# Patient Record
Sex: MALE | Race: WHITE | NOT HISPANIC OR LATINO | Employment: OTHER | URBAN - NONMETROPOLITAN AREA
[De-identification: names, ages, dates, MRNs, and addresses within clinical notes are randomized per-mention and may not be internally consistent; named-entity substitution may affect disease eponyms.]

---

## 2017-01-17 ENCOUNTER — OFFICE VISIT (OUTPATIENT)
Dept: OTOLARYNGOLOGY | Facility: CLINIC | Age: 72
End: 2017-01-17

## 2017-01-17 VITALS
SYSTOLIC BLOOD PRESSURE: 110 MMHG | BODY MASS INDEX: 27.25 KG/M2 | WEIGHT: 184 LBS | HEART RATE: 80 BPM | HEIGHT: 69 IN | TEMPERATURE: 97.5 F | RESPIRATION RATE: 19 BRPM | DIASTOLIC BLOOD PRESSURE: 80 MMHG

## 2017-01-17 DIAGNOSIS — C90.20 EXTRAMEDULLARY PLASMACYTOMA (HCC): Primary | ICD-10-CM

## 2017-01-17 DIAGNOSIS — I10 ESSENTIAL HYPERTENSION: ICD-10-CM

## 2017-01-17 DIAGNOSIS — Z92.3 HISTORY OF RADIATION THERAPY: ICD-10-CM

## 2017-01-17 PROCEDURE — 99213 OFFICE O/P EST LOW 20 MIN: CPT | Performed by: OTOLARYNGOLOGY

## 2017-01-17 NOTE — MR AVS SNAPSHOT
Manjit Seth   1/17/2017 9:00 AM   Office Visit    Dept Phone:  [phone]   Encounter #:  [id number]    Provider:  Jose Krueger MD   Department:  [facility]                Your Full Care Plan              Today's Medication Changes          These changes are accurate as of: 1/17/17  9:57 AM.  If you have any questions, ask your nurse or doctor.               Stop taking medication(s)listed here:     glipiZIDE 10 MG tablet   Commonly known as:  GLUCOTROL   Stopped by:  Jose Krueger MD           metFORMIN 500 MG tablet   Commonly known as:  GLUCOPHAGE   Stopped by:  Jose Krueger MD                      Your Updated Medication List          This list is accurate as of: 1/17/17  9:57 AM.  Always use your most recent med list.                aspirin 81 MG EC tablet       atenolol 50 MG tablet   Commonly known as:  TENORMIN       AYR SALINE NASAL DROPS 0.65 % (SOLN) solution   Generic drug:  Saline       Cranberry 400 MG capsule       finasteride 5 MG tablet   Commonly known as:  PROSCAR       fish oil 1000 MG capsule capsule       Garlic 10 MG capsule       lisinopril 5 MG tablet   Commonly known as:  PRINIVIL,ZESTRIL       MULTIVITAMIN ADULT PO       simvastatin 20 MG tablet   Commonly known as:  ZOCOR       triamterene-hydrochlorothiazide 37.5-25 MG per tablet   Commonly known as:  MAXZIDE-25       Vitamin D2 400 UNITS tablet               You Were Diagnosed With        Codes Comments    Extramedullary plasmacytoma    -  Primary ICD-10-CM: C90.20  ICD-9-CM: 238.6     History of radiation therapy     ICD-10-CM: Z92.3  ICD-9-CM: V15.3     Essential hypertension     ICD-10-CM: I10  ICD-9-CM: 401.9       Instructions     None    Patient Instructions History      Upcoming Appointments     Visit Type Date Time Department    FOLLOW UP 1/17/2017  9:00 AM [facility]    FOLLOW UP 7/18/2017  9:15 AM [facility]      MyChart Signup     [facility]  "[employer] allows you to send messages to your doctor, view your test results, renew your prescriptions, schedule appointments, and more. To sign up, go to [employer] and click on the Sign Up Now link in the New User? box. Enter your [employer] Activation Code exactly as it appears below along with the last four digits of your Social Security Number and your Date of Birth () to complete the sign-up process. If you do not sign up before the expiration date, you must request a new code.    [employer] Activation Code: [email or web address]  Expires: 2017  9:57 AM    If you have questions, you can email [email or web address] or call [phone] to talk to our [employer] staff. Remember, [employer] is NOT to be used for urgent needs. For medical emergencies, dial 911.               Other Info from Your Visit           Your Appointments     2017  9:15 AM CDT   Follow Up with Jose Krueger MD   [facility] (--)    [address] [place] [address]   [address]  [facility] [address] [phone]           Arrive 15 minutes prior to appointment.              Allergies     No Known Allergies      Reason for Visit     Follow-up Nasal mass      Vital Signs     Blood Pressure Pulse Temperature Respirations Height Weight    110/80 (Patient Position: Sitting) 80 97.5 °F (36.4 °C) 19 69\" (175.3 cm) 184 lb (83.5 kg)    Body Mass Index Smoking Status                27.17 kg/m2 Former Smoker          Problems and Diagnoses Noted     Extramedullary plasmacytoma    History of therapeutic radiation    High blood pressure        "

## 2017-01-17 NOTE — PATIENT INSTRUCTIONS
No evidence of disease  Call or return for problems, particularly recurrent mass, nasal obstruction, nasal congestion, dryness, bleeding or any other nasal problems  Follow-up with Dr. Stratton has recommended  Follow-up in 6 months

## 2017-01-17 NOTE — PROGRESS NOTES
YOB: 1945  Location: [facility]  Location Address: [address] [place] [address], [facility], [address] [place] [address]  Location Phone: [phone]    Chief Complaint   Patient presents with   • Follow-up     Nasal mass       History of Present Illness  Manjit Seth is a 71 y.o. male.  Manjit Seth is here for follow up of ENT complaints. 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 Patient has completed treatment and Dr. Saleem released him last week. He states he is doing well and has no complaints today. He denies nasal congestion, drainage, oral lesions and sinus pressure.     He has no crusting and denies nasal complaints.               Past Medical History   Diagnosis Date   • Bell's palsy      with facial tremor   • Diabetes mellitus    • Extramedullary plasmacytoma      left nose   • History of radiation therapy 2016     4000 cGy to nose completed on 3/28/16   • History of tobacco abuse    • Hyperlipidemia    • Hypertension        Past Surgical History   Procedure Laterality Date   • Colonoscopy     • Nasal endoscopy       bilateral with excisional biopsy of left intranasal mass 16         Current Outpatient Prescriptions:   •  aspirin 81 MG EC tablet, Take 81 mg by mouth Daily., Disp: , Rfl:   •  atenolol (TENORMIN) 50 MG tablet, Take 50 mg by mouth Daily., Disp: , Rfl:   •  Cranberry 400 MG capsule, Take  by mouth., Disp: , Rfl:   •  Ergocalciferol (VITAMIN D2) 400 UNITS tablet, Take  by mouth., Disp: , Rfl:   •  finasteride (PROSCAR) 5 MG tablet, Take 5 mg by mouth Daily., Disp: , Rfl:   •  Garlic 10 MG capsule, Take  by mouth., Disp: , Rfl:   •  lisinopril (PRINIVIL,ZESTRIL) 5 MG tablet, Take 5 mg by mouth Daily., Disp: , Rfl:   •  Multiple Vitamins-Minerals (MULTIVITAMIN ADULT PO), Take  by mouth., Disp: , Rfl:   •  Omega-3 Fatty Acids (FISH OIL) 1000 MG capsule capsule, Take  by mouth Daily With Breakfast., Disp: , Rfl:   •  Saline (AYR SALINE  NASAL DROPS) 0.65 % (SOLN) solution, into each nostril., Disp: , Rfl:   •  simvastatin (ZOCOR) 20 MG tablet, Take 20 mg by mouth Every Night., Disp: , Rfl:   •  triamterene-hydrochlorothiazide (MAXZIDE-25) 37.5-25 MG per tablet, Take 1 tablet by mouth Daily., Disp: , Rfl:     Review of patient's allergies indicates no known allergies.    Family History   Problem Relation Age of Onset   • Breast cancer Mother    • Heart failure Father    • Cancer Maternal Uncle        Social History     Social History   • Marital status:      Spouse name: N/A   • Number of children: N/A   • Years of education: N/A     Occupational History   • CRNA      Social History Main Topics   • Smoking status: Former Smoker     Types: Cigarettes   • Smokeless tobacco: Current User     Types: Chew   • Alcohol use No   • Drug use: No   • Sexual activity: Defer     Other Topics Concern   • Not on file     Social History Narrative       Review of Systems   Constitutional: Negative.    HENT: Negative.    Eyes: Negative.    Respiratory: Negative.    Cardiovascular: Negative.    Gastrointestinal: Negative.    Endocrine: Negative.    Genitourinary: Negative.    Musculoskeletal: Negative.    Skin: Negative.    Allergic/Immunologic: Negative.    Neurological: Negative.    Hematological: Negative.    Psychiatric/Behavioral: Negative.        Vitals:    01/17/17 0903   BP: 110/80   Pulse: 80   Resp: 19   Temp: 97.5 °F (36.4 °C)       Objective     Physical Exam  CONSTITUTIONAL: well nourished, alert, oriented, in no acute distress     COMMUNICATION AND VOICE: able to communicate normally, normal voice quality    HEAD: normocephalic, no lesions, atraumatic, no tenderness, no masses     FACE: appearance normal, no lesions, no tenderness, no deformities, facial motion symmetric    SALIVARY GLANDS: parotid glands with no tenderness, no swelling, no masses, submandibular glands with normal size, nontender    EYES: ocular motility normal, eyelids normal,  orbits normal, no proptosis, conjunctiva normal , pupils equal, round     EARS:  Hearing: response to conversational voice normal bilaterally   External Ears: auricles without lesions  Otoscopic: tympanic membrane appearance normal, no lesions, no perforation, normal mobility, no fluid    NOSE:  External Nose: structure normal, no tenderness on palpation, no nasal discharge, no lesions, no evidence of trauma, nostrils patent   Intranasal Exam: nasal mucosa with mild hyperemia and no hypervascularity no evidence of recurrent tumor, vestibule within normal limits, inferior turbinate normal, nasal septum with moderate deviation to the left of the midline  Nasopharynx:     ORAL:  Lips: upper and lower lips without lesion   Teeth: dentition within normal limits for age   Gums: gingivae healthy   Oral Mucosa: oral mucosa normal, no mucosal lesions   Floor of Mouth: Warthin’s duct patent, mucosa normal  Tongue: lingual mucosa normal without lesions, normal tongue mobility   Palate: soft and hard palates with normal mucosa and structure  Oropharynx: oropharyngeal mucosa normal    HYPOPHARYNX:   LARYNX:     NECK: neck appearance normal, no mass,  noted without erythema or tenderness    THYROID: no overt thyromegaly, no tenderness, nodules or mass present on palpation, position midline     LYMPH NODES: no lymphadenopathy    CHEST/RESPIRATORY: respiratory effort normal, normal breath sounds     CARDIOVASCULAR: rate and rhythm normal, extremities without cyanosis or edema      NEUROLOGIC/PSYCHIATRIC: oriented to time, place and person, mood normal, affect appropriate, CN II-XII intact grossly    Assessment/Plan   Manjit was seen today for follow-up.    Diagnoses and all orders for this visit:    Extramedullary plasmacytoma- left nose    History of radiation therapy- Left Nose    Essential hypertension      * Surgery not found *  No orders of the defined types were placed in this encounter.    Return in about 6 months (around  7/17/2017) for Recheck.       Patient Instructions   No evidence of disease  Call or return for problems, particularly recurrent mass, nasal obstruction, nasal congestion, dryness, bleeding or any other nasal problems  Follow-up with Dr. Stratton has recommended  Follow-up in 6 months

## 2017-07-18 ENCOUNTER — OFFICE VISIT (OUTPATIENT)
Dept: OTOLARYNGOLOGY | Facility: CLINIC | Age: 72
End: 2017-07-18

## 2017-07-18 VITALS
BODY MASS INDEX: 25.18 KG/M2 | HEART RATE: 55 BPM | DIASTOLIC BLOOD PRESSURE: 69 MMHG | WEIGHT: 170 LBS | SYSTOLIC BLOOD PRESSURE: 142 MMHG | TEMPERATURE: 97.1 F | HEIGHT: 69 IN

## 2017-07-18 DIAGNOSIS — Z92.3 HISTORY OF RADIATION THERAPY: ICD-10-CM

## 2017-07-18 DIAGNOSIS — C90.20 EXTRAMEDULLARY PLASMACYTOMA (HCC): Primary | ICD-10-CM

## 2017-07-18 PROCEDURE — 99214 OFFICE O/P EST MOD 30 MIN: CPT | Performed by: OTOLARYNGOLOGY

## 2017-07-18 PROCEDURE — 31231 NASAL ENDOSCOPY DX: CPT | Performed by: OTOLARYNGOLOGY

## 2017-07-18 RX ORDER — SIMVASTATIN 40 MG
TABLET ORAL
COMMUNITY
Start: 2017-07-08

## 2017-07-18 NOTE — PROGRESS NOTES
PROCEDURE NOTE    Manjit Seth    DATE OF PROCEDURE: 7/18/17    PROCEDURE:   Bilateral Diagnostic Rigid Nasal Endoscopy    PREPROCEDURE DIAGNOSIS:   History of extra medullary plasma cell side, the nasal cavity  Status post radiation therapy  History of epistaxis    POSTPROCEDURE DIAGNOSIS:  SAME    ANESTHESIA:   None    PROCEDURE DESCRIPTION:    With the patient in the chair bilateral diagnostic rigid nasal endoscopy was performed with the Stortz 0° endoscope without difficulty.     An endoscope was passed along the left nasal floor to the nasopharynx.  It was then passed into the region of the middle meatus, middle turbinate, and the sphenoethmoid region. An identical procedure was performed on the right side.  The following findings were noted as stated below:    Findings: Mild right septal deviation associated with inflammation of the left nasal cavity is mild hypervascularity.  No evidence of recent bleeding.  Prior to the last episode of bleeding several months ago the patient had noticed a decrease in his utilization of Bactroban and it was during the closing weeks of winter.  No evidence of recurrence noted.    Condition:  Stable.  Patient tolerated procedure well.    Complications:  None  There was no significant bleeding.

## 2017-07-18 NOTE — PROGRESS NOTES
YOB: 1945  Location: [facility]  Location Address: [address] [place] [address], [facility] [address], [address] [place] [address]  Location Phone: [phone]    Chief Complaint   Patient presents with   • Follow-up     Extramedullary plasmacytoma- left nose       History of Present Illness  Manjit Seth is a 71 y.o. male.  Manjit Seth is here for follow up of ENT complaints. 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 Patient has completed treatment and Dr. Saleem released him last week. He states he is doing well, but has had a couple of mild nosebleeds. He denies nasal congestion, drainage, oral lesions and sinus pressure.     He has no crusting and denies nasal complaints.               Past Medical History:   Diagnosis Date   • Bell's palsy     with facial tremor   • Diabetes mellitus    • Extramedullary plasmacytoma     left nose   • History of radiation therapy 2016    4000 cGy to nose completed on 3/28/16   • History of tobacco abuse    • Hyperlipidemia    • Hypertension        Past Surgical History:   Procedure Laterality Date   • COLONOSCOPY     • NASAL ENDOSCOPY      bilateral with excisional biopsy of left intranasal mass 16         Current Outpatient Prescriptions:   •  aspirin 81 MG EC tablet, Take 81 mg by mouth Daily., Disp: , Rfl:   •  atenolol (TENORMIN) 50 MG tablet, Take 50 mg by mouth Daily., Disp: , Rfl:   •  Cranberry 400 MG capsule, Take  by mouth., Disp: , Rfl:   •  Ergocalciferol (VITAMIN D2) 400 UNITS tablet, Take  by mouth., Disp: , Rfl:   •  finasteride (PROSCAR) 5 MG tablet, Take 5 mg by mouth Daily., Disp: , Rfl:   •  Garlic 10 MG capsule, Take  by mouth., Disp: , Rfl:   •  lisinopril (PRINIVIL,ZESTRIL) 5 MG tablet, Take 5 mg by mouth Daily., Disp: , Rfl:   •  Multiple Vitamins-Minerals (MULTIVITAMIN ADULT PO), Take  by mouth., Disp: , Rfl:   •  mupirocin (BACTROBAN) 2 % ointment, Apply intranasally bid, Disp: 22 g, Rfl: 3  •  Omega-3 Fatty  Acids (FISH OIL) 1000 MG capsule capsule, Take  by mouth Daily With Breakfast., Disp: , Rfl:   •  Saline (AYR SALINE NASAL DROPS) 0.65 % (SOLN) solution, into each nostril., Disp: , Rfl:   •  simvastatin (ZOCOR) 40 MG tablet, , Disp: , Rfl:   •  triamterene-hydrochlorothiazide (MAXZIDE-25) 37.5-25 MG per tablet, Take 1 tablet by mouth Daily., Disp: , Rfl:     Review of patient's allergies indicates no known allergies.    Family History   Problem Relation Age of Onset   • Breast cancer Mother    • Heart failure Father    • Cancer Maternal Uncle        Social History     Social History   • Marital status:      Spouse name: N/A   • Number of children: N/A   • Years of education: N/A     Occupational History   • CRNA      Social History Main Topics   • Smoking status: Former Smoker     Types: Cigarettes   • Smokeless tobacco: Current User     Types: Chew   • Alcohol use No   • Drug use: No   • Sexual activity: Defer     Other Topics Concern   • Not on file     Social History Narrative       Review of Systems   Constitutional: Negative.    HENT: Positive for nosebleeds.    Eyes: Negative.    Respiratory: Negative.    Cardiovascular: Negative.    Gastrointestinal: Negative.    Endocrine: Negative.    Genitourinary: Negative.    Musculoskeletal: Negative.    Skin: Negative.    Allergic/Immunologic: Negative.    Neurological: Negative.    Hematological: Negative.    Psychiatric/Behavioral: Negative.        Vitals:    07/18/17 0911   BP: 142/69   Pulse: 55   Temp: 97.1 °F (36.2 °C)       Objective     Physical Exam  CONSTITUTIONAL: well nourished, alert, oriented, in no acute distress     COMMUNICATION AND VOICE: able to communicate normally, normal voice quality    HEAD: normocephalic, no lesions, atraumatic, no tenderness, no masses     FACE: appearance normal, no lesions, no tenderness, no deformities, facial motion symmetric    SALIVARY GLANDS: parotid glands with no tenderness, no swelling, no masses,  submandibular glands with normal size, nontender    EYES: ocular motility normal, eyelids normal, orbits normal, no proptosis, conjunctiva normal , pupils equal, round     EARS:  Hearing: response to conversational voice normal bilaterally   External Ears: auricles without lesions  Otoscopic: tympanic membrane appearance normal, no lesions, no perforation, normal mobility, no fluid    NOSE:  External Nose: structure normal, no tenderness on palpation, no nasal discharge, no lesions, no evidence of trauma, nostrils patent   Intranasal Exam: nasal mucosa with mild hyperemia with dryness and no hypervascularity no evidence of recurrent tumor, vestibule within normal limits, inferior turbinate normal, nasal septum with moderate deviation to the left of the midline  Nasopharynx:     ORAL:  Lips: upper and lower lips without lesion   Teeth: dentition within normal limits for age   Gums: gingivae healthy   Oral Mucosa: oral mucosa normal, no mucosal lesions   Floor of Mouth: Warthin’s duct patent, mucosa normal  Tongue: lingual mucosa normal without lesions, normal tongue mobility   Palate: soft and hard palates with normal mucosa and structure  Oropharynx: oropharyngeal mucosa normal    HYPOPHARYNX:   LARYNX:     NECK: neck appearance normal, no mass,  noted without erythema or tenderness    THYROID: no overt thyromegaly, no tenderness, nodules or mass present on palpation, position midline     LYMPH NODES: no lymphadenopathy    CHEST/RESPIRATORY: respiratory effort normal, normal breath sounds     CARDIOVASCULAR: rate and rhythm normal, extremities without cyanosis or edema      NEUROLOGIC/PSYCHIATRIC: oriented to time, place and person, mood normal, affect appropriate, CN II-XII intact grossly    Assessment/Plan   Manjit was seen today for follow-up.    Diagnoses and all orders for this visit:    Extramedullary plasmacytoma- left nose    History of radiation therapy- Left Nose      * Surgery not found *  No orders of the  defined types were placed in this encounter.    Return in about 6 months (around 1/18/2018) for Recheck nose Extramedullary plasmacytoma.       Patient Instructions   Be aware of the signs and symptoms of head and neck cancer including neck mass, persistent sore throat, ear pain, hemoptysis, weight loss and hoarseness. If any of these symptoms occur, call for evaluation.

## 2018-03-19 NOTE — PROGRESS NOTES
YOB: 1945  Location: [facility]  Location Address: [address] [place] [address], [facility], [address] [place] [address]  Location Phone: [phone]    Chief Complaint   Patient presents with   • Follow-up     nose       History of Present Illness  Manjit Seth is a 71 y.o. male.  Manjit Seth is here for follow up of ENT complaints. 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 Patient has completed treatment and Dr. Saleem has released him  Patient has no complaints today.  He denies nasal congestion, nasal drainage, epistaxis, oral lesions and sinus pressure.                             Past Medical History:   Diagnosis Date   • Bell's palsy     with facial tremor   • Diabetes mellitus    • Extramedullary plasmacytoma     left nose   • History of radiation therapy 2016    4000 cGy to nose completed on 3/28/16   • History of tobacco abuse    • Hyperlipidemia    • Hypertension        Past Surgical History:   Procedure Laterality Date   • COLONOSCOPY     • NASAL ENDOSCOPY      bilateral with excisional biopsy of left intranasal mass 16       Outpatient Prescriptions Marked as Taking for the 3/20/18 encounter (Office Visit) with Jose Krueger MD   Medication Sig Dispense Refill   • aspirin 81 MG EC tablet Take 81 mg by mouth Daily.     • atenolol (TENORMIN) 50 MG tablet Take 50 mg by mouth Daily.     • Cranberry 400 MG capsule Take  by mouth.     • Ergocalciferol (VITAMIN D2) 400 UNITS tablet Take  by mouth.     • finasteride (PROSCAR) 5 MG tablet Take 5 mg by mouth Daily.     • Garlic 10 MG capsule Take  by mouth.     • lisinopril (PRINIVIL,ZESTRIL) 5 MG tablet Take 5 mg by mouth Daily.     • Multiple Vitamins-Minerals (MULTIVITAMIN ADULT PO) Take  by mouth.     • mupirocin (BACTROBAN) 2 % ointment Apply intranasally bid 22 g 11   • Omega-3 Fatty Acids (FISH OIL) 1000 MG capsule capsule Take  by mouth Daily With Breakfast.     • Saline (AYR SALINE NASAL  DROPS) 0.65 % (SOLN) solution into each nostril.     • simvastatin (ZOCOR) 40 MG tablet      • triamterene-hydrochlorothiazide (MAXZIDE-25) 37.5-25 MG per tablet Take 1 tablet by mouth Daily.         Review of patient's allergies indicates no known allergies.    Family History   Problem Relation Age of Onset   • Breast cancer Mother    • Heart failure Father    • Cancer Maternal Uncle        Social History     Social History   • Marital status:      Spouse name: N/A   • Number of children: N/A   • Years of education: N/A     Occupational History   • CRNA      Social History Main Topics   • Smoking status: Former Smoker     Types: Cigarettes   • Smokeless tobacco: Current User     Types: Chew   • Alcohol use No   • Drug use: No   • Sexual activity: Defer     Other Topics Concern   • Not on file     Social History Narrative   • No narrative on file       Review of Systems   Constitutional: Negative.    HENT: Negative.    Eyes: Negative.    Respiratory: Negative.    Cardiovascular: Negative.    Gastrointestinal: Negative.    Endocrine: Negative.    Genitourinary: Negative.    Musculoskeletal: Negative.    Skin: Negative.    Allergic/Immunologic: Negative.    Neurological: Negative.    Hematological: Negative.    Psychiatric/Behavioral: Negative.        Vitals:    03/20/18 0906   BP: 120/72   Pulse: 68   Temp: 97.3 °F (36.3 °C)       Body mass index is 25.7 kg/m².    Objective     Physical Exam  CONSTITUTIONAL: well nourished, alert, oriented, in no acute distress     COMMUNICATION AND VOICE: able to communicate normally, normal voice quality    HEAD: normocephalic, no lesions, atraumatic, no tenderness, no masses     FACE: appearance normal, no lesions, no tenderness, no deformities, facial motion symmetric    SALIVARY GLANDS: parotid glands with no tenderness, no swelling, no masses, submandibular glands with normal size, nontender    EYES: ocular motility normal, eyelids normal, orbits normal, no proptosis,  conjunctiva normal , pupils equal, round     EARS:  Hearing: response to conversational voice normal bilaterally   External Ears: auricles without lesions  Otoscopic: tympanic membrane appearance normal, no lesions, no perforation, normal mobility, no fluid    NOSE:  External Nose: structure normal, no tenderness on palpation, no nasal discharge, no lesions, no evidence of trauma, nostrils patent   Intranasal Exam: nasal mucosa normal, vestibule within normal limits, inferior turbinate normal, nasal septum midline   Nasopharynx:     ORAL:  Lips: upper and lower lips without lesion   Teeth:  Gums: gingivae healthy   Oral Mucosa: oral mucosa normal, no mucosal lesions   Floor of Mouth: Warthin’s duct patent, mucosa normal  Tongue: lingual mucosa normal without lesions, normal tongue mobility   Palate: soft and hard palates with normal mucosa and structure  Oropharynx: oropharyngeal mucosa normal    HYPOPHARYNX:   LARYNX:     NECK: neck appearance normal, no mass,  noted without erythema or tenderness    THYROID: no overt thyromegaly, no tenderness, nodules or mass present on palpation, position midline     LYMPH NODES: no lymphadenopathy    CHEST/RESPIRATORY: respiratory effort normal, normal breath sounds     CARDIOVASCULAR: rate and rhythm normal, extremities without cyanosis or edema      NEUROLOGIC/PSYCHIATRIC: oriented to time, place and person, mood normal, affect appropriate, CN II-XII intact grossly    Assessment/Plan   Manjit was seen today for follow-up.    Diagnoses and all orders for this visit:    Extramedullary plasmacytoma- left nose    History of radiation therapy- Left Nose      * Surgery not found *  No orders of the defined types were placed in this encounter.    No Follow-up on file.       There are no Patient Instructions on file for this visit.

## 2018-03-20 ENCOUNTER — OFFICE VISIT (OUTPATIENT)
Dept: OTOLARYNGOLOGY | Facility: CLINIC | Age: 73
End: 2018-03-20

## 2018-03-20 VITALS
DIASTOLIC BLOOD PRESSURE: 72 MMHG | TEMPERATURE: 97.3 F | SYSTOLIC BLOOD PRESSURE: 120 MMHG | HEART RATE: 68 BPM | BODY MASS INDEX: 25.77 KG/M2 | HEIGHT: 69 IN | WEIGHT: 174 LBS

## 2018-03-20 DIAGNOSIS — Z92.3 HISTORY OF RADIATION THERAPY: ICD-10-CM

## 2018-03-20 DIAGNOSIS — C90.20 EXTRAMEDULLARY PLASMACYTOMA (HCC): Primary | ICD-10-CM

## 2018-03-20 PROCEDURE — 99213 OFFICE O/P EST LOW 20 MIN: CPT | Performed by: OTOLARYNGOLOGY

## 2018-03-20 PROCEDURE — 31231 NASAL ENDOSCOPY DX: CPT | Performed by: OTOLARYNGOLOGY

## 2018-03-20 NOTE — PROGRESS NOTES
PROCEDURE NOTE    Manjit Seth    DATE OF PROCEDURE: [unfilled]    PROCEDURE:   Bilateral Diagnostic Rigid Nasal Endoscopy    PREPROCEDURE DIAGNOSIS:   History of extra medullary plasma cell cytoma,-nasal    POSTPROCEDURE DIAGNOSIS:  SAME    ANESTHESIA:   None    PROCEDURE DESCRIPTION:    With the patient in the chair bilateral diagnostic rigid nasal endoscopy was performed with the Stortz 0° endoscope without difficulty.     An endoscope was passed along the left nasal floor to the nasopharynx.  It was then passed into the region of the middle meatus, middle turbinate, and the sphenoethmoid region. An identical procedure was performed on the right side.  The following findings were noted as stated below:    Findings: Mild left septal deflection with minimal excoriation the nasal vestibules left greater than right.  No mass, polyp, lesion or ulceration or other abnormality was appreciated.  The choana were patent.    Condition:  Stable.  Patient tolerated procedure well.    Complications:  None  There was no significant bleeding.

## 2018-03-20 NOTE — PATIENT INSTRUCTIONS
Call or return for problems  Keep follow-up with Dr. Medel  Follow-up in 6 months        IF YOU SMOKE OR USE TOBACCO PLEASE READ THE FOLLOWING:    Why is smoking bad for me?  Smoking increases the risk of heart disease, lung disease, vascular disease, stroke, and cancer.     If you smoke, STOP!    If you would like more information on quitting smoking, please visit the [employer] website: [email or web address]   This link will provide additional resources including the QUIT line and the Beat the Pack support groups.     For more information:    Quit Now [facility]  1-800-QUIT-NOW  [email or web address]    MyPlate from [employer]  The general, healthful diet is based on the 2010 Dietary Guidelines for Americans. The amount of food you need to eat from each food group depends on your age, sex, and level of physical activity and can be individualized by a dietitian. Go to [employer].gov for more information.  What do I need to know about the MyPlate plan?  · Enjoy your food, but eat less.  · Avoid oversized portions.  ¨ ½ of your plate should include fruits and vegetables.  ¨ ¼ of your plate should be grains.  ¨ ¼ of your plate should be protein.  Grains   · Make at least half of your grains whole grains.  · For a 2,000 calorie daily food plan, eat 6 oz every day.  · 1 oz is about 1 slice bread, 1 cup cereal, or ½ cup cooked rice, cereal, or pasta.  Vegetables   · Make half your plate fruits and vegetables.  · For a 2,000 calorie daily food plan, eat 2½ cups every day.  · 1 cup is about 1 cup raw or cooked vegetables or vegetable juice or 2 cups raw leafy greens.  Fruits   · Make half your plate fruits and vegetables.  · For a 2,000 calorie daily food plan, eat 2 cups every day.  · 1 cup is about 1 cup fruit or 100% fruit juice or ½ cup dried fruit.  Protein   · For a 2,000 calorie daily food plan, eat 5½ oz every day.  · 1 oz is about 1 oz meat, poultry, or  fish, ¼ cup cooked beans, 1 egg, 1 Tbsp peanut butter, or ½ oz nuts or seeds.  Dairy   · Switch to fat-free or low-fat (1%) milk.  · For a 2,000 calorie daily food plan, eat 3 cups every day.  · 1 cup is about 1 cup milk or yogurt or soy milk (soy beverage), 1½ oz natural cheese, or 2 oz processed cheese.  Fats, Oils, and Empty Calories   · Only small amounts of oils are recommended.  · Empty calories are calories from solid fats or added sugars.  · Compare sodium in foods like soup, bread, and frozen meals. Choose the foods with lower numbers.  · Drink water instead of sugary drinks.  What foods can I eat?  Grains   Whole grains such as whole wheat, quinoa, millet, and bulgur. Bread, rolls, and pasta made from whole grains. Brown or wild rice. Hot or cold cereals made from whole grains and without added sugar.  Vegetables   All fresh vegetables, especially fresh red, dark green, or orange vegetables. Peas and beans. Low-sodium frozen or canned vegetables prepared without added salt. Low-sodium vegetable juices.  Fruits   All fresh, frozen, and dried fruits. Canned fruit packed in water or fruit juice without added sugar. Fruit juices without added sugar.  Meats and Other Protein Sources   Boiled, baked, or grilled lean meat trimmed of fat. Skinless poultry. Fresh seafood and shellfish. Canned seafood packed in water. Unsalted nuts and unsalted nut butters. Tofu. Dried beans and pea. Eggs.  Dairy   Low-fat or fat-free milk, yogurt, and cheeses.  Sweets and Desserts   Frozen desserts made from low-fat milk.  Fats and Oils   Olive, peanut, and canola oils and margarine. Salad dressing and mayonnaise made from these oils.  Other   Soups and casseroles made from allowed ingredients and without added fat or salt.  The items listed above may not be a complete list of recommended foods or beverages. Contact your dietitian for more options.   What foods are not recommended?  Grains   Sweetened, low-fiber cereals. Packaged  baked goods. Snack crackers and chips. Cheese crackers, butter crackers, and biscuits. Frozen waffles, sweet breads, doughnuts, pastries, packaged baking mixes, pancakes, cakes, and cookies.  Vegetables   Regular canned or frozen vegetables or vegetables prepared with salt. Canned tomatoes. Canned tomato sauce. Fried vegetables. Vegetables in cream sauce or cheese sauce.  Fruits   Fruits packed in syrup or made with added sugar.  Meats and Other Protein Sources   Marbled or fatty meats such as ribs. Poultry with skin. Fried meats, poultry, eggs, or fish. Sausages, hot dogs, and deli meats such as pastrami, bologna, or salami.  Dairy   Whole milk, cream, cheeses made from whole milk, sour cream. Ice cream or yogurt made from whole milk or with added sugar.  Beverages   For adults, no more than one alcoholic drink per day. Regular soft drinks or other sugary beverages. Juice drinks.  Sweets and Desserts   Sugary or fatty desserts, candy, and other sweets.  Fats and Oils   Solid shortening or partially hydrogenated oils. Solid margarine. Margarine that contains trans fats. Butter.  The items listed above may not be a complete list of foods and beverages to avoid. Contact your dietitian for more information.   This information is not intended to replace advice given to you by your health care provider. Make sure you discuss any questions you have with your health care provider.  Document Released: 01/06/2009 Document Revised: 05/25/2017 Document Reviewed: 11/26/2014  [employer]vier Interactive Patient Education © 2017 [employer]     Calorie Counting for Weight Loss  Calories are units of energy. Your body needs a certain amount of calories from food to keep you going throughout the day. When you eat more calories than your body needs, your body stores the extra calories as fat. When you eat fewer calories than your body needs, your body burns fat to get the energy it needs.  Calorie counting means keeping track of how many  calories you eat and drink each day. Calorie counting can be helpful if you need to lose weight. If you make sure to eat fewer calories than your body needs, you should lose weight. Ask your health care provider what a healthy weight is for you.  For calorie counting to work, you will need to eat the right number of calories in a day in order to lose a healthy amount of weight per week. A dietitian can help you determine how many calories you need in a day and will give you suggestions on how to reach your calorie goal.  · A healthy amount of weight to lose per week is usually 1-2 lb (0.5-0.9 kg). This usually means that your daily calorie intake should be reduced by 500-750 calories.  · Eating 1,200 - 1,500 calories per day can help most women lose weight.  · Eating 1,500 - 1,800 calories per day can help most men lose weight.  What is my plan?  My goal is to have __________ calories per day.  If I have this many calories per day, I should lose around __________ pounds per week.  What do I need to know about calorie counting?  In order to meet your daily calorie goal, you will need to:  · Find out how many calories are in each food you would like to eat. Try to do this before you eat.  · Decide how much of the food you plan to eat.  · Write down what you ate and how many calories it had. Doing this is called keeping a food log.  To successfully lose weight, it is important to balance calorie counting with a healthy lifestyle that includes regular activity. Aim for 150 minutes of moderate exercise (such as walking) or 75 minutes of vigorous exercise (such as running) each week.  Where do I find calorie information?     The number of calories in a food can be found on a Nutrition Facts label. If a food does not have a Nutrition Facts label, try to look up the calories online or ask your dietitian for help.  Remember that calories are listed per serving. If you choose to have more than one serving of a food, you will  "have to multiply the calories per serving by the amount of servings you plan to eat. For example, the label on a package of bread might say that a serving size is 1 slice and that there are 90 calories in a serving. If you eat 1 slice, you will have eaten 90 calories. If you eat 2 slices, you will have eaten 180 calories.  How do I keep a food log?  Immediately after each meal, record the following information in your food log:  · What you ate. Don't forget to include toppings, sauces, and other extras on the food.  · How much you ate. This can be measured in cups, ounces, or number of items.  · How many calories each food and drink had.  · The total number of calories in the meal.  Keep your food log near you, such as in a small notebook in your pocket, or use a mobile mary kay or website. Some programs will calculate calories for you and show you how many calories you have left for the day to meet your goal.  What are some calorie counting tips?  · Use your calories on foods and drinks that will fill you up and not leave you hungry:  ¨ Some examples of foods that fill you up are nuts and nut butters, vegetables, lean proteins, and high-fiber foods like whole grains. High-fiber foods are foods with more than 5 g fiber per serving.  ¨ Drinks such as sodas, specialty coffee drinks, alcohol, and juices have a lot of calories, yet do not fill you up.  · Eat nutritious foods and avoid empty calories. Empty calories are calories you get from foods or beverages that do not have many vitamins or protein, such as candy, sweets, and soda. It is better to have a nutritious high-calorie food (such as an avocado) than a food with few nutrients (such as a bag of chips).  · Know how many calories are in the foods you eat most often. This will help you calculate calorie counts faster.  · Pay attention to calories in drinks. Low-calorie drinks include water and unsweetened drinks.  · Pay attention to nutrition labels for \"low fat\" or " "\"fat free\" foods. These foods sometimes have the same amount of calories or more calories than the full fat versions. They also often have added sugar, starch, or salt, to make up for flavor that was removed with the fat.  · Find a way of tracking calories that works for you. Get creative. Try different apps or programs if writing down calories does not work for you.  What are some portion control tips?  · Know how many calories are in a serving. This will help you know how many servings of a certain food you can have.  · Use a measuring cup to measure serving sizes. You could also try weighing out portions on a kitchen scale. With time, you will be able to estimate serving sizes for some foods.  · Take some time to put servings of different foods on your favorite plates, bowls, and cups so you know what a serving looks like.  · Try not to eat straight from a bag or box. Doing this can lead to overeating. Put the amount you would like to eat in a cup or on a plate to make sure you are eating the right portion.  · Use smaller plates, glasses, and bowls to prevent overeating.  · Try not to multitask (for example, watch TV or use your computer) while eating. If it is time to eat, sit down at a table and enjoy your food. This will help you to know when you are full. It will also help you to be aware of what you are eating and how much you are eating.  What are tips for following this plan?  Reading food labels   · Check the calorie count compared to the serving size. The serving size may be smaller than what you are used to eating.  · Check the source of the calories. Make sure the food you are eating is high in vitamins and protein and low in saturated and trans fats.  Shopping   · Read nutrition labels while you shop. This will help you make healthy decisions before you decide to purchase your food.  · Make a grocery list and stick to it.  Cooking   · Try to cook your favorite foods in a healthier way. For example, " try baking instead of frying.  · Use low-fat dairy products.  Meal planning   · Use more fruits and vegetables. Half of your plate should be fruits and vegetables.  · Include lean proteins like poultry and fish.  How do I count calories when eating out?  · Ask for smaller portion sizes.  · Consider sharing an entree and sides instead of getting your own entree.  · If you get your own entree, eat only half. Ask for a box at the beginning of your meal and put the rest of your entree in it so you are not tempted to eat it.  · If calories are listed on the menu, choose the lower calorie options.  · Choose dishes that include vegetables, fruits, whole grains, low-fat dairy products, and lean protein.  · Choose items that are boiled, broiled, grilled, or steamed. Stay away from items that are buttered, battered, fried, or served with cream sauce. Items labeled “crispy” are usually fried, unless stated otherwise.  · Choose water, low-fat milk, unsweetened iced tea, or other drinks without added sugar. If you want an alcoholic beverage, choose a lower calorie option such as a glass of wine or light beer.  · Ask for dressings, sauces, and syrups on the side. These are usually high in calories, so you should limit the amount you eat.  · If you want a salad, choose a garden salad and ask for grilled meats. Avoid extra toppings like mitchell, cheese, or fried items. Ask for the dressing on the side, or ask for olive oil and vinegar or lemon to use as dressing.  · Estimate how many servings of a food you are given. For example, a serving of cooked rice is ½ cup or about the size of half a baseball. Knowing serving sizes will help you be aware of how much food you are eating at restaurants. The list below tells you how big or small some common portion sizes are based on everyday objects:  ¨ 1 oz--4 stacked dice.  ¨ 3 oz--1 deck of cards.  ¨ 1 tsp--1 die.  ¨ 1 Tbsp--½ a ping-pong ball.  ¨ 2 Tbsp--1 ping-pong ball.  ¨ ½ cup--½  baseball.  ¨ 1 cup--1 baseball.  Summary  · Calorie counting means keeping track of how many calories you eat and drink each day. If you eat fewer calories than your body needs, you should lose weight.  · A healthy amount of weight to lose per week is usually 1-2 lb (0.5-0.9 kg). This usually means reducing your daily calorie intake by 500-750 calories.  · The number of calories in a food can be found on a Nutrition Facts label. If a food does not have a Nutrition Facts label, try to look up the calories online or ask your dietitian for help.  · Use your calories on foods and drinks that will fill you up, and not on foods and drinks that will leave you hungry.  · Use smaller plates, glasses, and bowls to prevent overeating.  This information is not intended to replace advice given to you by your health care provider. Make sure you discuss any questions you have with your health care provider.  Document Released: 12/18/2006 Document Revised: 11/17/2017 Document Reviewed: 11/17/2017  [employer] Interactive Patient Education © 2017 [employer]     Exercising to Lose Weight  Exercising can help you to lose weight. In order to lose weight through exercise, you need to do vigorous-intensity exercise. You can tell that you are exercising with vigorous intensity if you are breathing very hard and fast and cannot hold a conversation while exercising.  Moderate-intensity exercise helps to maintain your current weight. You can tell that you are exercising at a moderate level if you have a higher heart rate and faster breathing, but you are still able to hold a conversation.  How often should I exercise?  Choose an activity that you enjoy and set realistic goals. Your health care provider can help you to make an activity plan that works for you. Exercise regularly as directed by your health care provider. This may include:  · Doing resistance training twice each week, such as:  ¨ Push-ups.  ¨ Sit-ups.  ¨ Lifting  weights.  ¨ Using resistance bands.  · Doing a given intensity of exercise for a given amount of time. Choose from these options:  ¨ 150 minutes of moderate-intensity exercise every week.  ¨ 75 minutes of vigorous-intensity exercise every week.  ¨ A mix of moderate-intensity and vigorous-intensity exercise every week.  Children, pregnant women, people who are out of shape, people who are overweight, and older adults may need to consult a health care provider for individual recommendations. If you have any sort of medical condition, be sure to consult your health care provider before starting a new exercise program.  What are some activities that can help me to lose weight?  · Walking at a rate of at least 4.5 miles an hour.  · Jogging or running at a rate of 5 miles per hour.  · Biking at a rate of at least 10 miles per hour.  · Lap swimming.  · Roller-skating or in-line skating.  · Cross-country skiing.  · Vigorous competitive sports, such as football, basketball, and soccer.  · Jumping rope.  · Aerobic dancing.  How can I be more active in my day-to-day activities?  · Use the stairs instead of the elevator.  · Take a walk during your lunch break.  · If you drive, park your car farther away from work or school.  · If you take public transportation, get off one stop early and walk the rest of the way.  · Make all of your phone calls while standing up and walking around.  · Get up, stretch, and walk around every 30 minutes throughout the day.  What guidelines should I follow while exercising?  · Do not exercise so much that you hurt yourself, feel dizzy, or get very short of breath.  · Consult your health care provider prior to starting a new exercise program.  · Wear comfortable clothes and shoes with good support.  · Drink plenty of water while you exercise to prevent dehydration or heat stroke. Body water is lost during exercise and must be replaced.  · Work out until you breathe faster and your heart beats  faster.  This information is not intended to replace advice given to you by your health care provider. Make sure you discuss any questions you have with your health care provider.  Document Released: 01/20/2012 Document Revised: 05/25/2017 Document Reviewed: 05/21/2015  [employer] Interactive Patient Education © 2017 [employer]

## 2018-09-24 NOTE — PROGRESS NOTES
YOB: 1945  Location: [facility]  Location Address: [address] [place] [address], [facility], [address] [place] [address]  Location Phone: [phone]    Chief Complaint   Patient presents with   • Follow-up     nose       History of Present Illness  Manjit Seth is a 71 y.o. male.  Manjit Seth is here for follow up of ENT complaints. 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 Patient has completed treatment and Dr. Saleem has released him  Patient has no complaints today and states he is feeling great. He denies nasal congestion, nasal drainage, postnasal drainage, headache, epistaxis, oral lesions, neck mass and sinus pressure.   He is without complaints with the exception of a place in the left level I neck which caused some difficulty with shaving.  It is nontender and enlarging very slowly.                         Past Medical History:   Diagnosis Date   • Bell's palsy     with facial tremor   • Diabetes mellitus (CMS/HCC)    • Extramedullary plasmacytoma (CMS/HCC)     left nose   • History of radiation therapy 2016    4000 cGy to nose completed on 3/28/16   • History of tobacco abuse    • Hyperlipidemia    • Hypertension        Past Surgical History:   Procedure Laterality Date   • COLONOSCOPY     • NASAL ENDOSCOPY      bilateral with excisional biopsy of left intranasal mass 16       Outpatient Prescriptions Marked as Taking for the 18 encounter (Office Visit) with Jose Krueger MD   Medication Sig Dispense Refill   • aspirin 81 MG EC tablet Take 81 mg by mouth Daily.     • atenolol (TENORMIN) 50 MG tablet Take 50 mg by mouth Daily.     • Cranberry 400 MG capsule Take  by mouth.     • Ergocalciferol (VITAMIN D2) 400 UNITS tablet Take  by mouth.     • finasteride (PROSCAR) 5 MG tablet Take 5 mg by mouth Daily.     • Garlic 10 MG capsule Take  by mouth.     • lisinopril (PRINIVIL,ZESTRIL) 5 MG tablet Take 5 mg by mouth Daily.     • Multiple  Vitamins-Minerals (MULTIVITAMIN ADULT PO) Take  by mouth.     • mupirocin (BACTROBAN) 2 % ointment Apply intranasally bid 22 g 11   • Omega-3 Fatty Acids (FISH OIL) 1000 MG capsule capsule Take  by mouth Daily With Breakfast.     • Saline (AYR SALINE NASAL DROPS) 0.65 % (SOLN) solution into each nostril.     • simvastatin (ZOCOR) 40 MG tablet      • triamterene-hydrochlorothiazide (MAXZIDE-25) 37.5-25 MG per tablet Take 1 tablet by mouth Daily.         Patient has no known allergies.    Family History   Problem Relation Age of Onset   • Breast cancer Mother    • Heart failure Father    • Cancer Maternal Uncle        Social History     Social History   • Marital status:      Spouse name: N/A   • Number of children: N/A   • Years of education: N/A     Occupational History   • CRNA      Social History Main Topics   • Smoking status: Former Smoker     Types: Cigarettes   • Smokeless tobacco: Current User     Types: Chew   • Alcohol use No   • Drug use: No   • Sexual activity: Defer     Other Topics Concern   • Not on file     Social History Narrative   • No narrative on file       Review of Systems   Constitutional: Negative.    HENT: Negative.    Eyes: Negative.    Respiratory: Negative.    Cardiovascular: Negative.    Gastrointestinal: Negative.    Endocrine: Negative.    Genitourinary: Negative.    Musculoskeletal: Negative.    Skin: Negative.    Allergic/Immunologic: Negative.    Neurological: Negative.    Hematological: Negative.    Psychiatric/Behavioral: Negative.        Vitals:    09/27/18 0905   BP: 120/70   Temp: 97.8 °F (36.6 °C)       Body mass index is 26.29 kg/m².    Objective     Physical Exam  CONSTITUTIONAL: well nourished, alert, oriented, in no acute distress     COMMUNICATION AND VOICE: able to communicate normally, normal voice quality    HEAD: normocephalic, no lesions, atraumatic, no tenderness, no masses     FACE: appearance normal, no lesions, no tenderness, no deformities, facial motion  symmetric    SALIVARY GLANDS: parotid glands with no tenderness, no swelling, no masses, submandibular glands with normal size, nontender    EYES: ocular motility normal, eyelids normal, orbits normal, no proptosis, conjunctiva normal , pupils equal, round     EARS:  Hearing: response to conversational voice normal bilaterally   External Ears: auricles without lesions  Otoscopic: tympanic membrane appearance normal, no lesions, no perforation, normal mobility, no fluid    NOSE:  External Nose: structure normal, no tenderness on palpation, no nasal discharge, no lesions, no evidence of trauma, nostrils patent   Intranasal Exam: nasal mucosa normal, vestibule within normal limits, inferior turbinate normal, nasal septum slight deviation to the left of midline with mild inflammatory changes left greater than right.  No bleeding mass or lesion is noted.  Nasopharynx:     ORAL:  Lips: upper and lower lips without lesion   Teeth: dentition within normal limits for age   Gums: gingivae healthy   Oral Mucosa: oral mucosa normal, no mucosal lesions   Floor of Mouth: Warthin’s duct patent, mucosa normal  Tongue: lingual mucosa normal without lesions, normal tongue mobility   Palate: soft and hard palates with normal mucosa and structure  Oropharynx: oropharyngeal mucosa normal    NECK: neck appearance normal, no mass, noted without erythema or tenderness  4 mm inflammatory appearing lesion left neck level I.    THYROID: no overt thyromegaly, no tenderness, nodules or mass present on palpation, position midline     LYMPH NODES: no lymphadenopathy    CHEST/RESPIRATORY: respiratory effort normal, normal breath sounds     CARDIOVASCULAR: rate and rhythm normal, extremities without cyanosis or edema      NEUROLOGIC/PSYCHIATRIC: oriented to time, place and person, mood normal, affect appropriate, CN II-XII intact grossly    Assessment/Plan   Manjit was seen today for follow-up.    Diagnoses and all orders for this  visit:    Extramedullary plasmacytoma- left nose    History of radiation therapy- Left Nose    Essential hypertension    Neoplasm of uncertain behavior  Comments:  Left neck level I      * Surgery not found *  No orders of the defined types were placed in this encounter.    Return in about 6 months (around 3/27/2019) for Recheck nose.       Patient Instructions   Continue care as directed, follow-up as directed or sooner if symptoms develop.  Mupirocin when necessary    Risks benefits and options of excision lesion of the left neck were discussed with patient and he agrees to have this removed in the office under local anesthesia.          IF YOU SMOKE OR USE TOBACCO PLEASE READ THE FOLLOWING:    Why is smoking bad for me?  Smoking increases the risk of heart disease, lung disease, vascular disease, stroke, and cancer.     If you smoke, STOP!    If you would like more information on quitting smoking, please visit the [employer] website: [email or web address]   This link will provide additional resources including the QUIT line and the Beat the Pack support groups.     For more information:    Quit Now [place]  1-800-QUIT-NOW  https://[place].[employer].org/en-US/    MyPlate from [employer]  The general, healthful diet is based on the 2010 Dietary Guidelines for Americans. The amount of food you need to eat from each food group depends on your age, sex, and level of physical activity and can be individualized by a dietitian. Go to ChooseMyPlate.gov for more information.  What do I need to know about the MyPlate plan?  · Enjoy your food, but eat less.  · Avoid oversized portions.  ? ½ of your plate should include fruits and vegetables.  ? ¼ of your plate should be grains.  ? ¼ of your plate should be protein.  Grains  · Make at least half of your grains whole grains.  · For a 2,000 calorie daily food plan, eat 6 oz every day.  · 1 oz is about 1 slice bread, 1 cup cereal, or ½  cup cooked rice, cereal, or pasta.  Vegetables  · Make half your plate fruits and vegetables.  · For a 2,000 calorie daily food plan, eat 2½ cups every day.  · 1 cup is about 1 cup raw or cooked vegetables or vegetable juice or 2 cups raw leafy greens.  Fruits  · Make half your plate fruits and vegetables.  · For a 2,000 calorie daily food plan, eat 2 cups every day.  · 1 cup is about 1 cup fruit or 100% fruit juice or ½ cup dried fruit.  Protein  · For a 2,000 calorie daily food plan, eat 5½ oz every day.  · 1 oz is about 1 oz meat, poultry, or fish, ¼ cup cooked beans, 1 egg, 1 Tbsp peanut butter, or ½ oz nuts or seeds.  Dairy  · Switch to fat-free or low-fat (1%) milk.  · For a 2,000 calorie daily food plan, eat 3 cups every day.  · 1 cup is about 1 cup milk or yogurt or soy milk (soy beverage), 1½ oz natural cheese, or 2 oz processed cheese.  Fats, Oils, and Empty Calories  · Only small amounts of oils are recommended.  · Empty calories are calories from solid fats or added sugars.  · Compare sodium in foods like soup, bread, and frozen meals. Choose the foods with lower numbers.  · Drink water instead of sugary drinks.  What foods can I eat?  Grains  Whole grains such as whole wheat, quinoa, millet, and bulgur. Bread, rolls, and pasta made from whole grains. Brown or wild rice. Hot or cold cereals made from whole grains and without added sugar.  Vegetables  All fresh vegetables, especially fresh red, dark green, or orange vegetables. Peas and beans. Low-sodium frozen or canned vegetables prepared without added salt. Low-sodium vegetable juices.  Fruits  All fresh, frozen, and dried fruits. Canned fruit packed in water or fruit juice without added sugar. Fruit juices without added sugar.  Meats and Other Protein Sources  Boiled, baked, or grilled lean meat trimmed of fat. Skinless poultry. Fresh seafood and shellfish. Canned seafood packed in water. Unsalted nuts and unsalted nut butters. Tofu. Dried beans and  pea. Eggs.  Dairy  Low-fat or fat-free milk, yogurt, and cheeses.  Sweets and Desserts  Frozen desserts made from low-fat milk.  Fats and Oils  Olive, peanut, and canola oils and margarine. Salad dressing and mayonnaise made from these oils.  Other  Soups and casseroles made from allowed ingredients and without added fat or salt.  The items listed above may not be a complete list of recommended foods or beverages. Contact your dietitian for more options.  What foods are not recommended?  Grains  Sweetened, low-fiber cereals. Packaged baked goods. Snack crackers and chips. Cheese crackers, butter crackers, and biscuits. Frozen waffles, sweet breads, doughnuts, pastries, packaged baking mixes, pancakes, cakes, and cookies.  Vegetables  Regular canned or frozen vegetables or vegetables prepared with salt. Canned tomatoes. Canned tomato sauce. Fried vegetables. Vegetables in cream sauce or cheese sauce.  Fruits  Fruits packed in syrup or made with added sugar.  Meats and Other Protein Sources  Marbled or fatty meats such as ribs. Poultry with skin. Fried meats, poultry, eggs, or fish. Sausages, hot dogs, and deli meats such as pastrami, bologna, or salami.  Dairy  Whole milk, cream, cheeses made from whole milk, sour cream. Ice cream or yogurt made from whole milk or with added sugar.  Beverages  For adults, no more than one alcoholic drink per day. Regular soft drinks or other sugary beverages. Juice drinks.  Sweets and Desserts  Sugary or fatty desserts, candy, and other sweets.  Fats and Oils  Solid shortening or partially hydrogenated oils. Solid margarine. Margarine that contains trans fats. Butter.  The items listed above may not be a complete list of foods and beverages to avoid. Contact your dietitian for more information.  This information is not intended to replace advice given to you by your health care provider. Make sure you discuss any questions you have with your health care provider.  Document Released:  01/06/2009 Document Revised: 05/25/2017 Document Reviewed: 11/26/2014  [employer] Interactive Patient Education © 2018 [employer]     Calorie Counting for Weight Loss  Calories are units of energy. Your body needs a certain amount of calories from food to keep you going throughout the day. When you eat more calories than your body needs, your body stores the extra calories as fat. When you eat fewer calories than your body needs, your body burns fat to get the energy it needs.  Calorie counting means keeping track of how many calories you eat and drink each day. Calorie counting can be helpful if you need to lose weight. If you make sure to eat fewer calories than your body needs, you should lose weight. Ask your health care provider what a healthy weight is for you.  For calorie counting to work, you will need to eat the right number of calories in a day in order to lose a healthy amount of weight per week. A dietitian can help you determine how many calories you need in a day and will give you suggestions on how to reach your calorie goal.  · A healthy amount of weight to lose per week is usually 1-2 lb (0.5-0.9 kg). This usually means that your daily calorie intake should be reduced by 500-750 calories.  · Eating 1,200 - 1,500 calories per day can help most women lose weight.  · Eating 1,500 - 1,800 calories per day can help most men lose weight.    What do I need to know about calorie counting?  In order to meet your daily calorie goal, you will need to:  · Find out how many calories are in each food you would like to eat. Try to do this before you eat.  · Decide how much of the food you plan to eat.  · Write down what you ate and how many calories it had. Doing this is called keeping a food log.    To successfully lose weight, it is important to balance calorie counting with a healthy lifestyle that includes regular activity. Aim for 150 minutes of moderate exercise (such as walking) or 75 minutes of vigorous  exercise (such as running) each week.  Where do I find calorie information?    The number of calories in a food can be found on a Nutrition Facts label. If a food does not have a Nutrition Facts label, try to look up the calories online or ask your dietitian for help.  Remember that calories are listed per serving. If you choose to have more than one serving of a food, you will have to multiply the calories per serving by the amount of servings you plan to eat. For example, the label on a package of bread might say that a serving size is 1 slice and that there are 90 calories in a serving. If you eat 1 slice, you will have eaten 90 calories. If you eat 2 slices, you will have eaten 180 calories.  How do I keep a food log?  Immediately after each meal, record the following information in your food log:  · What you ate. Don't forget to include toppings, sauces, and other extras on the food.  · How much you ate. This can be measured in cups, ounces, or number of items.  · How many calories each food and drink had.  · The total number of calories in the meal.    Keep your food log near you, such as in a small notebook in your pocket, or use a mobile mary kay or website. Some programs will calculate calories for you and show you how many calories you have left for the day to meet your goal.  What are some calorie counting tips?  · Use your calories on foods and drinks that will fill you up and not leave you hungry:  ? Some examples of foods that fill you up are nuts and nut butters, vegetables, lean proteins, and high-fiber foods like whole grains. High-fiber foods are foods with more than 5 g fiber per serving.  ? Drinks such as sodas, specialty coffee drinks, alcohol, and juices have a lot of calories, yet do not fill you up.  · Eat nutritious foods and avoid empty calories. Empty calories are calories you get from foods or beverages that do not have many vitamins or protein, such as candy, sweets, and soda. It is better to  "have a nutritious high-calorie food (such as an avocado) than a food with few nutrients (such as a bag of chips).  · Know how many calories are in the foods you eat most often. This will help you calculate calorie counts faster.  · Pay attention to calories in drinks. Low-calorie drinks include water and unsweetened drinks.  · Pay attention to nutrition labels for \"low fat\" or \"fat free\" foods. These foods sometimes have the same amount of calories or more calories than the full fat versions. They also often have added sugar, starch, or salt, to make up for flavor that was removed with the fat.  · Find a way of tracking calories that works for you. Get creative. Try different apps or programs if writing down calories does not work for you.  What are some portion control tips?  · Know how many calories are in a serving. This will help you know how many servings of a certain food you can have.  · Use a measuring cup to measure serving sizes. You could also try weighing out portions on a kitchen scale. With time, you will be able to estimate serving sizes for some foods.  · Take some time to put servings of different foods on your favorite plates, bowls, and cups so you know what a serving looks like.  · Try not to eat straight from a bag or box. Doing this can lead to overeating. Put the amount you would like to eat in a cup or on a plate to make sure you are eating the right portion.  · Use smaller plates, glasses, and bowls to prevent overeating.  · Try not to multitask (for example, watch TV or use your computer) while eating. If it is time to eat, sit down at a table and enjoy your food. This will help you to know when you are full. It will also help you to be aware of what you are eating and how much you are eating.  What are tips for following this plan?  Reading food labels  · Check the calorie count compared to the serving size. The serving size may be smaller than what you are used to eating.  · Check the " source of the calories. Make sure the food you are eating is high in vitamins and protein and low in saturated and trans fats.  Shopping  · Read nutrition labels while you shop. This will help you make healthy decisions before you decide to purchase your food.  · Make a grocery list and stick to it.  Cooking  · Try to cook your favorite foods in a healthier way. For example, try baking instead of frying.  · Use low-fat dairy products.  Meal planning  · Use more fruits and vegetables. Half of your plate should be fruits and vegetables.  · Include lean proteins like poultry and fish.  How do I count calories when eating out?  · Ask for smaller portion sizes.  · Consider sharing an entree and sides instead of getting your own entree.  · If you get your own entree, eat only half. Ask for a box at the beginning of your meal and put the rest of your entree in it so you are not tempted to eat it.  · If calories are listed on the menu, choose the lower calorie options.  · Choose dishes that include vegetables, fruits, whole grains, low-fat dairy products, and lean protein.  · Choose items that are boiled, broiled, grilled, or steamed. Stay away from items that are buttered, battered, fried, or served with cream sauce. Items labeled “crispy” are usually fried, unless stated otherwise.  · Choose water, low-fat milk, unsweetened iced tea, or other drinks without added sugar. If you want an alcoholic beverage, choose a lower calorie option such as a glass of wine or light beer.  · Ask for dressings, sauces, and syrups on the side. These are usually high in calories, so you should limit the amount you eat.  · If you want a salad, choose a garden salad and ask for grilled meats. Avoid extra toppings like mitchell, cheese, or fried items. Ask for the dressing on the side, or ask for olive oil and vinegar or lemon to use as dressing.  · Estimate how many servings of a food you are given. For example, a serving of cooked rice is ½ cup  or about the size of half a baseball. Knowing serving sizes will help you be aware of how much food you are eating at restaurants. The list below tells you how big or small some common portion sizes are based on everyday objects:  ? 1 oz--4 stacked dice.  ? 3 oz--1 deck of cards.  ? 1 tsp--1 die.  ? 1 Tbsp--½ a ping-pong ball.  ? 2 Tbsp--1 ping-pong ball.  ? ½ cup--½ baseball.  ? 1 cup--1 baseball.  Summary  · Calorie counting means keeping track of how many calories you eat and drink each day. If you eat fewer calories than your body needs, you should lose weight.  · A healthy amount of weight to lose per week is usually 1-2 lb (0.5-0.9 kg). This usually means reducing your daily calorie intake by 500-750 calories.  · The number of calories in a food can be found on a Nutrition Facts label. If a food does not have a Nutrition Facts label, try to look up the calories online or ask your dietitian for help.  · Use your calories on foods and drinks that will fill you up, and not on foods and drinks that will leave you hungry.  · Use smaller plates, glasses, and bowls to prevent overeating.  This information is not intended to replace advice given to you by your health care provider. Make sure you discuss any questions you have with your health care provider.  Document Released: 12/18/2006 Document Revised: 11/17/2017 Document Reviewed: 11/17/2017  [employer] Interactive Patient Education © 2018 [employer]     Exercising to Lose Weight  Exercising can help you to lose weight. In order to lose weight through exercise, you need to do vigorous-intensity exercise. You can tell that you are exercising with vigorous intensity if you are breathing very hard and fast and cannot hold a conversation while exercising.  Moderate-intensity exercise helps to maintain your current weight. You can tell that you are exercising at a moderate level if you have a higher heart rate and faster breathing, but you are still able to hold a  conversation.  How often should I exercise?  Choose an activity that you enjoy and set realistic goals. Your health care provider can help you to make an activity plan that works for you. Exercise regularly as directed by your health care provider. This may include:  · Doing resistance training twice each week, such as:  ? Push-ups.  ? Sit-ups.  ? Lifting weights.  ? Using resistance bands.  · Doing a given intensity of exercise for a given amount of time. Choose from these options:  ? 150 minutes of moderate-intensity exercise every week.  ? 75 minutes of vigorous-intensity exercise every week.  ? A mix of moderate-intensity and vigorous-intensity exercise every week.    Children, pregnant women, people who are out of shape, people who are overweight, and older adults may need to consult a health care provider for individual recommendations. If you have any sort of medical condition, be sure to consult your health care provider before starting a new exercise program.  What are some activities that can help me to lose weight?  · Walking at a rate of at least 4.5 miles an hour.  · Jogging or running at a rate of 5 miles per hour.  · Biking at a rate of at least 10 miles per hour.  · Lap swimming.  · Roller-skating or in-line skating.  · Cross-country skiing.  · Vigorous competitive sports, such as football, basketball, and soccer.  · Jumping rope.  · Aerobic dancing.  How can I be more active in my day-to-day activities?  · Use the stairs instead of the elevator.  · Take a walk during your lunch break.  · If you drive, park your car farther away from work or school.  · If you take public transportation, get off one stop early and walk the rest of the way.  · Make all of your phone calls while standing up and walking around.  · Get up, stretch, and walk around every 30 minutes throughout the day.  What guidelines should I follow while exercising?  · Do not exercise so much that you hurt yourself, feel dizzy, or get  very short of breath.  · Consult your health care provider prior to starting a new exercise program.  · Wear comfortable clothes and shoes with good support.  · Drink plenty of water while you exercise to prevent dehydration or heat stroke. Body water is lost during exercise and must be replaced.  · Work out until you breathe faster and your heart beats faster.  This information is not intended to replace advice given to you by your health care provider. Make sure you discuss any questions you have with your health care provider.  Document Released: 01/20/2012 Document Revised: 05/25/2017 Document Reviewed: 05/21/2015  [employer] Interactive Patient Education © 2018 [employer]

## 2018-09-27 ENCOUNTER — OFFICE VISIT (OUTPATIENT)
Dept: OTOLARYNGOLOGY | Facility: CLINIC | Age: 73
End: 2018-09-27

## 2018-09-27 VITALS
TEMPERATURE: 97.8 F | SYSTOLIC BLOOD PRESSURE: 120 MMHG | HEIGHT: 69 IN | WEIGHT: 178 LBS | BODY MASS INDEX: 26.36 KG/M2 | DIASTOLIC BLOOD PRESSURE: 70 MMHG

## 2018-09-27 DIAGNOSIS — C90.20 EXTRAMEDULLARY PLASMACYTOMA (HCC): Primary | ICD-10-CM

## 2018-09-27 DIAGNOSIS — I10 ESSENTIAL HYPERTENSION: ICD-10-CM

## 2018-09-27 DIAGNOSIS — Z92.3 HISTORY OF RADIATION THERAPY: ICD-10-CM

## 2018-09-27 DIAGNOSIS — D48.9 NEOPLASM OF UNCERTAIN BEHAVIOR: ICD-10-CM

## 2018-09-27 PROCEDURE — 99214 OFFICE O/P EST MOD 30 MIN: CPT | Performed by: OTOLARYNGOLOGY

## 2018-09-27 NOTE — PATIENT INSTRUCTIONS
Continue care as directed, follow-up as directed or sooner if symptoms develop.  Mupirocin when necessary    Risks benefits and options of excision lesion of the left neck were discussed with patient and he agrees to have this removed in the office under local anesthesia.          IF YOU SMOKE OR USE TOBACCO PLEASE READ THE FOLLOWING:    Why is smoking bad for me?  Smoking increases the risk of heart disease, lung disease, vascular disease, stroke, and cancer.     If you smoke, STOP!    If you would like more information on quitting smoking, please visit the [employer] website: [email or web address]/[place]unn/healthier-together/smoke   This link will provide additional resources including the QUIT line and the Beat the Pack support groups.     For more information:    Quit Now [place]  1-800-QUIT-NOW  https://[facility].clipsynclogix.org/en-[place]/    MyPlate from [employer]  The general, healthful diet is based on the 2010 Dietary Guidelines for Americans. The amount of food you need to eat from each food group depends on your age, sex, and level of physical activity and can be individualized by a dietitian. Go to ChooseKiddyPlate.gov for more information.  What do I need to know about the MyPlate plan?  · Enjoy your food, but eat less.  · Avoid oversized portions.  ? ½ of your plate should include fruits and vegetables.  ? ¼ of your plate should be grains.  ? ¼ of your plate should be protein.  Grains  · Make at least half of your grains whole grains.  · For a 2,000 calorie daily food plan, eat 6 oz every day.  · 1 oz is about 1 slice bread, 1 cup cereal, or ½ cup cooked rice, cereal, or pasta.  Vegetables  · Make half your plate fruits and vegetables.  · For a 2,000 calorie daily food plan, eat 2½ cups every day.  · 1 cup is about 1 cup raw or cooked vegetables or vegetable juice or 2 cups raw leafy greens.  Fruits  · Make half your plate fruits and vegetables.  · For a 2,000 calorie daily food plan, eat 2 cups every  day.  · 1 cup is about 1 cup fruit or 100% fruit juice or ½ cup dried fruit.  Protein  · For a 2,000 calorie daily food plan, eat 5½ oz every day.  · 1 oz is about 1 oz meat, poultry, or fish, ¼ cup cooked beans, 1 egg, 1 Tbsp peanut butter, or ½ oz nuts or seeds.  Dairy  · Switch to fat-free or low-fat (1%) milk.  · For a 2,000 calorie daily food plan, eat 3 cups every day.  · 1 cup is about 1 cup milk or yogurt or soy milk (soy beverage), 1½ oz natural cheese, or 2 oz processed cheese.  Fats, Oils, and Empty Calories  · Only small amounts of oils are recommended.  · Empty calories are calories from solid fats or added sugars.  · Compare sodium in foods like soup, bread, and frozen meals. Choose the foods with lower numbers.  · Drink water instead of sugary drinks.  What foods can I eat?  Grains  Whole grains such as whole wheat, quinoa, millet, and bulgur. Bread, rolls, and pasta made from whole grains. Brown or wild rice. Hot or cold cereals made from whole grains and without added sugar.  Vegetables  All fresh vegetables, especially fresh red, dark green, or orange vegetables. Peas and beans. Low-sodium frozen or canned vegetables prepared without added salt. Low-sodium vegetable juices.  Fruits  All fresh, frozen, and dried fruits. Canned fruit packed in water or fruit juice without added sugar. Fruit juices without added sugar.  Meats and Other Protein Sources  Boiled, baked, or grilled lean meat trimmed of fat. Skinless poultry. Fresh seafood and shellfish. Canned seafood packed in water. Unsalted nuts and unsalted nut butters. Tofu. Dried beans and pea. Eggs.  Dairy  Low-fat or fat-free milk, yogurt, and cheeses.  Sweets and Desserts  Frozen desserts made from low-fat milk.  Fats and Oils  Olive, peanut, and canola oils and margarine. Salad dressing and mayonnaise made from these oils.  Other  Soups and casseroles made from allowed ingredients and without added fat or salt.  The items listed above may not be  a complete list of recommended foods or beverages. Contact your dietitian for more options.  What foods are not recommended?  Grains  Sweetened, low-fiber cereals. Packaged baked goods. Snack crackers and chips. Cheese crackers, butter crackers, and biscuits. Frozen waffles, sweet breads, doughnuts, pastries, packaged baking mixes, pancakes, cakes, and cookies.  Vegetables  Regular canned or frozen vegetables or vegetables prepared with salt. Canned tomatoes. Canned tomato sauce. Fried vegetables. Vegetables in cream sauce or cheese sauce.  Fruits  Fruits packed in syrup or made with added sugar.  Meats and Other Protein Sources  Marbled or fatty meats such as ribs. Poultry with skin. Fried meats, poultry, eggs, or fish. Sausages, hot dogs, and deli meats such as pastrami, bologna, or salami.  Dairy  Whole milk, cream, cheeses made from whole milk, sour cream. Ice cream or yogurt made from whole milk or with added sugar.  Beverages  For adults, no more than one alcoholic drink per day. Regular soft drinks or other sugary beverages. Juice drinks.  Sweets and Desserts  Sugary or fatty desserts, candy, and other sweets.  Fats and Oils  Solid shortening or partially hydrogenated oils. Solid margarine. Margarine that contains trans fats. Butter.  The items listed above may not be a complete list of foods and beverages to avoid. Contact your dietitian for more information.  This information is not intended to replace advice given to you by your health care provider. Make sure you discuss any questions you have with your health care provider.  Document Released: 01/06/2009 Document Revised: 05/25/2017 Document Reviewed: 11/26/2014  [employer] Interactive Patient Education © 2018 Elsevier Inc.     Calorie Counting for Weight Loss  Calories are units of energy. Your body needs a certain amount of calories from food to keep you going throughout the day. When you eat more calories than your body needs, your body stores the extra  calories as fat. When you eat fewer calories than your body needs, your body burns fat to get the energy it needs.  Calorie counting means keeping track of how many calories you eat and drink each day. Calorie counting can be helpful if you need to lose weight. If you make sure to eat fewer calories than your body needs, you should lose weight. Ask your health care provider what a healthy weight is for you.  For calorie counting to work, you will need to eat the right number of calories in a day in order to lose a healthy amount of weight per week. A dietitian can help you determine how many calories you need in a day and will give you suggestions on how to reach your calorie goal.  · A healthy amount of weight to lose per week is usually 1-2 lb (0.5-0.9 kg). This usually means that your daily calorie intake should be reduced by 500-750 calories.  · Eating 1,200 - 1,500 calories per day can help most women lose weight.  · Eating 1,500 - 1,800 calories per day can help most men lose weight.    What do I need to know about calorie counting?  In order to meet your daily calorie goal, you will need to:  · Find out how many calories are in each food you would like to eat. Try to do this before you eat.  · Decide how much of the food you plan to eat.  · Write down what you ate and how many calories it had. Doing this is called keeping a food log.    To successfully lose weight, it is important to balance calorie counting with a healthy lifestyle that includes regular activity. Aim for 150 minutes of moderate exercise (such as walking) or 75 minutes of vigorous exercise (such as running) each week.  Where do I find calorie information?    The number of calories in a food can be found on a Nutrition Facts label. If a food does not have a Nutrition Facts label, try to look up the calories online or ask your dietitian for help.  Remember that calories are listed per serving. If you choose to have more than one serving of a  food, you will have to multiply the calories per serving by the amount of servings you plan to eat. For example, the label on a package of bread might say that a serving size is 1 slice and that there are 90 calories in a serving. If you eat 1 slice, you will have eaten 90 calories. If you eat 2 slices, you will have eaten 180 calories.  How do I keep a food log?  Immediately after each meal, record the following information in your food log:  · What you ate. Don't forget to include toppings, sauces, and other extras on the food.  · How much you ate. This can be measured in cups, ounces, or number of items.  · How many calories each food and drink had.  · The total number of calories in the meal.    Keep your food log near you, such as in a small notebook in your pocket, or use a mobile mary kay or website. Some programs will calculate calories for you and show you how many calories you have left for the day to meet your goal.  What are some calorie counting tips?  · Use your calories on foods and drinks that will fill you up and not leave you hungry:  ? Some examples of foods that fill you up are nuts and nut butters, vegetables, lean proteins, and high-fiber foods like whole grains. High-fiber foods are foods with more than 5 g fiber per serving.  ? Drinks such as sodas, specialty coffee drinks, alcohol, and juices have a lot of calories, yet do not fill you up.  · Eat nutritious foods and avoid empty calories. Empty calories are calories you get from foods or beverages that do not have many vitamins or protein, such as candy, sweets, and soda. It is better to have a nutritious high-calorie food (such as an avocado) than a food with few nutrients (such as a bag of chips).  · Know how many calories are in the foods you eat most often. This will help you calculate calorie counts faster.  · Pay attention to calories in drinks. Low-calorie drinks include water and unsweetened drinks.  · Pay attention to nutrition labels  "for \"low fat\" or \"fat free\" foods. These foods sometimes have the same amount of calories or more calories than the full fat versions. They also often have added sugar, starch, or salt, to make up for flavor that was removed with the fat.  · Find a way of tracking calories that works for you. Get creative. Try different apps or programs if writing down calories does not work for you.  What are some portion control tips?  · Know how many calories are in a serving. This will help you know how many servings of a certain food you can have.  · Use a measuring cup to measure serving sizes. You could also try weighing out portions on a kitchen scale. With time, you will be able to estimate serving sizes for some foods.  · Take some time to put servings of different foods on your favorite plates, bowls, and cups so you know what a serving looks like.  · Try not to eat straight from a bag or box. Doing this can lead to overeating. Put the amount you would like to eat in a cup or on a plate to make sure you are eating the right portion.  · Use smaller plates, glasses, and bowls to prevent overeating.  · Try not to multitask (for example, watch TV or use your computer) while eating. If it is time to eat, sit down at a table and enjoy your food. This will help you to know when you are full. It will also help you to be aware of what you are eating and how much you are eating.  What are tips for following this plan?  Reading food labels  · Check the calorie count compared to the serving size. The serving size may be smaller than what you are used to eating.  · Check the source of the calories. Make sure the food you are eating is high in vitamins and protein and low in saturated and trans fats.  Shopping  · Read nutrition labels while you shop. This will help you make healthy decisions before you decide to purchase your food.  · Make a grocery list and stick to it.  Cooking  · Try to cook your favorite foods in a healthier way. " For example, try baking instead of frying.  · Use low-fat dairy products.  Meal planning  · Use more fruits and vegetables. Half of your plate should be fruits and vegetables.  · Include lean proteins like poultry and fish.  How do I count calories when eating out?  · Ask for smaller portion sizes.  · Consider sharing an entree and sides instead of getting your own entree.  · If you get your own entree, eat only half. Ask for a box at the beginning of your meal and put the rest of your entree in it so you are not tempted to eat it.  · If calories are listed on the menu, choose the lower calorie options.  · Choose dishes that include vegetables, fruits, whole grains, low-fat dairy products, and lean protein.  · Choose items that are boiled, broiled, grilled, or steamed. Stay away from items that are buttered, battered, fried, or served with cream sauce. Items labeled “crispy” are usually fried, unless stated otherwise.  · Choose water, low-fat milk, unsweetened iced tea, or other drinks without added sugar. If you want an alcoholic beverage, choose a lower calorie option such as a glass of wine or light beer.  · Ask for dressings, sauces, and syrups on the side. These are usually high in calories, so you should limit the amount you eat.  · If you want a salad, choose a garden salad and ask for grilled meats. Avoid extra toppings like mitchell, cheese, or fried items. Ask for the dressing on the side, or ask for olive oil and vinegar or lemon to use as dressing.  · Estimate how many servings of a food you are given. For example, a serving of cooked rice is ½ cup or about the size of half a baseball. Knowing serving sizes will help you be aware of how much food you are eating at restaurants. The list below tells you how big or small some common portion sizes are based on everyday objects:  ? 1 oz--4 stacked dice.  ? 3 oz--1 deck of cards.  ? 1 tsp--1 die.  ? 1 Tbsp--½ a ping-pong ball.  ? 2 Tbsp--1 ping-pong ball.  ? ½  cup--½ baseball.  ? 1 cup--1 baseball.  Summary  · Calorie counting means keeping track of how many calories you eat and drink each day. If you eat fewer calories than your body needs, you should lose weight.  · A healthy amount of weight to lose per week is usually 1-2 lb (0.5-0.9 kg). This usually means reducing your daily calorie intake by 500-750 calories.  · The number of calories in a food can be found on a Nutrition Facts label. If a food does not have a Nutrition Facts label, try to look up the calories online or ask your dietitian for help.  · Use your calories on foods and drinks that will fill you up, and not on foods and drinks that will leave you hungry.  · Use smaller plates, glasses, and bowls to prevent overeating.  This information is not intended to replace advice given to you by your health care provider. Make sure you discuss any questions you have with your health care provider.  Document Released: 12/18/2006 Document Revised: 11/17/2017 Document Reviewed: 11/17/2017  [employer] Interactive Patient Education © 2018 [employer]     Exercising to Lose Weight  Exercising can help you to lose weight. In order to lose weight through exercise, you need to do vigorous-intensity exercise. You can tell that you are exercising with vigorous intensity if you are breathing very hard and fast and cannot hold a conversation while exercising.  Moderate-intensity exercise helps to maintain your current weight. You can tell that you are exercising at a moderate level if you have a higher heart rate and faster breathing, but you are still able to hold a conversation.  How often should I exercise?  Choose an activity that you enjoy and set realistic goals. Your health care provider can help you to make an activity plan that works for you. Exercise regularly as directed by your health care provider. This may include:  · Doing resistance training twice each week, such as:  ? Push-ups.  ? Sit-ups.  ? Lifting  weights.  ? Using resistance bands.  · Doing a given intensity of exercise for a given amount of time. Choose from these options:  ? 150 minutes of moderate-intensity exercise every week.  ? 75 minutes of vigorous-intensity exercise every week.  ? A mix of moderate-intensity and vigorous-intensity exercise every week.    Children, pregnant women, people who are out of shape, people who are overweight, and older adults may need to consult a health care provider for individual recommendations. If you have any sort of medical condition, be sure to consult your health care provider before starting a new exercise program.  What are some activities that can help me to lose weight?  · Walking at a rate of at least 4.5 miles an hour.  · Jogging or running at a rate of 5 miles per hour.  · Biking at a rate of at least 10 miles per hour.  · Lap swimming.  · Roller-skating or in-line skating.  · Cross-country skiing.  · Vigorous competitive sports, such as football, basketball, and soccer.  · Jumping rope.  · Aerobic dancing.  How can I be more active in my day-to-day activities?  · Use the stairs instead of the elevator.  · Take a walk during your lunch break.  · If you drive, park your car farther away from work or school.  · If you take public transportation, get off one stop early and walk the rest of the way.  · Make all of your phone calls while standing up and walking around.  · Get up, stretch, and walk around every 30 minutes throughout the day.  What guidelines should I follow while exercising?  · Do not exercise so much that you hurt yourself, feel dizzy, or get very short of breath.  · Consult your health care provider prior to starting a new exercise program.  · Wear comfortable clothes and shoes with good support.  · Drink plenty of water while you exercise to prevent dehydration or heat stroke. Body water is lost during exercise and must be replaced.  · Work out until you breathe faster and your heart beats  faster.  This information is not intended to replace advice given to you by your health care provider. Make sure you discuss any questions you have with your health care provider.  Document Released: 01/20/2012 Document Revised: 05/25/2017 Document Reviewed: 05/21/2015  [employer] Interactive Patient Education © 2018 [employer].

## 2018-11-08 ENCOUNTER — TELEPHONE (OUTPATIENT)
Dept: OTOLARYNGOLOGY | Facility: CLINIC | Age: 73
End: 2018-11-08

## 2018-11-08 ENCOUNTER — PROCEDURE VISIT (OUTPATIENT)
Dept: OTOLARYNGOLOGY | Facility: CLINIC | Age: 73
End: 2018-11-08

## 2018-11-08 DIAGNOSIS — D48.9 NEOPLASM OF UNCERTAIN BEHAVIOR: Primary | ICD-10-CM

## 2018-11-08 DIAGNOSIS — M54.12 CERVICAL RADICULOPATHY: Primary | ICD-10-CM

## 2018-11-08 PROCEDURE — 11420 EXC H-F-NK-SP B9+MARG 0.5/<: CPT | Performed by: OTOLARYNGOLOGY

## 2018-11-08 NOTE — PROGRESS NOTES
Patient presented for procedure with Dr. Krueger today. He is complaining of neck pain with extension into his right arm. He has occasional numbness and tingling. He states he has had previous MRI scans but these are over 10 years old. He has seen Dr. Hernandez many years ago but surgery was not performed. He states Dr. Hernandez states surgery would be in the future. Dr. Krueger has recommended a Cervical MRI and referral to Dr. Islas.

## 2018-11-08 NOTE — PROGRESS NOTES
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  Following the injection of local anesthesia circumferential elliptical excision was accomplished of the lesion left level I neck without difficulty utilizing a #15 blade.  Wide undermining was performed with curved iris scissors.  There was minimal to no bleeding.  The specimen was submitted for permanent pathologic examination.  The excision was approximately 2.2 x 0.8 centimeters and  the lesion was approximately 4 x 6 mm.    Reapproximation was accomplished with interrupted 5-0 nylon.    Bacitracin ointment was applied and the procedure terminated.  The patient tolerated the procedure well. There were no complications.

## 2018-11-13 ENCOUNTER — TELEPHONE (OUTPATIENT)
Dept: OTOLARYNGOLOGY | Facility: CLINIC | Age: 73
End: 2018-11-13

## 2018-11-14 ENCOUNTER — HOSPITAL ENCOUNTER (OUTPATIENT)
Dept: MRI IMAGING | Facility: HOSPITAL | Age: 73
Discharge: HOME | End: 2018-11-14
Attending: OTOLARYNGOLOGY | Admitting: OTOLARYNGOLOGY

## 2018-11-14 ENCOUNTER — TELEPHONE (OUTPATIENT)
Dept: OTOLARYNGOLOGY | Facility: CLINIC | Age: 73
End: 2018-11-14

## 2018-11-14 DIAGNOSIS — M54.12 CERVICAL RADICULOPATHY: ICD-10-CM

## 2018-11-14 PROCEDURE — 72141 MRI NECK SPINE W/O DYE: CPT

## 2018-11-14 NOTE — TELEPHONE ENCOUNTER
----- Message from Jose Krueger MD sent at 11/14/2018  3:38 PM CST -----  Please call patient with result: Please make sure he has a neurosurgical evaluation and appointment scheduled relatively quickly and send a copy of these results to Dr. Islas.  Please asked Mr. Seth to call if he has any questions

## 2018-11-19 ENCOUNTER — OFFICE VISIT (OUTPATIENT)
Dept: OTOLARYNGOLOGY | Facility: CLINIC | Age: 73
End: 2018-11-19

## 2018-11-19 DIAGNOSIS — B07.9 VIRAL WARTS, UNSPECIFIED TYPE: Primary | ICD-10-CM

## 2018-11-19 PROCEDURE — 99024 POSTOP FOLLOW-UP VISIT: CPT | Performed by: OTOLARYNGOLOGY

## 2018-11-19 NOTE — PROGRESS NOTES
Procedure   Suture Removal  Date/Time: 11/19/2018 9:58 AM  Performed by: Taay Caba RN  Authorized by: Jose Krueger MD   Consent: Verbal consent obtained.  Consent given by: patient  Patient identity confirmed: verbally with patient  Body area: head/neck  Location details: neck  Comments: Patient presents for suture removal. The incision is well-approximated with no redness or edema noted. Path results given

## 2018-12-06 ENCOUNTER — PREP FOR SURGERY (OUTPATIENT)
Dept: OTHER | Facility: HOSPITAL | Age: 73
End: 2018-12-06

## 2018-12-06 ENCOUNTER — OFFICE VISIT (OUTPATIENT)
Dept: NEUROSURGERY | Facility: CLINIC | Age: 73
End: 2018-12-06

## 2018-12-06 VITALS
SYSTOLIC BLOOD PRESSURE: 138 MMHG | WEIGHT: 179.2 LBS | BODY MASS INDEX: 26.54 KG/M2 | DIASTOLIC BLOOD PRESSURE: 70 MMHG | HEIGHT: 69 IN

## 2018-12-06 DIAGNOSIS — M50.30 DEGENERATION OF CERVICAL INTERVERTEBRAL DISC: ICD-10-CM

## 2018-12-06 DIAGNOSIS — M48.02 SPINAL STENOSIS IN CERVICAL REGION: Primary | ICD-10-CM

## 2018-12-06 DIAGNOSIS — M48.02 SPINAL STENOSIS IN CERVICAL REGION: ICD-10-CM

## 2018-12-06 DIAGNOSIS — M54.12 CERVICAL RADICULOPATHY: ICD-10-CM

## 2018-12-06 DIAGNOSIS — Z72.0 SNUFF USER: ICD-10-CM

## 2018-12-06 DIAGNOSIS — M47.12 CERVICAL SPONDYLOSIS WITH MYELOPATHY: Primary | ICD-10-CM

## 2018-12-06 DIAGNOSIS — M50.00 INTERVERTEBRAL CERVICAL DISC DISORDER WITH MYELOPATHY, CERVICAL REGION: ICD-10-CM

## 2018-12-06 PROCEDURE — 99204 OFFICE O/P NEW MOD 45 MIN: CPT | Performed by: NURSE PRACTITIONER

## 2018-12-06 RX ORDER — BUPIVACAINE HCL/0.9 % NACL/PF 0.1 %
2 PLASTIC BAG, INJECTION (ML) EPIDURAL ONCE
Status: CANCELLED | OUTPATIENT
Start: 2018-12-06 | End: 2018-12-06

## 2018-12-06 NOTE — PATIENT INSTRUCTIONS

## 2018-12-06 NOTE — PROGRESS NOTES
Chief complaint:   Chief Complaint   Patient presents with   • Neck Pain     Manjit has been referred here today by Dr. Jose Krueger for follow up for neck and right shoulder and arm pain.  He has had an MRI of his cervical here at [facility], but has had no physical therapy or pain management.        Subjective     HPI: This is a 73-year-old male gentleman who is referred to us by EDWIN Perez for neck and right arm pain.  He is here to be evaluated today.  He states that his had neck pain for several years but over the last couple years it has been progressively getting worse.  The pain in his neck is constant.  Its worse whenever he sitting without support to his neck and when he is driving.  Its better whenever he is looking down.  He does have pain that radiates over into his right upper extremity in a radicular fashion.  This pain is constant.  It has the same modifying factors as the neck.  Denies any bowel or bladder incontinence.  He has not done any recent physical therapy, cannot her to care, or pain management injections.  Rates the pain on scale 0-10 at a 1 on a constant basis however the pain can wax and wane.  He states that he is having some difficulty with his ambulation and has noticed that he does have to lean up against the wall from time to time.  He is right-hand dominant.  He is retired.  Denies any issues with fine motor problems.  He is .  Denies any tobacco, alcohol, or illicit drug use.  Past medical history significant for diabetes and hypertension.  Does have a past history of cancer as well.    Review of Systems   Constitutional: Positive for activity change. Negative for diaphoresis, fatigue and fever.   Respiratory: Negative for shortness of breath.    Cardiovascular: Negative for chest pain and palpitations.   Gastrointestinal: Negative for abdominal pain, nausea and vomiting.   Musculoskeletal: Positive for myalgias, neck pain and neck stiffness. Negative for back  "pain.   Neurological: Negative for dizziness, syncope, weakness, light-headedness and headaches.   Hematological: Positive for adenopathy.   Psychiatric/Behavioral: Negative for confusion.   All other systems reviewed and are negative.       Past Medical History:   Diagnosis Date   • Bell's palsy     with facial tremor   • Diabetes mellitus (CMS/HCC)    • Extramedullary plasmacytoma (CMS/HCC)     left nose   • History of radiation therapy 03/28/2016    4000 cGy to nose completed on 3/28/16   • History of tobacco abuse    • Hyperlipidemia    • Hypertension      Past Surgical History:   Procedure Laterality Date   • COLONOSCOPY     • NASAL ENDOSCOPY      bilateral with excisional biopsy of left intranasal mass 1/11/16     Family History   Problem Relation Age of Onset   • Breast cancer Mother    • Heart failure Father    • Cancer Maternal Uncle      Social History     Tobacco Use   • Smoking status: Former Smoker     Types: Cigarettes   • Smokeless tobacco: Current User     Types: Chew   Substance Use Topics   • Alcohol use: No   • Drug use: No       (Not in a hospital admission)  Allergies:  Patient has no known allergies.    Objective      Vital Signs  /70 (BP Location: Right arm, Patient Position: Sitting)   Ht 175.3 cm (69\")   Wt 81.3 kg (179 lb 3.2 oz)   BMI 26.46 kg/m²     Physical Exam   Constitutional: He is oriented to person, place, and time. He appears well-developed and well-nourished.   HENT:   Head: Normocephalic.   Eyes: Conjunctivae, EOM and lids are normal. Pupils are equal, round, and reactive to light.   Neck: Normal range of motion.   Cardiovascular: Normal rate, regular rhythm and normal heart sounds.   Pulmonary/Chest: Effort normal and breath sounds normal.   Abdominal: Normal appearance.   Musculoskeletal: Normal range of motion.        Cervical back: He exhibits pain.   Neurological: He is alert and oriented to person, place, and time. He has normal strength and normal reflexes. He " displays normal reflexes. No cranial nerve deficit or sensory deficit. Gait abnormal. GCS eye subscore is 4. GCS verbal subscore is 5. GCS motor subscore is 6.   unable to tandem walk.  No clonus or Mullen's.   Skin: Skin is warm.   Psychiatric: He has a normal mood and affect. His speech is normal and behavior is normal. Thought content normal. Cognition and memory are normal.       Results Review: MRI of the cervical spine shows the patient does have cervical spondylosis at C3 through 7.  C5-6 and 6-7 appear to be the worst levels with C6-7 having evidence of cord signal change.  No malalignment present.  Disc degeneration is noted with spurring.          Assessment/Plan: Dr. Islas did review the imaging and did come in and discuss this with the patient.  It is felt that the patient would benefit from a see 3 through 6 ACDF.  Dr. Islas did go over the risks and benefits of the procedure with the patient.  Please see his addendum to this patient.  BMI shows that he is overweight.  BMI chart was given the patient.  He is a nonsmoker.      Manjit was seen today for neck pain.    Diagnoses and all orders for this visit:    Spinal stenosis in cervical region  -     Ambulatory Referral to Family Practice    Intervertebral cervical disc disorder with myelopathy, cervical region  -     Ambulatory Referral to Family Practice    Cervical radiculopathy  -     Ambulatory Referral to Family Practice    BMI 26.0-26.9,adult    Snuff user          I discussed the patients findings and my recommendations with patient    Bc JOHAN Alcantar, EDWIN  12/06/18  10:26 AM    Attending addendum.  This is a 73-year-old gentleman has had some neck and right upper extremity radicular pain however he is noticed that he is having increasingly difficulty with walking and balance over the last several weeks.  He bumps into walls at home he cannot walk on his boat at all and has had several falls.  He denies any bowel or bladder problems.  His  MRI demonstrates severe cervical stenosis at 45, 56, 67 with significant cord compression at C5-6 with cord signal change.  He has clearly myelopathic gait and is unable to tandem walk.  At this point I would not recommend any further conservative care at the knee he is at significant risk for neurologic injury should he fall and hit his head and it is unlikely that his myelopathy will improve with any sort of physical therapy.  I would recommend a 3 level anterior cervical fusion in order to decompress his cervical spine and give his spinal cord a chance to recover.  I was very clear with the patient that the purpose of this operation primarily is to prevent him from any further neurologic deterioration.  If his spinal cord has been permanently damaged I was very clear with him that I cannot guarantee how much neurologic recovery he will experience.  The risks and benefits of the procedure were discussed at length which included but were not limited to infection, bleeding, paralysis, spinal fluid leak, bowel bladder incontinence, speech and swallow difficulty, stroke, coma, and death.  He acknowledged understanding of this.  His questions concerns were addressed.  We will get him prepped and scheduled as soon as possible.      Answers for HPI/ROS submitted by the patient on 11/29/2018   Neurologic complaint  altered mental status: No  clumsiness: Yes  focal sensory loss: No  focal weakness: No  loss of balance: Yes  memory loss: No  near-syncope: No  slurred speech: No  visual change: No  Chronicity: chronic  Onset: more than 1 year ago  Onset quality: gradually  Progression since onset: waxing and waning  Focality: upper extremity  auditory change: No  aura: No  bladder incontinence: No  bowel incontinence: No  vertigo: No  Treatments tried: acetaminophen  Improvement on treatment: mild

## 2018-12-10 PROBLEM — M47.12 CERVICAL SPONDYLOSIS WITH MYELOPATHY: Status: ACTIVE | Noted: 2018-12-10

## 2018-12-10 PROBLEM — M50.30 DEGENERATION OF CERVICAL INTERVERTEBRAL DISC: Status: ACTIVE | Noted: 2018-12-10

## 2019-01-09 ENCOUNTER — APPOINTMENT (OUTPATIENT)
Dept: PREADMISSION TESTING | Facility: HOSPITAL | Age: 74
End: 2019-01-09

## 2019-01-09 VITALS
WEIGHT: 180.34 LBS | RESPIRATION RATE: 16 BRPM | HEIGHT: 67 IN | DIASTOLIC BLOOD PRESSURE: 62 MMHG | HEART RATE: 54 BPM | BODY MASS INDEX: 28.3 KG/M2 | OXYGEN SATURATION: 99 % | SYSTOLIC BLOOD PRESSURE: 135 MMHG

## 2019-01-09 DIAGNOSIS — M50.30 DEGENERATION OF CERVICAL INTERVERTEBRAL DISC: ICD-10-CM

## 2019-01-09 DIAGNOSIS — I48.91 ATRIAL FIBRILLATION, UNSPECIFIED TYPE (HCC): Primary | ICD-10-CM

## 2019-01-09 DIAGNOSIS — M48.02 SPINAL STENOSIS IN CERVICAL REGION: ICD-10-CM

## 2019-01-09 DIAGNOSIS — M47.12 CERVICAL SPONDYLOSIS WITH MYELOPATHY: ICD-10-CM

## 2019-01-09 DIAGNOSIS — M54.12 CERVICAL RADICULOPATHY: ICD-10-CM

## 2019-01-09 LAB
ALBUMIN SERPL-MCNC: 4.7 G/DL (ref 3.5–5)
ALBUMIN/GLOB SERPL: 1.3 G/DL (ref 1.1–2.5)
ALP SERPL-CCNC: 90 U/L (ref 24–120)
ALT SERPL W P-5'-P-CCNC: 34 U/L (ref 0–54)
ANION GAP SERPL CALCULATED.3IONS-SCNC: 11 MMOL/L (ref 4–13)
APTT PPP: 30.1 SECONDS (ref 24.1–34.8)
AST SERPL-CCNC: 34 U/L (ref 7–45)
BASOPHILS # BLD AUTO: 0.07 10*3/MM3 (ref 0–0.2)
BASOPHILS NFR BLD AUTO: 0.8 % (ref 0–2)
BILIRUB SERPL-MCNC: 0.6 MG/DL (ref 0.1–1)
BILIRUB UR QL STRIP: NEGATIVE
BUN BLD-MCNC: 17 MG/DL (ref 5–21)
BUN/CREAT SERPL: 21.3 (ref 7–25)
CALCIUM SPEC-SCNC: 9.6 MG/DL (ref 8.4–10.4)
CHLORIDE SERPL-SCNC: 97 MMOL/L (ref 98–110)
CLARITY UR: CLEAR
CO2 SERPL-SCNC: 32 MMOL/L (ref 24–31)
COLOR UR: YELLOW
CREAT BLD-MCNC: 0.8 MG/DL (ref 0.5–1.4)
DEPRECATED RDW RBC AUTO: 46 FL (ref 40–54)
EOSINOPHIL # BLD AUTO: 0.21 10*3/MM3 (ref 0–0.7)
EOSINOPHIL NFR BLD AUTO: 2.3 % (ref 0–4)
ERYTHROCYTE [DISTWIDTH] IN BLOOD BY AUTOMATED COUNT: 13.1 % (ref 12–15)
GFR SERPL CREATININE-BSD FRML MDRD: 94 ML/MIN/1.73
GLOBULIN UR ELPH-MCNC: 3.5 GM/DL
GLUCOSE BLD-MCNC: 104 MG/DL (ref 70–100)
GLUCOSE UR STRIP-MCNC: NEGATIVE MG/DL
HCT VFR BLD AUTO: 42.2 % (ref 40–52)
HGB BLD-MCNC: 13.9 G/DL (ref 14–18)
HGB UR QL STRIP.AUTO: NEGATIVE
IMM GRANULOCYTES # BLD AUTO: 0.01 10*3/MM3 (ref 0–0.03)
IMM GRANULOCYTES NFR BLD AUTO: 0.1 % (ref 0–5)
INR PPP: 0.96 (ref 0.91–1.09)
KETONES UR QL STRIP: NEGATIVE
LEUKOCYTE ESTERASE UR QL STRIP.AUTO: NEGATIVE
LYMPHOCYTES # BLD AUTO: 2.81 10*3/MM3 (ref 0.72–4.86)
LYMPHOCYTES NFR BLD AUTO: 31.4 % (ref 15–45)
MCH RBC QN AUTO: 31.4 PG (ref 28–32)
MCHC RBC AUTO-ENTMCNC: 32.9 G/DL (ref 33–36)
MCV RBC AUTO: 95.5 FL (ref 82–95)
MONOCYTES # BLD AUTO: 0.62 10*3/MM3 (ref 0.19–1.3)
MONOCYTES NFR BLD AUTO: 6.9 % (ref 4–12)
NEUTROPHILS # BLD AUTO: 5.22 10*3/MM3 (ref 1.87–8.4)
NEUTROPHILS NFR BLD AUTO: 58.5 % (ref 39–78)
NITRITE UR QL STRIP: NEGATIVE
NRBC BLD AUTO-RTO: 0 /100 WBC (ref 0–0)
PH UR STRIP.AUTO: 6 [PH] (ref 5–8)
PLATELET # BLD AUTO: 170 10*3/MM3 (ref 130–400)
PMV BLD AUTO: 12.1 FL (ref 6–12)
POTASSIUM BLD-SCNC: 4.1 MMOL/L (ref 3.5–5.3)
PROT SERPL-MCNC: 8.2 G/DL (ref 6.3–8.7)
PROT UR QL STRIP: NEGATIVE
PROTHROMBIN TIME: 13.1 SECONDS (ref 11.9–14.6)
RBC # BLD AUTO: 4.42 10*6/MM3 (ref 4.8–5.9)
SODIUM BLD-SCNC: 140 MMOL/L (ref 135–145)
SP GR UR STRIP: 1.01 (ref 1–1.03)
UROBILINOGEN UR QL STRIP: NORMAL
WBC NRBC COR # BLD: 8.94 10*3/MM3 (ref 4.8–10.8)

## 2019-01-09 PROCEDURE — 93010 ELECTROCARDIOGRAM REPORT: CPT | Performed by: INTERNAL MEDICINE

## 2019-01-09 PROCEDURE — 36415 COLL VENOUS BLD VENIPUNCTURE: CPT

## 2019-01-09 PROCEDURE — 85610 PROTHROMBIN TIME: CPT | Performed by: NURSE PRACTITIONER

## 2019-01-09 PROCEDURE — 81003 URINALYSIS AUTO W/O SCOPE: CPT | Performed by: NURSE PRACTITIONER

## 2019-01-09 PROCEDURE — 85025 COMPLETE CBC W/AUTO DIFF WBC: CPT | Performed by: NURSE PRACTITIONER

## 2019-01-09 PROCEDURE — 93005 ELECTROCARDIOGRAM TRACING: CPT

## 2019-01-09 PROCEDURE — 85730 THROMBOPLASTIN TIME PARTIAL: CPT | Performed by: NURSE PRACTITIONER

## 2019-01-09 PROCEDURE — 80053 COMPREHEN METABOLIC PANEL: CPT | Performed by: NURSE PRACTITIONER

## 2019-01-09 NOTE — DISCHARGE INSTRUCTIONS
Anterior Cervical Diskectomy and Fusion  Anterior cervical diskectomy and fusion is a surgery to remove and replace an intervertebral disk. Intervertebral disks are plates of cartilage located between the bones of the spine. This surgery is done when an intervertebral disk in the neck puts pressure on the spine or on a nerve.  The surgery is done through the front (anterior) part of the neck. During the surgery, the damaged disk is removed and replaced with a plastic implant, a bone from another part of the body (bone graft), or both. Sometimes metal plates and screws (hardware) are also put in the neck to help keep the implant or bone graft in place and to allow the bones to grow together (fuse).  Tell a health care provider about:  · Any allergies you have.  · All medicines you are taking, including vitamins, herbs, eye drops, creams, and over-the-counter medicines.  · Any problems you or family members have had with anesthetic medicines.  · Any blood disorders you have.  · Any surgeries you have had.  · Any medical conditions you have.  · Whether you are pregnant or may be pregnant.  What are the risks?  Generally, this is a safe procedure. However, problems may occur, including:  · Infection.  · Bleeding with the possible need for blood transfusion.  · Injury to surrounding structures, including nerves.  · Leakage of fluid from the brain or spinal cord (cerebrospinal fluid).  · Blood clots.  · Temporary breathing difficulties.    What happens before the procedure?  · Follow instructions from your health care provider about eating or drinking restrictions.  · Ask your health care provider about:  ? Changing or stopping your regular medicines. This is especially important if you are taking diabetes medicines or blood thinners.  ? Taking medicines such as aspirin and ibuprofen. These medicines can thin your blood. Do not take these medicines before your procedure if your health care provider instructs you not  to.  · You may be given antibiotic medicines to help prevent infection.  What happens during the procedure?  · An IV tube will be inserted into one of your veins.  · You will be given one or more of the following:  ? A medicine that helps you relax (sedative).  ? A medicine that makes you fall asleep (general anesthetic).  · A breathing tube will be placed.  · Your neck will be cleaned with a germ-killing solution (antiseptic solution).  · Your surgeon will make a cut (incision) in the front of your neck.  · Your neck muscles will be spread apart.  · The damaged disk and any damaged bone will be removed.  · The area where the disk was removed will be filled with a small plastic implant, a bone graft, or both.  · Hardware may be put in your neck.  · The incision will be closed with stitches (sutures).  · Adhesive strips or skin glue may be placed across the incision.  · A bandage (dressing) will be applied over the incision.  The procedure may vary among health care providers and hospitals.  What happens after the procedure?  · Your blood pressure, heart rate, breathing rate, and blood oxygen level will be monitored often until the medicines you were given have worn off.  · You will be monitored for any signs of complications from the procedure, such as:  ? Too much bleeding from the incision site.  ? A buildup of blood under your skin at the surgical site.  ? Difficulty breathing.  · You may continue to receive antibiotics.  · You can start to eat as soon as you feel comfortable.  · You may be given a neck brace to wear. This brace limits your neck movement while your bones are fusing.  This information is not intended to replace advice given to you by your health care provider. Make sure you discuss any questions you have with your health care provider.  Document Released: 12/06/2010 Document Revised: 05/25/2017 Document Reviewed: 12/01/2016  [employer] Interactive Patient Education © 2018 [employer]      DAY OF  SURGERY INSTRUCTIONS        YOUR SURGEON: ***PAUL JEREZ     PROCEDURE: ***ANTERIOR CERVICAL DISCECTOMY    DATE OF SURGERY: ***January 16,2019    ARRIVAL TIME: AS DIRECTED BY OFFICE    YOU MAY TAKE THE FOLLOWING MEDICATION(S) THE MORNING OF SURGERY WITH A SIP OF WATER: ***ATENOLOL                MANAGING PAIN AFTER SURGERY    We know you are probably wondering what your pain will be like after surgery.  Following surgery it is unrealistic to expect you will not have pain.   Pain is how our bodies let us know that something is wrong or cautions us to be careful.  That said, our goal is to make your pain tolerable.    Methods we may use to treat your pain include (oral or IV medications, PCAs, epidurals, nerve blocks, etc.)   While some procedures require IV pain medications for a short time after surgery, transitioning to pain medications by mouth allows for better management of pain.   Your nurse will encourage you to take oral pain medications whenever possible.  IV medications work almost immediately, but only last a short while.  Taking medications by mouth allows for a more constant level of medication in your blood stream for a longer period of time.      Once your pain is out of control it is harder to get back under control.  It is important you are aware when your next dose of pain medication is due.  If you are admitted, your nurse may write the time of your next dose on the white board in your room to help you remember.      We are interested in your pain and encourage you to inform us about aggravating factors during your visit.   Many times a simple repositioning every few hours can make a big difference.    If your physician says it is okay, do not let your pain prevent you from getting out of bed. Be sure to call your nurse for assistance prior to getting up so you do not fall.      Before surgery, please decide your tolerable pain goal.  These faces help describe the pain ratings we use on a 0-10  scale.   Be prepared to tell us your goal and whether or not you take pain or anxiety medications at home.          BEFORE YOU COME TO THE HOSPITAL  (Pre-op instructions)  • Do not eat, drink, smoke or chew gum after midnight the night before surgery.  This also includes no mints.  • Morning of surgery take only the medicines you have been instructed with a sip of water unless otherwise instructed  by your physician.  • Do not shave, wear makeup or dark nail polish.  • Remove all jewelry including rings.  • Leave anything you consider valuable at home.  • Leave your suitcase in the car until after your surgery.  • Bring the following with you if applicable:  o Picture ID and insurance, Medicare or Medicaid cards  o Co-pay/deductible required by insurance (cash, check, credit card)  o Copy of advance directive, living will or power-of- documents if not brought to PAT  o CPAP or BIPAP mask and tubing  o Relaxation aids (MP3 player, book, magazine)  • On the day of surgery check in at registration located at the main entrance of the hospital.       Outpatient Surgery Guidelines, Adult  Outpatient procedures are those for which the person having the procedure is allowed to go home the same day as the procedure. Various procedures are done on an outpatient basis. You should follow some general guidelines if you will be having an outpatient procedure.  LET YOUR HEALTH CARE PROVIDER KNOW ABOUT:  · Any allergies you have.  · All medicines you are taking, including vitamins, herbs, eye drops, creams, and over-the-counter medicines.  · Previous problems you or members of your family have had with the use of anesthetics.  · Any blood disorders you have.  · Previous surgeries you have had.  · Medical conditions you have.  RISKS AND COMPLICATIONS  Your health care provider will discuss possible risks and complications with you before surgery. Common risks and complications include:    · Problems due to the use of  anesthetics.  · Blood loss and replacement (does not apply to minor surgical procedures).  · Temporary increase in pain due to surgery.  · Uncorrected pain or problems that the surgery was meant to correct.  · Infection.  · New damage.  BEFORE THE PROCEDURE  · Ask your health care provider about changing or stopping your regular medicines. You may need to stop taking certain medicines in the days or weeks before the procedure.  · Stop smoking at least 2 weeks before surgery. This lowers your risk for complications during and after surgery. Ask your health care provider for help with this if needed.  · Eat your usual meals and a light supper the day before surgery. Continue fluid intake. Do not drink alcohol.  · Do not eat or drink after midnight the night before your surgery.   · Arrange for someone to take you home and to stay with you for 24 hours after the procedure. Medicine given for your procedure may affect your ability to drive or to care for yourself.  · Call your health care provider's office if you develop an illness or problem that may prevent you from safely having your procedure.  AFTER THE PROCEDURE  After surgery, you will be taken to a recovery area, where your progress will be monitored. If there are no complications, you will be allowed to go home when you are awake, stable, and taking fluids well. You may have numbness around the surgical site. Healing will take some time. You will have tenderness at the surgical site and may have some swelling and bruising. You may also have some nausea.  HOME CARE INSTRUCTIONS  · Do not drive for 24 hours, or as directed by your health care provider. Do not drive while taking prescription pain medicines.  · Do not drink alcohol for 24 hours.  · Do not make important decisions or sign legal documents for 24 hours.  · You may resume a normal diet and activities as directed.  · Do not lift anything heavier than 10 pounds (4.5 kg) or play contact sports until your  health care provider says it is okay.  · Change your bandages (dressings) as directed.  · Only take over-the-counter or prescription medicines as directed by your health care provider.  · Follow up with your health care provider as directed.  SEEK MEDICAL CARE IF:  · You have increased bleeding (more than a small spot) from the surgical site.  · You have redness, swelling, or increasing pain in the wound.  · You see pus coming from the wound.  · You have a fever.  · You notice a bad smell coming from the wound or dressing.  · You feel lightheaded or faint.  · You develop a rash.  · You have trouble breathing.  · You develop allergies.  MAKE SURE YOU:  · Understand these instructions.  · Will watch your condition.  · Will get help right away if you are not doing well or get worse.     This information is not intended to replace advice given to you by your health care provider. Make sure you discuss any questions you have with your health care provider.     Document Released: 09/12/2002 Document Revised: 05/03/2016 Document Reviewed: 05/22/2014  [employer] Interactive Patient Education ©2016 [employer]       Fall Prevention in Hospitals, Adult  As a hospital patient, your condition and the treatments you receive can increase your risk for falls. Some additional risk factors for falls in a hospital include:  · Being in an unfamiliar environment.  · Being on bed rest.  · Your surgery.  · Taking certain medicines.  · Your tubing requirements, such as intravenous (IV) therapy or catheters.  It is important that you learn how to decrease fall risks while at the hospital. Below are important tips that can help prevent falls.  SAFETY TIPS FOR PREVENTING FALLS  Talk about your risk of falling.  · Ask your health care provider why you are at risk for falling. Is it your medicine, illness, tubing placement, or something else?  · Make a plan with your health care provider to keep you safe from falls.  · Ask your health care  provider or pharmacist about side effects of your medicines. Some medicines can make you dizzy or affect your coordination.  Ask for help.  · Ask for help before getting out of bed. You may need to press your call button.  · Ask for assistance in getting safely to the toilet.  · Ask for a walker or cane to be put at your bedside. Ask that most of the side rails on your bed be placed up before your health care provider leaves the room.  · Ask family or friends to sit with you.  · Ask for things that are out of your reach, such as your glasses, hearing aids, telephone, bedside table, or call button.  Follow these tips to avoid falling:  · Stay lying or seated, rather than standing, while waiting for help.  · Wear rubber-soled slippers or shoes whenever you walk in the hospital.  · Avoid quick, sudden movements.  ¨ Change positions slowly.  ¨ Sit on the side of your bed before standing.  ¨ Stand up slowly and wait before you start to walk.  · Let your health care provider know if there is a spill on the floor.  · Pay careful attention to the medical equipment, electrical cords, and tubes around you.  · When you need help, use your call button by your bed or in the bathroom. Wait for one of your health care providers to help you.  · If you feel dizzy or unsure of your footing, return to bed and wait for assistance.  · Avoid being distracted by the TV, telephone, or another person in your room.  · Do not lean or support yourself on rolling objects, such as IV poles or bedside tables.     This information is not intended to replace advice given to you by your health care provider. Make sure you discuss any questions you have with your health care provider.     Document Released: 12/15/2001 Document Revised: 01/08/2016 Document Reviewed: 08/25/2013  [employer] Interactive Patient Education ©2016 [employer]       Surgical Site Infections FAQs  What is a Surgical Site Infection (SSI)?  A surgical site infection is an  infection that occurs after surgery in the part of the body where the surgery took place. Most patients who have surgery do not develop an infection. However, infections develop in about 1 to 3 out of every 100 patients who have surgery.  Some of the common symptoms of a surgical site infection are:  · Redness and pain around the area where you had surgery  · Drainage of cloudy fluid from your surgical wound  · Fever  Can SSIs be treated?  Yes. Most surgical site infections can be treated with antibiotics. The antibiotic given to you depends on the bacteria (germs) causing the infection. Sometimes patients with SSIs also need another surgery to treat the infection.  What are some of the things that hospitals are doing to prevent SSIs?  To prevent SSIs, doctors, nurses, and other healthcare providers:  · Clean their hands and arms up to their elbows with an antiseptic agent just before the surgery.  · Clean their hands with soap and water or an alcohol-based hand rub before and after caring for each patient.  · May remove some of your hair immediately before your surgery using electric clippers if the hair is in the same area where the procedure will occur. They should not shave you with a razor.  · Wear special hair covers, masks, gowns, and gloves during surgery to keep the surgery area clean.  · Give you antibiotics before your surgery starts. In most cases, you should get antibiotics within 60 minutes before the surgery starts and the antibiotics should be stopped within 24 hours after surgery.  · Clean the skin at the site of your surgery with a special soap that kills germs.  What can I do to help prevent SSIs?  Before your surgery:  · Tell your doctor about other medical problems you may have. Health problems such as allergies, diabetes, and obesity could affect your surgery and your treatment.  · Quit smoking. Patients who smoke get more infections. Talk to your doctor about how you can quit before your  surgery.  · Do not shave near where you will have surgery. Shaving with a razor can irritate your skin and make it easier to develop an infection.  At the time of your surgery:  · Speak up if someone tries to shave you with a razor before surgery. Ask why you need to be shaved and talk with your surgeon if you have any concerns.  · Ask if you will get antibiotics before surgery.  After your surgery:  · Make sure that your healthcare providers clean their hands before examining you, either with soap and water or an alcohol-based hand rub.  · If you do not see your providers clean their hands, please ask them to do so.  · Family and friends who visit you should not touch the surgical wound or dressings.  · Family and friends should clean their hands with soap and water or an alcohol-based hand rub before and after visiting you. If you do not see them clean their hands, ask them to clean their hands.  What do I need to do when I go home from the hospital?  · Before you go home, your doctor or nurse should explain everything you need to know about taking care of your wound. Make sure you understand how to care for your wound before you leave the hospital.  · Always clean your hands before and after caring for your wound.  · Before you go home, make sure you know who to contact if you have questions or problems after you get home.  · If you have any symptoms of an infection, such as redness and pain at the surgery site, drainage, or fever, call your doctor immediately.  If you have additional questions, please ask your doctor or nurse.  Developed and co-sponsored by The Society for Healthcare Epidemiology of [place] (SHEA); Infectious Diseases Society of [place] (IDSA); American Hospital Association; Association for Professionals in Infection Control and Epidemiology (APIC); Centers for Disease Control and Prevention (CDC); and The Joint Commission.     This information is not intended to replace advice given to you by  your health care provider. Make sure you discuss any questions you have with your health care provider.     Document Released: 12/23/2014 Document Revised: 01/08/2016 Document Reviewed: 03/02/2016  [employer] Interactive Patient Education ©2016 [employer]       [facility]  CHG 4% Patient Instruction Sheet    Preparing the Skin Before Surgery  Preparing or “prepping” skin before surgery can reduce the risk of infection at the surgical site. To make the process easier,[facility] has chosen 4% Chlorhexidine Gluconate (CHG) antiseptic solution.   The steps below outline the prepping process and should be carefully followed.                                                                                                                                                      Prep the skin at the following time(s):                                                      We recommend you shower the night before surgery, and again the morning of surgery with the 4% CHG antiseptic solution using half of the bottle and a cloth each time.  Dress in clean clothes/sleepwear after showering.  See instructions below for application.          Do not apply any lotions or moisturizers.       Do not shave the area to be prepped for at least 2 days prior to surgery.    Clipping the hair may be done immediately prior to your surgery at the hospital    if needed.    Directions:  Thoroughly rinse your body with water.  Apply 4% CHG to a cloth and wash skin gently, paying special attention to the operative site.  Rinse again thoroughly.  Once you have begun using this product do not apply anything else to your skin. If itching or redness persists, rinse affected areas and discontinue use.    When using this product:  • Keep out of eyes, ears, and mouth.  • If solution should contact these areas, rinse out promptly and thoroughly with water.  • For external use only.  • Do not use in genital area, on your face or  head.      PATIENT/FAMILY/RESPONSIBLE PARTY VERBALIZES UNDERSTANDING OF ABOVE EDUCATION.  COPY OF PAIN SCALE GIVEN AND REVIEWED WITH VERBALIZED UNDERSTANDING.

## 2019-01-10 ENCOUNTER — OFFICE VISIT (OUTPATIENT)
Dept: CARDIOLOGY | Facility: CLINIC | Age: 74
End: 2019-01-10

## 2019-01-10 VITALS
HEIGHT: 69 IN | SYSTOLIC BLOOD PRESSURE: 100 MMHG | HEART RATE: 52 BPM | OXYGEN SATURATION: 99 % | WEIGHT: 178 LBS | BODY MASS INDEX: 26.36 KG/M2 | DIASTOLIC BLOOD PRESSURE: 74 MMHG

## 2019-01-10 DIAGNOSIS — I49.1 PAC (PREMATURE ATRIAL CONTRACTION): ICD-10-CM

## 2019-01-10 DIAGNOSIS — R94.31 ABNORMAL ECG: Primary | ICD-10-CM

## 2019-01-10 DIAGNOSIS — I10 ESSENTIAL HYPERTENSION: ICD-10-CM

## 2019-01-10 PROBLEM — I48.91 ATRIAL FIBRILLATION (HCC): Status: RESOLVED | Noted: 2019-01-09 | Resolved: 2019-01-10

## 2019-01-10 PROCEDURE — 99204 OFFICE O/P NEW MOD 45 MIN: CPT | Performed by: INTERNAL MEDICINE

## 2019-01-10 PROCEDURE — 93000 ELECTROCARDIOGRAM COMPLETE: CPT | Performed by: INTERNAL MEDICINE

## 2019-01-10 RX ORDER — GLIPIZIDE 10 MG/1
10 TABLET ORAL DAILY
Status: ON HOLD | COMMUNITY
End: 2019-01-16

## 2019-01-10 NOTE — PROGRESS NOTES
Subjective:     Encounter Date:01/10/2019      Patient ID: Manjit Seth is a 73 y.o. male with no prior cardiac history who is referred for preoperative evaluation after he was found have an abnormal EKG.    Referring provider: Pablito Islas M.D.  Reason for referral: Abnormal EKG    Chief Complaint: Abnormal EKG    Hypertension   This is a chronic problem. The problem is unchanged. The problem is controlled. Associated symptoms include neck pain. Pertinent negatives include no blurred vision, chest pain, headaches, orthopnea, peripheral edema, PND or shortness of breath. There are no associated agents to hypertension. Risk factors for coronary artery disease include male gender, diabetes mellitus and dyslipidemia. Current antihypertension treatment includes diuretics, beta blockers and ACE inhibitors. The current treatment provides significant improvement. There are no compliance problems.  There is no history of angina, CAD/MI, CVA, heart failure or PVD.      This a 73-year-old male with a history of hypertension, hyperlipidemia who presents after he was found have an abnormal preoperative EKG.  The patient says that for a number of years he has had some irregularity to his pulse.  He has previous had thought this was either PACs or PVCs.  He denies having any palpitations but says that on a few times he has checked his pulse and found it to be somewhat irregular.  He says that a number of years ago after taking a multivitamin with ephedra in it, he had some brief cardiac rhythm abnormality that spontaneously resolved.  Since that time, he has never been diagnosed or had any significant cardiac rhythm abnormalities.  He says that he has never been told that he has atrial fibrillation.  He does not have any history of cardiac disease otherwise.  As a history of type II diabetes mellitus which is treated with oral medications.  He does not require insulin.  He is to undergo neck surgery in the near future  due to degenerative disc disease.    The following portions of the patient's history were reviewed and updated as appropriate: allergies, current medications, past family history, past medical history, past social history, past surgical history and problem list.     Past Medical History:   Diagnosis Date   • Bell's palsy     with facial tremor   • Diabetes mellitus (CMS/HCC)    • Extramedullary plasmacytoma (CMS/HCC)     left nose   • History of radiation therapy 2016    4000 cGy to nose completed on 3/28/16   • History of tobacco abuse    • Hyperlipidemia    • Hypertension      Past Surgical History:   Procedure Laterality Date   • COLONOSCOPY     • NASAL ENDOSCOPY      bilateral with excisional biopsy of left intranasal mass 16       Current Outpatient Medications:   •  aspirin 81 MG EC tablet, Take 81 mg by mouth Daily., Disp: , Rfl:   •  atenolol (TENORMIN) 50 MG tablet, Take 50 mg by mouth Daily., Disp: , Rfl:   •  Cranberry 400 MG capsule, Take 1 capsule by mouth Daily., Disp: , Rfl:   •  Ergocalciferol (VITAMIN D2) 400 UNITS tablet, Take  by mouth., Disp: , Rfl:   •  finasteride (PROSCAR) 5 MG tablet, Take 5 mg by mouth Daily., Disp: , Rfl:   •  Garlic 10 MG capsule, Take  by mouth., Disp: , Rfl:   •  glipiZIDE (GLUCOTROL) 10 MG tablet, Take 10 mg by mouth Daily., Disp: , Rfl:   •  Multiple Vitamins-Minerals (MULTIVITAMIN ADULT PO), Take  by mouth., Disp: , Rfl:   •  Omega-3 Fatty Acids (FISH OIL) 1000 MG capsule capsule, Take  by mouth Daily With Breakfast., Disp: , Rfl:   •  simvastatin (ZOCOR) 40 MG tablet, , Disp: , Rfl:   •  triamterene-hydrochlorothiazide (MAXZIDE-25) 37.5-25 MG per tablet, Take 1 tablet by mouth Daily., Disp: , Rfl:   •  lisinopril (PRINIVIL,ZESTRIL) 5 MG tablet, Take 5 mg by mouth Daily., Disp: , Rfl:     No Known Allergies    Social History     Tobacco Use   • Smoking status: Former Smoker     Types: Cigarettes     Last attempt to quit: 1980     Years since quittin.0    • Smokeless tobacco: Current User     Types: Chew   Substance Use Topics   • Alcohol use: No     Family History   Problem Relation Age of Onset   • Breast cancer Mother    • Heart failure Father    • Cancer Maternal Uncle      Review of Systems   Constitution: Negative for chills, fever, night sweats and weight loss.   HENT: Negative for congestion and hearing loss.    Eyes: Negative for blurred vision and pain.   Cardiovascular: Positive for irregular heartbeat (PAC's). Negative for chest pain, claudication, dyspnea on exertion, leg swelling, orthopnea, paroxysmal nocturnal dyspnea and syncope.   Respiratory: Negative for cough, hemoptysis, shortness of breath and wheezing.    Endocrine: Negative for cold intolerance, heat intolerance, polydipsia and polyuria.   Hematologic/Lymphatic: Negative for adenopathy and bleeding problem. Does not bruise/bleed easily.   Skin: Negative for color change, poor wound healing and rash.   Musculoskeletal: Positive for neck pain. Negative for arthritis, back pain, joint pain, joint swelling and myalgias.   Gastrointestinal: Negative for abdominal pain, change in bowel habit, constipation, diarrhea, heartburn, hematochezia, melena, nausea and vomiting.   Genitourinary: Negative for bladder incontinence, dysuria, frequency, hematuria and nocturia.   Neurological: Negative for dizziness, focal weakness, headaches, light-headedness, loss of balance, numbness and seizures.   Psychiatric/Behavioral: Negative for altered mental status, memory loss and substance abuse.   Allergic/Immunologic: Negative for hives and persistent infections.       ECG 12 Lead  Date/Time: 1/10/2019 1:46 PM  Performed by: Turner Morley MD  Authorized by: Turner Morley MD   Rhythm: sinus bradycardia  Ectopy: atrial premature contractions  Rate: bradycardic  BPM: 59  Conduction: conduction normal  QRS axis: normal  Clinical impression: abnormal ECG  Comments: NSTT             Objective:      Physical Exam   Constitutional: He is oriented to person, place, and time. Vital signs are normal. He appears well-developed and well-nourished. He is cooperative.  Non-toxic appearance. No distress.   HENT:   Head: Normocephalic and atraumatic.   Right Ear: External ear normal.   Left Ear: External ear normal.   Nose: Nose normal.   Mouth/Throat: Uvula is midline, oropharynx is clear and moist and mucous membranes are normal. Mucous membranes are not pale, not dry and not cyanotic. No oropharyngeal exudate.   Eyes: EOM and lids are normal. Pupils are equal, round, and reactive to light.   Neck: Normal range of motion. Neck supple. No hepatojugular reflux and no JVD present. Carotid bruit is not present. No tracheal deviation and no edema present. No thyroid mass and no thyromegaly present.   Cardiovascular: Normal rate, S1 normal, S2 normal, normal heart sounds, intact distal pulses and normal pulses. An irregular rhythm present.  No extrasystoles are present. PMI is not displaced. Exam reveals no gallop and no friction rub.   No murmur heard.  Pulses:       Radial pulses are 2+ on the right side, and 2+ on the left side.        Femoral pulses are 2+ on the right side, and 2+ on the left side.       Dorsalis pedis pulses are 2+ on the right side, and 2+ on the left side.        Posterior tibial pulses are 2+ on the right side, and 2+ on the left side.   Irregular due to presence of PACs   Pulmonary/Chest: Effort normal and breath sounds normal. No accessory muscle usage. No respiratory distress. He has no wheezes. He has no rales. He exhibits no tenderness.   Abdominal: Soft. Normal appearance and bowel sounds are normal. He exhibits no distension, no abdominal bruit and no pulsatile midline mass. There is no hepatosplenomegaly. There is no tenderness.   Musculoskeletal: Normal range of motion. He exhibits no edema, tenderness or deformity.   Lymphadenopathy:     He has no cervical adenopathy.   Neurological: He is  "oriented to person, place, and time. He has normal strength. No cranial nerve deficit.   Skin: Skin is warm, dry and intact. No rash noted. He is not diaphoretic. No cyanosis or erythema. Nails show no clubbing.   Psychiatric: He has a normal mood and affect. His speech is normal and behavior is normal. Thought content normal.   Vitals reviewed.    /74 (BP Location: Left arm, Patient Position: Sitting)   Pulse 52   Ht 175.3 cm (69\")   Wt 80.7 kg (178 lb)   SpO2 99%   BMI 26.29 kg/m²     Data/Lab Review:     Lipid panel from 12/28/18: LDL cholesterol 62, HDL 41    Lab Results   Component Value Date    WBC 8.94 01/09/2019    HGB 13.9 (L) 01/09/2019    HCT 42.2 01/09/2019    MCV 95.5 (H) 01/09/2019     01/09/2019     Lab Results   Component Value Date    GLUCOSE 104 (H) 01/09/2019    CALCIUM 9.6 01/09/2019     01/09/2019    K 4.1 01/09/2019    CO2 32.0 (H) 01/09/2019    CL 97 (L) 01/09/2019    BUN 17 01/09/2019    CREATININE 0.80 01/09/2019    EGFRIFNONA 94 01/09/2019    BCR 21.3 01/09/2019    ANIONGAP 11.0 01/09/2019           Assessment:          Diagnosis Plan   1. Abnormal ECG  ECG 12 Lead   2. PAC (premature atrial contraction)     3. Essential hypertension            Plan:       1.  Abnormal EKG: The patient was referred here after his preoperative EKG showed possible atrial fibrillation.  Review of this EKG does show that this is not truly atrial fibrillation.  There are P waves in front of every QRS complex.  The rhythm in question actually appears to be an ectopic atrial rhythm, which would not increase this patient's risk for surgery and any fashion.  He has PACs which create the irregularity to the rhythm.  Based on this EKG, no further cardiac workup would be indicated prior to surgery.  Today's EKG shows sinus rhythm/sinus bradycardia with PACs as well.  There is no evidence of atrial fibrillation.  I do not feel that this patient has atrial fibrillation.    2.  Premature atrial " contractions: The patient does have PACs present on both EKGs.  The patient is relatively asymptomatic from these.  He has been on a beta blocker for a number of years.  No changes in therapy recommended at this time, as PACs would be considered benign.    3.  Essential hypertension: The patient's blood pressures currently well controlled on current medications.  No changes at this time.    Patient's Body mass index is 26.29 kg/m². BMI is above normal parameters. Recommendations include: exercise counseling and nutrition counseling.    Follow-up: as needed

## 2019-01-15 ENCOUNTER — ANESTHESIA EVENT (OUTPATIENT)
Dept: PERIOP | Facility: HOSPITAL | Age: 74
End: 2019-01-15

## 2019-01-16 ENCOUNTER — HOSPITAL ENCOUNTER (INPATIENT)
Facility: HOSPITAL | Age: 74
LOS: 1 days | Discharge: HOME | End: 2019-01-17
Attending: NEUROLOGICAL SURGERY | Admitting: NEUROLOGICAL SURGERY

## 2019-01-16 ENCOUNTER — ANESTHESIA (OUTPATIENT)
Dept: PERIOP | Facility: HOSPITAL | Age: 74
End: 2019-01-16

## 2019-01-16 ENCOUNTER — APPOINTMENT (OUTPATIENT)
Dept: GENERAL RADIOLOGY | Facility: HOSPITAL | Age: 74
End: 2019-01-16

## 2019-01-16 DIAGNOSIS — M54.12 CERVICAL RADICULOPATHY: ICD-10-CM

## 2019-01-16 DIAGNOSIS — M50.30 DEGENERATION OF CERVICAL INTERVERTEBRAL DISC: ICD-10-CM

## 2019-01-16 DIAGNOSIS — M48.02 SPINAL STENOSIS IN CERVICAL REGION: ICD-10-CM

## 2019-01-16 DIAGNOSIS — M47.12 CERVICAL SPONDYLOSIS WITH MYELOPATHY: ICD-10-CM

## 2019-01-16 LAB
ABO GROUP BLD: NORMAL
BLD GP AB SCN SERPL QL: NEGATIVE
GLUCOSE BLDC GLUCOMTR-MCNC: 106 MG/DL (ref 70–130)
GLUCOSE BLDC GLUCOMTR-MCNC: 111 MG/DL (ref 70–130)
RH BLD: NEGATIVE
T&S EXPIRATION DATE: NORMAL

## 2019-01-16 PROCEDURE — 86901 BLOOD TYPING SEROLOGIC RH(D): CPT | Performed by: NURSE PRACTITIONER

## 2019-01-16 PROCEDURE — 0RG20A0 FUSION OF 2 OR MORE CERVICAL VERTEBRAL JOINTS WITH INTERBODY FUSION DEVICE, ANTERIOR APPROACH, ANTERIOR COLUMN, OPEN APPROACH: ICD-10-PCS | Performed by: NEUROLOGICAL SURGERY

## 2019-01-16 PROCEDURE — 25010000002 CEFAZOLIN PER 500 MG: Performed by: NURSE PRACTITIONER

## 2019-01-16 PROCEDURE — 22845 INSERT SPINE FIXATION DEVICE: CPT | Performed by: NEUROLOGICAL SURGERY

## 2019-01-16 PROCEDURE — 25010000002 PROPOFOL 1000 MG/ML EMULSION: Performed by: NURSE ANESTHETIST, CERTIFIED REGISTERED

## 2019-01-16 PROCEDURE — C1713 ANCHOR/SCREW BN/BN,TIS/BN: HCPCS | Performed by: NEUROLOGICAL SURGERY

## 2019-01-16 PROCEDURE — 88304 TISSUE EXAM BY PATHOLOGIST: CPT | Performed by: NEUROLOGICAL SURGERY

## 2019-01-16 PROCEDURE — 94760 N-INVAS EAR/PLS OXIMETRY 1: CPT

## 2019-01-16 PROCEDURE — 0RT30ZZ RESECTION OF CERVICAL VERTEBRAL DISC, OPEN APPROACH: ICD-10-PCS | Performed by: NEUROLOGICAL SURGERY

## 2019-01-16 PROCEDURE — 22585 ARTHRD ANT NTRBD MIN DSC EA: CPT | Performed by: NEUROLOGICAL SURGERY

## 2019-01-16 PROCEDURE — 72040 X-RAY EXAM NECK SPINE 2-3 VW: CPT

## 2019-01-16 PROCEDURE — 25010000002 MIDAZOLAM PER 1 MG: Performed by: ANESTHESIOLOGY

## 2019-01-16 PROCEDURE — 25010000002 PROPOFOL 10 MG/ML EMULSION: Performed by: NURSE ANESTHETIST, CERTIFIED REGISTERED

## 2019-01-16 PROCEDURE — 22554 ARTHRD ANT NTRBD MIN DSC CRV: CPT | Performed by: NEUROLOGICAL SURGERY

## 2019-01-16 PROCEDURE — 22854 INSJ BIOMECHANICAL DEVICE: CPT | Performed by: NEUROLOGICAL SURGERY

## 2019-01-16 PROCEDURE — 88311 DECALCIFY TISSUE: CPT | Performed by: NEUROLOGICAL SURGERY

## 2019-01-16 PROCEDURE — 82962 GLUCOSE BLOOD TEST: CPT

## 2019-01-16 PROCEDURE — 25010000002 SUCCINYLCHOLINE PER 20 MG: Performed by: NURSE ANESTHETIST, CERTIFIED REGISTERED

## 2019-01-16 PROCEDURE — 25010000002 MIDAZOLAM PER 1 MG: Performed by: NURSE ANESTHETIST, CERTIFIED REGISTERED

## 2019-01-16 PROCEDURE — 94799 UNLISTED PULMONARY SVC/PX: CPT

## 2019-01-16 PROCEDURE — 86900 BLOOD TYPING SEROLOGIC ABO: CPT | Performed by: NURSE PRACTITIONER

## 2019-01-16 PROCEDURE — 76000 FLUOROSCOPY <1 HR PHYS/QHP: CPT

## 2019-01-16 PROCEDURE — 99024 POSTOP FOLLOW-UP VISIT: CPT | Performed by: NEUROLOGICAL SURGERY

## 2019-01-16 PROCEDURE — 25010000002 ONDANSETRON PER 1 MG: Performed by: NURSE ANESTHETIST, CERTIFIED REGISTERED

## 2019-01-16 PROCEDURE — 63082 REMOVE VERTEBRAL BODY ADD-ON: CPT | Performed by: NEUROLOGICAL SURGERY

## 2019-01-16 PROCEDURE — 63081 REMOVE VERT BODY DCMPRN CRVL: CPT | Performed by: NEUROLOGICAL SURGERY

## 2019-01-16 PROCEDURE — 86850 RBC ANTIBODY SCREEN: CPT | Performed by: NURSE PRACTITIONER

## 2019-01-16 DEVICE — CAGE 5030741 ANATOMIC PTC 14X11X7MM
Type: IMPLANTABLE DEVICE | Site: SPINE CERVICAL | Status: FUNCTIONAL
Brand: ANATOMIC PEEK PTC CERVICAL FUSION SYSTEM

## 2019-01-16 DEVICE — INSTRUMENT 8796003 PREFIXATION PIN: Type: IMPLANTABLE DEVICE | Site: SPINE CERVICAL | Status: FUNCTIONAL

## 2019-01-16 DEVICE — HEMO ABS GELFOAM PWDR PORCN 1GM: Type: IMPLANTABLE DEVICE | Status: FUNCTIONAL

## 2019-01-16 DEVICE — HEMO ABS GELFOAM SPNG PORCN SZ100: Type: IMPLANTABLE DEVICE | Status: FUNCTIONAL

## 2019-01-16 DEVICE — SCREW 7713515 ZEVO VAR SD 3.5MM X 15MM
Type: IMPLANTABLE DEVICE | Site: SPINE CERVICAL | Status: FUNCTIONAL
Brand: ZEVO™ ANTERIOR CERVICAL PLATE SYSTEM

## 2019-01-16 RX ORDER — SODIUM CHLORIDE 0.9 % (FLUSH) 0.9 %
3 SYRINGE (ML) INJECTION EVERY 12 HOURS SCHEDULED
Status: DISCONTINUED | OUTPATIENT
Start: 2019-01-16 | End: 2019-01-17 | Stop reason: HOSPADM

## 2019-01-16 RX ORDER — SODIUM CHLORIDE 9 MG/ML
75 INJECTION, SOLUTION INTRAVENOUS CONTINUOUS
Status: DISCONTINUED | OUTPATIENT
Start: 2019-01-16 | End: 2019-01-17 | Stop reason: HOSPADM

## 2019-01-16 RX ORDER — KETAMINE HYDROCHLORIDE 50 MG/ML
INJECTION, SOLUTION, CONCENTRATE INTRAMUSCULAR; INTRAVENOUS AS NEEDED
Status: DISCONTINUED | OUTPATIENT
Start: 2019-01-16 | End: 2019-01-16 | Stop reason: SURG

## 2019-01-16 RX ORDER — SODIUM CHLORIDE 0.9 % (FLUSH) 0.9 %
3-10 SYRINGE (ML) INJECTION AS NEEDED
Status: DISCONTINUED | OUTPATIENT
Start: 2019-01-16 | End: 2019-01-16 | Stop reason: HOSPADM

## 2019-01-16 RX ORDER — SUFENTANIL CITRATE 50 UG/ML
INJECTION EPIDURAL; INTRAVENOUS AS NEEDED
Status: DISCONTINUED | OUTPATIENT
Start: 2019-01-16 | End: 2019-01-16 | Stop reason: SURG

## 2019-01-16 RX ORDER — MEPERIDINE HYDROCHLORIDE 25 MG/ML
12.5 INJECTION INTRAMUSCULAR; INTRAVENOUS; SUBCUTANEOUS
Status: DISCONTINUED | OUTPATIENT
Start: 2019-01-16 | End: 2019-01-16 | Stop reason: HOSPADM

## 2019-01-16 RX ORDER — SUCCINYLCHOLINE CHLORIDE 20 MG/ML
INJECTION INTRAMUSCULAR; INTRAVENOUS AS NEEDED
Status: DISCONTINUED | OUTPATIENT
Start: 2019-01-16 | End: 2019-01-16 | Stop reason: SURG

## 2019-01-16 RX ORDER — HYDRALAZINE HYDROCHLORIDE 20 MG/ML
5 INJECTION INTRAMUSCULAR; INTRAVENOUS
Status: DISCONTINUED | OUTPATIENT
Start: 2019-01-16 | End: 2019-01-16 | Stop reason: HOSPADM

## 2019-01-16 RX ORDER — ACETAMINOPHEN 325 MG/1
650 TABLET ORAL EVERY 4 HOURS PRN
Status: DISCONTINUED | OUTPATIENT
Start: 2019-01-16 | End: 2019-01-17 | Stop reason: HOSPADM

## 2019-01-16 RX ORDER — NALOXONE HCL 0.4 MG/ML
0.4 VIAL (ML) INJECTION
Status: DISCONTINUED | OUTPATIENT
Start: 2019-01-16 | End: 2019-01-17 | Stop reason: HOSPADM

## 2019-01-16 RX ORDER — SODIUM CHLORIDE, SODIUM LACTATE, POTASSIUM CHLORIDE, CALCIUM CHLORIDE 600; 310; 30; 20 MG/100ML; MG/100ML; MG/100ML; MG/100ML
1000 INJECTION, SOLUTION INTRAVENOUS CONTINUOUS
Status: DISCONTINUED | OUTPATIENT
Start: 2019-01-16 | End: 2019-01-16 | Stop reason: HOSPADM

## 2019-01-16 RX ORDER — BISACODYL 10 MG
10 SUPPOSITORY, RECTAL RECTAL DAILY PRN
Status: DISCONTINUED | OUTPATIENT
Start: 2019-01-16 | End: 2019-01-17 | Stop reason: HOSPADM

## 2019-01-16 RX ORDER — ONDANSETRON 2 MG/ML
INJECTION INTRAMUSCULAR; INTRAVENOUS AS NEEDED
Status: DISCONTINUED | OUTPATIENT
Start: 2019-01-16 | End: 2019-01-16 | Stop reason: SURG

## 2019-01-16 RX ORDER — ACETAMINOPHEN 500 MG
1000 TABLET ORAL ONCE
Status: COMPLETED | OUTPATIENT
Start: 2019-01-16 | End: 2019-01-16

## 2019-01-16 RX ORDER — FAMOTIDINE 10 MG/ML
20 INJECTION, SOLUTION INTRAVENOUS
Status: DISCONTINUED | OUTPATIENT
Start: 2019-01-16 | End: 2019-01-16 | Stop reason: HOSPADM

## 2019-01-16 RX ORDER — ATENOLOL 50 MG/1
50 TABLET ORAL DAILY
Status: DISCONTINUED | OUTPATIENT
Start: 2019-01-17 | End: 2019-01-17 | Stop reason: HOSPADM

## 2019-01-16 RX ORDER — HYDROCODONE BITARTRATE AND ACETAMINOPHEN 7.5; 325 MG/1; MG/1
1 TABLET ORAL EVERY 4 HOURS PRN
Status: DISCONTINUED | OUTPATIENT
Start: 2019-01-16 | End: 2019-01-17 | Stop reason: HOSPADM

## 2019-01-16 RX ORDER — NALOXONE HCL 0.4 MG/ML
0.04 VIAL (ML) INJECTION AS NEEDED
Status: DISCONTINUED | OUTPATIENT
Start: 2019-01-16 | End: 2019-01-16 | Stop reason: HOSPADM

## 2019-01-16 RX ORDER — SODIUM CHLORIDE 0.9 % (FLUSH) 0.9 %
1-10 SYRINGE (ML) INJECTION AS NEEDED
Status: DISCONTINUED | OUTPATIENT
Start: 2019-01-16 | End: 2019-01-17 | Stop reason: HOSPADM

## 2019-01-16 RX ORDER — MIDAZOLAM HYDROCHLORIDE 1 MG/ML
1 INJECTION INTRAMUSCULAR; INTRAVENOUS
Status: DISCONTINUED | OUTPATIENT
Start: 2019-01-16 | End: 2019-01-16 | Stop reason: HOSPADM

## 2019-01-16 RX ORDER — ROCURONIUM BROMIDE 10 MG/ML
INJECTION, SOLUTION INTRAVENOUS AS NEEDED
Status: DISCONTINUED | OUTPATIENT
Start: 2019-01-16 | End: 2019-01-16 | Stop reason: SURG

## 2019-01-16 RX ORDER — PROPOFOL 10 MG/ML
VIAL (ML) INTRAVENOUS AS NEEDED
Status: DISCONTINUED | OUTPATIENT
Start: 2019-01-16 | End: 2019-01-16 | Stop reason: SURG

## 2019-01-16 RX ORDER — SODIUM CHLORIDE 0.9 % (FLUSH) 0.9 %
1-10 SYRINGE (ML) INJECTION AS NEEDED
Status: DISCONTINUED | OUTPATIENT
Start: 2019-01-16 | End: 2019-01-16 | Stop reason: HOSPADM

## 2019-01-16 RX ORDER — MORPHINE SULFATE 2 MG/ML
2 INJECTION, SOLUTION INTRAMUSCULAR; INTRAVENOUS
Status: DISCONTINUED | OUTPATIENT
Start: 2019-01-16 | End: 2019-01-16 | Stop reason: HOSPADM

## 2019-01-16 RX ORDER — FLUMAZENIL 0.1 MG/ML
0.2 INJECTION INTRAVENOUS AS NEEDED
Status: DISCONTINUED | OUTPATIENT
Start: 2019-01-16 | End: 2019-01-16 | Stop reason: HOSPADM

## 2019-01-16 RX ORDER — SENNA AND DOCUSATE SODIUM 50; 8.6 MG/1; MG/1
1 TABLET, FILM COATED ORAL NIGHTLY PRN
Status: DISCONTINUED | OUTPATIENT
Start: 2019-01-16 | End: 2019-01-17 | Stop reason: HOSPADM

## 2019-01-16 RX ORDER — DOCUSATE SODIUM 100 MG/1
100 CAPSULE, LIQUID FILLED ORAL 2 TIMES DAILY PRN
Status: DISCONTINUED | OUTPATIENT
Start: 2019-01-16 | End: 2019-01-17 | Stop reason: HOSPADM

## 2019-01-16 RX ORDER — FINASTERIDE 5 MG/1
5 TABLET, FILM COATED ORAL DAILY
Status: DISCONTINUED | OUTPATIENT
Start: 2019-01-16 | End: 2019-01-17 | Stop reason: HOSPADM

## 2019-01-16 RX ORDER — LABETALOL HYDROCHLORIDE 5 MG/ML
5 INJECTION, SOLUTION INTRAVENOUS
Status: DISCONTINUED | OUTPATIENT
Start: 2019-01-16 | End: 2019-01-16 | Stop reason: HOSPADM

## 2019-01-16 RX ORDER — BUPIVACAINE HCL/0.9 % NACL/PF 0.1 %
2 PLASTIC BAG, INJECTION (ML) EPIDURAL ONCE
Status: COMPLETED | OUTPATIENT
Start: 2019-01-16 | End: 2019-01-16

## 2019-01-16 RX ORDER — FENTANYL CITRATE 50 UG/ML
25 INJECTION, SOLUTION INTRAMUSCULAR; INTRAVENOUS AS NEEDED
Status: DISCONTINUED | OUTPATIENT
Start: 2019-01-16 | End: 2019-01-16 | Stop reason: HOSPADM

## 2019-01-16 RX ORDER — BUPIVACAINE HCL/0.9 % NACL/PF 0.1 %
2 PLASTIC BAG, INJECTION (ML) EPIDURAL EVERY 8 HOURS
Status: COMPLETED | OUTPATIENT
Start: 2019-01-16 | End: 2019-01-17

## 2019-01-16 RX ORDER — METOCLOPRAMIDE HYDROCHLORIDE 5 MG/ML
5 INJECTION INTRAMUSCULAR; INTRAVENOUS
Status: DISCONTINUED | OUTPATIENT
Start: 2019-01-16 | End: 2019-01-16 | Stop reason: HOSPADM

## 2019-01-16 RX ORDER — MIDAZOLAM HYDROCHLORIDE 1 MG/ML
2 INJECTION INTRAMUSCULAR; INTRAVENOUS
Status: DISCONTINUED | OUTPATIENT
Start: 2019-01-16 | End: 2019-01-16 | Stop reason: HOSPADM

## 2019-01-16 RX ORDER — LISINOPRIL 5 MG/1
5 TABLET ORAL DAILY
Status: DISCONTINUED | OUTPATIENT
Start: 2019-01-16 | End: 2019-01-17 | Stop reason: HOSPADM

## 2019-01-16 RX ORDER — SODIUM CHLORIDE, SODIUM LACTATE, POTASSIUM CHLORIDE, CALCIUM CHLORIDE 600; 310; 30; 20 MG/100ML; MG/100ML; MG/100ML; MG/100ML
100 INJECTION, SOLUTION INTRAVENOUS CONTINUOUS
Status: DISCONTINUED | OUTPATIENT
Start: 2019-01-16 | End: 2019-01-16 | Stop reason: HOSPADM

## 2019-01-16 RX ORDER — MAGNESIUM HYDROXIDE 1200 MG/15ML
LIQUID ORAL AS NEEDED
Status: DISCONTINUED | OUTPATIENT
Start: 2019-01-16 | End: 2019-01-16 | Stop reason: HOSPADM

## 2019-01-16 RX ORDER — IPRATROPIUM BROMIDE AND ALBUTEROL SULFATE 2.5; .5 MG/3ML; MG/3ML
3 SOLUTION RESPIRATORY (INHALATION) ONCE AS NEEDED
Status: DISCONTINUED | OUTPATIENT
Start: 2019-01-16 | End: 2019-01-16 | Stop reason: HOSPADM

## 2019-01-16 RX ORDER — SODIUM CHLORIDE 9 MG/ML
INJECTION, SOLUTION INTRAVENOUS AS NEEDED
Status: DISCONTINUED | OUTPATIENT
Start: 2019-01-16 | End: 2019-01-16 | Stop reason: HOSPADM

## 2019-01-16 RX ORDER — MIDAZOLAM HYDROCHLORIDE 1 MG/ML
INJECTION INTRAMUSCULAR; INTRAVENOUS AS NEEDED
Status: DISCONTINUED | OUTPATIENT
Start: 2019-01-16 | End: 2019-01-16 | Stop reason: SURG

## 2019-01-16 RX ORDER — ONDANSETRON 2 MG/ML
4 INJECTION INTRAMUSCULAR; INTRAVENOUS AS NEEDED
Status: DISCONTINUED | OUTPATIENT
Start: 2019-01-16 | End: 2019-01-16 | Stop reason: HOSPADM

## 2019-01-16 RX ORDER — OXYCODONE AND ACETAMINOPHEN 10; 325 MG/1; MG/1
1 TABLET ORAL ONCE AS NEEDED
Status: COMPLETED | OUTPATIENT
Start: 2019-01-16 | End: 2019-01-16

## 2019-01-16 RX ORDER — MORPHINE SULFATE 2 MG/ML
1 INJECTION, SOLUTION INTRAMUSCULAR; INTRAVENOUS EVERY 4 HOURS PRN
Status: DISCONTINUED | OUTPATIENT
Start: 2019-01-16 | End: 2019-01-17 | Stop reason: RX

## 2019-01-16 RX ORDER — TRIAMTERENE AND HYDROCHLOROTHIAZIDE 37.5; 25 MG/1; MG/1
1 TABLET ORAL DAILY
Status: DISCONTINUED | OUTPATIENT
Start: 2019-01-16 | End: 2019-01-17 | Stop reason: HOSPADM

## 2019-01-16 RX ORDER — SODIUM CHLORIDE 0.9 % (FLUSH) 0.9 %
3 SYRINGE (ML) INJECTION EVERY 12 HOURS SCHEDULED
Status: DISCONTINUED | OUTPATIENT
Start: 2019-01-16 | End: 2019-01-16 | Stop reason: HOSPADM

## 2019-01-16 RX ORDER — SODIUM CHLORIDE 0.9 % (FLUSH) 0.9 %
3 SYRINGE (ML) INJECTION AS NEEDED
Status: DISCONTINUED | OUTPATIENT
Start: 2019-01-16 | End: 2019-01-16 | Stop reason: HOSPADM

## 2019-01-16 RX ORDER — SODIUM CHLORIDE, SODIUM LACTATE, POTASSIUM CHLORIDE, CALCIUM CHLORIDE 600; 310; 30; 20 MG/100ML; MG/100ML; MG/100ML; MG/100ML
30 INJECTION, SOLUTION INTRAVENOUS CONTINUOUS
Status: DISCONTINUED | OUTPATIENT
Start: 2019-01-16 | End: 2019-01-16 | Stop reason: HOSPADM

## 2019-01-16 RX ORDER — ONDANSETRON 2 MG/ML
4 INJECTION INTRAMUSCULAR; INTRAVENOUS EVERY 6 HOURS PRN
Status: DISCONTINUED | OUTPATIENT
Start: 2019-01-16 | End: 2019-01-17 | Stop reason: HOSPADM

## 2019-01-16 RX ADMIN — ONDANSETRON HYDROCHLORIDE 4 MG: 2 SOLUTION INTRAMUSCULAR; INTRAVENOUS at 13:41

## 2019-01-16 RX ADMIN — ROCURONIUM BROMIDE 5 MG: 10 INJECTION INTRAVENOUS at 10:36

## 2019-01-16 RX ADMIN — MIDAZOLAM HYDROCHLORIDE 2 MG: 1 INJECTION, SOLUTION INTRAMUSCULAR; INTRAVENOUS at 10:29

## 2019-01-16 RX ADMIN — SUFENTANIL CITRATE 10 MCG: 50 INJECTION, SOLUTION EPIDURAL; INTRAVENOUS at 12:00

## 2019-01-16 RX ADMIN — FAMOTIDINE 20 MG: 10 INJECTION, SOLUTION INTRAVENOUS at 09:44

## 2019-01-16 RX ADMIN — Medication 2 G: at 10:43

## 2019-01-16 RX ADMIN — MIDAZOLAM HYDROCHLORIDE 3 MG: 1 INJECTION, SOLUTION INTRAMUSCULAR; INTRAVENOUS at 10:35

## 2019-01-16 RX ADMIN — SUFENTANIL CITRATE 10 MCG: 50 INJECTION, SOLUTION EPIDURAL; INTRAVENOUS at 10:45

## 2019-01-16 RX ADMIN — KETAMINE HYDROCHLORIDE 20 MG: 50 INJECTION, SOLUTION INTRAMUSCULAR; INTRAVENOUS at 10:36

## 2019-01-16 RX ADMIN — SODIUM CHLORIDE 75 ML/HR: 9 INJECTION, SOLUTION INTRAVENOUS at 17:22

## 2019-01-16 RX ADMIN — FINASTERIDE 5 MG: 5 TABLET, FILM COATED ORAL at 17:21

## 2019-01-16 RX ADMIN — LIDOCAINE HYDROCHLORIDE 0.5 ML: 10 INJECTION, SOLUTION EPIDURAL; INFILTRATION; INTRACAUDAL; PERINEURAL at 09:03

## 2019-01-16 RX ADMIN — MIDAZOLAM HYDROCHLORIDE 2 MG: 1 INJECTION, SOLUTION INTRAMUSCULAR; INTRAVENOUS at 13:16

## 2019-01-16 RX ADMIN — PROPOFOL 150 MCG/KG/MIN: 10 INJECTION, EMULSION INTRAVENOUS at 10:38

## 2019-01-16 RX ADMIN — SUFENTANIL CITRATE 20 MCG: 50 INJECTION, SOLUTION EPIDURAL; INTRAVENOUS at 10:36

## 2019-01-16 RX ADMIN — OXYCODONE HYDROCHLORIDE AND ACETAMINOPHEN 1 TABLET: 10; 325 TABLET ORAL at 14:49

## 2019-01-16 RX ADMIN — PROPOFOL 120 MG: 10 INJECTION, EMULSION INTRAVENOUS at 10:36

## 2019-01-16 RX ADMIN — SODIUM CHLORIDE, POTASSIUM CHLORIDE, SODIUM LACTATE AND CALCIUM CHLORIDE 1000 ML: 600; 310; 30; 20 INJECTION, SOLUTION INTRAVENOUS at 09:05

## 2019-01-16 RX ADMIN — ACETAMINOPHEN 650 MG: 325 TABLET, FILM COATED ORAL at 22:35

## 2019-01-16 RX ADMIN — TRIAMTERENE AND HYDROCHLOROTHIAZIDE 1 TABLET: 37.5; 25 TABLET ORAL at 17:21

## 2019-01-16 RX ADMIN — KETAMINE HYDROCHLORIDE 20 MG: 50 INJECTION, SOLUTION INTRAMUSCULAR; INTRAVENOUS at 12:15

## 2019-01-16 RX ADMIN — KETAMINE HYDROCHLORIDE 20 MG: 50 INJECTION, SOLUTION INTRAMUSCULAR; INTRAVENOUS at 11:35

## 2019-01-16 RX ADMIN — ACETAMINOPHEN 1000 MG: 500 TABLET, FILM COATED ORAL at 09:43

## 2019-01-16 RX ADMIN — KETAMINE HYDROCHLORIDE 20 MG: 50 INJECTION, SOLUTION INTRAMUSCULAR; INTRAVENOUS at 12:30

## 2019-01-16 RX ADMIN — SUFENTANIL CITRATE 10 MCG: 50 INJECTION, SOLUTION EPIDURAL; INTRAVENOUS at 11:16

## 2019-01-16 RX ADMIN — SUCCINYLCHOLINE CHLORIDE 100 MG: 20 INJECTION, SOLUTION INTRAMUSCULAR; INTRAVENOUS at 10:36

## 2019-01-16 RX ADMIN — SODIUM CHLORIDE, POTASSIUM CHLORIDE, SODIUM LACTATE AND CALCIUM CHLORIDE: 600; 310; 30; 20 INJECTION, SOLUTION INTRAVENOUS at 13:39

## 2019-01-16 RX ADMIN — KETAMINE HYDROCHLORIDE 20 MG: 50 INJECTION, SOLUTION INTRAMUSCULAR; INTRAVENOUS at 11:00

## 2019-01-16 RX ADMIN — SODIUM CHLORIDE, POTASSIUM CHLORIDE, SODIUM LACTATE AND CALCIUM CHLORIDE 30 ML/HR: 600; 310; 30; 20 INJECTION, SOLUTION INTRAVENOUS at 09:04

## 2019-01-16 RX ADMIN — LISINOPRIL 5 MG: 5 TABLET ORAL at 17:21

## 2019-01-16 RX ADMIN — SODIUM CHLORIDE 2 G: 9 INJECTION, SOLUTION INTRAVENOUS at 17:22

## 2019-01-16 NOTE — H&P
Chief Complaint   Patient presents with   • Neck Pain       Manjit has been referred here today by Dr. Jose Krueger for follow up for neck and right shoulder and arm pain.  He has had an MRI of his cervical here at [facility], but has had no physical therapy or pain management.             Subjective         HPI: This is a 73-year-old male gentleman who is referred to us by EDWIN Perez for neck and right arm pain.  He is here to be evaluated today.  He states that his had neck pain for several years but over the last couple years it has been progressively getting worse.  The pain in his neck is constant.  Its worse whenever he sitting without support to his neck and when he is driving.  Its better whenever he is looking down.  He does have pain that radiates over into his right upper extremity in a radicular fashion.  This pain is constant.  It has the same modifying factors as the neck.  Denies any bowel or bladder incontinence.  He has not done any recent physical therapy, cannot her to care, or pain management injections.  Rates the pain on scale 0-10 at a 1 on a constant basis however the pain can wax and wane.  He states that he is having some difficulty with his ambulation and has noticed that he does have to lean up against the wall from time to time.  He is right-hand dominant.  He is retired.  Denies any issues with fine motor problems.  He is .  Denies any tobacco, alcohol, or illicit drug use.  Past medical history significant for diabetes and hypertension.  Does have a past history of cancer as well.     Review of Systems   Constitutional: Positive for activity change. Negative for diaphoresis, fatigue and fever.   Respiratory: Negative for shortness of breath.    Cardiovascular: Negative for chest pain and palpitations.   Gastrointestinal: Negative for abdominal pain, nausea and vomiting.   Musculoskeletal: Positive for myalgias, neck pain and neck stiffness. Negative for back pain.  "  Neurological: Negative for dizziness, syncope, weakness, light-headedness and headaches.   Hematological: Positive for adenopathy.   Psychiatric/Behavioral: Negative for confusion.   All other systems reviewed and are negative.        Medical History        Past Medical History:   Diagnosis Date   • Bell's palsy       with facial tremor   • Diabetes mellitus (CMS/HCC)     • Extramedullary plasmacytoma (CMS/HCC)       left nose   • History of radiation therapy 03/28/2016     4000 cGy to nose completed on 3/28/16   • History of tobacco abuse     • Hyperlipidemia     • Hypertension           Surgical History         Past Surgical History:   Procedure Laterality Date   • COLONOSCOPY       • NASAL ENDOSCOPY         bilateral with excisional biopsy of left intranasal mass 1/11/16               Family History   Problem Relation Age of Onset   • Breast cancer Mother     • Heart failure Father     • Cancer Maternal Uncle        Social History            Tobacco Use   • Smoking status: Former Smoker       Types: Cigarettes   • Smokeless tobacco: Current User       Types: Chew   Substance Use Topics   • Alcohol use: No   • Drug use: No        Prescriptions Prior to Admission      (Not in a hospital admission)     Allergies:  Patient has no known allergies.          Objective          Vital Signs  /70 (BP Location: Right arm, Patient Position: Sitting)   Ht 175.3 cm (69\")   Wt 81.3 kg (179 lb 3.2 oz)   BMI 26.46 kg/m²      Physical Exam   Constitutional: He is oriented to person, place, and time. He appears well-developed and well-nourished.   HENT:   Head: Normocephalic.   Eyes: Conjunctivae, EOM and lids are normal. Pupils are equal, round, and reactive to light.   Neck: Normal range of motion.   Cardiovascular: Normal rate, regular rhythm and normal heart sounds.   Pulmonary/Chest: Effort normal and breath sounds normal.   Abdominal: Normal appearance.   Musculoskeletal: Normal range of motion.        Cervical " back: He exhibits pain.   Neurological: He is alert and oriented to person, place, and time. He has normal strength and normal reflexes. He displays normal reflexes. No cranial nerve deficit or sensory deficit. Gait abnormal. GCS eye subscore is 4. GCS verbal subscore is 5. GCS motor subscore is 6.   unable to tandem walk.  No clonus or Mullen's.   Skin: Skin is warm.   Psychiatric: He has a normal mood and affect. His speech is normal and behavior is normal. Thought content normal. Cognition and memory are normal.         Results Review: MRI of the cervical spine shows the patient does have cervical spondylosis at C3 through 7.  C5-6 and 6-7 appear to be the worst levels with C6-7 having evidence of cord signal change.  No malalignment present.  Disc degeneration is noted with spurring.              Assessment/Plan: Dr. Islas did review the imaging and did come in and discuss this with the patient.  It is felt that the patient would benefit from a see 3 through 6 ACDF.  Dr. Islas did go over the risks and benefits of the procedure with the patient.  Please see his addendum to this patient.  BMI shows that he is overweight.  BMI chart was given the patient.  He is a nonsmoker.        Manjit was seen today for neck pain.     Diagnoses and all orders for this visit:     Spinal stenosis in cervical region  -     Ambulatory Referral to Family Practice     Intervertebral cervical disc disorder with myelopathy, cervical region  -     Ambulatory Referral to Family Practice     Cervical radiculopathy  -     Ambulatory Referral to Family Practice     BMI 26.0-26.9,adult     Snuff user            I discussed the patients findings and my recommendations with patient     EDWIN Lema  12/06/18  10:26 AM     Attending addendum.  This is a 73-year-old gentleman has had some neck and right upper extremity radicular pain however he is noticed that he is having increasingly difficulty with walking and balance over the  last several weeks.  He bumps into walls at home he cannot walk on his boat at all and has had several falls.  He denies any bowel or bladder problems.  His MRI demonstrates severe cervical stenosis at 45, 56, 67 with significant cord compression at C5-6 with cord signal change.  He has clearly myelopathic gait and is unable to tandem walk.  At this point I would not recommend any further conservative care at the knee he is at significant risk for neurologic injury should he fall and hit his head and it is unlikely that his myelopathy will improve with any sort of physical therapy.  I would recommend a 3 level anterior cervical fusion in order to decompress his cervical spine and give his spinal cord a chance to recover.  I was very clear with the patient that the purpose of this operation primarily is to prevent him from any further neurologic deterioration.  If his spinal cord has been permanently damaged I was very clear with him that I cannot guarantee how much neurologic recovery he will experience.  The risks and benefits of the procedure were discussed at length which included but were not limited to infection, bleeding, paralysis, spinal fluid leak, bowel bladder incontinence, speech and swallow difficulty, stroke, coma, and death.  He acknowledged understanding of this.  His questions concerns were addressed.  We will get him prepped and scheduled as soon as possible.

## 2019-01-16 NOTE — OP NOTE
Procedure Note  Preop Diagnosis: Cervical spondylosis with myelopathy [M47.12]  Spinal stenosis in cervical region [M48.02]  Cervical radiculopathy [M54.12]  Degeneration of cervical intervertebral disc [M50.30]    Post-Op Diagnosis Codes:     * Cervical spondylosis with myelopathy [M47.12]     * Spinal stenosis in cervical region [M48.02]     * Cervical radiculopathy [M54.12]     * Degeneration of cervical intervertebral disc [M50.30]     Procedure Name: C4-5, 5-6, 6-7 anterior cervical discectomy  C4-5, 5-6, 6-7 anterior interbody fusion with peek and allograft  C4-5, 5-6, 6-7 anterior cervical plating and instrumentation  C 4-5, 5-6, 6-7 partial corpectomies less than 50%  Use of the operative microscope    Indications:  A MRI the cervical spine revealed findings of severe cervical spondylosis and stenosis. The patient now presents for decompression and fusion after discussing therapeutic alternatives.          Surgeon: Pablito Islas MD     Assistants: None    Anesthesia: General endotracheal anesthesia    ASA Class: 3    Procedure Details   After obtaining informed consent, having the risks and benefits of the procedure explained including but not limited to infection, bleeding, paralysis, spinal fluid leak, bowel bladder incontinence, speech and swallow difficulty, stroke, coma, and death.  The patient was brought to the operating.  He was given general anesthesia via an endotracheal tube.  He was laid supine on operating table.  His head was bolstered with towels and he was placed in 5 pounds of axial traction using occipital mandibular head harness.  A preplanned incision the right anterior portion of his neck was marked with indelible marker.  The patient was then prepped and draped in standard sterile fashion.  Fluoroscopy confirm the correct levels.  The preplanned incision was then infiltrated Marcaine and epinephrine.  10 blade scalpel edges make incisions the dermis and epidermis.  Bovie cautery  used to extend the incision to the subcutaneous and soft tissues to level the platysma.  The platysma was then split longitudinally using Metzenbaum scissors.  The connective tissue plane lateral to the esophagus and trachea medial to the carotid artery was then bluntly and sharply dissected using Metzenbaum scissors.  Once the anterior portions of the vertebral body were identified a bent spinal needle was placed into the disc space.  Fluoroscopy confirm the level of C4-5.  This levels and marked with Bovie cautery and a purple marker.  The longus coli were then dissected off the lateral anterior portions of the vertebral body of C4 through 7 using Bovie cautery.  At this point the shadow line retractor system was put into the case.  At this point the operative microscope was brought in.  The discectomies at each level was then conducted using a 15 blade scalpel to incise the annulus then a pituitary rongeur and up-biting curettes and a 5 mm barrel bit were used to complete the discectomies.  The posterior longitudinal ligament was identified at each level was bluntly dissected using a small nerve hook.  The posterior longitudinal ligament was then incised using 1 mm Kerrison.  Bilateral foraminotomies were then conducted using 1.5 mm Kerrisons at each level.  Posterior partial corpectomies were done at C4, 5, 6, 7 using a 1 and 2 mm Kerrisons.  Once are satisfied that we adequately decompressed all the neural structures a series of interbody spacers were sized and then tapped into place.  A 6 mm spacer was tapped into place at C4-5, a 8 mm spacer was tapped into place at C5-6, and a 7 mm spacers tapped into place at C6-7.  A 53 mm anterior cervical plate was then selected.  It is tapped her to the anterior portions of the vertebral body at C4, 5, 6, 7 using a series of 15 mm screws.  Final AP and lateral fluoroscopy showed good positioning of the interbody devices and hardware.  The wound was then copiously  irrigated with an antibiotic solution.  It was inspected for hemostasis.  The subcutaneous tissues and platysma were reapproximated using a series of inverted interrupted 2-0 Vicryl sutures.  The skin was closed using a running 4-0 Monocryl subcuticular.  All sponge needle and instrument counts were correct at the end of the procedure.  The patient was extubated in stable condition returned recovery with about 100 mL of blood loss.    Findings:  Cervical stenosis]    Estimated Blood Loss:  100ml           Drains: None           Total IV Fluids: ml           Specimens:   ID Type Source Tests Collected by Time   A : C3-7 Disc Spine, Cervical TISSUE PATHOLOGY EXAM Pablito Islas MD 1/16/2019 1128              Implants:   Implant Name Type Inv. Item Serial No.  Lot No. LRB No. Used   GELFOAM PWDR 1GM - [id number] Implant GELFOAM PWDR 1GM  [employer]  N/A 1   SPNG HEMO GELFOAM COLLGN  - [id number] Implant SPNG HEMO GELFOAM COLLGN   [employer]  N/A 1   CAGE ANATOMIC PTC [id number] - [id number] Implant CAGE ANATOMIC PTC [id number]  [employer] 43FY N/A 1   CAGE ANATOMIC PTC [id number] - [id number] Implant CAGE ANATOMIC PTC [id number]  [employer] 46EP N/A 1   CAGE ANATOMIC PTC [id number] - [id number] Implant CAGE ANATOMIC PTC [id number]  [employer] 01EY N/A 1   PLT CERV ANT ZEVO 3LVL 53MM - [id number] Implant PLT CERV ANT ZEVO 3LVL 53MM  [employer]  N/A 1   SCRW ZEVO 2THRD SD VA 3.5X15MM - [id number] Implant SCRW ZEVO 2THRD SD VA 3.5X15MM  [employer]  N/A 8   PIN PREFIXATION [id number] - [id number] Implant PIN PREFIXATION [id number]  [employer]  N/A 1              Complications:  None           Disposition: PACU - hemodynamically stable.           Condition: stable        Pablito Islas MD

## 2019-01-16 NOTE — ANESTHESIA PREPROCEDURE EVALUATION
Anesthesia Evaluation     Patient summary reviewed and Nursing notes reviewed   no history of anesthetic complications:  NPO Solid Status: > 8 hours  NPO Liquid Status: > 8 hours           Airway   Mallampati: I  TM distance: >3 FB  Neck ROM: full  No difficulty expected  Dental          Pulmonary    (+) a smoker (quit 1980) Former,   Cardiovascular   Exercise tolerance: good (4-7 METS)    ECG reviewed  Patient on routine beta blocker and Beta blocker given within 24 hours of surgery    (+) hypertension, dysrhythmias PAC, hyperlipidemia,       Neuro/Psych  (+) numbness,     GI/Hepatic/Renal/Endo    (+)   diabetes mellitus,     Musculoskeletal     Abdominal    Substance History - negative use     OB/GYN negative ob/gyn ROS         Other   (+) arthritis   history of cancer (extramedullary plasmacytoma left nose, s/p radiation) remission                    Anesthesia Plan    ASA 2     general     intravenous induction   Anesthetic plan, all risks, benefits, and alternatives have been provided, discussed and informed consent has been obtained with: patient.

## 2019-01-16 NOTE — PLAN OF CARE
Problem: Patient Care Overview  Goal: Plan of Care Review  Outcome: Ongoing (interventions implemented as appropriate)   01/16/19 5038   Coping/Psychosocial   Plan of Care Reviewed With patient;spouse;family   Plan of Care Review   Progress no change   OTHER   Outcome Summary New ACDF per Dr. Islas today. Hard collar in place, dressing clean, dry and intact. C/O some pain in shoulders and sore throat, no swelling noted. Safety maintained. will continue to monitor.        Problem: Fall Risk (Adult)  Goal: Absence of Fall  Outcome: Ongoing (interventions implemented as appropriate)      Problem: Laminectomy/Foraminotomy/Discectomy (Adult)  Goal: Signs and Symptoms of Listed Potential Problems Will be Absent, Minimized or Managed (Laminectomy/Foraminotomy/Discectomy)  Outcome: Ongoing (interventions implemented as appropriate)

## 2019-01-16 NOTE — ANESTHESIA PROCEDURE NOTES
Airway  Urgency: elective    Airway not difficult    General Information and Staff    Patient location during procedure: OR  CRNA: Clement Giordano CRNA    Indications and Patient Condition  Indications for airway management: airway protection    Preoxygenated: yes  MILS maintained throughout  Mask difficulty assessment: 1 - vent by mask    Final Airway Details  Final airway type: endotracheal airway      Successful airway: ETT and NIM tube  Cuffed: yes   Successful intubation technique: video laryngoscopy  Endotracheal tube insertion site: oral  Blade: [place]  Blade size: 3  ETT size (mm): 8.0  Cormack-Lehane Classification: grade I - full view of glottis  Placement verified by: chest auscultation and capnometry   Cuff volume (mL): 5  Measured from: lips  ETT to lips (cm): 22  Number of attempts at approach: 1

## 2019-01-16 NOTE — ANESTHESIA POSTPROCEDURE EVALUATION
Patient: Manjit Seth    Procedure Summary     Date:  01/16/19 Room / Location:   [facility] OR  /  [facility]    Anesthesia Start:  1035 Anesthesia Stop:  1358    Procedure:  CERVICAL DISCECTOMY ANTERIOR WITH FUSION C3-4,4-5, 5-6 ACDF with neuromonitoring and 1 C-arm (N/A Spine Cervical) Diagnosis:       Cervical spondylosis with myelopathy      Spinal stenosis in cervical region      Cervical radiculopathy      Degeneration of cervical intervertebral disc      (Cervical spondylosis with myelopathy [M47.12])      (Spinal stenosis in cervical region [M48.02])      (Cervical radiculopathy [M54.12])      (Degeneration of cervical intervertebral disc [M50.30])    Surgeon:  Pablito Islas MD Provider:  Clement Giordano CRNA    Anesthesia Type:  general ASA Status:  2          Anesthesia Type: general  Last vitals  BP   118/55 (01/16/19 1440)   Temp   98.4 °F (36.9 °C) (01/16/19 1440)   Pulse   62 (01/16/19 1502)   Resp   16 (01/16/19 1502)     SpO2   100 % (01/16/19 1502)     Post Anesthesia Care and Evaluation    Patient location during evaluation: PACU  Patient participation: complete - patient participated  Level of consciousness: awake and alert  Pain management: adequate  Airway patency: patent  Anesthetic complications: No anesthetic complications    Cardiovascular status: acceptable  Respiratory status: acceptable  Hydration status: acceptable    Comments: Blood pressure 118/55, pulse 62, temperature 98.4 °F (36.9 °C), temperature source Temporal, resp. rate 16, SpO2 100 %.    Pt discharged from PACU based on lonnie score >8

## 2019-01-17 VITALS
DIASTOLIC BLOOD PRESSURE: 64 MMHG | WEIGHT: 180.34 LBS | HEART RATE: 74 BPM | TEMPERATURE: 98.3 F | HEIGHT: 67 IN | OXYGEN SATURATION: 94 % | BODY MASS INDEX: 28.3 KG/M2 | SYSTOLIC BLOOD PRESSURE: 107 MMHG | RESPIRATION RATE: 18 BRPM

## 2019-01-17 LAB
ANION GAP SERPL CALCULATED.3IONS-SCNC: 11 MMOL/L (ref 4–13)
BASOPHILS # BLD AUTO: 0.05 10*3/MM3 (ref 0–0.2)
BASOPHILS NFR BLD AUTO: 0.4 % (ref 0–2)
BUN BLD-MCNC: 10 MG/DL (ref 5–21)
BUN/CREAT SERPL: 17.2 (ref 7–25)
CALCIUM SPEC-SCNC: 8.8 MG/DL (ref 8.4–10.4)
CHLORIDE SERPL-SCNC: 98 MMOL/L (ref 98–110)
CO2 SERPL-SCNC: 28 MMOL/L (ref 24–31)
CREAT BLD-MCNC: 0.58 MG/DL (ref 0.5–1.4)
DEPRECATED RDW RBC AUTO: 43.8 FL (ref 40–54)
EOSINOPHIL # BLD AUTO: 0.12 10*3/MM3 (ref 0–0.7)
EOSINOPHIL NFR BLD AUTO: 1.1 % (ref 0–4)
ERYTHROCYTE [DISTWIDTH] IN BLOOD BY AUTOMATED COUNT: 13.2 % (ref 12–15)
GFR SERPL CREATININE-BSD FRML MDRD: 137 ML/MIN/1.73
GLUCOSE BLD-MCNC: 112 MG/DL (ref 70–100)
HCT VFR BLD AUTO: 35.6 % (ref 40–52)
HGB BLD-MCNC: 11.9 G/DL (ref 14–18)
IMM GRANULOCYTES # BLD AUTO: 0.03 10*3/MM3 (ref 0–0.03)
IMM GRANULOCYTES NFR BLD AUTO: 0.3 % (ref 0–5)
LYMPHOCYTES # BLD AUTO: 1.76 10*3/MM3 (ref 0.72–4.86)
LYMPHOCYTES NFR BLD AUTO: 15.7 % (ref 15–45)
MCH RBC QN AUTO: 30.7 PG (ref 28–32)
MCHC RBC AUTO-ENTMCNC: 33.4 G/DL (ref 33–36)
MCV RBC AUTO: 91.8 FL (ref 82–95)
MONOCYTES # BLD AUTO: 0.75 10*3/MM3 (ref 0.19–1.3)
MONOCYTES NFR BLD AUTO: 6.7 % (ref 4–12)
NEUTROPHILS # BLD AUTO: 8.47 10*3/MM3 (ref 1.87–8.4)
NEUTROPHILS NFR BLD AUTO: 75.8 % (ref 39–78)
NRBC BLD AUTO-RTO: 0 /100 WBC (ref 0–0)
PLATELET # BLD AUTO: 160 10*3/MM3 (ref 130–400)
PMV BLD AUTO: 11.7 FL (ref 6–12)
POTASSIUM BLD-SCNC: 3.9 MMOL/L (ref 3.5–5.3)
RBC # BLD AUTO: 3.88 10*6/MM3 (ref 4.8–5.9)
SODIUM BLD-SCNC: 137 MMOL/L (ref 135–145)
WBC NRBC COR # BLD: 11.18 10*3/MM3 (ref 4.8–10.8)

## 2019-01-17 PROCEDURE — 97161 PT EVAL LOW COMPLEX 20 MIN: CPT

## 2019-01-17 PROCEDURE — 25010000002 CEFAZOLIN PER 500 MG: Performed by: NURSE PRACTITIONER

## 2019-01-17 PROCEDURE — 97165 OT EVAL LOW COMPLEX 30 MIN: CPT | Performed by: OCCUPATIONAL THERAPIST

## 2019-01-17 PROCEDURE — 85025 COMPLETE CBC W/AUTO DIFF WBC: CPT | Performed by: NURSE PRACTITIONER

## 2019-01-17 PROCEDURE — 80048 BASIC METABOLIC PNL TOTAL CA: CPT | Performed by: NURSE PRACTITIONER

## 2019-01-17 PROCEDURE — 99024 POSTOP FOLLOW-UP VISIT: CPT | Performed by: NURSE PRACTITIONER

## 2019-01-17 RX ORDER — HYDROCODONE BITARTRATE AND ACETAMINOPHEN 7.5; 325 MG/1; MG/1
1 TABLET ORAL EVERY 6 HOURS PRN
Qty: 120 TABLET | Refills: 0 | Status: SHIPPED | OUTPATIENT
Start: 2019-01-17 | End: 2019-02-16

## 2019-01-17 RX ADMIN — FINASTERIDE 5 MG: 5 TABLET, FILM COATED ORAL at 09:37

## 2019-01-17 RX ADMIN — ACETAMINOPHEN 650 MG: 325 TABLET, FILM COATED ORAL at 03:45

## 2019-01-17 RX ADMIN — TRIAMTERENE AND HYDROCHLOROTHIAZIDE 1 TABLET: 37.5; 25 TABLET ORAL at 09:37

## 2019-01-17 RX ADMIN — SODIUM CHLORIDE 2 G: 9 INJECTION, SOLUTION INTRAVENOUS at 02:51

## 2019-01-17 RX ADMIN — SODIUM CHLORIDE 75 ML/HR: 9 INJECTION, SOLUTION INTRAVENOUS at 06:35

## 2019-01-17 RX ADMIN — LISINOPRIL 5 MG: 5 TABLET ORAL at 09:37

## 2019-01-17 RX ADMIN — ATENOLOL 50 MG: 50 TABLET ORAL at 09:37

## 2019-01-17 NOTE — THERAPY EVALUATION
Acute Care - Physical Therapy Initial Evaluation  [facility]     Patient Name: Manjit Seth  : 1945  MRN: [id number]  Today's Date: 2019   Onset of Illness/Injury or Date of Surgery: 19  Date of Referral to PT: 19  Referring Physician: Dr. Islas      Admit Date: 2019    Visit Dx:     ICD-10-CM ICD-9-CM   1. Cervical spondylosis with myelopathy M47.12 721.1   2. Spinal stenosis in cervical region M48.02 723.0   3. Cervical radiculopathy M54.12 723.4   4. Degeneration of cervical intervertebral disc M50.30 722.4     Patient Active Problem List   Diagnosis   • Extramedullary plasmacytoma- left nose   • History of radiation therapy- Left Nose   • Encounter for aftercare   • Hypertension   • Neoplasm of uncertain behavior   • Spinal stenosis in cervical region   • Intervertebral cervical disc disorder with myelopathy, cervical region   • Cervical radiculopathy   • BMI 26.0-26.9,adult   • Snuff user   • Cervical spondylosis with myelopathy   • Degeneration of cervical intervertebral disc   • Abnormal ECG   • PAC (premature atrial contraction)     Past Medical History:   Diagnosis Date   • Bell's palsy     with facial tremor   • Diabetes mellitus (CMS/HCC)    • Extramedullary plasmacytoma (CMS/HCC)     left nose   • History of radiation therapy 2016    4000 cGy to nose completed on 3/28/16   • History of tobacco abuse    • Hyperlipidemia    • Hypertension      Past Surgical History:   Procedure Laterality Date   • ANTERIOR CERVICAL DISCECTOMY W/ FUSION N/A 2019    Procedure: CERVICAL DISCECTOMY ANTERIOR WITH FUSION C3-4,4-5, 5-6 ACDF with neuromonitoring and 1 C-arm;  Surgeon: Pablito Islas MD;  Location: [facility];  Service: Neurosurgery   • COLONOSCOPY     • NASAL ENDOSCOPY      bilateral with excisional biopsy of left intranasal mass 16        PT ASSESSMENT (last 12 hours)      Physical Therapy Evaluation     Row Name 19 0809          PT Evaluation  Time/Intention    Subjective Information  complains of;pain  -MS (r) EH (t) MS (c)     Document Type  evaluation  -MS (r) EH (t) MS (c)     Mode of Treatment  physical therapy;concurrent therapy  -MS (r) EH (t) MS (c)     Patient Effort  good  -MS (r) EH (t) MS (c)     Symptoms Noted During/After Treatment  none  -MS (r) EH (t) MS (c)     Row Name 01/17/19 0809          General Information    Patient Profile Reviewed?  yes  -MS (r) EH (t) MS (c)     Onset of Illness/Injury or Date of Surgery  01/16/19  -MS (r) EH (t) MS (c)     Referring Physician  Dr. Islas  -MS (r) EH (t) MS (c)     Patient Observations  alert;cooperative;agree to therapy  -MS (r) EH (t) MS (c)     General Observations of Patient  Pt seated upright in bed, SCDs on, IV in place, cervical collar in place and in no apparent distress.  -MS (r) EH (t) MS (c)     Prior Level of Function  independent:;all household mobility;community mobility;gait;shopping;home management;bathing;dressing;grooming;transfer;bed mobility  -MS (r) EH (t) MS (c)     Equipment Currently Used at Home  none  -MS (r) EH (t) MS (c)     Pertinent History of Current Functional Problem  Pt was experiencing neck and R shoulder and arm pain. He waws also feeling clumsy when walking around at night. Due to this the pt was scheduled to have surgery on 1/16/19. Surgery performed was an anterior cervical discectomy with fusion of C3-4, 4-5, 5-6.   -MS (r) EH (t) MS (c)     Existing Precautions/Restrictions  brace worn when out of bed  -MS (r) EH (t) MS (c)     Risks Reviewed  patient:;increased discomfort;dizziness;nausea/vomiting  -MS (r) EH (t) MS (c)     Benefits Reviewed  patient:;improve function;increase independence;increase strength;increase balance;decrease pain  -MS (r) EH (t) MS (c)     Barriers to Rehab  none identified  -MS (r) EH (t) MS (c)     Row Name 01/17/19 0858          Relationship/Environment    Primary Source of Support/Comfort  spouse  -MS (r) EH (t) MS (c)      Lives With  spouse  -MS (r) EH (t) MS (c)     Row Name 01/17/19 0809          Resource/Environmental Concerns    Current Living Arrangements  home/apartment/condo  -MS (r) EH (t) MS (c)     Resource/Environmental Concerns  none  -MS (r) EH (t) MS (c)     Row Name 01/17/19 0809          Home Main Entrance    Number of Stairs, Main Entrance  two  -MS (r) EH (t) MS (c)     Stair Railings, Main Entrance  railing on right side (ascending)  -MS (r) EH (t) MS (c)     Row Name 01/17/19 0809          Cognitive Assessment/Intervention- PT/OT    Orientation Status (Cognition)  oriented x 4  -MS (r) EH (t) MS (c)     Follows Commands (Cognition)  WNL  -MS (r) EH (t) MS (c)     Row Name 01/17/19 0809          Safety Issues, Functional Mobility    Impairments Affecting Function (Mobility)  balance;pain  -MS (r) EH (t) MS (c)     Row Name 01/17/19 0809          Bed Mobility Assessment/Treatment    Bed Mobility Assessment/Treatment  bed mobility (all) activities  -MS (r) EH (t) MS (c)     [place] Level (Bed Mobility)  independent  -MS (r) EH (t) MS (c)     Assistive Device (Bed Mobility)  head of bed elevated  -MS (r) EH (t) MS (c)     Row Name 01/17/19 0809          Transfer Assessment/Treatment    Transfer Assessment/Treatment  bed-chair transfer  -MS (r) EH (t) MS (c)     Bed-Chair [place] (Transfers)  stand by assist  -MS (r) EH (t) MS (c)     Row Name 01/17/19 0809          Gait/Stairs Assessment/Training    Gait/Stairs Assessment/Training  gait/ambulation independence  -MS (r) EH (t) MS (c)     [place] Level (Gait)  contact guard  -MS (r) EH (t) MS (c)     Distance in Feet (Gait)  200  -MS (r) EH (t) MS (c)     Pattern (Gait)  step-through  -MS (r) EH (t) MS (c)     Deviations/Abnormal Patterns (Gait)  base of support, narrow;stride length decreased  -MS (r) EH (t) MS (c)     Comment (Gait/Stairs)  Pt drifts R/L when ambulating as well as poor posture and he frequently looks at the ground. Both feet are toed  out.   -MS (r) EH (t) MS (c)     Row Name 01/17/19 0809          General ROM    GENERAL ROM COMMENTS  All UE and LE ROM are WFL.   -MS (r) EH (t) MS (c)     Row Name 01/17/19 0809          MMT (Manual Muscle Testing)    General MMT Comments  All UE and LE muscle strength is WFL.   -MS (r) EH (t) MS (c)     Row Name 01/17/19 0809          Motor Assessment/Intervention    Additional Documentation  Balance (Group)  -MS (r) EH (t) MS (c)     Row Name 01/17/19 0809          Balance    Balance  static standing balance  -MS (r) EH (t) MS (c)     Row Name 01/17/19 0809          Static Standing Balance    Level of [place] (Supported Standing, Static Balance)  contact guard assist  -MS (r) EH (t) MS (c)     Time Able to Maintain Position (Supported Standing, Static Balance)  15 to 30 seconds  -MS (r) EH (t) MS (c)     Comment (Supported Standing, Static Balance)  Pt stood with feet apart EO/EC with no LOB, feet together EO/EC with no LOB but increased sway, and tandem (<15 s) with extreme difficulty and LOB.   -MS (r) EH (t) MS (c)     [facility] Name 01/17/19 0809          Sensory Assessment/Intervention    Sensory General Assessment  no sensation deficits identified  -MS (r) EH (t) MS (c)     Row Name 01/17/19 0809          Pain Assessment    Additional Documentation  Pain Scale: Numbers Pre/Post-Treatment (Group)  -MS (r) EH (t) MS (c)     Row Name 01/17/19 0809          Pain Scale: Numbers Pre/Post-Treatment    Pain Scale: Numbers, Pretreatment  2/10  -MS (r) EH (t) MS (c)     Pain Scale: Numbers, Post-Treatment  2/10  -MS (r) EH (t) MS (c)     Pain Location - Side  Bilateral  -MS (r) EH (t) MS (c)     Pain Location  neck  -MS (r) EH (t) MS (c)     Pre/Post Treatment Pain Comment  Pt described the pain as discomfort vs pain  -MS (r) EH (t) MS (c)     Pain Intervention(s)  Repositioned;Ambulation/increased activity  -MS (r) EH (t) MS (c)     Row Name 01/17/19 0809          Orthotics & Prosthetics Management    Orthosis  Location  spinal orthosis  -MS (r) EH (t) MS (c)     Additional Documentation  Orthosis Location (Row)  -MS (r) EH (t) MS (c)     Row Name 01/17/19 0809          Spinal Orthosis Management    Type (Spinal Orthosis)  cervical collar, hard  -MS (r) EH (t) MS (c)     Wearing Schedule (Spinal Orthosis)  wear full time  -MS (r) EH (t) MS (c)     Orthosis Training (Spinal Orthosis)  patient;cleaning/care of orthosis;donning/doffing orthosis;orthosis adjustment;purpose/goals of orthosis;wearing schedule;able to verbalize training;partially meets, needs review/practice  -MS (r) EH (t) MS (c)     Compliance/Wearing Issues (Spinal Orthosis)  patient/caregiver comprehend strategies;patient/caregiver comprehend rationale for orthosis  -MS (r) EH (t) MS (c)     Row Name             Wound 01/16/19 1353 Other (See comments) neck incision    Wound - Properties Group Date first assessed: 01/16/19  -GENE Time first assessed: 1353  -GENE Side: Other (See comments)  -GENE Location: neck  -GENE Type: incision  -GENE    Row Name 01/17/19 0809          Plan of Care Review    Plan of Care Reviewed With  patient  -MS (r) EH (t) MS (c)     Row Name 01/17/19 0809          Physical Therapy Clinical Impression    Date of Referral to PT  01/16/19  -MS (r) EH (t) MS (c)     PT Diagnosis (PT Clinical Impression)  Impaired balance  -MS (r) EH (t) MS (c)     Patient/Family Goals Statement (PT Clinical Impression)  Pt wants to go home.   -MS (r) EH (t) MS (c)     Pathology/Pathophysiology Noted (Describe Specifically for Each System)  neuromuscular  -MS (r) EH (t) MS (c)     Impairments Found (describe specific impairments)  gait, locomotion, and balance  -MS (r) EH (t) MS (c)     Functional Limitations in Following Categories (Describe Specific Limitations)  self-care  -MS (r) EH (t) MS (c)     Rehab Potential (PT Clinical Summary)  good, to achieve stated therapy goals  -MS (r) EH (t) MS (c)     Predicted Duration of Therapy (PT)  Pt is being discharged  today 1/17/19  -MS (r) EH (t) MS (c)     Care Plan Review (PT)  evaluation/treatment results reviewed;risks/benefits reviewed  -MS (r) EH (t) MS (c)     Patient/Family Concerns, Anticipated Discharge Disposition (PT)  OP if increase in pain or balance problems persist.   -MS (r) EH (t) MS (c)     Row Name 01/17/19 0809          Positioning and Restraints    Pre-Treatment Position  in bed  -MS (r) EH (t) MS (c)     Post Treatment Position  chair  -MS (r) EH (t) MS (c)     In Chair  sitting;call light within reach;encouraged to call for assist  -MS (r) EH (t) MS (c)     Row Name 01/17/19 0809          Physical Therapy Discharge Summary    Additional Documentation  Discharge Summary, PT Eval (Group)  -MS (r) EH (t) MS (c)     Row Name 01/17/19 0809          Discharge Summary, PT Eval    Reason for Discharge (PT Discharge Summary)  patient discharged from this facility  -MS (r) EH (t) MS (c)     Outcomes Achieved Upon Discharge (PT Discharge Summary)  discharge from facility occurred on same date as evaluation  -MS (r) EH (t) MS (c)     Row Name 01/17/19 0809          Living Environment    Home Accessibility  stairs to enter home  -MS (r) EH (t) MS (c)       User Key  (r) = Recorded By, (t) = Taken By, (c) = Cosigned By    Initials Name Provider Type    Adelaida Heath R, PT, DPT, NCS Physical Therapist    Delvis Adler, RN Registered Nurse    Magdalena Will, PT Student PT Student        Physical Therapy Education     Title: PT OT SLP Therapies (In Progress)     Topic: Physical Therapy (In Progress)     Point: Mobility training (Done)     Learning Progress Summary           Patient Acceptance, E,TB, VU by  at 1/17/2019 11:06 AM    Comment:  Pt was edu on brace wear schedule and don/doffing instructions. Pt was edu on maintaining head in place when brace is off for dressing. Pt was educated on looking up when ambulating and safety during ambulation.                   Point: Body mechanics (Done)     Learning  Progress Summary           Patient Acceptance, E,TB, VU by  at 1/17/2019 11:06 AM    Comment:  Pt was edu on brace wear schedule and don/doffing instructions. Pt was edu on maintaining head in place when brace is off for dressing. Pt was educated on looking up when ambulating and safety during ambulation.                   Point: Precautions (Done)     Learning Progress Summary           Patient Acceptance, E,TB, VU by  at 1/17/2019 11:06 AM    Comment:  Pt was edu on brace wear schedule and don/doffing instructions. Pt was edu on maintaining head in place when brace is off for dressing. Pt was educated on looking up when ambulating and safety during ambulation.                               User Key     Initials Effective Dates Name Provider Type Discipline     12/28/18 -  Magdalena Banerjee, PT Student PT Student PT              PT Recommendation and Plan  Anticipated Discharge Disposition (PT): home with OP services  Therapy Frequency (PT Clinical Impression): evaluation only  Outcome Summary/Treatment Plan (PT)  Anticipated Discharge Disposition (PT): home with OP services  Patient/Family Concerns, Anticipated Discharge Disposition (PT): OP if increase in pain or balance problems persist.   Reason for Discharge (PT Discharge Summary): patient discharged from this facility  Plan of Care Reviewed With: patient  Progress: no change  Outcome Summary: PT evaluation completed. Pt reports a 2/10 pain today as more of discomfort than pain. He was able to perform bed mobility, transfers, and gait with supervision/contact guard. Balance was only impaired in tandem stance and pt reports he has never been able to do that. He was able to walk up and down hallway with no increase in pain or fatigue. His face was flushed but he reports he has rosacia. Pt is scheduled for discharge today. I recommend outpatient therapy if pain increases or balance problems persist.   Outcome Measures     Row Name 01/17/19 1100 01/17/19 0838           How much help from another person do you currently need...    Turning from your back to your side while in flat bed without using bedrails?  --  4  -MS (r) EH (t) MS (c)     Moving from lying on back to sitting on the side of a flat bed without bedrails?  --  4  -MS (r) EH (t) MS (c)     Moving to and from a bed to a chair (including a wheelchair)?  --  4  -MS (r) EH (t) MS (c)     Standing up from a chair using your arms (e.g., wheelchair, bedside chair)?  --  4  -MS (r) EH (t) MS (c)     Climbing 3-5 steps with a railing?  --  4  -MS (r) EH (t) MS (c)     To walk in hospital room?  --  4  -MS (r) EH (t) MS (c)     AM-[facility] 6 Clicks Score  --  24  -MS (r) EH (t)        How much help from another is currently needed...    Putting on and taking off regular lower body clothing?  2  -JJ  --     Bathing (including washing, rinsing, and drying)  4  -JJ  --     Toileting (which includes using toilet bed pan or urinal)  4  -JJ  --     Putting on and taking off regular upper body clothing  3  -JJ  --     Taking care of personal grooming (such as brushing teeth)  4  -JJ  --     Eating meals  4  -JJ  --     Score  21  -JJ  --        Functional Assessment    Outcome Measure Options  AM-PAC 6 Clicks Daily Activity (OT)  -JJ  -[facility] 6 Clicks Basic Mobility (PT)  -MS (r) EH (t) MS (c)       User Key  (r) = Recorded By, (t) = Taken By, (c) = Cosigned By    Initials Name Provider Type    Adelaida Heath, PT, DPT, NCS Physical Therapist    Mago Moralez, OTR/L Occupational Therapist    Magdalena Will, PT Student PT Student         Time Calculation:   PT Charges     Row Name 01/17/19 2096             Time Calculation    Start Time  0809 Chart Review: 9681-0757  -MS (r) EH (t) MS (c)      Stop Time  0838  -MS (r) EH (t) MS (c)      Time Calculation (min)  29 min  -MS (r) EH (t)      PT Received On  01/17/19  -MS (r) EH (t) MS (c)      PT Goal Re-Cert Due Date  --  -MS (r) EH (t) MS (c)        User Key  (r) = Recorded  By, (t) = Taken By, (c) = Cosigned By    Initials Name Provider Type    MS Adelaida Ruvalcaba R, PT, DPT, NCS Physical Therapist    EH Magdalena Banerjee, PT Student PT Student        Therapy Suggested Charges     Code   Minutes Charges    None           Therapy Charges for Today     Code Description Service Date Service Provider Modifiers Qty    52522167804 HC PT EVAL LOW COMPLEXITY 2 1/17/2019 Magdalena Banerjee, PT Student GP 1          PT G-Codes  Outcome Measure Options: AM-PAC 6 Clicks Daily Activity (OT)  AM-PAC 6 Clicks Score: 24  Score: 21      Magdalena Banerjee PT Student  1/17/2019

## 2019-01-17 NOTE — PROGRESS NOTES
Discharge Planning Assessment  [facility]     Patient Name: Manjit Seth  MRN: [id number]  Today's Date: 1/17/2019    Admit Date: 1/16/2019    Discharge Needs Assessment     Row Name 01/17/19 0906       Living Environment    Lives With  spouse    Current Living Arrangements  home/apartment/condo    Primary Care Provided by  self;spouse/significant other    Provides Primary Care For  no one    Family Caregiver if Needed  spouse    Quality of Family Relationships  helpful;involved    Able to Return to Prior Arrangements  yes       Resource/Environmental Concerns    Resource/Environmental Concerns  none    Transportation Concerns  car, none       Transition Planning    Patient/Family Anticipates Transition to  home;home with family    Transportation Anticipated  family or friend will provide       Discharge Needs Assessment    Readmission Within the Last 30 Days  no previous admission in last 30 days    Concerns to be Addressed  no discharge needs identified;denies needs/concerns at this time    Equipment Currently Used at Home  none    Discharge Coordination/Progress  Patient lives with his wife and plans to return home today as he is discharged. Patient has Rx coverage and uses [facility] in [place], [place]. Patient denies discharge planning needs.         Discharge Plan     Row Name 01/17/19 0805       Plan    Final Discharge Disposition Code  01 - home or self-care             Expected Discharge Date and Time     Expected Discharge Date Expected Discharge Time    Jan 17, 2019         JONAH Rod

## 2019-01-17 NOTE — PLAN OF CARE
Problem: Patient Care Overview  Goal: Plan of Care Review  Outcome: Ongoing (interventions implemented as appropriate)   01/17/19 1117   Coping/Psychosocial   Plan of Care Reviewed With patient   Plan of Care Review   Progress improving   OTHER   Outcome Summary OT eval completed. Pt able to complete all bed mobility with Mod I. Pt able to complete sit <> stand t/f from EOB with SBA. Pt amb from bed to nurses station and back to chair with CGA. Pt required Max A for donning socks, but reports that wife will be at home to assist with this task. Pt educated on cervical collar wear schedule/fitting/mgt and spinal precuations. Pt is schedule for d/c from facility today, OT to sign off. Anticipate d/c home with assist.

## 2019-01-17 NOTE — DISCHARGE SUMMARY
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 male presented with neck and arm pain. The patient went through all manner of conservative care without any relief. He went to the OR on 1/16/2019 for a C 3-6 ACDF. The patient tolerated procedure well. He has had improvement in his neck and arm pain. He is ambulating, swallowing and voiding. It is felt at this time the patient is stable and suitable to be DC home.  Pain is controlled    Procedures Performed  Procedure(s):  CERVICAL DISCECTOMY ANTERIOR WITH FUSION C3-4,4-5, 5-6 ACDF with neuromonitoring and 1 C-arm       Consults:   Consults     No orders found for last 30 day(s).              Condition on Discharge:  stable    Vital Signs  Temp:  [97.6 °F (36.4 °C)-99.1 °F (37.3 °C)] 98.3 °F (36.8 °C)  Heart Rate:  [51-76] 74  Resp:  [14-18] 18  BP: (103-132)/(45-76) 107/64    Physical Exam:   Physical Exam   Constitutional: He is oriented to person, place, and time. He appears well-developed and well-nourished.   HENT:   Head: Normocephalic.   Eyes: EOM are normal. Pupils are equal, round, and reactive to light.   Neck: Normal range of motion.   Pulmonary/Chest: Effort normal.   Musculoskeletal: Normal range of motion.        Cervical back: He exhibits pain.   Neurological: He is alert and oriented to person, place, and time. He has normal strength and normal reflexes. No cranial nerve deficit or sensory deficit. Gait normal. GCS eye subscore is 4. GCS verbal subscore is 5. GCS motor subscore is 6.   Skin: Skin is warm.   Incision clean dry and intact   Psychiatric: He has a normal mood and affect. His  speech is normal and behavior is normal. Thought content normal.        Neurologic Exam     Mental Status   Oriented to person, place, and time.   Speech: speech is normal     Cranial Nerves     CN III, IV, VI   Pupils are equal, round, and reactive to light.  Extraocular motions are normal.     Motor Exam     Strength   Strength 5/5 throughout.          Discharge Disposition  Home or Self Care    Discharge Medications     Discharge Medications      New Medications      Instructions Start Date   HYDROcodone-acetaminophen 7.5-325 MG per tablet  Commonly known as:  NORCO   1 tablet, Oral, Every 6 Hours PRN         Continue These Medications      Instructions Start Date   atenolol 50 MG tablet  Commonly known as:  TENORMIN   50 mg, Oral, Daily      Cranberry 400 MG capsule   1 capsule, Oral, Daily      finasteride 5 MG tablet  Commonly known as:  PROSCAR   5 mg, Oral, Daily      fish oil 1000 MG capsule capsule   Oral, Daily With Breakfast      Garlic 10 MG capsule   Oral      lisinopril 5 MG tablet  Commonly known as:  PRINIVIL,ZESTRIL   5 mg, Oral, Daily      MULTIVITAMIN ADULT PO   Oral      simvastatin 40 MG tablet  Commonly known as:  ZOCOR   No dose, route, or frequency recorded.      triamterene-hydrochlorothiazide 37.5-25 MG per tablet  Commonly known as:  MAXZIDE-25   1 tablet, Oral, Daily      Vitamin D2 400 units tablet   Oral         Stop These Medications    aspirin 81 MG EC tablet            Discharge Diet:   Diet Instructions     Diet: Regular; Thin      Discharge Diet:  Regular    Fluid Consistency:  Thin          Activity at Discharge:   Activity Instructions     Discharge Activity Restrictions      1) No driving for till seen in office and no longer taking narcotics.   2) May shower / sponge bathe in 72 hours.  3) Do not lift / push / pull more then 5 lbs.  4) Wear collar at all times          Follow-up Appointments  Future Appointments   Date Time Provider Department Center   3/28/2019  9:00 AM  Jose Krueger MD [facility] None     Additional Instructions for the Follow-ups that You Need to Schedule     Call MD With Problems / Concerns   As directed      Instructions: Worsening neck or arm pain, drainage from wound, fever, difficulty breathing or swallowing.    Order Comments:  Instructions: Worsening neck or arm pain, drainage from wound, fever, difficulty breathing or swallowing.          Discharge Follow-up with Specialty: sally alcantar np; 3 Weeks   As directed      Specialty:  sally alcantar np    Follow Up:  3 Weeks               Test Results Pending at Discharge   Order Current Status    Tissue Pathology Exam Collected (01/16/19 1128)           Sally Alcantar, EDWIN  01/17/19  7:49 AM    Time: Discharge 30 min

## 2019-01-17 NOTE — PLAN OF CARE
Problem: Patient Care Overview  Goal: Plan of Care Review  Outcome: Ongoing (interventions implemented as appropriate)   01/17/19 0520   Coping/Psychosocial   Plan of Care Reviewed With patient;spouse   Plan of Care Review   Progress improving   OTHER   Outcome Summary VSS, safety maintained, dressing to neck CDI, cervical collar in place, no swelling noted, no c/o numbness and tingling, minimal c/o pain, tylenol provided with relief noted, voiding, A/Ox4, AMBROSIO, will continue to monitor     Goal: Individualization and Mutuality  Outcome: Ongoing (interventions implemented as appropriate)    Goal: Discharge Needs Assessment  Outcome: Ongoing (interventions implemented as appropriate)    Goal: Interprofessional Rounds/Family Conf  Outcome: Ongoing (interventions implemented as appropriate)      Problem: Fall Risk (Adult)  Goal: Identify Related Risk Factors and Signs and Symptoms  Outcome: Ongoing (interventions implemented as appropriate)    Goal: Absence of Fall  Outcome: Ongoing (interventions implemented as appropriate)      Problem: Laminectomy/Foraminotomy/Discectomy (Adult)  Goal: Signs and Symptoms of Listed Potential Problems Will be Absent, Minimized or Managed (Laminectomy/Foraminotomy/Discectomy)  Outcome: Ongoing (interventions implemented as appropriate)

## 2019-01-17 NOTE — THERAPY DISCHARGE NOTE
Acute Care - Occupational Therapy Initial Eval/Discharge  [facility]     Patient Name: Manjit Seth  : 1945  MRN: [id number]  Today's Date: 2019  Onset of Illness/Injury or Date of Surgery: 19  Date of Referral to OT: 19  Referring Physician: Dr. Islas      Admit Date: 2019       ICD-10-CM ICD-9-CM   1. Cervical spondylosis with myelopathy M47.12 721.1   2. Spinal stenosis in cervical region M48.02 723.0   3. Cervical radiculopathy M54.12 723.4   4. Degeneration of cervical intervertebral disc M50.30 722.4     Patient Active Problem List   Diagnosis   • Extramedullary plasmacytoma- left nose   • History of radiation therapy- Left Nose   • Encounter for aftercare   • Hypertension   • Neoplasm of uncertain behavior   • Spinal stenosis in cervical region   • Intervertebral cervical disc disorder with myelopathy, cervical region   • Cervical radiculopathy   • BMI 26.0-26.9,adult   • Snuff user   • Cervical spondylosis with myelopathy   • Degeneration of cervical intervertebral disc   • Abnormal ECG   • PAC (premature atrial contraction)     Past Medical History:   Diagnosis Date   • Bell's palsy     with facial tremor   • Diabetes mellitus (CMS/HCC)    • Extramedullary plasmacytoma (CMS/HCC)     left nose   • History of radiation therapy 2016    4000 cGy to nose completed on 3/28/16   • History of tobacco abuse    • Hyperlipidemia    • Hypertension      Past Surgical History:   Procedure Laterality Date   • ANTERIOR CERVICAL DISCECTOMY W/ FUSION N/A 2019    Procedure: CERVICAL DISCECTOMY ANTERIOR WITH FUSION C3-4,4-5, 5-6 ACDF with neuromonitoring and 1 C-arm;  Surgeon: Pablito Islas MD;  Location: [facility];  Service: Neurosurgery   • COLONOSCOPY     • NASAL ENDOSCOPY      bilateral with excisional biopsy of left intranasal mass 16          OT ASSESSMENT FLOWSHEET (last 72 hours)      Occupational Therapy Evaluation     Row Name 19 0810                   OT  Evaluation Time/Intention    Subjective Information  complains of;pain  -JJ        Document Type  evaluation see MAR  -JJ        Mode of Treatment  occupational therapy;concurrent therapy  -JJ        Patient Effort  good  -JJ           General Information    Patient Profile Reviewed?  yes  -JJ        Onset of Illness/Injury or Date of Surgery  01/16/19  -JJ        Referring Physician  Dr. Islas  -JJ        Patient Observations  alert;cooperative;agree to therapy  -JJ        Patient/Family Observations  no family present in room   -JJ        General Observations of Patient  Pt seated in bed, IV infusing, pulse ox, Apsen collar in place.   -JJ        Prior Level of Function  independent:;all household mobility;community mobility;transfer;bed mobility;ADL's  -JJ        Equipment Currently Used at Home  none  -JJ        Pertinent History of Current Functional Problem  Pt was experiencing neck and R shoulder and arm pain. He waws also feeling clumsy when walking around at night. Due to this the pt was scheduled to have surgery on 1/16/19. Surgery performed was an anterior cervical discectomy with fusion of C3-4, 4-5, 5-6. (P)   -JJ        Existing Precautions/Restrictions  brace worn when out of bed  -JJ        Equipment Issued to Patient  gait belt  -JJ        Risks Reviewed  patient:;LOB;nausea/vomiting;dizziness;increased discomfort;change in vital signs;increased drainage;lines disloged  -JJ        Benefits Reviewed  patient:;improve function;increase independence;increase strength;increase balance;decrease pain;decrease risk of DVT;improve skin integrity;increase knowledge  -JJ           Relationship/Environment    Primary Source of Support/Comfort  spouse  -JJ           Resource/Environmental Concerns    Current Living Arrangements  home/apartment/condo  -J        Resource/Environmental Concerns  none  -JJ           Home Main Entrance    Number of Stairs, Main Entrance  two  -JJ        Stair Railings, Main  Entrance  railing on right side (ascending)  -           Cognitive Assessment/Interventions    Additional Documentation  Cognitive Assessment/Intervention (Group)  -           Cognitive Assessment/Intervention- PT/OT    Affect/Mental Status (Cognitive)  WNL  -        Orientation Status (Cognition)  oriented x 4  -        Follows Commands (Cognition)  WNL  -        Personal Safety Interventions  fall prevention program maintained;gait belt;muscle strengthening facilitated;nonskid shoes/slippers when out of bed;supervised activity  -           Safety Issues, Functional Mobility    Impairments Affecting Function (Mobility)  pain;balance  -J           Bed Mobility Assessment/Treatment    Bed Mobility Assessment/Treatment  bed mobility (all) activities  -        [place] Level (Bed Mobility)  conditional independence  -        Assistive Device (Bed Mobility)  head of bed elevated  -           Functional Mobility    Functional Mobility- Ind. Level  contact guard assist  -        Functional Mobility- Device  -- HHA  -        Functional Mobility- Comment  Pt amb from bed to nurses station and back to bed with CGA and HHA. Demo's decreased step length with amb.,   -J           Transfer Assessment/Treatment    Transfer Assessment/Treatment  sit-stand transfer;stand-sit transfer  -           Sit-Stand Transfer    Sit-Stand [place] (Transfers)  stand by assist  -           Stand-Sit Transfer    Stand-Sit [place] (Transfers)  stand by assist  -           ADL Assessment/Intervention    BADL Assessment/Intervention  lower body dressing  -           Lower Body Dressing Assessment/Training    Lower Body Dressing [place] Level  don;socks;maximum assist (25% patient effort)  -        Lower Body Dressing Position  edge of bed sitting  -           BADL Safety/Performance    Impairments, BADL Safety/Performance  balance;pain  -        Skilled BADL Treatment/Intervention  BADL  process/adaptation training  -JJ           General ROM    GENERAL ROM COMMENTS  BUE AROM WFL   -JJ           MMT (Manual Muscle Testing)    General MMT Comments  BUE strength functionally 4+/5  -JJ           Motor Assessment/Interventions    Additional Documentation  Balance (Group)  -JJ           Balance    Balance  static sitting balance;static standing balance  -J           Static Sitting Balance    Level of [place] (Unsupported Sitting, Static Balance)  independent  -JJ        Sitting Position (Unsupported Sitting, Static Balance)  sitting on edge of bed  -JJ           Static Standing Balance    Level of [place] (Supported Standing, Static Balance)  contact guard assist  -JJ           Sensory Assessment/Intervention    Sensory General Assessment  no sensation deficits identified per pt report   -JJ           Positioning and Restraints    Pre-Treatment Position  in bed  -JJ        Post Treatment Position  chair  -JJ        In Chair  notified nsg;sitting;call light within reach;encouraged to call for assist;legs elevated  -JJ           Pain Assessment    Additional Documentation  Pain Scale: Numbers Pre/Post-Treatment (Group)  -J           Pain Scale: Numbers Pre/Post-Treatment    Pain Scale: Numbers, Pretreatment  2/10  -JJ        Pain Scale: Numbers, Post-Treatment  2/10  -JJ        Pain Location  neck  -JJ        Pain Intervention(s)  Repositioned;Ambulation/increased activity  -JJ           Orthotics & Prosthetics Management    Orthosis Location  spinal orthosis  -JJ           Spinal Orthosis Management    Type (Spinal Orthosis)  cervical collar, hard  -JJ        Fabrication Comment (Spinal Orthosis)  Cervical collar readjusted for proper fitting.   -JJ        Functional Design (Spinal Orthosis)  static orthosis  -JJ        Wearing Schedule (Spinal Orthosis)  wear full time  -JJ        Orthosis Training (Spinal Orthosis)  patient;all orthosis skills  -J        Compliance/Wearing Issues (Spinal  Orthosis)  patient/caregiver comprehend strategies  -JJ           Wound 01/16/19 1353 Other (See comments) neck incision    Wound - Properties Group Date first assessed: 01/16/19  -GENE Time first assessed: 1353  -GENE Side: Other (See comments)  -GENE Location: neck  -GENE Type: incision  -GENE       Plan of Care Review    Plan of Care Reviewed With  patient  -JJ           Clinical Impression (OT)    Date of Referral to OT  01/16/19  -JJ        OT Diagnosis  decreased adls   -JJ        Patient/Family Goals Statement (OT Eval)  return home   -J        Criteria for Skilled Therapeutic Interventions Met (OT Eval)  other (see comments) eval occured on same day as d/c   -JJ        Therapy Frequency (OT Eval)  evaluation only  -J        Care Plan Review (OT)  evaluation/treatment results reviewed;care plan/treatment goals reviewed;risks/benefits reviewed;current/potential barriers reviewed;patient/other agree to care plan  -JJ        Anticipated Discharge Disposition (OT)  home with assist  -J           Living Environment    Home Accessibility  stairs to enter home  -J          User Key  (r) = Recorded By, (t) = Taken By, (c) = Cosigned By    Initials Name Effective Dates    Delvis Adler RN 08/02/16 -     JMago Guan OTR/L 10/12/18 -           Occupational Therapy Education     Title: PT OT SLP Therapies (In Progress)     Topic: Occupational Therapy (In Progress)     Point: ADL training (Done)     Description: Instruct learner(s) on proper safety adaptation and remediation techniques during self care or transfers.   Instruct in proper use of assistive devices.    Learning Progress Summary           Patient Acceptance, E, VU by ASHA at 1/17/2019 11:17 AM    Comment:  Pt educated on the benefits and role of OT, POC, adl modifications, spinal precuations, cervical collar wear schedule/fitting/mgt.                   Point: Precautions (Done)     Description: Instruct learner(s) on prescribed precautions during  self-care and functional transfers.    Learning Progress Summary           Patient Acceptance, E, VU by ASHA at 1/17/2019 11:17 AM    Comment:  Pt educated on the benefits and role of OT, POC, adl modifications, spinal precuations, cervical collar wear schedule/fitting/mgt.                   Point: Body mechanics (Done)     Description: Instruct learner(s) on proper positioning and spine alignment during self-care, functional mobility activities and/or exercises.    Learning Progress Summary           Patient Acceptance, E, VU by ASHA at 1/17/2019 11:17 AM    Comment:  Pt educated on the benefits and role of OT, POC, adl modifications, spinal precuations, cervical collar wear schedule/fitting/mgt.                               User Key     Initials Effective Dates Name Provider Type Discipline    ASHA 10/12/18 -  Mago Chase OTR/L Occupational Therapist OT                OT Recommendation and Plan  Outcome Summary/Treatment Plan (OT)  Anticipated Discharge Disposition (OT): home with assist  Therapy Frequency (OT Eval): evaluation only  Plan of Care Review  Plan of Care Reviewed With: patient  Plan of Care Reviewed With: patient  Outcome Summary: OT eval completed. Pt able to complete all bed mobility with Mod I. Pt able to complete sit <> stand t/f from EOB with SBA. Pt amb from bed to nurses station and back to chair with CGA. Pt required Max A for donning socks, but reports that wife will be at home to assist with this task. Pt educated on cervical collar wear schedule/fitting/mgt and spinal precuations. Pt is schedule for d/c from facility today, OT to sign off. Anticipate d/c home with assist.          Outcome Measures     Row Name 01/17/19 1100 01/17/19 0838          How much help from another person do you currently need...    Turning from your back to your side while in flat bed without using bedrails?  --  4  (Pended)   -EH     Moving from lying on back to sitting on the side of a flat bed without  bedrails?  --  4  (Pended)   -EH     Moving to and from a bed to a chair (including a wheelchair)?  --  4  (Pended)   -EH     Standing up from a chair using your arms (e.g., wheelchair, bedside chair)?  --  4  (Pended)   -EH     Climbing 3-5 steps with a railing?  --  4  (Pended)   -EH     To walk in hospital room?  --  4  (Pended)   -     AM-PAC 6 Clicks Score  --  24  (Pended)   - (r)  (t)        How much help from another is currently needed...    Putting on and taking off regular lower body clothing?  2  -JJ  --     Bathing (including washing, rinsing, and drying)  4  -JJ  --     Toileting (which includes using toilet bed pan or urinal)  4  -JJ  --     Putting on and taking off regular upper body clothing  3  -JJ  --     Taking care of personal grooming (such as brushing teeth)  4  -JJ  --     Eating meals  4  -J  --     Score  21  -JJ  --        Functional Assessment    Outcome Measure Options  AM-[facility] 6 Clicks Daily Activity (OT)  -[facility] 6 Clicks Basic Mobility (PT)  (Pended)   -       User Key  (r) = Recorded By, (t) = Taken By, (c) = Cosigned By    Initials Name Provider Type    Mago Moralez OTR/L Occupational Therapist     Magdalena Banerjee, PT Student PT Student          Time Calculation:   Time Calculation- OT     Row Name 01/17/19 1115             Time Calculation- OT    OT Start Time  0810 add 10 minutes for chart review   -      OT Stop Time  0840  -      OT Time Calculation (min)  30 min  -      OT Received On  01/17/19  -        User Key  (r) = Recorded By, (t) = Taken By, (c) = Cosigned By    Initials Name Provider Type    Mago Moralez OTR/L Occupational Therapist        Therapy Suggested Charges     Code   Minutes Charges    None           Therapy Charges for Today     Code Description Service Date Service Provider Modifiers Qty    [id number]  OT EVAL LOW COMPLEXITY 3 1/17/2019 Mago Chase OTR/L GO 1               OT Discharge Summary  Anticipated  Discharge Disposition (OT): home with assist  Reason for Discharge: Discharge from facility  Outcomes Achieved: (eval x1)  Discharge Destination: Home with assist    Mago Chase OTR/L  1/17/2019

## 2019-01-17 NOTE — PLAN OF CARE
Problem: Patient Care Overview  Goal: Plan of Care Review  Outcome: Ongoing (interventions implemented as appropriate)   01/17/19 1508 01/17/19 1515   Coping/Psychosocial   Plan of Care Reviewed With --  patient   Plan of Care Review   Progress --  no change   OTHER   Outcome Summary PT evaluation completed. Pt reports a 2/10 pain today as more of discomfort than pain. He was able to perform bed mobility, transfers, and gait with supervision/contact guard. Balance was only impaired in tandem stance and pt reports he has never been able to do that. He was able to walk up and down hallway with no increase in pain or fatigue. His face was flushed but he reports he has rosacia. Pt is scheduled for discharge today. I recommend outpatient therapy if pain increases or balance problems persist.  --

## 2019-01-18 LAB
CYTO UR: NORMAL
LAB AP CASE REPORT: NORMAL
PATH REPORT.FINAL DX SPEC: NORMAL
PATH REPORT.GROSS SPEC: NORMAL

## 2019-02-13 ENCOUNTER — OFFICE VISIT (OUTPATIENT)
Dept: NEUROSURGERY | Facility: CLINIC | Age: 74
End: 2019-02-13

## 2019-02-13 ENCOUNTER — HOSPITAL ENCOUNTER (OUTPATIENT)
Dept: GENERAL RADIOLOGY | Facility: HOSPITAL | Age: 74
Discharge: HOME | End: 2019-02-13
Admitting: NURSE PRACTITIONER

## 2019-02-13 DIAGNOSIS — M50.00 INTERVERTEBRAL CERVICAL DISC DISORDER WITH MYELOPATHY, CERVICAL REGION: Primary | ICD-10-CM

## 2019-02-13 DIAGNOSIS — M47.12 CERVICAL SPONDYLOSIS WITH MYELOPATHY: ICD-10-CM

## 2019-02-13 DIAGNOSIS — Z72.0 SNUFF USER: ICD-10-CM

## 2019-02-13 DIAGNOSIS — M54.12 CERVICAL RADICULOPATHY: ICD-10-CM

## 2019-02-13 DIAGNOSIS — M50.00 INTERVERTEBRAL CERVICAL DISC DISORDER WITH MYELOPATHY, CERVICAL REGION: ICD-10-CM

## 2019-02-13 PROCEDURE — 72050 X-RAY EXAM NECK SPINE 4/5VWS: CPT

## 2019-02-13 PROCEDURE — 99024 POSTOP FOLLOW-UP VISIT: CPT | Performed by: NURSE PRACTITIONER

## 2019-02-13 NOTE — PATIENT INSTRUCTIONS
BMI for Adults  Body mass index (BMI) is a number that is calculated from a person's weight and height. In most adults, the number is used to find how much of an adult's weight is made up of fat. BMI is not as accurate as a direct measure of body fat.  How is BMI calculated?  BMI is calculated by dividing weight in kilograms by height in meters squared. It can also be calculated by dividing weight in pounds by height in inches squared, then multiplying the resulting number by 703. Charts are available to help you find your BMI quickly and easily without doing this calculation.  How is BMI interpreted?  Health care professionals use BMI charts to identify whether an adult is underweight, at a normal weight, or overweight based on the following guidelines:  · Underweight: BMI less than 18.5.  · Normal weight: BMI between 18.5 and 24.9.  · Overweight: BMI between 25 and 29.9.  · Obese: BMI of 30 and above.    BMI is usually interpreted the same for males and females.  Weight includes both fat and muscle, so someone with a muscular build, such as an athlete, may have a BMI that is higher than 24.9. In cases like these, BMI may not accurately depict body fat. To determine if excess body fat is the cause of a BMI of 25 or higher, further assessments may need to be done by a health care provider.  Why is BMI a useful tool?  BMI is used to identify a possible weight problem that may be related to a medical problem or may increase the risk for medical problems. BMI can also be used to promote changes to reach a healthy weight.  This information is not intended to replace advice given to you by your health care provider. Make sure you discuss any questions you have with your health care provider.  Document Released: 08/29/2005 Document Revised: 04/27/2017 Document Reviewed: 05/15/2015  [employer] Interactive Patient Education © 2018 [employer]  Steps to Quit Smoking  Smoking tobacco can be bad for your health. It can also  affect almost every organ in your body. Smoking puts you and people around you at risk for many serious long-lasting (chronic) diseases. Quitting smoking is hard, but it is one of the best things that you can do for your health. It is never too late to quit.  What are the benefits of quitting smoking?  When you quit smoking, you lower your risk for getting serious diseases and conditions. They can include:  · Lung cancer or lung disease.  · Heart disease.  · Stroke.  · Heart attack.  · Not being able to have children (infertility).  · Weak bones (osteoporosis) and broken bones (fractures).    If you have coughing, wheezing, and shortness of breath, those symptoms may get better when you quit. You may also get sick less often. If you are pregnant, quitting smoking can help to lower your chances of having a baby of low birth weight.  What can I do to help me quit smoking?  Talk with your doctor about what can help you quit smoking. Some things you can do (strategies) include:  · Quitting smoking totally, instead of slowly cutting back how much you smoke over a period of time.  · Going to in-person counseling. You are more likely to quit if you go to many counseling sessions.  · Using resources and support systems, such as:  ? Online chats with a counselor.  ? Phone quitlines.  ? Printed self-help materials.  ? Support groups or group counseling.  ? Text messaging programs.  ? Mobile phone apps or applications.  · Taking medicines. Some of these medicines may have nicotine in them. If you are pregnant or breastfeeding, do not take any medicines to quit smoking unless your doctor says it is okay. Talk with your doctor about counseling or other things that can help you.    Talk with your doctor about using more than one strategy at the same time, such as taking medicines while you are also going to in-person counseling. This can help make quitting easier.  What things can I do to make it easier to quit?  Quitting smoking  might feel very hard at first, but there is a lot that you can do to make it easier. Take these steps:  · Talk to your family and friends. Ask them to support and encourage you.  · Call phone quitlines, reach out to support groups, or work with a counselor.  · Ask people who smoke to not smoke around you.  · Avoid places that make you want (trigger) to smoke, such as:  ? Bars.  ? Parties.  ? Smoke-break areas at work.  · Spend time with people who do not smoke.  · Lower the stress in your life. Stress can make you want to smoke. Try these things to help your stress:  ? Getting regular exercise.  ? Deep-breathing exercises.  ? Yoga.  ? Meditating.  ? Doing a body scan. To do this, close your eyes, focus on one area of your body at a time from head to toe, and notice which parts of your body are tense. Try to relax the muscles in those areas.  · Download or buy apps on your mobile phone or tablet that can help you stick to your quit plan. There are many free apps, such as QuitGuide from the CDC (Centers for Disease Control and Prevention). You can find more support from smokefree.gov and other websites.    This information is not intended to replace advice given to you by your health care provider. Make sure you discuss any questions you have with your health care provider.  Document Released: 10/14/2010 Document Revised: 08/15/2017 Document Reviewed: 05/03/2016  [employer] Interactive Patient Education © 2018 [employer]

## 2019-02-13 NOTE — PROGRESS NOTES
Chief complaint:   Chief Complaint   Patient presents with   • Post-op     Manjit returns today following a ACF on 01/16/19, he is very pleased and states he is doing great.         Subjective     HPI: This is a 73-year-old male gentleman who went to the operating room on January 16, 2019 for C3-4, 4-5, 5-6 ACDF for cervical stenosis with cervical cord signal change and early signs of myelopathy.  He is here in follow-up today.  He states overall he feels like he is doing very well.  He is not complaining of any meaningful pain.  He does have some stiffness in his arm but is not complaining of any radicular arm pain like what he is having prior to the surgery.  Overall though is very happy and satisfied with the results of the surgery.  He remains in the colon.    Review of Systems   Musculoskeletal: Positive for arthralgias.   Neurological: Negative.          Objective      Vital Signs  There were no vitals taken for this visit.    Physical Exam   Constitutional: He is oriented to person, place, and time. He appears well-developed and well-nourished.   HENT:   Head: Normocephalic.   Eyes: EOM are normal. Pupils are equal, round, and reactive to light.   Neck: Normal range of motion.   Pulmonary/Chest: Effort normal.   Musculoskeletal: Normal range of motion.   Neurological: He is alert and oriented to person, place, and time. He has normal strength and normal reflexes. No cranial nerve deficit or sensory deficit. Gait normal. GCS eye subscore is 4. GCS verbal subscore is 5. GCS motor subscore is 6.   Skin: Skin is warm.   Incision clean dry and intact   Psychiatric: He has a normal mood and affect. His speech is normal and behavior is normal. Thought content normal.       Results Review: No new imaging          Assessment/Plan: At this point the patient is doing very well.  We are going to follow-up again with him in 6-8 weeks to see how he is doing.  We will send him for a set of x-rays of his neck today and if  everything looks good and come out of the brace in a week and a half.  BMI shows that he is overweight.  Mitral to the patient.  He is a non-smoker        Manjit was seen today for post-op.    Diagnoses and all orders for this visit:    Intervertebral cervical disc disorder with myelopathy, cervical region  -     XR Spine Cervical Complete 4 or 5 View; Future    Cervical radiculopathy  -     XR Spine Cervical Complete 4 or 5 View; Future    Cervical spondylosis with myelopathy  -     XR Spine Cervical Complete 4 or 5 View; Future    Snuff user    BMI 26.0-26.9,adult        I discussed the patients findings and my recommendations with patient  Bc Alcantar, APRN  02/13/19  1:52 PM

## 2019-02-19 ENCOUNTER — TELEPHONE (OUTPATIENT)
Dept: NEUROSURGERY | Facility: CLINIC | Age: 74
End: 2019-02-19

## 2019-02-19 NOTE — TELEPHONE ENCOUNTER
Manjit was called with his x-ray results and it was explained he may come out of his brace on 02/23/19, he is now calling with some questions about his restrictions, he is asking since he won't get to see Dr. Islas until April 11 would it be okay to ride in a fishing boat or may he ride on his riding .    Please call patient back and advise. .    His # [phone]

## 2019-02-21 ENCOUNTER — TELEPHONE (OUTPATIENT)
Dept: NEUROSURGERY | Facility: CLINIC | Age: 74
End: 2019-02-21

## 2019-04-03 NOTE — PROGRESS NOTES
YOB: 1945  Location: [facility]  Location Address: [address] [place] [address], [facility], [address] [place] [address]  Location Phone: [phone]    Chief Complaint   Patient presents with   • Follow-up     nose       History of Present Illness  Manjit Seth is a 73 y.o. male.  Manjit Seth is status post Excision of neoplasm of uncertain origin of the left neck with simple closure on 2018      Manjit Seth is a 71 y.o. male.  Manjit Seth is here for follow up of ENT complaints. 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 Patient has completed treatment and Dr. Saleem has released him  Patient has no complaints today and states he is feeling great. He denies nasal congestion, nasal drainage, postnasal drainage, headache, epistaxis, oral lesions, neck mass and sinus pressure    He is doing well.          Tissue Pathology Exam: [id number]   Order: [id number]   Collected:  2019 11:28   Status:  Final result   Visible to patient:  Yes (MyChart)   Dx:  Cervical spondylosis with myelopathy;...   Component    Final Diagnosis   Intravertebral disc, C3-67, discectomy:  Benign fibrous disc material  Benign, viable bone  Benign bone marrow elements   Electronically signed by Yanet Barron MD on 2019 at 0855                  Past Medical History:   Diagnosis Date   • Bell's palsy     with facial tremor   • Diabetes mellitus (CMS/HCC)    • Extramedullary plasmacytoma (CMS/HCC)     left nose   • History of radiation therapy 2016    4000 cGy to nose completed on 3/28/16   • History of tobacco abuse    • Hyperlipidemia    • Hypertension        Past Surgical History:   Procedure Laterality Date   • ANTERIOR CERVICAL DISCECTOMY W/ FUSION N/A 2019    Procedure: CERVICAL DISCECTOMY ANTERIOR WITH FUSION C3-4,4-5, 5-6 ACDF with neuromonitoring and 1 C-arm;  Surgeon: Pablito Islas MD;  Location: [facility];  Service: Neurosurgery   • COLONOSCOPY      • NASAL ENDOSCOPY      bilateral with excisional biopsy of left intranasal mass 16       Outpatient Medications Marked as Taking for the 19 encounter (Office Visit) with Jose Krueger MD   Medication Sig Dispense Refill   • atenolol (TENORMIN) 50 MG tablet Take 50 mg by mouth Daily.     • Cranberry 400 MG capsule Take 1 capsule by mouth Daily.     • Ergocalciferol (VITAMIN D2) 400 UNITS tablet Take  by mouth.     • finasteride (PROSCAR) 5 MG tablet Take 5 mg by mouth Daily.     • Garlic 10 MG capsule Take  by mouth.     • lisinopril (PRINIVIL,ZESTRIL) 5 MG tablet Take 5 mg by mouth Daily.     • Multiple Vitamins-Minerals (MULTIVITAMIN ADULT PO) Take  by mouth.     • Omega-3 Fatty Acids (FISH OIL) 1000 MG capsule capsule Take  by mouth Daily With Breakfast.     • simvastatin (ZOCOR) 40 MG tablet      • triamterene-hydrochlorothiazide (MAXZIDE-25) 37.5-25 MG per tablet Take 1 tablet by mouth Daily.         Patient has no known allergies.    Family History   Problem Relation Age of Onset   • Breast cancer Mother    • Heart failure Father    • Cancer Maternal Uncle        Social History     Socioeconomic History   • Marital status:      Spouse name: Not on file   • Number of children: Not on file   • Years of education: Not on file   • Highest education level: Not on file   Occupational History   • Occupation: CRNA   Tobacco Use   • Smoking status: Former Smoker     Types: Cigarettes     Last attempt to quit: 1980     Years since quittin.2   • Smokeless tobacco: Current User     Types: Chew   Substance and Sexual Activity   • Alcohol use: No   • Drug use: No   • Sexual activity: Defer       Review of Systems   Constitutional: Negative for activity change, appetite change, chills, diaphoresis, fatigue, fever and unexpected weight change.   HENT: Negative for congestion, ear discharge, ear pain, facial swelling, hearing loss, mouth sores, nosebleeds, postnasal drip, rhinorrhea, sinus  pressure, sneezing, sore throat, tinnitus, trouble swallowing and voice change.         History of left intranasal carcinoma   Eyes: Negative for pain, discharge, redness, itching and visual disturbance.   Respiratory: Negative for apnea, cough, choking, chest tightness, shortness of breath, wheezing and stridor.    Gastrointestinal: Negative for nausea and vomiting.   Endocrine: Negative for cold intolerance and heat intolerance.   Musculoskeletal: Negative for arthralgias, back pain, gait problem, neck pain and neck stiffness.   Skin: Negative for rash.   Allergic/Immunologic: Negative for environmental allergies and food allergies.   Neurological: Negative for dizziness, tremors, seizures, syncope, facial asymmetry, speech difficulty, weakness, light-headedness, numbness and headaches.   Hematological: Negative for adenopathy. Does not bruise/bleed easily.   Psychiatric/Behavioral: Negative for behavioral problems, sleep disturbance and suicidal ideas. The patient is not nervous/anxious and is not hyperactive.        Vitals:    04/04/19 0932   BP: 118/80   Temp: 97.8 °F (36.6 °C)       Body mass index is 29.01 kg/m².    Objective     Physical Exam  CONSTITUTIONAL: well nourished, alert, oriented, in no acute distress     COMMUNICATION AND VOICE: able to communicate normally, normal voice quality    HEAD: normocephalic, no lesions, atraumatic, no tenderness, no masses     FACE: appearance normal, no lesions, no tenderness, no deformities, facial motion symmetric    SALIVARY GLANDS: parotid glands with no tenderness, no swelling, no masses, submandibular glands with normal size, nontender    EYES: ocular motility normal, eyelids normal, orbits normal, no proptosis, conjunctiva normal , pupils equal, round     EARS:  Hearing: response to conversational voice normal bilaterally   External Ears: auricles without lesions  Otoscopic: tympanic membrane appearance normal, no lesions, no perforation, normal mobility, no  fluid    NOSE:  External Nose: structure normal, no tenderness on palpation, no nasal discharge, no lesions, no evidence of trauma, nostrils patent   Intranasal Exam: nasal mucosa normal, vestibule within normal limits, inferior turbinate normal, mild deviation of the nasal septum to the right of midline-no evidence of recurrent mass or lesion  Nasopharynx:     ORAL:  Lips: upper and lower lips without lesion   Teeth:   Gums: gingivae healthy   Oral Mucosa: oral mucosa normal, no mucosal lesions   Floor of Mouth: Warthin’s duct patent, mucosa normal  Tongue: lingual mucosa normal without lesions, normal tongue mobility   Palate: soft and hard palates with normal mucosa and structure  Oropharynx: oropharyngeal mucosa normal    HYPOPHARYNX:   LARYNX:     NECK: neck appearance normal, no mass,  noted without erythema or tenderness-well-healed incision    THYROID: no overt thyromegaly, no tenderness, nodules or mass present on palpation, position midline     LYMPH NODES: no lymphadenopathy    CHEST/RESPIRATORY: respiratory effort normal,   CARDIOVASCULAR: rate and rhythm normal, extremities without cyanosis or edema      NEUROLOGIC/PSYCHIATRIC: oriented to time, place and person, mood normal, affect appropriate, CN II-XII intact grossly    Assessment/Plan   Manjit was seen today for follow-up.    Diagnoses and all orders for this visit:    Extramedullary plasmacytoma- left nose    History of radiation therapy- Left Nose      * Surgery not found *  No orders of the defined types were placed in this encounter.    Return in about 6 months (around 10/4/2019) for Recheck nose.       Patient Instructions   Will continue to monitor, follow-up in six months. If symptoms develop call for sooner appointment.    Call or return for problems      IF YOU SMOKE OR USE TOBACCO PLEASE READ THE FOLLOWING:    Why is smoking bad for me?  Smoking increases the risk of heart disease, lung disease, vascular disease, stroke, and cancer.     If  you smoke, STOP!    If you would like more information on quitting smoking, please visit the [employer] website: www.[employer]/corporate/healthier-together/smoke   This link will provide additional resources including the QUIT line and the Beat the Pack support groups.     For more information:    Quit Now [place]  1-800-QUIT-NOW  https://[place].quitlogix.org/en-[place]/    MyPlate from USDA  MyPlate is an outline of a general healthy diet based on the 2010 Dietary Guidelines for Americans, from the U.S. Department of Agriculture (USDA). It sets guidelines for how much food you should eat from each food group based on your age, sex, and level of physical activity.  What are tips for following MyPlate?  To follow MyPlate recommendations:  · Eat a wide variety of fruits and vegetables, grains, and protein foods.  · Serve smaller portions and eat less food throughout the day.  · Limit portion sizes to avoid overeating.  · Enjoy your food.  · Get at least 150 minutes of exercise every week. This is about 30 minutes each day, 5 or more days per week.    It can be difficult to have every meal look like MyPlate. Think about MyPlate as eating guidelines for an entire day, rather than each individual meal.  Fruits and Vegetables  · Make half of your plate fruits and vegetables.  · Eat many different colors of fruits and vegetables each day.  · For a 2,000 calorie daily food plan, eat:  ? 2½ cups of vegetables every day.  ? 2 cups of fruit every day.  · 1 cup is equal to:  ? 1 cup raw or cooked vegetables.  ? 1 cup raw fruit.  ? 1 medium-sized orange, apple, or banana.  ? 1 cup 100% fruit or vegetable juice.  ? 2 cups raw leafy greens, such as lettuce, spinach, or kale.  ? ½ cup dried fruit.  Grains  · One fourth of your plate should be grains.  · Make at least half of the grains you eat each day whole grains.  · For a 2,000 calorie daily food plan, eat 6 oz of grains every day.  · 1 oz is equal to:  ? 1  slice bread.  ? 1 cup cereal.  ? ½ cup cooked rice, cereal, or pasta.  Protein  · One fourth of your plate should be protein.  · Eat a wide variety of protein foods, including meat, poultry, fish, eggs, beans, nuts, and tofu.  · For a 2,000 calorie daily food plan, eat 5½ oz of protein every day.  · 1 oz is equal to:  ? 1 oz meat, poultry, or fish.  ? ¼ cup cooked beans.  ? 1 egg.  ? ½ oz nuts or seeds.  ? 1 Tbsp peanut butter.  Dairy  · Drink fat-free or low-fat (1%) milk.  · Eat or drink dairy as a side to meals.  · For a 2,000 calorie daily food plan, eat or drink 3 cups of dairy every day.  · 1 cup is equal to:  ? 1 cup milk, yogurt, cottage cheese, or soy milk (soy beverage).  ? 2 oz processed cheese.  ? 1½ oz natural cheese.  Fats, oils, salt, and sugars  · Only small amounts of oils are recommended.  · Avoid foods that are high in calories and low in nutritional value (empty calories), like foods high in fat or added sugars.  · Choose foods that are low in salt (sodium). Choose foods that have less than 140 milligrams (mg) of sodium per serving.  · Drink water instead of sugary drinks. Drink enough water each day to keep your urine pale yellow.  Where to find support  · Work with your health care provider or a nutrition specialist (dietitian) to develop a customized eating plan that is right for you.  · Download an mary kay (mobile application) to help you track your daily food intake.  Where to find more information  · Go to ChooseMyPlate.gov for more information.  · Learn more and log your daily food intake according to MyPlate using USDA's SuperTracker: www.supertracker.usda.gov  Summary  · MyPlate is a general guideline for healthy eating from the USDA. It is based on the 2010 Dietary Guidelines for Americans.  · In general, fruits and vegetables should take up ½ of your plate, grains should take up ¼ of your plate, and protein should take up ¼ of your plate.  This information is not intended to replace advice  given to you by your health care provider. Make sure you discuss any questions you have with your health care provider.  Document Released: 01/06/2009 Document Revised: 03/19/2018 Document Reviewed: 03/19/2018  [employer] Interactive Patient Education © 2019 [employer]     Calorie Counting for Weight Loss  Calories are units of energy. Your body needs a certain amount of calories from food to keep you going throughout the day. When you eat more calories than your body needs, your body stores the extra calories as fat. When you eat fewer calories than your body needs, your body burns fat to get the energy it needs.  Calorie counting means keeping track of how many calories you eat and drink each day. Calorie counting can be helpful if you need to lose weight. If you make sure to eat fewer calories than your body needs, you should lose weight. Ask your health care provider what a healthy weight is for you.  For calorie counting to work, you will need to eat the right number of calories in a day in order to lose a healthy amount of weight per week. A dietitian can help you determine how many calories you need in a day and will give you suggestions on how to reach your calorie goal.  · A healthy amount of weight to lose per week is usually 1-2 lb (0.5-0.9 kg). This usually means that your daily calorie intake should be reduced by 500-750 calories.  · Eating 1,200 - 1,500 calories per day can help most women lose weight.  · Eating 1,500 - 1,800 calories per day can help most men lose weight.    What is my plan?  My goal is to have __________ calories per day.  If I have this many calories per day, I should lose around __________ pounds per week.  What do I need to know about calorie counting?  In order to meet your daily calorie goal, you will need to:  · Find out how many calories are in each food you would like to eat. Try to do this before you eat.  · Decide how much of the food you plan to eat.  · Write down what  you ate and how many calories it had. Doing this is called keeping a food log.    To successfully lose weight, it is important to balance calorie counting with a healthy lifestyle that includes regular activity. Aim for 150 minutes of moderate exercise (such as walking) or 75 minutes of vigorous exercise (such as running) each week.  Where do I find calorie information?    The number of calories in a food can be found on a Nutrition Facts label. If a food does not have a Nutrition Facts label, try to look up the calories online or ask your dietitian for help.  Remember that calories are listed per serving. If you choose to have more than one serving of a food, you will have to multiply the calories per serving by the amount of servings you plan to eat. For example, the label on a package of bread might say that a serving size is 1 slice and that there are 90 calories in a serving. If you eat 1 slice, you will have eaten 90 calories. If you eat 2 slices, you will have eaten 180 calories.  How do I keep a food log?  Immediately after each meal, record the following information in your food log:  · What you ate. Don't forget to include toppings, sauces, and other extras on the food.  · How much you ate. This can be measured in cups, ounces, or number of items.  · How many calories each food and drink had.  · The total number of calories in the meal.    Keep your food log near you, such as in a small notebook in your pocket, or use a mobile mary kay or website. Some programs will calculate calories for you and show you how many calories you have left for the day to meet your goal.  What are some calorie counting tips?  · Use your calories on foods and drinks that will fill you up and not leave you hungry:  ? Some examples of foods that fill you up are nuts and nut butters, vegetables, lean proteins, and high-fiber foods like whole grains. High-fiber foods are foods with more than 5 g fiber per serving.  ? Drinks such as  "sodas, specialty coffee drinks, alcohol, and juices have a lot of calories, yet do not fill you up.  · Eat nutritious foods and avoid empty calories. Empty calories are calories you get from foods or beverages that do not have many vitamins or protein, such as candy, sweets, and soda. It is better to have a nutritious high-calorie food (such as an avocado) than a food with few nutrients (such as a bag of chips).  · Know how many calories are in the foods you eat most often. This will help you calculate calorie counts faster.  · Pay attention to calories in drinks. Low-calorie drinks include water and unsweetened drinks.  · Pay attention to nutrition labels for \"low fat\" or \"fat free\" foods. These foods sometimes have the same amount of calories or more calories than the full fat versions. They also often have added sugar, starch, or salt, to make up for flavor that was removed with the fat.  · Find a way of tracking calories that works for you. Get creative. Try different apps or programs if writing down calories does not work for you.  What are some portion control tips?  · Know how many calories are in a serving. This will help you know how many servings of a certain food you can have.  · Use a measuring cup to measure serving sizes. You could also try weighing out portions on a kitchen scale. With time, you will be able to estimate serving sizes for some foods.  · Take some time to put servings of different foods on your favorite plates, bowls, and cups so you know what a serving looks like.  · Try not to eat straight from a bag or box. Doing this can lead to overeating. Put the amount you would like to eat in a cup or on a plate to make sure you are eating the right portion.  · Use smaller plates, glasses, and bowls to prevent overeating.  · Try not to multitask (for example, watch TV or use your computer) while eating. If it is time to eat, sit down at a table and enjoy your food. This will help you to know " when you are full. It will also help you to be aware of what you are eating and how much you are eating.  What are tips for following this plan?  Reading food labels  · Check the calorie count compared to the serving size. The serving size may be smaller than what you are used to eating.  · Check the source of the calories. Make sure the food you are eating is high in vitamins and protein and low in saturated and trans fats.  Shopping  · Read nutrition labels while you shop. This will help you make healthy decisions before you decide to purchase your food.  · Make a grocery list and stick to it.  Cooking  · Try to cook your favorite foods in a healthier way. For example, try baking instead of frying.  · Use low-fat dairy products.  Meal planning  · Use more fruits and vegetables. Half of your plate should be fruits and vegetables.  · Include lean proteins like poultry and fish.  How do I count calories when eating out?  · Ask for smaller portion sizes.  · Consider sharing an entree and sides instead of getting your own entree.  · If you get your own entree, eat only half. Ask for a box at the beginning of your meal and put the rest of your entree in it so you are not tempted to eat it.  · If calories are listed on the menu, choose the lower calorie options.  · Choose dishes that include vegetables, fruits, whole grains, low-fat dairy products, and lean protein.  · Choose items that are boiled, broiled, grilled, or steamed. Stay away from items that are buttered, battered, fried, or served with cream sauce. Items labeled “crispy” are usually fried, unless stated otherwise.  · Choose water, low-fat milk, unsweetened iced tea, or other drinks without added sugar. If you want an alcoholic beverage, choose a lower calorie option such as a glass of wine or light beer.  · Ask for dressings, sauces, and syrups on the side. These are usually high in calories, so you should limit the amount you eat.  · If you want a salad,  choose a garden salad and ask for grilled meats. Avoid extra toppings like mitchell, cheese, or fried items. Ask for the dressing on the side, or ask for olive oil and vinegar or lemon to use as dressing.  · Estimate how many servings of a food you are given. For example, a serving of cooked rice is ½ cup or about the size of half a baseball. Knowing serving sizes will help you be aware of how much food you are eating at restaurants. The list below tells you how big or small some common portion sizes are based on everyday objects:  ? 1 oz--4 stacked dice.  ? 3 oz--1 deck of cards.  ? 1 tsp--1 die.  ? 1 Tbsp--½ a ping-pong ball.  ? 2 Tbsp--1 ping-pong ball.  ? ½ cup--½ baseball.  ? 1 cup--1 baseball.  Summary  · Calorie counting means keeping track of how many calories you eat and drink each day. If you eat fewer calories than your body needs, you should lose weight.  · A healthy amount of weight to lose per week is usually 1-2 lb (0.5-0.9 kg). This usually means reducing your daily calorie intake by 500-750 calories.  · The number of calories in a food can be found on a Nutrition Facts label. If a food does not have a Nutrition Facts label, try to look up the calories online or ask your dietitian for help.  · Use your calories on foods and drinks that will fill you up, and not on foods and drinks that will leave you hungry.  · Use smaller plates, glasses, and bowls to prevent overeating.  This information is not intended to replace advice given to you by your health care provider. Make sure you discuss any questions you have with your health care provider.  Document Released: 12/18/2006 Document Revised: 11/17/2017 Document Reviewed: 11/17/2017  [employer] Interactive Patient Education © 2019 [employer]     Exercising to Lose Weight  Exercising can help you to lose weight. In order to lose weight through exercise, you need to do vigorous-intensity exercise. You can tell that you are exercising with vigorous intensity  if you are breathing very hard and fast and cannot hold a conversation while exercising.  Moderate-intensity exercise helps to maintain your current weight. You can tell that you are exercising at a moderate level if you have a higher heart rate and faster breathing, but you are still able to hold a conversation.  How often should I exercise?  Choose an activity that you enjoy and set realistic goals. Your health care provider can help you to make an activity plan that works for you. Exercise regularly as directed by your health care provider. This may include:  · Doing resistance training twice each week, such as:  ? Push-ups.  ? Sit-ups.  ? Lifting weights.  ? Using resistance bands.  · Doing a given intensity of exercise for a given amount of time. Choose from these options:  ? 150 minutes of moderate-intensity exercise every week.  ? 75 minutes of vigorous-intensity exercise every week.  ? A mix of moderate-intensity and vigorous-intensity exercise every week.    Children, pregnant women, people who are out of shape, people who are overweight, and older adults may need to consult a health care provider for individual recommendations. If you have any sort of medical condition, be sure to consult your health care provider before starting a new exercise program.  What are some activities that can help me to lose weight?  · Walking at a rate of at least 4.5 miles an hour.  · Jogging or running at a rate of 5 miles per hour.  · Biking at a rate of at least 10 miles per hour.  · Lap swimming.  · Roller-skating or in-line skating.  · Cross-country skiing.  · Vigorous competitive sports, such as football, basketball, and soccer.  · Jumping rope.  · Aerobic dancing.  How can I be more active in my day-to-day activities?  · Use the stairs instead of the elevator.  · Take a walk during your lunch break.  · If you drive, park your car farther away from work or school.  · If you take public transportation, get off one stop  early and walk the rest of the way.  · Make all of your phone calls while standing up and walking around.  · Get up, stretch, and walk around every 30 minutes throughout the day.  What guidelines should I follow while exercising?  · Do not exercise so much that you hurt yourself, feel dizzy, or get very short of breath.  · Consult your health care provider prior to starting a new exercise program.  · Wear comfortable clothes and shoes with good support.  · Drink plenty of water while you exercise to prevent dehydration or heat stroke. Body water is lost during exercise and must be replaced.  · Work out until you breathe faster and your heart beats faster.  This information is not intended to replace advice given to you by your health care provider. Make sure you discuss any questions you have with your health care provider.  Document Released: 01/20/2012 Document Revised: 05/25/2017 Document Reviewed: 05/21/2015  [employer] Interactive Patient Education © 2019 [employer]

## 2019-04-04 ENCOUNTER — OFFICE VISIT (OUTPATIENT)
Dept: OTOLARYNGOLOGY | Facility: CLINIC | Age: 74
End: 2019-04-04

## 2019-04-04 VITALS
DIASTOLIC BLOOD PRESSURE: 80 MMHG | SYSTOLIC BLOOD PRESSURE: 118 MMHG | TEMPERATURE: 97.8 F | BODY MASS INDEX: 29.35 KG/M2 | HEIGHT: 67 IN | WEIGHT: 187 LBS

## 2019-04-04 DIAGNOSIS — C90.20 EXTRAMEDULLARY PLASMACYTOMA (HCC): Primary | ICD-10-CM

## 2019-04-04 DIAGNOSIS — Z92.3 HISTORY OF RADIATION THERAPY: ICD-10-CM

## 2019-04-04 PROCEDURE — 99213 OFFICE O/P EST LOW 20 MIN: CPT | Performed by: OTOLARYNGOLOGY

## 2019-04-04 NOTE — PATIENT INSTRUCTIONS
Will continue to monitor, follow-up in six months. If symptoms develop call for sooner appointment.    Call or return for problems      IF YOU SMOKE OR USE TOBACCO PLEASE READ THE FOLLOWING:    Why is smoking bad for me?  Smoking increases the risk of heart disease, lung disease, vascular disease, stroke, and cancer.     If you smoke, STOP!    If you would like more information on quitting smoking, please visit the [employer] website: [email or web address]/Luminateate/healthier-[email or web address]   This link will provide additional resources including the QUIT line and the Beat the Pack support groups.     For more information:    Quit Now [place]  1-800-QUIT-NOW  https://[place].GamgeelogOgone.org/en-[place]/    MyPlate from USDA  MyPlate is an outline of a general healthy diet based on the 2010 Dietary Guidelines for Americans, from the U.S. Department of Agriculture (USDA). It sets guidelines for how much food you should eat from each food group based on your age, sex, and level of physical activity.  What are tips for following MyPlate?  To follow MyPlate recommendations:  · Eat a wide variety of fruits and vegetables, grains, and protein foods.  · Serve smaller portions and eat less food throughout the day.  · Limit portion sizes to avoid overeating.  · Enjoy your food.  · Get at least 150 minutes of exercise every week. This is about 30 minutes each day, 5 or more days per week.    It can be difficult to have every meal look like MyPlate. Think about MyPlate as eating guidelines for an entire day, rather than each individual meal.  Fruits and Vegetables  · Make half of your plate fruits and vegetables.  · Eat many different colors of fruits and vegetables each day.  · For a 2,000 calorie daily food plan, eat:  ? 2½ cups of vegetables every day.  ? 2 cups of fruit every day.  · 1 cup is equal to:  ? 1 cup raw or cooked vegetables.  ? 1 cup raw fruit.  ? 1 medium-sized orange, apple, or banana.  ? 1 cup 100% fruit  or vegetable juice.  ? 2 cups raw leafy greens, such as lettuce, spinach, or kale.  ? ½ cup dried fruit.  Grains  · One fourth of your plate should be grains.  · Make at least half of the grains you eat each day whole grains.  · For a 2,000 calorie daily food plan, eat 6 oz of grains every day.  · 1 oz is equal to:  ? 1 slice bread.  ? 1 cup cereal.  ? ½ cup cooked rice, cereal, or pasta.  Protein  · One fourth of your plate should be protein.  · Eat a wide variety of protein foods, including meat, poultry, fish, eggs, beans, nuts, and tofu.  · For a 2,000 calorie daily food plan, eat 5½ oz of protein every day.  · 1 oz is equal to:  ? 1 oz meat, poultry, or fish.  ? ¼ cup cooked beans.  ? 1 egg.  ? ½ oz nuts or seeds.  ? 1 Tbsp peanut butter.  Dairy  · Drink fat-free or low-fat (1%) milk.  · Eat or drink dairy as a side to meals.  · For a 2,000 calorie daily food plan, eat or drink 3 cups of dairy every day.  · 1 cup is equal to:  ? 1 cup milk, yogurt, cottage cheese, or soy milk (soy beverage).  ? 2 oz processed cheese.  ? 1½ oz natural cheese.  Fats, oils, salt, and sugars  · Only small amounts of oils are recommended.  · Avoid foods that are high in calories and low in nutritional value (empty calories), like foods high in fat or added sugars.  · Choose foods that are low in salt (sodium). Choose foods that have less than 140 milligrams (mg) of sodium per serving.  · Drink water instead of sugary drinks. Drink enough water each day to keep your urine pale yellow.  Where to find support  · Work with your health care provider or a nutrition specialist (dietitian) to develop a customized eating plan that is right for you.  · Download an mary kay (mobile application) to help you track your daily food intake.  Where to find more information  · Go to ChooseMyPlate.gov for more information.  · Learn more and log your daily food intake according to MyPlate using USDA's SuperTracker:  www.supertracker.usda.gov  Summary  · MyPlate is a general guideline for healthy eating from the USDA. It is based on the 2010 Dietary Guidelines for Americans.  · In general, fruits and vegetables should take up ½ of your plate, grains should take up ¼ of your plate, and protein should take up ¼ of your plate.  This information is not intended to replace advice given to you by your health care provider. Make sure you discuss any questions you have with your health care provider.  Document Released: 01/06/2009 Document Revised: 03/19/2018 Document Reviewed: 03/19/2018  [employer] Interactive Patient Education © 2019 [employer]     Calorie Counting for Weight Loss  Calories are units of energy. Your body needs a certain amount of calories from food to keep you going throughout the day. When you eat more calories than your body needs, your body stores the extra calories as fat. When you eat fewer calories than your body needs, your body burns fat to get the energy it needs.  Calorie counting means keeping track of how many calories you eat and drink each day. Calorie counting can be helpful if you need to lose weight. If you make sure to eat fewer calories than your body needs, you should lose weight. Ask your health care provider what a healthy weight is for you.  For calorie counting to work, you will need to eat the right number of calories in a day in order to lose a healthy amount of weight per week. A dietitian can help you determine how many calories you need in a day and will give you suggestions on how to reach your calorie goal.  · A healthy amount of weight to lose per week is usually 1-2 lb (0.5-0.9 kg). This usually means that your daily calorie intake should be reduced by 500-750 calories.  · Eating 1,200 - 1,500 calories per day can help most women lose weight.  · Eating 1,500 - 1,800 calories per day can help most men lose weight.    What is my plan?  My goal is to have __________ calories per  day.  If I have this many calories per day, I should lose around __________ pounds per week.  What do I need to know about calorie counting?  In order to meet your daily calorie goal, you will need to:  · Find out how many calories are in each food you would like to eat. Try to do this before you eat.  · Decide how much of the food you plan to eat.  · Write down what you ate and how many calories it had. Doing this is called keeping a food log.    To successfully lose weight, it is important to balance calorie counting with a healthy lifestyle that includes regular activity. Aim for 150 minutes of moderate exercise (such as walking) or 75 minutes of vigorous exercise (such as running) each week.  Where do I find calorie information?    The number of calories in a food can be found on a Nutrition Facts label. If a food does not have a Nutrition Facts label, try to look up the calories online or ask your dietitian for help.  Remember that calories are listed per serving. If you choose to have more than one serving of a food, you will have to multiply the calories per serving by the amount of servings you plan to eat. For example, the label on a package of bread might say that a serving size is 1 slice and that there are 90 calories in a serving. If you eat 1 slice, you will have eaten 90 calories. If you eat 2 slices, you will have eaten 180 calories.  How do I keep a food log?  Immediately after each meal, record the following information in your food log:  · What you ate. Don't forget to include toppings, sauces, and other extras on the food.  · How much you ate. This can be measured in cups, ounces, or number of items.  · How many calories each food and drink had.  · The total number of calories in the meal.    Keep your food log near you, such as in a small notebook in your pocket, or use a mobile mary kay or website. Some programs will calculate calories for you and show you how many calories you have left for the day  "to meet your goal.  What are some calorie counting tips?  · Use your calories on foods and drinks that will fill you up and not leave you hungry:  ? Some examples of foods that fill you up are nuts and nut butters, vegetables, lean proteins, and high-fiber foods like whole grains. High-fiber foods are foods with more than 5 g fiber per serving.  ? Drinks such as sodas, specialty coffee drinks, alcohol, and juices have a lot of calories, yet do not fill you up.  · Eat nutritious foods and avoid empty calories. Empty calories are calories you get from foods or beverages that do not have many vitamins or protein, such as candy, sweets, and soda. It is better to have a nutritious high-calorie food (such as an avocado) than a food with few nutrients (such as a bag of chips).  · Know how many calories are in the foods you eat most often. This will help you calculate calorie counts faster.  · Pay attention to calories in drinks. Low-calorie drinks include water and unsweetened drinks.  · Pay attention to nutrition labels for \"low fat\" or \"fat free\" foods. These foods sometimes have the same amount of calories or more calories than the full fat versions. They also often have added sugar, starch, or salt, to make up for flavor that was removed with the fat.  · Find a way of tracking calories that works for you. Get creative. Try different apps or programs if writing down calories does not work for you.  What are some portion control tips?  · Know how many calories are in a serving. This will help you know how many servings of a certain food you can have.  · Use a measuring cup to measure serving sizes. You could also try weighing out portions on a kitchen scale. With time, you will be able to estimate serving sizes for some foods.  · Take some time to put servings of different foods on your favorite plates, bowls, and cups so you know what a serving looks like.  · Try not to eat straight from a bag or box. Doing this can " lead to overeating. Put the amount you would like to eat in a cup or on a plate to make sure you are eating the right portion.  · Use smaller plates, glasses, and bowls to prevent overeating.  · Try not to multitask (for example, watch TV or use your computer) while eating. If it is time to eat, sit down at a table and enjoy your food. This will help you to know when you are full. It will also help you to be aware of what you are eating and how much you are eating.  What are tips for following this plan?  Reading food labels  · Check the calorie count compared to the serving size. The serving size may be smaller than what you are used to eating.  · Check the source of the calories. Make sure the food you are eating is high in vitamins and protein and low in saturated and trans fats.  Shopping  · Read nutrition labels while you shop. This will help you make healthy decisions before you decide to purchase your food.  · Make a grocery list and stick to it.  Cooking  · Try to cook your favorite foods in a healthier way. For example, try baking instead of frying.  · Use low-fat dairy products.  Meal planning  · Use more fruits and vegetables. Half of your plate should be fruits and vegetables.  · Include lean proteins like poultry and fish.  How do I count calories when eating out?  · Ask for smaller portion sizes.  · Consider sharing an entree and sides instead of getting your own entree.  · If you get your own entree, eat only half. Ask for a box at the beginning of your meal and put the rest of your entree in it so you are not tempted to eat it.  · If calories are listed on the menu, choose the lower calorie options.  · Choose dishes that include vegetables, fruits, whole grains, low-fat dairy products, and lean protein.  · Choose items that are boiled, broiled, grilled, or steamed. Stay away from items that are buttered, battered, fried, or served with cream sauce. Items labeled “crispy” are usually fried, unless  stated otherwise.  · Choose water, low-fat milk, unsweetened iced tea, or other drinks without added sugar. If you want an alcoholic beverage, choose a lower calorie option such as a glass of wine or light beer.  · Ask for dressings, sauces, and syrups on the side. These are usually high in calories, so you should limit the amount you eat.  · If you want a salad, choose a garden salad and ask for grilled meats. Avoid extra toppings like mitchell, cheese, or fried items. Ask for the dressing on the side, or ask for olive oil and vinegar or lemon to use as dressing.  · Estimate how many servings of a food you are given. For example, a serving of cooked rice is ½ cup or about the size of half a baseball. Knowing serving sizes will help you be aware of how much food you are eating at restaurants. The list below tells you how big or small some common portion sizes are based on everyday objects:  ? 1 oz--4 stacked dice.  ? 3 oz--1 deck of cards.  ? 1 tsp--1 die.  ? 1 Tbsp--½ a ping-pong ball.  ? 2 Tbsp--1 ping-pong ball.  ? ½ cup--½ baseball.  ? 1 cup--1 baseball.  Summary  · Calorie counting means keeping track of how many calories you eat and drink each day. If you eat fewer calories than your body needs, you should lose weight.  · A healthy amount of weight to lose per week is usually 1-2 lb (0.5-0.9 kg). This usually means reducing your daily calorie intake by 500-750 calories.  · The number of calories in a food can be found on a Nutrition Facts label. If a food does not have a Nutrition Facts label, try to look up the calories online or ask your dietitian for help.  · Use your calories on foods and drinks that will fill you up, and not on foods and drinks that will leave you hungry.  · Use smaller plates, glasses, and bowls to prevent overeating.  This information is not intended to replace advice given to you by your health care provider. Make sure you discuss any questions you have with your health care  provider.  Document Released: 12/18/2006 Document Revised: 11/17/2017 Document Reviewed: 11/17/2017  [employer] Interactive Patient Education © 2019 [employer]     Exercising to Lose Weight  Exercising can help you to lose weight. In order to lose weight through exercise, you need to do vigorous-intensity exercise. You can tell that you are exercising with vigorous intensity if you are breathing very hard and fast and cannot hold a conversation while exercising.  Moderate-intensity exercise helps to maintain your current weight. You can tell that you are exercising at a moderate level if you have a higher heart rate and faster breathing, but you are still able to hold a conversation.  How often should I exercise?  Choose an activity that you enjoy and set realistic goals. Your health care provider can help you to make an activity plan that works for you. Exercise regularly as directed by your health care provider. This may include:  · Doing resistance training twice each week, such as:  ? Push-ups.  ? Sit-ups.  ? Lifting weights.  ? Using resistance bands.  · Doing a given intensity of exercise for a given amount of time. Choose from these options:  ? 150 minutes of moderate-intensity exercise every week.  ? 75 minutes of vigorous-intensity exercise every week.  ? A mix of moderate-intensity and vigorous-intensity exercise every week.    Children, pregnant women, people who are out of shape, people who are overweight, and older adults may need to consult a health care provider for individual recommendations. If you have any sort of medical condition, be sure to consult your health care provider before starting a new exercise program.  What are some activities that can help me to lose weight?  · Walking at a rate of at least 4.5 miles an hour.  · Jogging or running at a rate of 5 miles per hour.  · Biking at a rate of at least 10 miles per hour.  · Lap swimming.  · Roller-skating or in-line skating.  · Cross-country  skiing.  · Vigorous competitive sports, such as football, basketball, and soccer.  · Jumping rope.  · Aerobic dancing.  How can I be more active in my day-to-day activities?  · Use the stairs instead of the elevator.  · Take a walk during your lunch break.  · If you drive, park your car farther away from work or school.  · If you take public transportation, get off one stop early and walk the rest of the way.  · Make all of your phone calls while standing up and walking around.  · Get up, stretch, and walk around every 30 minutes throughout the day.  What guidelines should I follow while exercising?  · Do not exercise so much that you hurt yourself, feel dizzy, or get very short of breath.  · Consult your health care provider prior to starting a new exercise program.  · Wear comfortable clothes and shoes with good support.  · Drink plenty of water while you exercise to prevent dehydration or heat stroke. Body water is lost during exercise and must be replaced.  · Work out until you breathe faster and your heart beats faster.  This information is not intended to replace advice given to you by your health care provider. Make sure you discuss any questions you have with your health care provider.  Document Released: 01/20/2012 Document Revised: 05/25/2017 Document Reviewed: 05/21/2015  [employer] Interactive Patient Education © 2019 [employer].

## 2019-04-11 ENCOUNTER — OFFICE VISIT (OUTPATIENT)
Dept: NEUROSURGERY | Facility: CLINIC | Age: 74
End: 2019-04-11

## 2019-04-11 VITALS
DIASTOLIC BLOOD PRESSURE: 74 MMHG | BODY MASS INDEX: 28.63 KG/M2 | WEIGHT: 182.4 LBS | SYSTOLIC BLOOD PRESSURE: 124 MMHG | HEIGHT: 67 IN

## 2019-04-11 DIAGNOSIS — Z72.0 SNUFF USER: ICD-10-CM

## 2019-04-11 DIAGNOSIS — M50.00 INTERVERTEBRAL CERVICAL DISC DISORDER WITH MYELOPATHY, CERVICAL REGION: ICD-10-CM

## 2019-04-11 DIAGNOSIS — M47.12 CERVICAL SPONDYLOSIS WITH MYELOPATHY: ICD-10-CM

## 2019-04-11 DIAGNOSIS — M48.02 SPINAL STENOSIS IN CERVICAL REGION: Primary | ICD-10-CM

## 2019-04-11 PROCEDURE — 99024 POSTOP FOLLOW-UP VISIT: CPT | Performed by: NEUROLOGICAL SURGERY

## 2019-04-11 NOTE — PROGRESS NOTES
SUBJECTIVE:  Patient ID: Manjit Seth is a 73 y.o. male is here today for follow-up.    Chief Complaint: Neck pain  Chief Complaint   Patient presents with   • Cervical Myelopathy     patient went to the operating room on 1-16-19 for C3-4, 4-5, 5-6 ACDF for cervical stenosis with cervical cord signal change and early signs of myelopathy; patient states he continues to do well from the surgery and has no new complaints today.       HPI  73-year-old gentleman went to the operating for 3 level anterior cervical fusion for neck pain arm pain and cervical myelopathy.  Is doing very well.  He has no neck pain or arm pain.  Right now is very satisfied with the results of the surgery.    The following portions of the patient's history were reviewed and updated as appropriate: allergies, current medications, past family history, past medical history, past social history, past surgical history and problem list.    OBJECTIVE:    Review of Systems   All other systems reviewed and are negative.         Physical Exam   Constitutional: He is oriented to person, place, and time. He appears well-developed and well-nourished.   HENT:   Head: Normocephalic and atraumatic.   Right Ear: Hearing normal.   Left Ear: Hearing normal.   Eyes: EOM are normal. Pupils are equal, round, and reactive to light.   Neck: Normal range of motion.   Neurological: He is alert and oriented to person, place, and time. He has normal strength and normal reflexes. No cranial nerve deficit or sensory deficit. He displays a negative Romberg sign. GCS eye subscore is 4. GCS verbal subscore is 5. GCS motor subscore is 6. He displays no Babinski's sign on the right side. He displays no Babinski's sign on the left side.   Psychiatric: His speech is normal. Judgment normal. Cognition and memory are normal.       Neurologic Exam     Mental Status   Oriented to person, place, and time.   Speech: speech is normal     Cranial Nerves     CN III, IV, VI   Pupils  are equal, round, and reactive to light.  Extraocular motions are normal.     Motor Exam     Strength   Strength 5/5 throughout.   Thoracolumbar scoliotic deformity with some kyphosis on inspection.    Independent Review of Radiographic Studies:   Plain x-rays show good positioning of all interbody devices and hardware.    ASSESSMENT/PLAN:  Manjit is done very well after surgery his pain is essentially disappeared.  He does still have some difficulty with balance but that could be a multifactorial issue with his age.  We will see him in follow-up in 3 months.      1. Spinal stenosis in cervical region    2. Intervertebral cervical disc disorder with myelopathy, cervical region    3. Cervical spondylosis with myelopathy    4. Snuff user    5. BMI 28.0-28.9,adult            Return in about 3 months (around 7/11/2019) for follow up w/[facility].      Pablito Islas MD

## 2019-04-11 NOTE — PATIENT INSTRUCTIONS
PATIENT TO CONTINUE TO FOLLOW UP WITH HIS PRIMARY CARE PROVIDER FOR YEARLY PHYSICAL EXAMS TO ENSURE COMPLETE HEALTH MAINTENANCE      BMI for Adults  Body mass index (BMI) is a number that is calculated from a person's weight and height. In most adults, the number is used to find how much of an adult's weight is made up of fat. BMI is not as accurate as a direct measure of body fat.  How is BMI calculated?  BMI is calculated by dividing weight in kilograms by height in meters squared. It can also be calculated by dividing weight in pounds by height in inches squared, then multiplying the resulting number by 703. Charts are available to help you find your BMI quickly and easily without doing this calculation.  How is BMI interpreted?  Health care professionals use BMI charts to identify whether an adult is underweight, at a normal weight, or overweight based on the following guidelines:  · Underweight: BMI less than 18.5.  · Normal weight: BMI between 18.5 and 24.9.  · Overweight: BMI between 25 and 29.9.  · Obese: BMI of 30 and above.    BMI is usually interpreted the same for males and females.  Weight includes both fat and muscle, so someone with a muscular build, such as an athlete, may have a BMI that is higher than 24.9. In cases like these, BMI may not accurately depict body fat. To determine if excess body fat is the cause of a BMI of 25 or higher, further assessments may need to be done by a health care provider.  Why is BMI a useful tool?  BMI is used to identify a possible weight problem that may be related to a medical problem or may increase the risk for medical problems. BMI can also be used to promote changes to reach a healthy weight.  This information is not intended to replace advice given to you by your health care provider. Make sure you discuss any questions you have with your health care provider.  Document Released: 08/29/2005 Document Revised: 04/27/2017 Document Reviewed: 05/15/2015  [employer]  Interactive Patient Education © 2018 [employer]        Smokeless Tobacco Information, Adult  Tobacco use is one of the leading causes of cancer and other chronic health problems. Smokeless tobacco is tobacco that is put directly into the mouth instead of being smoked. It may also be called chewing tobacco or snuff. Smokeless tobacco is made from the leaves of tobacco plants and it comes in several forms:  · Loose, dry leaves, plugs, or twists.  · Moist pouches.  · Dissolving lozenges or strips.    Chewing, sucking, or holding the tobacco in your mouth causes your mouth to make more saliva. The saliva mixes with the tobacco to make “tobacco juice” that is swallowed or spit out.  How can smokeless tobacco affect me?  Using smokeless tobacco:  · Increases your risk of developing cancer. Smokeless tobacco contains at least 28 different types of cancer-causing chemicals (carcinogens).  · Increases your chances of developing other long-term health problems, including high blood pressure, heart disease, stroke, and dental problems.  · Can make you become addicted. Nicotine is one of the chemicals in tobacco. When you chew tobacco, you absorb nicotine from the tobacco juice. This can make you feel more alert than usual.  · Can cause problems with pregnancy. Pregnant women who use smokeless tobacco are more likely to miscarry or deliver a baby too early (premature delivery).  · Can affect the appearance and health of your mouth. Using smokeless tobacco may cause bad breath, yellow-brown teeth, mouth sores, cracking and bleeding lips, gum recession, and lesions on the soft tissues of your mouth (leukoplakia).    What are the benefits of not using smokeless tobacco?  The benefits of not using smokeless tobacco include:  · A healthy mind because:  ? You avoid addiction.  · A healthy body because:  ? You avoid dental problems.  ? You promote healthy pregnancy.  ? You avoid long-term health problems.  · A healthy wallet  because:  ? You avoid costs of buying tobacco.  ? You avoid health care costs in the future.  · A healthy family because:  ? You avoid accidental poisoning of children in your household.    What can happen if I continue to use smokeless tobacco?  If you continue to use smokeless tobacco, you will increase your risk for developing certain cancers. These include:  · Tongue.  · Lips, mouth, and gums.  · Throat (esophagus) and voice box (larynx).  · Stomach.  · Pancreas.  · Bladder.  · Colon.    Long-term use of smokeless tobacco can also lead to:  · High blood pressure, heart disease, and stroke.  · Gum disease, gum recession, and bone loss around the teeth.  · Tooth decay.    How do I quit using smokeless tobacco?  Quitting the use of smokeless tobacco can be hard, but it can be done. Follow these steps:  · Pick a date to quit. Set a date within the next two weeks. This gives you time to prepare.  · Write down the reasons why you are quitting. Keep this list in places where you will see it often, such as on your bathroom mirror or in your car or wallet.  · Identify the people, places, things, and activities that make you want to use tobacco (triggers) and avoid them.  · Get rid of any tobacco you have and remove any tobacco smells. To do this:  ? Throw away all containers of tobacco at home, at work, and in your car.  ? Throw away any other items that you use regularly when you chew tobacco.  ? Clean your car and make sure to remove all tobacco-related items.  ? Clean your home, including curtains and carpets.  · Tell your family, friends, and coworkers that you are quitting. This can make quitting easier.  · Ask your health care provider for help quitting smokeless tobacco. This may involve treatment. Find out what treatment options are covered by your health insurance.  · Keep track of how many days have passed since you quit. Remembering how long and hard you have worked to quit can help you avoid using tobacco  again.    Where can I get support?  Ask your health care provider if there is a local support group for quitting smokeless tobacco.  Where can I get more information?  You can learn more about the risks of using smokeless tobacco and the benefits of quitting from these sources:  · National Cancer [place]: www.cancer.gov  · American Cancer Society: www.cancer.org    When should I seek medical care?  Seek medical care if you have:  · White or other discolored patches in your mouth.  · Difficulty swallowing.  · A change in your voice.  · Unexplained weight loss.  · Stomach pain, nausea, or vomiting.    Summary  · Smokeless tobacco contains at least 28 different chemicals that are known to cause cancer (carcinogen).  · Nicotine is an addictive chemical in smokeless tobacco.  · When you quit using smokeless tobacco, you lower your risk of developing cancer.  This information is not intended to replace advice given to you by your health care provider. Make sure you discuss any questions you have with your health care provider.  Document Released: 05/21/2012 Document Revised: 08/03/2018 Document Reviewed: 07/29/2016  [employer] Interactive Patient Education © 2019 [employer].

## 2019-07-03 ENCOUNTER — HOSPITAL ENCOUNTER (OUTPATIENT)
Dept: INFUSION THERAPY | Age: 74
Discharge: HOME | End: 2019-07-03
Payer: MEDICARE

## 2019-07-03 PROCEDURE — 84153 ASSAY OF PSA TOTAL: CPT

## 2019-07-03 PROCEDURE — 80053 COMPREHEN METABOLIC PANEL: CPT

## 2019-07-03 PROCEDURE — 84443 ASSAY THYROID STIM HORMONE: CPT

## 2019-07-03 PROCEDURE — 36415 COLL VENOUS BLD VENIPUNCTURE: CPT

## 2019-07-11 ENCOUNTER — OFFICE VISIT (OUTPATIENT)
Dept: NEUROSURGERY | Facility: CLINIC | Age: 74
End: 2019-07-11

## 2019-07-11 VITALS
DIASTOLIC BLOOD PRESSURE: 78 MMHG | SYSTOLIC BLOOD PRESSURE: 128 MMHG | HEIGHT: 67 IN | WEIGHT: 191.2 LBS | BODY MASS INDEX: 30.01 KG/M2

## 2019-07-11 DIAGNOSIS — M54.12 CERVICAL RADICULOPATHY: Primary | ICD-10-CM

## 2019-07-11 DIAGNOSIS — Z72.0 SNUFF USER: ICD-10-CM

## 2019-07-11 DIAGNOSIS — M50.30 DEGENERATION OF CERVICAL INTERVERTEBRAL DISC: ICD-10-CM

## 2019-07-11 PROBLEM — R97.20 RAISED PROSTATE SPECIFIC ANTIGEN: Status: ACTIVE | Noted: 2017-08-07

## 2019-07-11 PROBLEM — N40.1 BENIGN PROSTATIC HYPERPLASIA WITH URINARY OBSTRUCTION: Status: ACTIVE | Noted: 2017-08-07

## 2019-07-11 PROBLEM — N13.8 BENIGN PROSTATIC HYPERPLASIA WITH URINARY OBSTRUCTION: Status: ACTIVE | Noted: 2017-08-07

## 2019-07-11 PROCEDURE — 99213 OFFICE O/P EST LOW 20 MIN: CPT | Performed by: NURSE PRACTITIONER

## 2019-07-11 NOTE — PROGRESS NOTES
"    Chief complaint:   Chief Complaint   Patient presents with   • Neck Pain     Manjit is here today for his routine 3 month follow up, he states he is doing great and has no complaints this moring.         Subjective     HPI: This is a 73-year-old male gentleman who went to the operating room on January 16, 2019 for C3-4, 4-5, 5-6 ACDF for cervical stenosis with cervical cord signal change and early signs of myelopathy.  He states overall feels like he is doing very well.  Not complaining of any meaningful pain.  Denies any numbness or tingling.  He says that his arms are doing well at this point.  He feels that his walking is not quite as good as he would like for to be but he says it is a lot better than what it was but he says that this is not bothersome to him at this time.  Overall he is very happy and satisfied with the results of the surgery and feels like he is had a great improvement.    Review of Systems   Musculoskeletal: Positive for arthralgias.   Neurological: Negative.          Objective      Vital Signs  /78 (BP Location: Right arm, Patient Position: Sitting)   Ht 171 cm (67.32\")   Wt 86.7 kg (191 lb 3.2 oz)   BMI 29.66 kg/m²     Physical Exam   Constitutional: He is oriented to person, place, and time. He appears well-developed and well-nourished.   HENT:   Head: Normocephalic.   Eyes: EOM are normal. Pupils are equal, round, and reactive to light.   Neck: Normal range of motion.   Pulmonary/Chest: Effort normal.   Musculoskeletal: Normal range of motion.   Neurological: He is alert and oriented to person, place, and time. He has normal strength and normal reflexes. No cranial nerve deficit or sensory deficit. Gait normal. GCS eye subscore is 4. GCS verbal subscore is 5. GCS motor subscore is 6.   Skin: Skin is warm.   Incision clean dry and intact   Psychiatric: He has a normal mood and affect. His speech is normal and behavior is normal. Thought content normal.       Results Review: No " new imaging          Assessment/Plan: The patient is doing very well after his 3 level ACDF.  At this point we will need to see him on an as-needed basis.  He was told to call us if he had any further problems.    BMI shows the patient is Overweight. BMI chart was given to the patient. Patient is a non-smoker    Manjit was seen today for neck pain.    Diagnoses and all orders for this visit:    Cervical radiculopathy    Degeneration of cervical intervertebral disc    Snuff user    BMI 29.0-29.9,adult        I discussed the patients findings and my recommendations with patient  Bc Alcantar, APRN  07/11/19  8:42 AM

## 2019-07-11 NOTE — PATIENT INSTRUCTIONS
Smokeless Tobacco Information, Adult  Tobacco use is one of the leading causes of cancer and other chronic health problems. Smokeless tobacco is tobacco that is put directly into the mouth instead of being smoked. It may also be called chewing tobacco or snuff. Smokeless tobacco is made from the leaves of tobacco plants and it comes in several forms:  · Loose, dry leaves, plugs, or twists.  · Moist pouches.  · Dissolving lozenges or strips.    Chewing, sucking, or holding the tobacco in your mouth causes your mouth to make more saliva. The saliva mixes with the tobacco to make “tobacco juice” that is swallowed or spit out.  How can smokeless tobacco affect me?  Using smokeless tobacco:  · Increases your risk of developing cancer. Smokeless tobacco contains at least 28 different types of cancer-causing chemicals (carcinogens).  · Increases your chances of developing other long-term health problems, including high blood pressure, heart disease, stroke, and dental problems.  · Can make you become addicted. Nicotine is one of the chemicals in tobacco. When you chew tobacco, you absorb nicotine from the tobacco juice. This can make you feel more alert than usual.  · Can cause problems with pregnancy. Pregnant women who use smokeless tobacco are more likely to miscarry or deliver a baby too early (premature delivery).  · Can affect the appearance and health of your mouth. Using smokeless tobacco may cause bad breath, yellow-brown teeth, mouth sores, cracking and bleeding lips, gum recession, and lesions on the soft tissues of your mouth (leukoplakia).    What are the benefits of not using smokeless tobacco?  The benefits of not using smokeless tobacco include:  · A healthy mind because:  ? You avoid addiction.  · A healthy body because:  ? You avoid dental problems.  ? You promote healthy pregnancy.  ? You avoid long-term health problems.  · A healthy wallet because:  ? You avoid costs of buying tobacco.  ? You avoid  health care costs in the future.  · A healthy family because:  ? You avoid accidental poisoning of children in your household.    What can happen if I continue to use smokeless tobacco?  If you continue to use smokeless tobacco, you will increase your risk for developing certain cancers. These include:  · Tongue.  · Lips, mouth, and gums.  · Throat (esophagus) and voice box (larynx).  · Stomach.  · Pancreas.  · Bladder.  · Colon.    Long-term use of smokeless tobacco can also lead to:  · High blood pressure, heart disease, and stroke.  · Gum disease, gum recession, and bone loss around the teeth.  · Tooth decay.    How do I quit using smokeless tobacco?  Quitting the use of smokeless tobacco can be hard, but it can be done. Follow these steps:  · Pick a date to quit. Set a date within the next two weeks. This gives you time to prepare.  · Write down the reasons why you are quitting. Keep this list in places where you will see it often, such as on your bathroom mirror or in your car or wallet.  · Identify the people, places, things, and activities that make you want to use tobacco (triggers) and avoid them.  · Get rid of any tobacco you have and remove any tobacco smells. To do this:  ? Throw away all containers of tobacco at home, at work, and in your car.  ? Throw away any other items that you use regularly when you chew tobacco.  ? Clean your car and make sure to remove all tobacco-related items.  ? Clean your home, including curtains and carpets.  · Tell your family, friends, and coworkers that you are quitting. This can make quitting easier.  · Ask your health care provider for help quitting smokeless tobacco. This may involve treatment. Find out what treatment options are covered by your health insurance.  · Keep track of how many days have passed since you quit. Remembering how long and hard you have worked to quit can help you avoid using tobacco again.    Where can I get support?  Ask your health care  provider if there is a local support group for quitting smokeless tobacco.  Where can I get more information?  You can learn more about the risks of using smokeless tobacco and the benefits of quitting from these sources:  · National Cancer [place]: www.cancer.gov  · American Cancer Society: www.cancer.org    When should I seek medical care?  Seek medical care if you have:  · White or other discolored patches in your mouth.  · Difficulty swallowing.  · A change in your voice.  · Unexplained weight loss.  · Stomach pain, nausea, or vomiting.    Summary  · Smokeless tobacco contains at least 28 different chemicals that are known to cause cancer (carcinogen).  · Nicotine is an addictive chemical in smokeless tobacco.  · When you quit using smokeless tobacco, you lower your risk of developing cancer.  This information is not intended to replace advice given to you by your health care provider. Make sure you discuss any questions you have with your health care provider.  Document Released: 05/21/2012 Document Revised: 08/03/2018 Document Reviewed: 07/29/2016  [employer] Interactive Patient Education © 2019 [employer].    Document Released: 01/16/2018 Document Revised: 10/18/2018  [employer] Interactive Patient Education © 2019 [employer].  BMI for Adults  Body mass index (BMI) is a number that is calculated from a person's weight and height. In most adults, the number is used to find how much of an adult's weight is made up of fat. BMI is not as accurate as a direct measure of body fat.  How is BMI calculated?  BMI is calculated by dividing weight in kilograms by height in meters squared. It can also be calculated by dividing weight in pounds by height in inches squared, then multiplying the resulting number by 703. Charts are available to help you find your BMI quickly and easily without doing this calculation.  How is BMI interpreted?  Health care professionals use BMI charts to identify whether an adult is  underweight, at a normal weight, or overweight based on the following guidelines:  · Underweight: BMI less than 18.5.  · Normal weight: BMI between 18.5 and 24.9.  · Overweight: BMI between 25 and 29.9.  · Obese: BMI of 30 and above.    BMI is usually interpreted the same for males and females.  Weight includes both fat and muscle, so someone with a muscular build, such as an athlete, may have a BMI that is higher than 24.9. In cases like these, BMI may not accurately depict body fat. To determine if excess body fat is the cause of a BMI of 25 or higher, further assessments may need to be done by a health care provider.  Why is BMI a useful tool?  BMI is used to identify a possible weight problem that may be related to a medical problem or may increase the risk for medical problems. BMI can also be used to promote changes to reach a healthy weight.  This information is not intended to replace advice given to you by your health care provider. Make sure you discuss any questions you have with your health care provider.  Document Released: 08/29/2005 Document Revised: 04/27/2017 Document Reviewed: 05/15/2015  [employer] Interactive Patient Education © 2018 [employer]

## 2019-07-20 VITALS — WEIGHT: 180 LBS | DIASTOLIC BLOOD PRESSURE: 60 MMHG | SYSTOLIC BLOOD PRESSURE: 120 MMHG | HEART RATE: 57 BPM

## 2019-08-15 ENCOUNTER — OFFICE VISIT (OUTPATIENT)
Dept: HEMATOLOGY | Age: 74
End: 2019-08-15
Payer: MEDICARE

## 2019-08-15 ENCOUNTER — HOSPITAL ENCOUNTER (OUTPATIENT)
Dept: INFUSION THERAPY | Age: 74
Discharge: HOME | End: 2019-08-15
Payer: MEDICARE

## 2019-08-15 VITALS
HEIGHT: 69 IN | DIASTOLIC BLOOD PRESSURE: 76 MMHG | HEART RATE: 60 BPM | WEIGHT: 189.3 LBS | SYSTOLIC BLOOD PRESSURE: 118 MMHG | BODY MASS INDEX: 28.04 KG/M2 | OXYGEN SATURATION: 98 %

## 2019-08-15 DIAGNOSIS — C90.20 EXTRAMEDULLARY PLASMACYTOMA (HCC): ICD-10-CM

## 2019-08-15 DIAGNOSIS — C90.20 EXTRAMEDULLARY PLASMACYTOMA (HCC): Primary | ICD-10-CM

## 2019-08-15 PROCEDURE — 99211 OFF/OP EST MAY X REQ PHY/QHP: CPT

## 2019-08-15 PROCEDURE — 85025 COMPLETE CBC W/AUTO DIFF WBC: CPT

## 2019-08-15 PROCEDURE — 99213 OFFICE O/P EST LOW 20 MIN: CPT | Performed by: INTERNAL MEDICINE

## 2019-08-15 RX ORDER — SIMVASTATIN 40 MG
TABLET ORAL
COMMUNITY
Start: 2019-07-31

## 2019-08-15 RX ORDER — LISINOPRIL 5 MG/1
TABLET ORAL
COMMUNITY
End: 2019-08-15

## 2019-08-15 RX ORDER — ERGOCALCIFEROL (VITAMIN D2) 10 MCG
TABLET ORAL
COMMUNITY

## 2019-08-15 RX ORDER — FINASTERIDE 5 MG/1
TABLET, FILM COATED ORAL
COMMUNITY
Start: 2019-07-31

## 2019-08-15 RX ORDER — TRIAMTERENE AND HYDROCHLOROTHIAZIDE 37.5; 25 MG/1; MG/1
CAPSULE ORAL
COMMUNITY
Start: 2019-06-28

## 2019-08-15 RX ORDER — ATENOLOL 50 MG/1
TABLET ORAL
COMMUNITY

## 2019-08-15 RX ORDER — CHLORAL HYDRATE 500 MG
CAPSULE ORAL
COMMUNITY

## 2019-08-15 NOTE — PROGRESS NOTES
bilateral rigid nasal endoscopy with excisional but biopsy of the left intranasal mass. Findings on the left revealed an excoriated polypoid mass noted in the vault in the posterior aspect of the nasal vestibule and to the nasal cavity proper. No other abnormalities were noted. The mass was excised down to the perichondrium. Pathology from the 1/11/2016 specimen described a 2.3 x 0.6 x 0.1 cm in depth specimen. At the in the of the strip was a fungating pink polyp measuring 1.4 x 1.3 x 0.6 cm. Notation is made that \"the polyp appears to extend to the peripheral margin. ....... 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 FISH for myeloma panel was also submitted, and pending. Pathology was discussed with Dr. Serena Whittington on 1/19/2016. Dr Annamaria Musa again reviewed the pathology and stated that the deep surgical margins were involved with the plasmacytoma. Serology on 1/19/2016 revealed a normal creatinine, calcium and protein level on the CMP. SPEP did reveal a positive M--spike of 0.4g/dL  Quantitative immunoglobulins revealed a mild elevation in the IgA at 425 () with a normal IgG and IgM. Kappa and lambda light chains and ratios were all normal.   Beta-2 microglobulin was normal at 1.6  LDH was normal at 189    A bone survey on 1/20/2016 did not reveal any sclerotic or lytic bone lesions. A 24-hour urine for immunoelectrophoresis on 2/5/2016 was positive for Bence Mcdonough protein, kappa type. The 24-hour urine protein was 118 mg/24 hr but due to the small quantity of monoclonal proteins, the M-spike was unable to be quantitated.      A bone marrow aspiration and biopsy on 2/8/2016 was normal cellular (25-30%) with trilineage encounter. No follow-ups on file. I, Dr. Doretta Boeck, personally performed the services described in this documentation as scribed by [facility] LPN in my presence, and it's both accurate and complete.

## 2019-10-02 NOTE — PROGRESS NOTES
YOB: 1945  Location: [facility]  Location Address: [address] [place] [address], [facility], [address] [place] [address]  Location Phone: [phone]    Chief Complaint   Patient presents with   • Follow-up     F/U nose Ca, doing well       History of Present Illness  Manjit Seth is a 73 y.o. male.  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 Patient has had 2-3 nosebleeds since last office visit. He denies nasal congestion, nasal drainage, headaches and allergies. Patient is continuing to use bactroban.      I have personally reviewed the review of systems.    I have personally reviewed the information imported into the chart during this visit.  Prior documentation that information has been reviewed in the imported note or documented material should not be taken to imply that review during this visit has not occurred.      Final Diagnosis                          Amended Report                                                       Excision, left nasal mass:                                 A.     Extramedullary plasmacytoma.                                 B.     The plasmacytic infiltrate can be seen extending to the inked deep                           margin of excision.                                                       Comment:  Flow cytometric analysis performed by [facility] reveals a                           monoclonal IgG kappa plasma cell population consistent with plasma cell neoplasm                           accounting for 5% of total cells.  Please refer to the complete flow cytometry                           report from [facility] which is appended.  Morphologically, the plasma                           cell population is greater with the plasmacytoma packed with atypical plasma                           cells.  FISH myeloma panel studies are pending at this time and will be                           forwarded upon receipt from  the reference laboratory.                                                                       1                                                                                 Electronically Signed Out By                           Laxmi Mo MD                                                      Specimen(s) Received:                           Left nasal mass    Past Medical History:   Diagnosis Date   • Bell's palsy     with facial tremor   • Diabetes mellitus (CMS/HCC)    • Extramedullary plasmacytoma (CMS/HCC)     left nose   • History of radiation therapy 03/28/2016    4000 cGy to nose completed on 3/28/16   • History of tobacco abuse    • Hyperlipidemia    • Hypertension        Past Surgical History:   Procedure Laterality Date   • ANTERIOR CERVICAL DISCECTOMY W/ FUSION N/A 1/16/2019    Procedure: CERVICAL DISCECTOMY ANTERIOR WITH FUSION C3-4,4-5, 5-6 ACDF with neuromonitoring and 1 C-arm;  Surgeon: Pablito Islas MD;  Location: [facility];  Service: Neurosurgery   • COLONOSCOPY     • NASAL ENDOSCOPY      bilateral with excisional biopsy of left intranasal mass 1/11/16       Outpatient Medications Marked as Taking for the 10/3/19 encounter (Office Visit) with Jose Krueger MD   Medication Sig Dispense Refill   • atenolol (TENORMIN) 50 MG tablet Take 50 mg by mouth Daily.     • Cranberry 400 MG capsule Take 1 capsule by mouth Daily.     • Ergocalciferol (VITAMIN D2) 400 UNITS tablet Take  by mouth.     • finasteride (PROSCAR) 5 MG tablet Take 5 mg by mouth Daily.     • Garlic 10 MG capsule Take  by mouth.     • lisinopril (PRINIVIL,ZESTRIL) 5 MG tablet Take 5 mg by mouth Daily.     • Multiple Vitamins-Minerals (MULTIVITAMIN ADULT PO) Take  by mouth.     • Omega-3 Fatty Acids (FISH OIL) 1000 MG capsule capsule Take  by mouth Daily With Breakfast.     • simvastatin (ZOCOR) 40 MG tablet      • triamterene-hydrochlorothiazide (MAXZIDE-25) 37.5-25 MG per tablet Take 1 tablet by mouth Daily.          Patient has no known allergies.    Family History   Problem Relation Age of Onset   • Breast cancer Mother    • Heart failure Father    • Cancer Maternal Uncle        Social History     Socioeconomic History   • Marital status:      Spouse name: Not on file   • Number of children: Not on file   • Years of education: Not on file   • Highest education level: Not on file   Occupational History   • Occupation: CRNA   Tobacco Use   • Smoking status: Former Smoker     Types: Cigarettes     Last attempt to quit: 1980     Years since quittin.7   • Smokeless tobacco: Current User     Types: Chew   Substance and Sexual Activity   • Alcohol use: No   • Drug use: No   • Sexual activity: Defer       Review of Systems   Constitutional: Negative.    HENT: Positive for nosebleeds.    Eyes: Negative.    Respiratory: Negative.    Cardiovascular: Negative.    Gastrointestinal: Negative.    Endocrine: Negative.    Genitourinary: Negative.    Musculoskeletal: Negative.    Skin: Negative.    Allergic/Immunologic: Negative.    Neurological: Negative.    Hematological: Negative.    Psychiatric/Behavioral: Negative.        Vitals:    10/03/19 0911   BP: 128/64   Pulse: 61   Resp: 17   Temp: 98.5 °F (36.9 °C)   SpO2: 98%       Body mass index is 30.25 kg/m².    Objective     Physical Exam  CONSTITUTIONAL: well nourished, alert, oriented, in no acute distress     COMMUNICATION AND VOICE: able to communicate normally, normal voice quality    HEAD: normocephalic, no lesions, atraumatic, no tenderness, no masses     FACE: appearance normal, no lesions, no tenderness, no deformities, facial motion symmetric    SALIVARY GLANDS: parotid glands with no tenderness, no swelling, no masses, submandibular glands with normal size, nontender    EYES: ocular motility normal, eyelids normal, orbits normal, no proptosis, conjunctiva normal , pupils equal, round     EARS:  Hearing: response to conversational voice normal bilaterally    External Ears: auricles without lesions  Otoscopic: tympanic membrane appearance normal, no lesions, no perforation, normal mobility, no fluid    NOSE:  External Nose: structure normal, no tenderness on palpation, no nasal discharge, no lesions, no evidence of trauma, nostrils patent   Intranasal Exam:   Bilateral diagnostic rigid nasal endoscopy was performed the Stortz 0 degree endoscope.  Mild caudal septal deviation is noted on the right with no evidence of lesion or mass.  No recidivistic polyposis is noted.  On the left no significant excoriation is appreciated but there is slight hypervascularity of Julia area in Kiesselbach's plexus.  Nasopharynx:     ORAL:  Lips: upper and lower lips without lesion   Teeth:   Gums: gingivae healthy   Oral Mucosa: oral mucosa normal, no mucosal lesions   Floor of Mouth: Warthin’s duct patent, mucosa normal  Tongue: lingual mucosa normal without lesions, normal tongue mobility   Palate: soft and hard palates with normal mucosa and structure  Oropharynx: oropharyngeal mucosa normal    HYPOPHARYNX:   LARYNX:   NECK:   THYROID: no overt thyromegaly, no tenderness, nodules or mass present on palpation, position midline     LYMPH NODES: no lymphadenopathy    CHEST/RESPIRATORY: respiratory effort normal, normal breath sounds     CARDIOVASCULAR: rate and rhythm normal, extremities without cyanosis or edema      NEUROLOGIC/PSYCHIATRIC: oriented to time, place and person, mood normal, affect appropriate, CN II-XII intact grossly    Assessment/Plan   Manjit was seen today for follow-up.    Diagnoses and all orders for this visit:    Extramedullary plasmacytoma in remission (CMS/HCC)    Anterior epistaxis      * Surgery not found *  No orders of the defined types were placed in this encounter.    Return in about 6 months (around 4/3/2020).       Patient Instructions   Continue mupirocin  Recommendations for how to control anterior epistaxis if it occurs were covered    Continue Ocean  Spray  Call or return for problems  Follow-up in 6 months

## 2019-10-03 ENCOUNTER — OFFICE VISIT (OUTPATIENT)
Dept: OTOLARYNGOLOGY | Facility: CLINIC | Age: 74
End: 2019-10-03

## 2019-10-03 VITALS
SYSTOLIC BLOOD PRESSURE: 128 MMHG | TEMPERATURE: 98.5 F | DIASTOLIC BLOOD PRESSURE: 64 MMHG | RESPIRATION RATE: 17 BRPM | HEART RATE: 61 BPM | HEIGHT: 67 IN | WEIGHT: 195 LBS | OXYGEN SATURATION: 98 % | BODY MASS INDEX: 30.61 KG/M2

## 2019-10-03 DIAGNOSIS — R04.0 ANTERIOR EPISTAXIS: ICD-10-CM

## 2019-10-03 DIAGNOSIS — C90.21 EXTRAMEDULLARY PLASMACYTOMA IN REMISSION (HCC): Primary | ICD-10-CM

## 2019-10-03 PROBLEM — Z87.891 HISTORY OF TOBACCO ABUSE: Status: ACTIVE | Noted: 2019-10-03

## 2019-10-03 PROCEDURE — 99214 OFFICE O/P EST MOD 30 MIN: CPT | Performed by: OTOLARYNGOLOGY

## 2019-10-03 RX ORDER — INFLUENZA VACCINE, ADJUVANTED 15; 15; 15 UG/.5ML; UG/.5ML; UG/.5ML
INJECTION, SUSPENSION INTRAMUSCULAR
Refills: 0 | COMMUNITY
Start: 2019-09-16

## 2019-10-03 NOTE — PATIENT INSTRUCTIONS
Continue mupirocin  Recommendations for how to control anterior epistaxis if it occurs were covered    Continue [facility]  Call or return for problems  Follow-up in 6 months

## 2020-01-16 ENCOUNTER — HOSPITAL ENCOUNTER (OUTPATIENT)
Dept: INFUSION THERAPY | Age: 75
Discharge: HOME | End: 2020-01-16
Payer: MEDICARE

## 2020-01-16 DIAGNOSIS — C90.20 EXTRAMEDULLARY PLASMACYTOMA, UNSPECIFIED WHETHER REMISSION ACHIEVED (HCC): ICD-10-CM

## 2020-01-16 DIAGNOSIS — C90.20 EXTRAMEDULLARY PLASMACYTOMA (HCC): ICD-10-CM

## 2020-01-22 NOTE — PROGRESS NOTES
Patient:  Alexis Cabezas  YOB: 1945  Date of Service: 1/23/2020  MRN: [id number]    Primary Care Physician: Caio Mendoza    Chief Complaint   Patient presents with    Other     Extramedullary plasmacytoma    Other     IgA MGUS       Patient Seen, Chart, Consults notes, Labs, Radiology studies reviewed. 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 Re-resection by Dr. Tucker Pereyra on 1/11/2016. Consolidation XRT to the left nares area at [facility] completed on 3/28/2016. Robert Sanderson Is monitored every 6 months, in January and July of each year at this office and  he is seen by Dr. Tucker Pereyra every 6 months in April and October of every year for fiberoptic evaluation. 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 Tucker Pereyra with a diagnosis of a resected left nasal extra medullary plasmacytoma. Mr. Dung Gupta presented to the emergency room at [facility] on 12/23/2015 for evaluation of tissue that he blew out of his left nose that same morning. The tissue was submitted to pathology. Pathology on 12/23/2015 documented a piece of tan fibrosis tissue measuring 0.9 x 0.9 x 0.5 cm. Microscopic exam demonstrated histopathologic features supporting a diagnosis of a plasmacytoma. Mr. Dung Gupta was relatively asymptomatic prior to this presentation.  He did state that occasionally over the previous couple of plasmacytoma from left nares 1/11/2016   2. Excisional but biopsy of the left intranasal mass by Dr. Reinier Stephen 1/11/2016   3. Consolidation XRT was initiated on 3/1/2016. Cholo Robles received 20 fractions of treatment for a total of 4000 cGy that was completed on 3/28/2016      Allergies:  No known allergies    Medicines:  Current Outpatient Medications   Medication Sig Dispense Refill    magnesium (MAGNESIUM-OXIDE) 250 MG TABS tablet Take 250 mg by mouth daily      diphenhydrAMINE (BENADRYL ALLERGY) 25 MG capsule Take 25 mg by mouth every 6 hours as needed for Itching      ASPIRIN 81 PO Aspir-81   daily      Cranberry 1000 MG CAPS cranberry   daily      Multiple Vitamin (MULTI-VITAMINS PO) Take by mouth      atenolol (TENORMIN) 50 MG tablet atenolol 50 mg tablet      Ergocalciferol (VITAMIN D2) 400 units TABS Take by mouth      finasteride (PROSCAR) 5 MG tablet       Omega-3 Fatty Acids (FISH OIL) 1000 MG CAPS Take by mouth      simvastatin (ZOCOR) 40 MG tablet       triamterene-hydrochlorothiazide (DYAZIDE) 37.5-25 MG per capsule       Acetaminophen (TYLENOL) 325 MG CAPS Tylenol   prn       No current facility-administered medications for this visit. Past Medical History:      Diagnosis Date    Epistaxis 1/23/2020    Extramedullary plasmacytoma ([facility] [address] 1/23/2020    Head and neck cancer ([facility] [address]     Hypertension     Plasmacytoma, extramedullary ([facility] [address]     Type 2 diabetes mellitus without complication ([facility] [address]         Past Surgical History:      Procedure Laterality Date    NOSE SURGERY Left         Family History:  No family history on file. Social History  Social History     Tobacco Use    Smoking status: Former Smoker    Smokeless tobacco: Current User   Substance Use Topics    Alcohol use: Not on file    Drug use: Not on file          Review of Systems:  Constitutional: Negative for chills, fatigue, fever or significant weight loss.    HENT: Negative for congestion, hearing loss, nosebleeds pallor. Psychiatric: Judgment normal.     Labs:  BMP: No results for input(s): NA, K, CL, CO2, PHOS, BUN, CREATININE, CALCIUM in the last 72 hours. CBC: No results for input(s): WBC, HGB, HCT, MCV, PLT in the last 72 hours. LIVER PROFILE: No results for input(s): AST, ALT, LIPASE, BILIDIR, BILITOT, ALKPHOS in the last 72 hours. Invalid input(s): AMYLASE,  ALB  PT/INR: No results for input(s): PROTIME, INR in the last 72 hours. APTT: No results for input(s): APTT in the last 72 hours. BNP:  No results for input(s): BNP in the last 72 hours. Ionized Calcium:No results for input(s): IONCA in the last 72 hours. Magnesium:No results for input(s): MG in the last 72 hours. Phosphorus:No results for input(s): PHOS in the last 72 hours. HgbA1C: No results for input(s): LABA1C in the last 72 hours. Hepatic: No results for input(s): ALKPHOS, ALT, AST, PROT, BILITOT, BILIDIR, LABALBU in the last 72 hours. Lactic Acid: No results for input(s): LACTA in the last 72 hours. Troponin: No results for input(s): CKTOTAL, CKMB, TROPONINT in the last 72 hours. ABGs: No results for input(s): PH, PCO2, PO2, HCO3, O2SAT in the last 72 hours. CRP:  No results for input(s): CRP in the last 72 hours. Sed Rate:  No results for input(s): SEDRATE in the last 72 hours. Cultures:   No results for input(s): CULTURE in the last 72 hours. Radiology reports as per the Radiologist  Radiology: No results found. 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   Re-resection by Dr. Julio Fung Placed This Encounter   Procedures    Comprehensive Metabolic Panel     Standing Status:   Future     Standing Expiration Date:   1/23/2021    Beta 2 Microglobulin, Serum     Standing Status:   Future     Standing Expiration Date:   1/23/2021    Miscellaneous Sendout 1     Standing Status:   Future     Standing Expiration Date:   1/23/2021     Order Specific Question:   Specify Req. Test (1 Test/Order)     Answer:   Immunofixation electrophoresis, serum    Immunoglobulins, Quantitative     Standing Status:   Future     Standing Expiration Date:   1/23/2021    Rifton/Lambda Free Lt Chains, Serum Quant     Standing Status:   Future     Standing Expiration Date:   1/23/2021         Return in about 6 months (around 7/23/2020) for follow-up w/ Dr. Kimmy Ruiz with labs prior to. I, Dr. Mynor Peters, personally performed the services described in this documentation as scribed by Apryl Hairston LPN in my presence, and it's both accurate and complete.

## 2020-01-23 ENCOUNTER — HOSPITAL ENCOUNTER (OUTPATIENT)
Dept: INFUSION THERAPY | Age: 75
Discharge: HOME | End: 2020-01-23
Payer: MEDICARE

## 2020-01-23 ENCOUNTER — OFFICE VISIT (OUTPATIENT)
Dept: HEMATOLOGY | Age: 75
End: 2020-01-23
Payer: MEDICARE

## 2020-01-23 VITALS
BODY MASS INDEX: 28.96 KG/M2 | SYSTOLIC BLOOD PRESSURE: 132 MMHG | HEART RATE: 78 BPM | OXYGEN SATURATION: 96 % | DIASTOLIC BLOOD PRESSURE: 68 MMHG | HEIGHT: 69 IN | WEIGHT: 195.5 LBS

## 2020-01-23 DIAGNOSIS — C90.20 EXTRAMEDULLARY PLASMACYTOMA, UNSPECIFIED WHETHER REMISSION ACHIEVED (HCC): ICD-10-CM

## 2020-01-23 PROBLEM — D47.2 IGA MONOCLONAL GAMMOPATHY OF UNCERTAIN SIGNIFICANCE: Status: ACTIVE | Noted: 2020-01-23

## 2020-01-23 PROBLEM — R04.0 EPISTAXIS: Status: ACTIVE | Noted: 2020-01-23

## 2020-01-23 PROCEDURE — 4004F PT TOBACCO SCREEN RCVD TLK: CPT | Performed by: INTERNAL MEDICINE

## 2020-01-23 PROCEDURE — 1123F ACP DISCUSS/DSCN MKR DOCD: CPT | Performed by: INTERNAL MEDICINE

## 2020-01-23 PROCEDURE — 3017F COLORECTAL CA SCREEN DOC REV: CPT | Performed by: INTERNAL MEDICINE

## 2020-01-23 PROCEDURE — 99214 OFFICE O/P EST MOD 30 MIN: CPT | Performed by: INTERNAL MEDICINE

## 2020-01-23 PROCEDURE — 4040F PNEUMOC VAC/ADMIN/RCVD: CPT | Performed by: INTERNAL MEDICINE

## 2020-01-23 PROCEDURE — 99211 OFF/OP EST MAY X REQ PHY/QHP: CPT

## 2020-01-23 PROCEDURE — G8417 CALC BMI ABV UP PARAM F/U: HCPCS | Performed by: INTERNAL MEDICINE

## 2020-01-23 PROCEDURE — G8427 DOCREV CUR MEDS BY ELIG CLIN: HCPCS | Performed by: INTERNAL MEDICINE

## 2020-01-23 PROCEDURE — 85025 COMPLETE CBC W/AUTO DIFF WBC: CPT

## 2020-01-23 PROCEDURE — G8484 FLU IMMUNIZE NO ADMIN: HCPCS | Performed by: INTERNAL MEDICINE

## 2020-01-23 RX ORDER — DIPHENHYDRAMINE HCL 25 MG
25 CAPSULE ORAL EVERY 6 HOURS PRN
COMMUNITY

## 2020-01-23 RX ORDER — MULTIVITAMIN WITH IRON
250 TABLET ORAL DAILY
COMMUNITY

## 2020-07-16 ENCOUNTER — HOSPITAL ENCOUNTER (OUTPATIENT)
Dept: INFUSION THERAPY | Age: 75
Discharge: HOME | End: 2020-07-16
Payer: MEDICARE

## 2020-07-16 DIAGNOSIS — D47.2 IGA MONOCLONAL GAMMOPATHY OF UNCERTAIN SIGNIFICANCE: ICD-10-CM

## 2020-07-16 LAB
ALBUMIN SERPL-MCNC: 4.1 G/DL (ref 3.5–5.2)
ALP BLD-CCNC: 230 U/L (ref 40–130)
ALT SERPL-CCNC: 20 U/L (ref 21–72)
ANION GAP SERPL CALCULATED.3IONS-SCNC: 8 MMOL/L (ref 7–19)
AST SERPL-CCNC: 24 U/L (ref 17–59)
BASOPHILS ABSOLUTE: 0.06 K/UL (ref 0.01–0.08)
BASOPHILS RELATIVE PERCENT: 0.8 % (ref 0.1–1.2)
BILIRUB SERPL-MCNC: 0.3 MG/DL (ref 0.2–1.3)
BUN BLDV-MCNC: 16 MG/DL (ref 9–20)
CALCIUM SERPL-MCNC: 9.6 MG/DL (ref 8.4–10.2)
CHLORIDE BLD-SCNC: 104 MMOL/L (ref 98–111)
CO2: 30 MMOL/L (ref 22–29)
CREAT SERPL-MCNC: 0.9 MG/DL (ref 0.6–1.2)
EOSINOPHILS ABSOLUTE: 0.21 K/UL (ref 0.04–0.54)
EOSINOPHILS RELATIVE PERCENT: 3 % (ref 0.7–7)
GFR NON-AFRICAN AMERICAN: >60
GLOBULIN: 2.6 G/DL
GLUCOSE BLD-MCNC: 132 MG/DL (ref 74–106)
HCT VFR BLD CALC: 39.2 % (ref 40.1–51)
HEMOGLOBIN: 13 G/DL (ref 13.7–17.5)
LYMPHOCYTES ABSOLUTE: 1.88 K/UL (ref 1.18–3.74)
LYMPHOCYTES RELATIVE PERCENT: 26.4 % (ref 19.3–53.1)
MCH RBC QN AUTO: 31.6 PG (ref 25.7–32.2)
MCHC RBC AUTO-ENTMCNC: 33.2 G/DL (ref 32.3–36.5)
MCV RBC AUTO: 95.1 FL (ref 79–92.2)
MONOCYTES ABSOLUTE: 0.5 K/UL (ref 0.24–0.82)
MONOCYTES RELATIVE PERCENT: 7 % (ref 4.7–12.5)
NEUTROPHILS ABSOLUTE: 4.46 K/UL (ref 1.56–6.13)
NEUTROPHILS RELATIVE PERCENT: 62.8 % (ref 34–71.1)
PDW BLD-RTO: 13.9 % (ref 11.6–14.4)
PLATELET # BLD: 169 K/UL (ref 163–337)
PMV BLD AUTO: 11.2 FL (ref 7.4–10.4)
POTASSIUM SERPL-SCNC: 4.5 MMOL/L (ref 3.5–5.1)
RBC # BLD: 4.12 M/UL (ref 4.63–6.08)
SODIUM BLD-SCNC: 142 MMOL/L (ref 137–145)
TOTAL PROTEIN: 6.8 G/DL (ref 6.3–8.2)
WBC # BLD: 7.11 K/UL (ref 4.23–9.07)

## 2020-07-16 PROCEDURE — 85025 COMPLETE CBC W/AUTO DIFF WBC: CPT

## 2020-07-16 PROCEDURE — 80053 COMPREHEN METABOLIC PANEL: CPT

## 2020-07-22 NOTE — PROGRESS NOTES
relatively asymptomatic prior to this presentation. He did state that occasionally over the previous couple of months he had blown a small amount of blood from his nasal cavity. He did not feel that it was significant given the fact that he has had episodes of occasional nosebleeds since he was a child. He was referred to Dr. Scott Jennings for management. On 1/11/2016 he was taken to the OR for a bilateral rigid nasal endoscopy with excisional but biopsy of the left intranasal mass. Findings on the left revealed an excoriated polypoid mass noted in the vault in the posterior aspect of the nasal vestibule and to the nasal cavity proper. No other abnormalities were noted. The mass was excised down to the perichondrium. Pathology from the 1/11/2016 specimen described a 2.3 x 0.6 x 0.1 cm in depth specimen. At the in the of the strip was a fungating pink polyp measuring 1.4 x 1.3 x 0.6 cm. Notation is made that \"the polyp appears to extend to the peripheral margin. ....... 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 FISH for myeloma panel was also submitted, and pending. Pathology was discussed with Dr. Jared Cowan on 1/19/2016. Dr Gregoria Huitron again reviewed the pathology and stated that the deep surgical margins were involved with the plasmacytoma. Serology on 1/19/2016 revealed a normal creatinine, calcium and protein level on the CMP. SPEP did reveal a positive M--spike of 0.4g/dL  Quantitative immunoglobulins revealed a mild elevation in the IgA at 425 () with a normal IgG and IgM.   Kappa and lambda light chains and ratios were all normal.   Beta-2 microglobulin was normal at 1.6  LDH was normal at 189    A bone survey on 1/20/2016 did not reveal any sclerotic or lytic bone lesions. A 24-hour urine for immunoelectrophoresis on 2/5/2016 was positive for Bence Mcdonough protein, kappa type. The 24-hour urine protein was 118 mg/24 hr but due to the small quantity of monoclonal proteins, the M-spike was unable to be quantitated. A bone marrow aspiration and biopsy on 2/8/2016 was normal cellular (25-30%) with trilineage hematopoiesis and no significant dyspoiesis and only 1% blasts. There was no diagnostic evidence of a clonal plasma cell proliferation nor significant increase in plasma cells (3-4%). There was adequate storage iron with out increased ring sideroblasts    Liz Bydr was seen by Dr. Bridget Givens on 2/29/2016 for evaluation of consolidation radiation therapy. XRT was initiated on 3/1/2016. Liz Byrd received 20 fractions of treatment for a total of 4000 cGy that was completed on 3/28/2016. Liz Byrd was seen in follow-up by Dr. Winnie Munoz on 7/8/2017. A repeat Bilateral Diagnostic Rigid Nasal Endoscopic was completed and documented no evidence of recurrent disease. 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 Liz Byrd was seen in follow-up by Dr. Winnie Munoz on 3/20/2018. A repeat Bilateral Diagnostic Rigid Nasal Endoscopic was completed and documented no evidence of recurrent disease. Myeloma studies were repeated on 7/10/2018 that remained stable with no significant change. IgG 950, IgA 279, IgM 24, M spike 0.4, creatinine 0.8 and a calcium of 9.4. A 24-hour urine immunoelectrophoresis was completed on 7/24/2018 that documented a total 24-hour urine protein of 119 and M spike was not observed.     Myeloma studies on 1/2/2019 remained stable with an M spike of 0.3, creatinine 0.78, Systems:  Constitutional: Negative for chills, fatigue, fever or significant weight loss. HENT: Negative for congestion, hearing loss, nosebleeds or sore throat. Eyes: Negative for photophobia, pain, discharge, redness and visual disturbance. Respiratory: Negative for cough, shortness of breath, or wheezing. Cardiovascular: Negative for chest pain, palpitations or leg swelling. Gastrointestinal: Negative for abdominal pain, blood in stool, constipation, diarrhea, nausea or vomiting. Genitourinary: Negative for dysuria, flank pain, frequency, hematuria or urgency. Musculoskeletal: Negative for back pain, joint swelling, myalgias or neck pain. Skin: Negative for rash or petechiae. Neurological: Negative for tremors, seizures, syncope, weakness or headaches. Hematological: No active bruising or bleeding. Psychiatric/Behavioral: Negative for hallucinations. Objective:  Vital Signs: Blood pressure 128/62, pulse 55, temperature 97.5 °F (36.4 °C), temperature source Temporal, height 5' 9\" (1.753 m), weight 190 lb 4.8 oz (86.3 kg), SpO2 98 %. Physical Exam   Constitutional: Oriented to person, place, and time. No acute distress. Head: Normocephalic and atraumatic. Nose: Nose normal.   Mouth/Throat: Oropharynx is clear and moist. No oropharyngeal exudate. Eyes: Pupils are equal and round. Conjunctivae and EOM are normal. No scleral icterus. Neck: Normal range of motion. Neck supple. No JVD. No appreciable thyromegaly. Cardiovascular: Normal rate, regular rhythm, normal heart sounds and intact distal pulses. Exam reveals no gallop, murmurs or friction rub. Pulmonary/Chest: Effort normal and breath sounds normal. No respiratory distress. No wheezes. Abdominal: Soft. Bowel sounds are normal. No organomegally or masses. No tenderness. There is no rebound and no guarding. Musculoskeletal: Normal range of motion. No edema or tenderness.    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 Re-resection by Dr. Cydney Whiting on 1/11/2016. Consolidation XRT to the left nares area at [facility] completed on 3/28/2016. Mitul Joshua Is monitored every 6 months, in January and July of each year at this office and  he is seen by Dr. Cydney Whiting every 6 months in April and October of every year for fiberoptic evaluation. Mitul Joshua presents for evaluation and monitoring of serology. Serology on 1/16/2020 revealed: IgG 1161  IgA 304  IgM 25  M-spike- 0.5  Kappa light chains- 25.9  Lambda light chains- 10.6  K/L ratio- 2.44    Serology on 7/16/2020 revealed:  CMP with CO2 30, glucose 132, Alk Phos 230, ALT 20   B2M- 1.4  Kappa light chains- 30.7  Lambda light chains- 12.0  K/L ratio- 2.56  IgG 1093, IgA 269, IgM 22    CBC 1/23/2020 reveals a WBC of 11.63. Hgb is 14.8 with MCV of 99.2 and platelet count of 453,592. CBC today (7/23/2020) reveals a WBC of 11.35. Hgb is 14.1 with an MCV of 100.4 and platelet count of 816,344. Physical examination does not reveal abnormalities in the nares area, pharyngeal and oral cavity exam is negative. No palpable lymphadenopathy in the head and neck area. Lungs are clear heart is regular abdomen is soft and benign. Facial asymmetry is noted secondary to the left hemifacial and eye spasm problem, a chronic issue. #2  GI cancer screening    Colonoscopy performed 4/24/2019 by Dr. Ronnie Torres was normal. Recall 3 years. #3  Prostate cancer screening    Mitul Joshua sees urology, Dr. Horace Maria in [facility] who also follows his PSA. PSA on 5/28/2020 was 2.51      Summary of PLAN:  1. Follow-up appointment given for 6 months   2. Quantitative immunoglobulins, SPEP, B2M, kappa lambda light chains and CMP prior to return visit.    3. Continued follow-up with Dr. Humberto Arias for complete ENT exam as recommended. #4  Elevated alkaline phosphatase of 230 on 7/16/2020  He is on vitamin D and a multivitamin with calcium  Bone density is requested    #5   He is to see his PCP JIA Yoo next week and needs labs drawn. Schedules have been disrupted during this COVID-19 coronavirus pandemic and we will be happy to assist in streamlining his pre-evaluation lab work. Other than the lab drawn last week above-noted, I will redraw labs today including A1c and lipid profile so that it will be available for his appointment with his PCP next week. I will see him in follow-up in 6 months      I, Resyeimi Conde am scribing for Barbara Monsivais MD. Electronically signed by Woodrow Falcon LPN on 0/80/4355 at 2:89 AM       Mykel Sharif was seen today for follow-up. Diagnoses and all orders for this visit:    Extramedullary plasmacytoma, unspecified whether remission achieved (HCC)  -     Beta 2 Microglobulin, Serum; Future  -     Immunoglobulins, Quantitative; Future  -     [place]/Lambda Free Lt Chains, Serum Quant; Future  -     Miscellaneous Sendout 1; Future  -     Comprehensive Metabolic Panel; Future  -     Lipid Panel; Future  -     Hemoglobin A1C; Future    IgA monoclonal gammopathy of uncertain significance  -     Beta 2 Microglobulin, Serum; Future  -     Immunoglobulins, Quantitative; Future  -     [place]/Lambda Free Lt Chains, Serum Quant; Future  -     Miscellaneous Sendout 1; Future  -     Comprehensive Metabolic Panel; Future  -     Lipid Panel; Future  -     Hemoglobin A1C; Future    Abnormal laboratory test  -     DEXA BONE DENSITY 2 SITES;  Future    Other specified diabetes mellitus with other specified complication, unspecified whether long term insulin use ([address]  -     Hemoglobin A1C; Future         Orders Placed This Encounter   Procedures    DEXA BONE DENSITY 2 SITES     Standing Status:   Future     Standing Expiration Date: 9/23/2020     Order Specific Question:   Reason for exam:     Answer:   SCREENING    Beta 2 Microglobulin, Serum     Standing Status:   Future     Standing Expiration Date:   7/23/2021    Immunoglobulins, Quantitative     Standing Status:   Future     Standing Expiration Date:   7/23/2021    [place]/Lambda Free Lt Chains, Serum Quant     Standing Status:   Future     Standing Expiration Date:   7/23/2021    Miscellaneous Sendout 1     Standing Status:   Future     Standing Expiration Date:   7/23/2021     Order Specific Question:   Specify Req. Test (1 Test/Order)     Answer:   Immunofixation electrophoresis, serum    Comprehensive Metabolic Panel     Standing Status:   Future     Standing Expiration Date:   7/23/2021    Lipid Panel     Standing Status:   Future     Standing Expiration Date:   7/23/2021     Order Specific Question:   Is Patient Fasting?/# of Hours     Answer:   YES    Hemoglobin A1C     Standing Status:   Future     Standing Expiration Date:   7/23/2021         Return in about 6 months (around 1/23/2021) for follow-up w/ Dr. Dalton Alejandra with labs prior. I, Dr. Jana Navarro, personally performed the services described in this documentation as scribed by [facility] LPN in my presence, and it's both accurate and complete.

## 2020-07-23 ENCOUNTER — HOSPITAL ENCOUNTER (OUTPATIENT)
Dept: INFUSION THERAPY | Age: 75
Discharge: HOME | End: 2020-07-23
Payer: MEDICARE

## 2020-07-23 ENCOUNTER — OFFICE VISIT (OUTPATIENT)
Dept: HEMATOLOGY | Age: 75
End: 2020-07-23
Payer: MEDICARE

## 2020-07-23 VITALS
OXYGEN SATURATION: 98 % | DIASTOLIC BLOOD PRESSURE: 62 MMHG | TEMPERATURE: 97.5 F | HEIGHT: 69 IN | SYSTOLIC BLOOD PRESSURE: 128 MMHG | BODY MASS INDEX: 28.19 KG/M2 | WEIGHT: 190.3 LBS | HEART RATE: 55 BPM

## 2020-07-23 DIAGNOSIS — D47.2 IGA MONOCLONAL GAMMOPATHY OF UNCERTAIN SIGNIFICANCE: ICD-10-CM

## 2020-07-23 LAB
BASOPHILS ABSOLUTE: 0.05 K/UL (ref 0.01–0.08)
BASOPHILS RELATIVE PERCENT: 0.4 % (ref 0.1–1.2)
EOSINOPHILS ABSOLUTE: 0.31 K/UL (ref 0.04–0.54)
EOSINOPHILS RELATIVE PERCENT: 2.7 % (ref 0.7–7)
HCT VFR BLD CALC: 45.1 % (ref 40.1–51)
HEMOGLOBIN: 14.1 G/DL (ref 13.7–17.5)
LYMPHOCYTES ABSOLUTE: 3.91 K/UL (ref 1.18–3.74)
LYMPHOCYTES RELATIVE PERCENT: 34.4 % (ref 19.3–53.1)
MCH RBC QN AUTO: 31.4 PG (ref 25.7–32.2)
MCHC RBC AUTO-ENTMCNC: 31.3 G/DL (ref 32.3–36.5)
MCV RBC AUTO: 100.4 FL (ref 79–92.2)
MONOCYTES ABSOLUTE: 0.92 K/UL (ref 0.24–0.82)
MONOCYTES RELATIVE PERCENT: 8.1 % (ref 4.7–12.5)
NEUTROPHILS ABSOLUTE: 6.16 K/UL (ref 1.56–6.13)
NEUTROPHILS RELATIVE PERCENT: 54.4 % (ref 34–71.1)
PDW BLD-RTO: 13.9 % (ref 11.6–14.4)
PLATELET # BLD: 152 K/UL (ref 163–337)
PMV BLD AUTO: 11.6 FL (ref 7.4–10.4)
RBC # BLD: 4.49 M/UL (ref 4.63–6.08)
WBC # BLD: 11.35 K/UL (ref 4.23–9.07)

## 2020-07-23 PROCEDURE — 1123F ACP DISCUSS/DSCN MKR DOCD: CPT | Performed by: INTERNAL MEDICINE

## 2020-07-23 PROCEDURE — 3046F HEMOGLOBIN A1C LEVEL >9.0%: CPT | Performed by: INTERNAL MEDICINE

## 2020-07-23 PROCEDURE — G8427 DOCREV CUR MEDS BY ELIG CLIN: HCPCS | Performed by: INTERNAL MEDICINE

## 2020-07-23 PROCEDURE — 2022F DILAT RTA XM EVC RTNOPTHY: CPT | Performed by: INTERNAL MEDICINE

## 2020-07-23 PROCEDURE — 85025 COMPLETE CBC W/AUTO DIFF WBC: CPT

## 2020-07-23 PROCEDURE — 4004F PT TOBACCO SCREEN RCVD TLK: CPT | Performed by: INTERNAL MEDICINE

## 2020-07-23 PROCEDURE — 99215 OFFICE O/P EST HI 40 MIN: CPT | Performed by: INTERNAL MEDICINE

## 2020-07-23 PROCEDURE — G8417 CALC BMI ABV UP PARAM F/U: HCPCS | Performed by: INTERNAL MEDICINE

## 2020-07-23 PROCEDURE — 4040F PNEUMOC VAC/ADMIN/RCVD: CPT | Performed by: INTERNAL MEDICINE

## 2020-07-23 PROCEDURE — 3017F COLORECTAL CA SCREEN DOC REV: CPT | Performed by: INTERNAL MEDICINE

## 2020-07-23 PROCEDURE — 99212 OFFICE O/P EST SF 10 MIN: CPT

## 2020-07-23 RX ORDER — LISINOPRIL 5 MG/1
TABLET ORAL
COMMUNITY
Start: 2020-06-23

## 2020-08-07 ENCOUNTER — HOSPITAL ENCOUNTER (OUTPATIENT)
Dept: WOMENS IMAGING | Age: 75
Discharge: HOME | End: 2020-08-07
Payer: MEDICARE

## 2020-08-07 PROCEDURE — 77080 DXA BONE DENSITY AXIAL: CPT

## 2020-08-13 ENCOUNTER — CLINICAL DOCUMENTATION (OUTPATIENT)
Dept: INFUSION THERAPY | Age: 75
End: 2020-08-13

## 2020-08-25 ENCOUNTER — TRANSCRIBE ORDERS (OUTPATIENT)
Dept: ADMINISTRATIVE | Facility: HOSPITAL | Age: 75
End: 2020-08-25

## 2020-08-25 DIAGNOSIS — Z01.818 PRE-OP TESTING: Primary | ICD-10-CM

## 2020-08-28 ENCOUNTER — LAB (OUTPATIENT)
Dept: LAB | Facility: HOSPITAL | Age: 75
End: 2020-08-28

## 2020-08-28 PROCEDURE — C9803 HOPD COVID-19 SPEC COLLECT: HCPCS | Performed by: OTOLARYNGOLOGY

## 2020-08-28 PROCEDURE — U0003 INFECTIOUS AGENT DETECTION BY NUCLEIC ACID (DNA OR RNA); SEVERE ACUTE RESPIRATORY SYNDROME CORONAVIRUS 2 (SARS-COV-2) (CORONAVIRUS DISEASE [COVID-19]), AMPLIFIED PROBE TECHNIQUE, MAKING USE OF HIGH THROUGHPUT TECHNOLOGIES AS DESCRIBED BY CMS-2020-01-R: HCPCS | Performed by: OTOLARYNGOLOGY

## 2020-08-29 LAB
COVID LABCORP PRIORITY: NORMAL
SARS-COV-2 RNA RESP QL NAA+PROBE: NOT DETECTED

## 2020-09-01 ENCOUNTER — OFFICE VISIT (OUTPATIENT)
Dept: OTOLARYNGOLOGY | Facility: CLINIC | Age: 75
End: 2020-09-01

## 2020-09-01 VITALS
WEIGHT: 198 LBS | RESPIRATION RATE: 20 BRPM | DIASTOLIC BLOOD PRESSURE: 79 MMHG | TEMPERATURE: 98 F | BODY MASS INDEX: 29.33 KG/M2 | SYSTOLIC BLOOD PRESSURE: 154 MMHG | HEIGHT: 69 IN | HEART RATE: 64 BPM

## 2020-09-01 DIAGNOSIS — C90.21 EXTRAMEDULLARY PLASMACYTOMA IN REMISSION (HCC): Primary | ICD-10-CM

## 2020-09-01 DIAGNOSIS — R04.0 EPISTAXIS: ICD-10-CM

## 2020-09-01 PROCEDURE — 99214 OFFICE O/P EST MOD 30 MIN: CPT | Performed by: PHYSICIAN ASSISTANT

## 2020-09-01 NOTE — PATIENT INSTRUCTIONS
Continue bactroban as directed, follow nosebleed instructions if bleeding starts. Recheck in 6 months.    Nosebleed Fact Sheet    Nosebleeds are a common problem that we see in our clinic and can occur in any age group.  They can range from spotty bleeding to episodes of uncontrolled profuse bleeding.  Multiple medical conditions can contribute to nosebleeds and fortunately most of these can be controlled to limit and/or eliminate these bleeding episodes.    Most commonly bleeding occurs in the anterior or front part of the nose on the septum, or center wall of the nose.  This is because this area has several blood vessels vulnerable to both the drying effect of breathing, and to finger trauma of nose picking.  When this area become dry, the lining of the nose in this area can crack and cause the blood vessels underneath to bleed.    The following condition can contribute to and cause nosebleeds:  1. High Blood Pressure - When the blood pressure is high, the increased pressure can cause blood to be more easily pushed through a damaged blood vessel.  If your blood pressure is uncontrolled over 140 systolic, you may need to consult your primary-care physician to help limit your nosebleeds.  2. Low Platelets and Blood Clotting Factors - Certain medical conditions such as cancer and bleeding disorders can interfere with the body’s ability to form blood clots.  This interferes with the body’s own ability to stop nosebleeds.  3. Medications - Aspirin and aspirin type products such as nonsteroidal anti-inflammatory medications can interfere with body’s ability to form blood clots.  Nonsteroidal anti-inflammatory medications include medicines like ibuprofen (Motrin), naprosyn (Aleve), and Goody’s and BC powder.  Several cold and flu remedies also include aspirin.  If there’s a question, please ask you doctor or pharmacist.  Recently, it has been shown that some herbal medications, such as high doses of Vitamin E, can also  interfere with the body’s ability to form clots.  Often times, these types of medications need to be discontinued to help with nosebleed prevention.  4. Dryness - The nose needs to stay nice and moist to try to prevent any kind of cracking or injury to the nasal lining and underlying blood vessels.  The worst time of year for nosebleeds is in the wintertime when the humidity is low.  Occasionally, a nasal deviation can cause abnormal airflow that dries out an area on the septum and cause a nosebleed.  5. Trauma -One of the most common reasons for nosebleeds is trauma caused by nose picking or external injury.  If an area in your nose is irritated, scratching or picking it can cause it to easily bleed.  Recommendations for Preventing Nosebleeds:  1. Keep well hydrated by drinking at least six to eight glasses of water a day.  2. Increase the moisture of the nose by placing Vaseline in the front of the nose twice a day and irrigating the nose with nasal saline spray often (every 1-2 hrs).  3. Hold on taking aspirin or aspirin type products.  4. If you have high blood pressure, make sure this is well controlled.  5. Avoid nose picking and other forms of nasal trauma.  What to Do if Your Nose Bleeds:  1. Spray Afrin or a similar 12 hr nasal decongestant into the side that is bleeding.  2. With your thumb and forefinger, grasp the fleshy part of the nose and hold firm pressure.  If the bleeding is on the front part of the nose, it should make it stop.  Hold this pressure for 5 minutes on the clock then release.  If the nose is still bleeding, repeat.  If the nose is still bleeding after trying this 2-3 times, you may need to go to the emergency room for evaluation.  3. Call your doctor or go to the emergency room if you cannot get the bleeding to stop or if you feel dizzy or lightheaded after a large bleed.

## 2021-01-14 ENCOUNTER — HOSPITAL ENCOUNTER (OUTPATIENT)
Dept: INFUSION THERAPY | Age: 76
Discharge: HOME | End: 2021-01-14
Payer: MEDICARE

## 2021-01-21 ENCOUNTER — APPOINTMENT (OUTPATIENT)
Dept: INFUSION THERAPY | Age: 76
End: 2021-01-21
Payer: MEDICARE

## 2021-02-03 NOTE — PROGRESS NOTES
histopathologic features supporting a diagnosis of a plasmacytoma. Mr. Joel Torres was relatively asymptomatic prior to this presentation. He did state that occasionally over the previous couple of months he had blown a small amount of blood from his nasal cavity. He did not feel that it was significant given the fact that he has had episodes of occasional nosebleeds since he was a child. He was referred to Dr. Matilde Donohue for management. On 1/11/2016 he was taken to the OR for a bilateral rigid nasal endoscopy with excisional but biopsy of the left intranasal mass. Findings on the left revealed an excoriated polypoid mass noted in the vault in the posterior aspect of the nasal vestibule and to the nasal cavity proper. No other abnormalities were noted. The mass was excised down to the perichondrium. Pathology from the 1/11/2016 specimen described a 2.3 x 0.6 x 0.1 cm in depth specimen. At the in the of the strip was a fungating pink polyp measuring 1.4 x 1.3 x 0.6 cm. Notation is made that \"the polyp appears to extend to the peripheral margin. ....... 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 FISH for myeloma panel was also submitted, and pending. Pathology was discussed with Dr. Jefe Foley on 1/19/2016. Dr Steven Burch again reviewed the pathology and stated that the deep surgical margins were involved with the plasmacytoma. Serology on 1/19/2016 revealed a normal creatinine, calcium and protein level on the CMP. SPEP did reveal a positive Mspike of 0.4g/dL  Quantitative immunoglobulins revealed a mild elevation in the IgA at 425 () with a normal IgG and IgM.   Kappa and lambda light chains and ratios were all normal. Beta-2 microglobulin was normal at 1.6  LDH was normal at 189    A bone survey on 1/20/2016 did not reveal any sclerotic or lytic bone lesions. A 24-hour urine for immunoelectrophoresis on 2/5/2016 was positive for Bence Mcdonough protein, kappa type. The 24-hour urine protein was 118 mg/24 hr but due to the small quantity of monoclonal proteins, the Mspike was unable to be quantitated. A bone marrow aspiration and biopsy on 2/8/2016 was normal cellular (2530%) with trilineage hematopoiesis and no significant dyspoiesis and only 1% blasts. There was no diagnostic evidence of a clonal plasma cell proliferation nor significant increase in plasma cells (34%). There was adequate storage iron with out increased ring sideroblasts    Christopher Gary was seen by Dr. Aracely Regan on 2/29/2016 for evaluation of consolidation radiation therapy. XRT was initiated on 3/1/2016. Christopher Gary received 20 fractions of treatment for a total of 4000 cGy that was completed on 3/28/2016. Christopher Gary was seen in follow-up by Dr. Sera King on 7/8/2017. A repeat Bilateral Diagnostic Rigid Nasal Endoscopic was completed and documented no evidence of recurrent disease. Given the negative hematological workup with a minimal serological IgA and only trace Kappa light chain Bence Mcdonough proteins in the urine, at most, systemically, Mr. Preeti Guadarrama may have an IgA kappa MGUS associated with his isolated resected IgG kappa left nasal plasmacytoma. Christopher Gary was seen in follow-up by Dr. Sera King on 3/20/2018. A repeat Bilateral Diagnostic Rigid Nasal Endoscopic was completed and documented no evidence of recurrent disease. Myeloma studies were repeated on 7/10/2018 that remained stable with no significant change. IgG 950, IgA 279, IgM 24, M spike 0.4, creatinine 0.8 and a calcium of 9.4.     A 24-hour urine immunoelectrophoresis was completed on 7/24/2018 that documented a total 24-hour urine protein of 119 and M spike was not observed. Myeloma studies on 1/2/2019 remained stable with an M spike of 0.3, creatinine 0.78, calcium 9.4, IgG 969, IgA 299 and IgM of 25. TREATMENT SUMMARY:  1. Auto extraction \"BLOWOUT\" of solitary plasmacytoma from left nares 1/11/2016   2. Excisional but biopsy of the left intranasal mass by Dr. Merino Poster 1/11/2016   3. Consolidation XRT was initiated on 3/1/2016. Rosibel Chen received 20 fractions of treatment for a total of 4000 cGy that was completed on 3/28/2016      Allergies:  No known allergies    Medicines:  Current Outpatient Medications   Medication Sig Dispense Refill    lisinopril (PRINIVIL;ZESTRIL) 5 MG tablet       mupirocin (BACTROBAN) 2 % ointment       magnesium (MAGNESIUM-OXIDE) 250 MG TABS tablet Take 250 mg by mouth daily      diphenhydrAMINE (BENADRYL ALLERGY) 25 MG capsule Take 25 mg by mouth every 6 hours as needed for Itching      ASPIRIN 81 PO Aspir-81   daily      Cranberry 1000 MG CAPS cranberry   daily      Multiple Vitamin (MULTI-VITAMINS PO) Take by mouth      atenolol (TENORMIN) 50 MG tablet atenolol 50 mg tablet      Ergocalciferol (VITAMIN D2) 400 units TABS Take by mouth      finasteride (PROSCAR) 5 MG tablet       Omega-3 Fatty Acids (FISH OIL) 1000 MG CAPS Take by mouth      simvastatin (ZOCOR) 40 MG tablet       triamterene-hydrochlorothiazide (DYAZIDE) 37.5-25 MG per capsule        No current facility-administered medications for this visit. Past Medical History:      Diagnosis Date    Epistaxis 1/23/2020    Extramedullary plasmacytoma ([address] 1/23/2020    Head and neck cancer ([address]     Hypertension     Plasmacytoma, extramedullary ([address]     Type 2 diabetes mellitus without complication ([address]         Past Surgical History:      Procedure Laterality Date    NOSE SURGERY Left         Family History:  No family history on file.      Social History  Social History     Tobacco Use    Smoking status: Former Smoker    Smokeless tobacco: Current User Substance Use Topics    Alcohol use: Not on file    Drug use: Not on file          Review of Systems:  Constitutional: Negative for chills, fatigue, fever or significant weight loss. HENT: Negative for congestion, hearing loss, nosebleeds or sore throat. Eyes: Negative for photophobia, pain, discharge, redness and visual disturbance. Respiratory: Negative for cough, shortness of breath, or wheezing. Cardiovascular: Negative for chest pain, palpitations or leg swelling. Gastrointestinal: Negative for abdominal pain, blood in stool, constipation, diarrhea, nausea or vomiting. Genitourinary: Negative for dysuria, flank pain, frequency, hematuria or urgency. Musculoskeletal: Negative for back pain, joint swelling, myalgias or neck pain. Skin: Negative for rash or petechiae. Neurological: Negative for tremors, seizures, syncope, weakness or headaches. Hematological: No active bruising or bleeding. Psychiatric/Behavioral: Negative for hallucinations. Objective:  Vital Signs: Blood pressure 134/60, pulse 86, temperature 97.5 °F (36.4 °C), height 5' 9\" (1.753 m), weight 202 lb 12.8 oz (92 kg), SpO2 96 %. Physical Exam   Constitutional: Oriented to person, place, and time. No acute distress. Head: Normocephalic and atraumatic. Nose: Nose normal.   Mouth/Throat: Oropharynx is clear and moist. No oropharyngeal exudate. Eyes: Pupils are equal and round. Conjunctivae and EOM are normal. No scleral icterus. Neck: Normal range of motion. Neck supple. No JVD. No appreciable thyromegaly. Cardiovascular: Normal rate, regular rhythm, normal heart sounds and intact distal pulses. Exam reveals no gallop, murmurs or friction rub. Pulmonary/Chest: Effort normal and breath sounds normal. No respiratory distress. No wheezes. Abdominal: Soft. Bowel sounds are normal. No organomegally or masses. No tenderness. There is no rebound and no guarding.    Musculoskeletal: Normal range of motion. No edema or tenderness. Lymphadenopathy: No cervical, axillary or inguinal lymphadenopathy. Neurological: Alert and oriented to person, place, and time. Cranial nerves are intact. Neurological exam is nonfocal  Skin: Skin is warm and dry. No rash noted. No erythema. No pallor. Psychiatric: Judgment normal.     Labs:  BMP: No results for input(s): NA, K, CL, CO2, PHOS, BUN, CREATININE, CALCIUM in the last 72 hours. CBC:   Recent Labs     02/04/21  0928   WBC 8.83   HGB 14.1   HCT 43.7   MCV 99.5*   *     LIVER PROFILE: No results for input(s): AST, ALT, LIPASE, BILIDIR, BILITOT, ALKPHOS in the last 72 hours. Invalid input(s): AMYLASE,  ALB  PT/INR: No results for input(s): PROTIME, INR in the last 72 hours. APTT: No results for input(s): APTT in the last 72 hours. BNP:  No results for input(s): BNP in the last 72 hours. Ionized Calcium:No results for input(s): IONCA in the last 72 hours. Magnesium:No results for input(s): MG in the last 72 hours. Phosphorus:No results for input(s): PHOS in the last 72 hours. HgbA1C: No results for input(s): LABA1C in the last 72 hours. Hepatic: No results for input(s): ALKPHOS, ALT, AST, PROT, BILITOT, BILIDIR, LABALBU in the last 72 hours. Lactic Acid: No results for input(s): LACTA in the last 72 hours. Troponin: No results for input(s): CKTOTAL, CKMB, TROPONINT in the last 72 hours. ABGs: No results for input(s): PH, PCO2, PO2, HCO3, O2SAT in the last 72 hours. CRP:  No results for input(s): CRP in the last 72 hours. Sed Rate:  No results for input(s): SEDRATE in the last 72 hours. Cultures:   No results for input(s): CULTURE in the last 72 hours. Radiology reports as per the Radiologist  Radiology: No results found.      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 Reresection by Dr. Uzma Pena on 1/11/2016. Consolidation XRT to the left nares area at [facility] completed on 3/28/2016. Teresita Ann Is monitored every 6 months, in January and July of each year at this office and  he is seen by Dr. Uzma Pena every 6 months in April and October of every year for fiberoptic evaluation. Teresita Ann presents for evaluation and monitoring of serology without new developments or complaints on review of systems. Physical examination does not reveal abnormalities in the nares area, pharyngeal and oral cavity exam is negative. No palpable lymphadenopathy in the head and neck area. Lungs are clear heart is regular abdomen is soft and benign. Facial asymmetry is noted secondary to the left hemifacial and eye spasm problem, a chronic issue. CBC 1/23/2020 reveals a WBC of 11.63. Hgb is 14.8 with MCV of 99.2 and platelet count of 188,684. CBC 7/23/2020 revealed a WBC of 11.35. Hgb is 14.1 with an MCV of 100.4 and platelet count of 968,418. CBC today (2/4/2021) reveals a WBC of 8.83. Hgb is 14.1 with an MCV of 99.5 and platelet count of 568,004. Serology on 1/16/2020 revealed:   IgG 1161  IgA 304  IgM 25  M-spike- 0.5  Kappa light chains- 25.9  Lambda light chains- 10.6  K/L ratio- 2.44    Serology on 7/16/2020 revealed:  CMP with CO2 30, glucose 132, Alk Phos 230, ALT 20   B2M- 1.4  Kappa light chains- 30.7  Lambda light chains- 12.0  K/L ratio- 2.56  IgG 1093,   IgA 269,   IgM 22    Serology on 1/14/2021 revealed:  CMP with glucose 108  B2M- 5.4  Kappa light chains- 34.1  Lambda light chains- 9.7  K/L ratio- 3.52  IgG 1141,   IgA 258,   IgM 22  M-spike- 0.5  Hgb A1C- 6.1  TSH- 3.060    Given the gentle incremental trend albeit low levels of kappa light chains and ratio, a 24-hour urine for immunoelectrophoresis will be done prior to his return in 6 months. Otherwise continue conservative monitoring only indicated. An extended discussion was undertaken regarding these numbers. I will see him in follow-up in 6 months. #2  Elevated alkaline phosphatase of 230 on 7/16/2020    DEXA scan on 8/7/2020 showed osteopenia. He is on vitamin D and a multivitamin with calcium      #3   Tumor screening and health maintenance    GI cancer screening  Colonoscopy performed 4/24/2019 by Dr. Michaud was normal. Recall 3 years. Prostate cancer screening  [facility], Dr. Juanita Mccollum in [facility] who also follows his PSA. PSA on 5/28/2020 was 2.51      Summary of PLAN:  1. Follow-up appointment given for 6 months   2. Quantitative immunoglobulins, SPEP, B2M, kappa lambda light chains and CMP prior to return visit. 3. Continued follow-up with Dr. Betsy Lopez for complete ENT exam as recommended. Ubaldo Araujo am scribing for Claudia Colon MD. Electronically signed by Madi Ch LPN on 0/2/4336 at 38:33 Jonny Miller was seen today for follow-up. Diagnoses and all orders for this visit:    Extramedullary plasmacytoma, unspecified whether remission achieved [facility])  -     Comprehensive Metabolic Panel; Future  -     Beta 2 Microglobulin, Serum; Future  -     Miscellaneous Sendout 1; Future  -     Immunoglobulins, Quantitative; Future  -     [place]/Lambda Free Lt Chains, Serum Quant; Future  -     Miscellaneous Sendout 1; Future    IgA monoclonal gammopathy of uncertain significance  -     Comprehensive Metabolic Panel; Future  -     Beta 2 Microglobulin, Serum; Future  -     Miscellaneous Sendout 1; Future  -     Immunoglobulins, Quantitative; Future  -     [place]/Lambda Free Lt Chains, Serum Quant;  Future  -     Miscellaneous Sendout 1; Future    Health care maintenance    Colon cancer screening    Prostate cancer screening    Osteopenia, unspecified location         Orders Placed This Encounter   Procedures    Comprehensive Metabolic Panel     Standing Status:   Future     Standing Expiration Date:   2/4/2022    Beta 2 Microglobulin, Serum     Standing Status:   Future     Standing Expiration Date:   2/4/2022    Miscellaneous Sendout 1     Standing Status:   Future     Standing Expiration Date:   2/4/2022     Order Specific Question:   Specify Req. Test (1 Test/Order)     Answer:   Immunofixation electrophoresis, serum    Immunoglobulins, Quantitative     Standing Status:   Future     Standing Expiration Date:   2/4/2022    [place]/Lambda Free Lt Chains, Serum Quant     Standing Status:   Future     Standing Expiration Date:   2/4/2022    Miscellaneous Sendout 1     Standing Status:   Future     Standing Expiration Date:   2/4/2022     Order Specific Question:   Specify Req. Test (1 Test/Order)     Answer:   Urine protein electrophoresis, 24 hr urine         Return in about 6 months (around 8/4/2021) for follow-up w/ Dr. Huber Viramontes with labs prior to. I, Dr. Damaris Rouse, personally performed the services described in this documentation as scribed by [facility] LPN in my presence, and it's both accurate and complete.

## 2021-02-04 ENCOUNTER — HOSPITAL ENCOUNTER (OUTPATIENT)
Dept: INFUSION THERAPY | Age: 76
Discharge: HOME | End: 2021-02-04
Payer: MEDICARE

## 2021-02-04 ENCOUNTER — OFFICE VISIT (OUTPATIENT)
Dept: HEMATOLOGY | Age: 76
End: 2021-02-04
Payer: MEDICARE

## 2021-02-04 VITALS
DIASTOLIC BLOOD PRESSURE: 60 MMHG | BODY MASS INDEX: 30.04 KG/M2 | TEMPERATURE: 97.5 F | HEIGHT: 69 IN | SYSTOLIC BLOOD PRESSURE: 134 MMHG | HEART RATE: 86 BPM | WEIGHT: 202.8 LBS | OXYGEN SATURATION: 96 %

## 2021-02-04 DIAGNOSIS — Z00.00 HEALTH CARE MAINTENANCE: ICD-10-CM

## 2021-02-04 DIAGNOSIS — Z12.5 PROSTATE CANCER SCREENING: ICD-10-CM

## 2021-02-04 DIAGNOSIS — M85.80 OSTEOPENIA, UNSPECIFIED LOCATION: ICD-10-CM

## 2021-02-04 DIAGNOSIS — D47.2 IGA MONOCLONAL GAMMOPATHY OF UNCERTAIN SIGNIFICANCE: ICD-10-CM

## 2021-02-04 DIAGNOSIS — Z12.11 COLON CANCER SCREENING: ICD-10-CM

## 2021-02-04 DIAGNOSIS — C90.20 EXTRAMEDULLARY PLASMACYTOMA, UNSPECIFIED WHETHER REMISSION ACHIEVED (HCC): Primary | ICD-10-CM

## 2021-02-04 LAB
BASOPHILS ABSOLUTE: 0.05 K/UL (ref 0.01–0.08)
BASOPHILS RELATIVE PERCENT: 0.6 % (ref 0.1–1.2)
EOSINOPHILS ABSOLUTE: 0.3 K/UL (ref 0.04–0.54)
EOSINOPHILS RELATIVE PERCENT: 3.4 % (ref 0.7–7)
HCT VFR BLD CALC: 43.7 % (ref 40.1–51)
HEMOGLOBIN: 14.1 G/DL (ref 13.7–17.5)
LYMPHOCYTES ABSOLUTE: 2.3 K/UL (ref 1.18–3.74)
LYMPHOCYTES RELATIVE PERCENT: 26 % (ref 19.3–53.1)
MCH RBC QN AUTO: 32.1 PG (ref 25.7–32.2)
MCHC RBC AUTO-ENTMCNC: 32.3 G/DL (ref 32.3–36.5)
MCV RBC AUTO: 99.5 FL (ref 79–92.2)
MONOCYTES ABSOLUTE: 0.64 K/UL (ref 0.24–0.82)
MONOCYTES RELATIVE PERCENT: 7.2 % (ref 4.7–12.5)
NEUTROPHILS ABSOLUTE: 5.54 K/UL (ref 1.56–6.13)
NEUTROPHILS RELATIVE PERCENT: 62.8 % (ref 34–71.1)
PDW BLD-RTO: 13.4 % (ref 11.6–14.4)
PLATELET # BLD: 140 K/UL (ref 163–337)
PMV BLD AUTO: 11.7 FL (ref 7.4–10.4)
RBC # BLD: 4.39 M/UL (ref 4.63–6.08)
WBC # BLD: 8.83 K/UL (ref 4.23–9.07)

## 2021-02-04 PROCEDURE — G8417 CALC BMI ABV UP PARAM F/U: HCPCS | Performed by: INTERNAL MEDICINE

## 2021-02-04 PROCEDURE — 1123F ACP DISCUSS/DSCN MKR DOCD: CPT | Performed by: INTERNAL MEDICINE

## 2021-02-04 PROCEDURE — G8427 DOCREV CUR MEDS BY ELIG CLIN: HCPCS | Performed by: INTERNAL MEDICINE

## 2021-02-04 PROCEDURE — 99214 OFFICE O/P EST MOD 30 MIN: CPT | Performed by: INTERNAL MEDICINE

## 2021-02-04 PROCEDURE — 4040F PNEUMOC VAC/ADMIN/RCVD: CPT | Performed by: INTERNAL MEDICINE

## 2021-02-04 PROCEDURE — 99211 OFF/OP EST MAY X REQ PHY/QHP: CPT

## 2021-02-04 PROCEDURE — G8484 FLU IMMUNIZE NO ADMIN: HCPCS | Performed by: INTERNAL MEDICINE

## 2021-02-04 PROCEDURE — 4004F PT TOBACCO SCREEN RCVD TLK: CPT | Performed by: INTERNAL MEDICINE

## 2021-02-04 PROCEDURE — 85025 COMPLETE CBC W/AUTO DIFF WBC: CPT

## 2021-02-23 ENCOUNTER — TRANSCRIBE ORDERS (OUTPATIENT)
Dept: LAB | Facility: HOSPITAL | Age: 76
End: 2021-02-23

## 2021-02-23 DIAGNOSIS — Z01.818 PREOP TESTING: Primary | ICD-10-CM

## 2021-02-26 ENCOUNTER — LAB (OUTPATIENT)
Dept: LAB | Facility: HOSPITAL | Age: 76
End: 2021-02-26

## 2021-02-26 PROCEDURE — C9803 HOPD COVID-19 SPEC COLLECT: HCPCS | Performed by: OTOLARYNGOLOGY

## 2021-02-26 PROCEDURE — U0005 INFEC AGEN DETEC AMPLI PROBE: HCPCS | Performed by: OTOLARYNGOLOGY

## 2021-02-26 PROCEDURE — U0004 COV-19 TEST NON-CDC HGH THRU: HCPCS | Performed by: OTOLARYNGOLOGY

## 2021-02-27 LAB — SARS-COV-2 ORF1AB RESP QL NAA+PROBE: NOT DETECTED

## 2021-03-01 NOTE — PROGRESS NOTES
YOB: 1945  Location: [facility]  Location Address: [address] [place] [address], [facility], [address] [place] [address]  Location Phone: [phone]    Chief Complaint   Patient presents with   • Follow-up     pt needs refill on Bactroban        History of Present Illness  Manjit Seth is a 73 y.o. male.  Manjit Seth is status post excision of extramedullary plasmacytoma on 16. The patient reports that he has only had a couple of minor nosebleeds over the last six months. He denies nasal congestion, nasal drainage, headaches and allergies. Patient is continuing to use bactroban.         Final Diagnosis                          Amended Report                                                       Excision, left nasal mass:                                 A.     Extramedullary plasmacytoma.                                 B.     The plasmacytic infiltrate can be seen extending to the inked deep                           margin of excision.                                                       Comment:  Flow cytometric analysis performed by [facility] reveals a                           monoclonal IgG kappa plasma cell population consistent with plasma cell neoplasm                           accounting for 5% of total cells.  Please refer to the complete flow cytometry                           report from [facility] which is appended.  Morphologically, the plasma                           cell population is greater with the plasmacytoma packed with atypical plasma                           cells.  FISH myeloma panel studies are pending at this time and will be                           forwarded upon receipt from the reference laboratory.                                                                       1                                                                                 Electronically Signed Out By                           Laxmi Mo MD                                                       Specimen(s) Received:                           Left nasal mass                    Past Medical History:   Diagnosis Date   • Bell's palsy     with facial tremor   • Diabetes mellitus (CMS/HCC)    • Extramedullary plasmacytoma (CMS/HCC)     left nose   • History of radiation therapy 03/28/2016    4000 cGy to nose completed on 3/28/16   • History of tobacco abuse    • Hyperlipidemia    • Hypertension        Past Surgical History:   Procedure Laterality Date   • ANTERIOR CERVICAL DISCECTOMY W/ FUSION N/A 1/16/2019    Procedure: CERVICAL DISCECTOMY ANTERIOR WITH FUSION C3-4,4-5, 5-6 ACDF with neuromonitoring and 1 C-arm;  Surgeon: Pablito Islas MD;  Location: [facility];  Service: Neurosurgery   • COLONOSCOPY     • NASAL ENDOSCOPY      bilateral with excisional biopsy of left intranasal mass 1/11/16       Outpatient Medications Marked as Taking for the 3/2/21 encounter (Office Visit) with Jose Krueger MD   Medication Sig Dispense Refill   • ASPIRIN 81 PO Aspir-81   daily     • atenolol (TENORMIN) 50 MG tablet Take 50 mg by mouth Daily.     • Cranberry 400 MG capsule Take 1 capsule by mouth Daily.     • Ergocalciferol (VITAMIN D2) 400 UNITS tablet Take  by mouth.     • finasteride (PROSCAR) 5 MG tablet Take 5 mg by mouth Daily.     • FLUAD 0.5 ML suspension prefilled syringe ADM 0.5ML IM UTD  0   • Garlic 10 MG capsule Take  by mouth.     • lisinopril (PRINIVIL,ZESTRIL) 5 MG tablet Take 5 mg by mouth Daily.     • Multiple Vitamins-Minerals (MULTIVITAMIN ADULT PO) Take  by mouth.     • mupirocin (BACTROBAN) 2 % ointment Apply  topically to the appropriate area as directed 3 (Three) Times a Day. 22 g 5   • Omega-3 Fatty Acids (FISH OIL) 1000 MG capsule capsule Take  by mouth Daily With Breakfast.     • simvastatin (ZOCOR) 40 MG tablet      • triamterene-hydrochlorothiazide (MAXZIDE-25) 37.5-25 MG per tablet Take 1 tablet by mouth Daily.     • [DISCONTINUED] mupirocin (BACTROBAN)  2 % ointment Apply topically to the appropriate area as directed 3 (Three) Times aDay. 22 g 0       Patient has no known allergies.    Family History   Problem Relation Age of Onset   • Breast cancer Mother    • Heart failure Father    • Cancer Maternal Uncle        Social History     Socioeconomic History   • Marital status:      Spouse name: Not on file   • Number of children: Not on file   • Years of education: Not on file   • Highest education level: Not on file   Occupational History   • Occupation: CRNA   Tobacco Use   • Smoking status: Former Smoker     Types: Cigarettes     Quit date:      Years since quittin.1   • Smokeless tobacco: Current User     Types: Chew   Substance and Sexual Activity   • Alcohol use: No   • Drug use: No   • Sexual activity: Defer       Review of Systems   Constitutional: Negative for activity change, appetite change, chills, diaphoresis, fatigue, fever and unexpected weight change.   HENT: Positive for nosebleeds. Negative for congestion, ear discharge, ear pain, facial swelling, hearing loss, mouth sores, postnasal drip, rhinorrhea, sinus pressure, sneezing, sore throat, tinnitus, trouble swallowing and voice change.         H/O left nasal carcinoma   Eyes: Negative for pain, discharge, redness, itching and visual disturbance.   Respiratory: Negative for apnea, cough, choking, chest tightness, shortness of breath, wheezing and stridor.    Gastrointestinal: Negative for nausea and vomiting.   Endocrine: Negative for cold intolerance and heat intolerance.   Musculoskeletal: Negative for arthralgias, back pain, gait problem, neck pain and neck stiffness.   Skin: Negative for rash.   Allergic/Immunologic: Negative for environmental allergies and food allergies.   Neurological: Negative for dizziness, tremors, seizures, syncope, facial asymmetry, speech difficulty, weakness, light-headedness, numbness and headaches.   Hematological: Negative for adenopathy. Does not  bruise/bleed easily.   Psychiatric/Behavioral: Negative for behavioral problems, sleep disturbance and suicidal ideas. The patient is not nervous/anxious and is not hyperactive.        Vitals:    03/02/21 0922   BP: 158/76   Pulse: 69   Temp: 96.9 °F (36.1 °C)       Body mass index is 29.89 kg/m².    Objective     Physical Exam  Physical Exam  CONSTITUTIONAL: well nourished, alert, oriented, in no acute distress      COMMUNICATION AND VOICE: able to communicate normally, normal voice quality     HEAD: normocephalic, no lesions, atraumatic, no tenderness, no masses      FACE: appearance normal, no lesions, no tenderness, no deformities, facial motion symmetric     SALIVARY GLANDS: parotid glands with no tenderness, no swelling, no masses, submandibular glands with normal size, nontender     EYES: ocular motility normal, eyelids normal, orbits normal, no proptosis, conjunctiva normal , pupils equal, round      EARS:  Hearing: response to conversational voice normal bilaterally   External Ears: auricles without lesions  Otoscopic: tympanic membrane appearance normal, no lesions, no perforation, normal mobility, no fluid     NOSE:  External Nose: structure normal, no tenderness on palpation, no nasal discharge, no lesions, no evidence of trauma, nostrils patent   Intranasal Exam: see nasal endoscope     ORAL:  Lips: upper and lower lips without lesion   Teeth: dentition within normal limits for age   Gums: gingivae healthy   Oral Mucosa: oral mucosa normal, no mucosal lesions   Floor of Mouth: Warthin’s duct patent, mucosa normal  Tongue: lingual mucosa normal without lesions, normal tongue mobility   Palate: soft and hard palates with normal mucosa and structure  Oropharynx: oropharyngeal mucosa normal     NECK: neck appearance normal, no mass,  noted without erythema or tenderness     THYROID: no overt thyromegaly, no tenderness, nodules or mass present on palpation, position midline      LYMPH NODES: no  lymphadenopathy     CHEST/RESPIRATORY: respiratory effort normal     CARDIOVASCULAR: extremities without cyanosis or edema       NEUROLOGIC/PSYCHIATRIC: oriented to time, place and person, mood normal, affect appropriate, CN II-XII intact grossly    Assessment/Plan   Diagnoses and all orders for this visit:    1. Epistaxis (Primary)  -     mupirocin (BACTROBAN) 2 % ointment; Apply  topically to the appropriate area as directed 3 (Three) Times a Day.  Dispense: 22 g; Refill: 5    2. History of radiation therapy- Left Nose    3. Extramedullary plasmacytoma in remission (CMS/HCC)    4. History of tobacco abuse      * Surgery not found *  No orders of the defined types were placed in this encounter.    Return in about 6 months (around 9/2/2021) for Recheck nose.       Patient Instructions   Continue treatment as directed, if symptoms develop call for sooner appointment, otherwise follow-up as directed.    The patient understands and agrees with assessment and plan.

## 2021-03-02 ENCOUNTER — OFFICE VISIT (OUTPATIENT)
Dept: OTOLARYNGOLOGY | Facility: CLINIC | Age: 76
End: 2021-03-02

## 2021-03-02 VITALS
SYSTOLIC BLOOD PRESSURE: 158 MMHG | HEART RATE: 69 BPM | BODY MASS INDEX: 29.98 KG/M2 | DIASTOLIC BLOOD PRESSURE: 76 MMHG | WEIGHT: 202.4 LBS | HEIGHT: 69 IN | TEMPERATURE: 96.9 F

## 2021-03-02 DIAGNOSIS — R04.0 EPISTAXIS: Primary | ICD-10-CM

## 2021-03-02 DIAGNOSIS — Z92.3 HISTORY OF RADIATION THERAPY: ICD-10-CM

## 2021-03-02 DIAGNOSIS — C90.21 EXTRAMEDULLARY PLASMACYTOMA IN REMISSION (HCC): ICD-10-CM

## 2021-03-02 DIAGNOSIS — Z87.891 HISTORY OF TOBACCO ABUSE: ICD-10-CM

## 2021-03-02 PROCEDURE — 99214 OFFICE O/P EST MOD 30 MIN: CPT | Performed by: PHYSICIAN ASSISTANT

## 2021-03-02 PROCEDURE — 31231 NASAL ENDOSCOPY DX: CPT | Performed by: PHYSICIAN ASSISTANT

## 2021-03-02 NOTE — PROGRESS NOTES
OPERATIVE NOTE:  Manjit Seth    DATE OF PROCEDURE: 3/2/21    PROCEDURE:   Rigid Nasal Endoscopy    ANESTHESIA:  None    REASON FOR PROCEDURE:  Procedure was recommend for history of nosebleeds status post radiation treatment for extramedullary plasmacytoma.  Risks, benefits and alternatives were discussed.      DETAILS of OPERATION:  The patient was seated in the exam chair.  A rigid nasal endoscopy was performed.  The 0 degree scope was introduced into the nasal cavity and the following findings noted.    FINDINGS:  MUCOSAL SURFACES:   The mucosal surfaces demonstrated erythema and dryness.    NASAL SEPTUM:  The nasal septum was noted to be mildly and deviated to the left with erythema and prominent blood vessels in [place]'s area.    INFERIOR TURBINATES:  The inferior turbinates were noted to have normal mucosa.    MIDDLE TURBINATES:  The middle turbinates were noted to have normal mucosa      MIDDLE MEATUS:  The middle meatus was noted to have normal mucosa      SPHENOETHMOID RECESS:  The sphenoethmoid recess was noted to have normal mucosa      NASOPHARYNX:  The nasopharynx was noted to have no lesion or mass

## 2021-03-02 NOTE — PROGRESS NOTES
Jose Krueger MD   I have seen and/or examined Manjit Seth and have reviewed the notes, assessments, and/or procedures and I concur with this documentation.    Jose Krueger MD, FACS  03/02/21  1:26 PM CST

## 2021-07-31 NOTE — PROGRESS NOTES
Patient:  Angelica Miller  YOB: 1945  Date of Service: 8/12/2021  MRN: [id number]    Primary Care Physician: Aleyda Jacobo    Chief Complaint   Patient presents with    Follow-up     Extramedullary plasmacytoma, unspecified whether remission achieved [facility])       Patient Seen, Chart, Consults notes, Labs, Radiology studies reviewed. 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 Reresection by Dr. Shereen Viramontes on 1/11/2016. Consolidation XRT to the left nares area at [facility] completed on 3/28/2016. Mckayla Edmonds Is monitored every 6 months, in January and July of each year at this office and  he is seen by Dr. Shereen Viramontes every 6 months in April and October of every year for fiberoptic evaluation. Mckayla Edmonds presents for evaluation and monitoring of serology without new developments or complaints on review of systems. 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 Shereen Viramontes with a diagnosis of a resected left nasal extra medullary plasmacytoma. Mr. Kristian Wilson presented to the emergency room at [facility] on 12/23/2015 for evaluation of tissue that he blew out of his left nose that same morning. The tissue was submitted to pathology.    Pathology on 12/23/2015 documented a piece of tan fibrosis tissue measuring 0.9 x 0.9 x 0.5 cm. Microscopic exam demonstrated histopathologic features supporting a diagnosis of a plasmacytoma. Mr. Sissy Scott was relatively asymptomatic prior to this presentation. He did state that occasionally over the previous couple of months he had blown a small amount of blood from his nasal cavity. He did not feel that it was significant given the fact that he has had episodes of occasional nosebleeds since he was a child. He was referred to Dr. Rashmi Berg for management. On 1/11/2016 he was taken to the OR for a bilateral rigid nasal endoscopy with excisional but biopsy of the left intranasal mass. Findings on the left revealed an excoriated polypoid mass noted in the vault in the posterior aspect of the nasal vestibule and to the nasal cavity proper. No other abnormalities were noted. The mass was excised down to the perichondrium. Pathology from the 1/11/2016 specimen described a 2.3 x 0.6 x 0.1 cm in depth specimen. At the in the of the strip was a fungating pink polyp measuring 1.4 x 1.3 x 0.6 cm. Notation is made that \"the polyp appears to extend to the peripheral margin. ....... 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 FISH for myeloma panel was also submitted, and pending. Pathology was discussed with Dr. Maria Del Carmen Fabian on 1/19/2016. Dr Genoveva Bear again reviewed the pathology and stated that the deep surgical margins were involved with the plasmacytoma. Serology on 1/19/2016 revealed a normal creatinine, calcium and protein level on the CMP.   SPEP did reveal a positive Mspike of 0.4g/dL  Quantitative immunoglobulins revealed a mild elevation in the IgA at 425 () with a normal IgG and IgM.  Kappa and lambda light chains and ratios were all normal.   Beta-2 microglobulin was normal at 1.6  LDH was normal at 189    A bone survey on 1/20/2016 did not reveal any sclerotic or lytic bone lesions. A 24-hour urine for immunoelectrophoresis on 2/5/2016 was positive for Bence Mcdonough protein, kappa type. The 24-hour urine protein was 118 mg/24 hr but due to the small quantity of monoclonal proteins, the Mspike was unable to be quantitated. A bone marrow aspiration and biopsy on 2/8/2016 was normal cellular (2530%) with trilineage hematopoiesis and no significant dyspoiesis and only 1% blasts. There was no diagnostic evidence of a clonal plasma cell proliferation nor significant increase in plasma cells (34%). There was adequate storage iron with out increased ring sideroblasts    Tomas was seen by Dr. Candis Curran on 2/29/2016 for evaluation of consolidation radiation therapy. XRT was initiated on 3/1/2016. Tomas received 20 fractions of treatment for a total of 4000 cGy that was completed on 3/28/2016. Tomas was seen in follow-up by Dr. Polo Guerrero on 7/8/2017. A repeat Bilateral Diagnostic Rigid Nasal Endoscopic was completed and documented no evidence of recurrent disease. Given the negative hematological workup with a minimal serological IgA and only trace Kappa light chain Bence Mcdonough proteins in the urine, at most, systemically, Mr. Margarita Wheeler may have an IgA kappa MGUS associated with his isolated resected IgG kappa left nasal plasmacytoma. Tomas was seen in follow-up by Dr. Polo Guerrero on 3/20/2018. A repeat Bilateral Diagnostic Rigid Nasal Endoscopic was completed and documented no evidence of recurrent disease. Myeloma studies were repeated on 7/10/2018 that remained stable with no significant change. IgG 950, IgA 279, IgM 24, M spike 0.4, creatinine 0.8 and a calcium of 9.4.     A 24-hour urine immunoelectrophoresis was completed on 7/24/2018 that documented a total 24-hour urine protein of 119 and M spike was not observed. Myeloma studies on 1/2/2019 remained stable with an M spike of 0.3, creatinine 0.78, calcium 9.4, IgG 969, IgA 299 and IgM of 25. TREATMENT SUMMARY:  1. Auto extraction \"BLOWOUT\" of solitary plasmacytoma from left nares 1/11/2016   2. Excisional but biopsy of the left intranasal mass by Dr. Vita Oliveira 1/11/2016   3. Consolidation XRT was initiated on 3/1/2016. Vandana Brian received 20 fractions of treatment for a total of 4000 cGy that was completed on 3/28/2016      Allergies:  No known allergies    Medicines:  Current Outpatient Medications   Medication Sig Dispense Refill    lisinopril (PRINIVIL;ZESTRIL) 5 MG tablet       mupirocin (BACTROBAN) 2 % ointment       magnesium (MAGNESIUM-OXIDE) 250 MG TABS tablet Take 250 mg by mouth daily      diphenhydrAMINE (BENADRYL ALLERGY) 25 MG capsule Take 25 mg by mouth every 6 hours as needed for Itching      ASPIRIN 81 PO Aspir-81   daily      Cranberry 1000 MG CAPS cranberry   daily      Multiple Vitamin (MULTI-VITAMINS PO) Take by mouth      atenolol (TENORMIN) 50 MG tablet atenolol 50 mg tablet      Ergocalciferol (VITAMIN D2) 400 units TABS Take by mouth      finasteride (PROSCAR) 5 MG tablet       Omega-3 Fatty Acids (FISH OIL) 1000 MG CAPS Take by mouth      simvastatin (ZOCOR) 40 MG tablet       triamterene-hydrochlorothiazide (DYAZIDE) 37.5-25 MG per capsule        No current facility-administered medications for this visit. Past Medical History:      Diagnosis Date    Epistaxis 1/23/2020    Extramedullary plasmacytoma ([address] 1/23/2020    Head and neck cancer ([address]     Hypertension     Plasmacytoma, extramedullary ([address]     Type 2 diabetes mellitus without complication ([address]         Past Surgical History:      Procedure Laterality Date    NOSE SURGERY Left         Family History:  No family history on file.      Social History  Social History     Tobacco Use    Smoking status: Former Smoker    Smokeless tobacco: Current User   Substance Use Topics    Alcohol use: Not on file    Drug use: Not on file          Review of Systems:  Constitutional: Negative for chills, fatigue, fever or significant weight loss. HENT: Negative for congestion, hearing loss, nosebleeds or sore throat. Eyes: Negative for photophobia, pain, discharge, redness and visual disturbance. Respiratory: Negative for cough, shortness of breath, or wheezing. Cardiovascular: Negative for chest pain, palpitations or leg swelling. Gastrointestinal: Negative for abdominal pain, blood in stool, constipation, diarrhea, nausea or vomiting. Genitourinary: Negative for dysuria, flank pain, frequency, hematuria or urgency. Musculoskeletal: Negative for back pain, joint swelling, myalgias or neck pain. Skin: Negative for rash or petechiae. Neurological: Negative for tremors, seizures, syncope, weakness or headaches. Hematological: No active bruising or bleeding. Psychiatric/Behavioral: Negative for hallucinations. Objective:  Vital Signs: Blood pressure 132/60, pulse 64, height 5' 9\" (1.753 m), weight 196 lb (88.9 kg), SpO2 97 %. Physical Exam   Constitutional: Oriented to person, place, and time. No acute distress. Head: Normocephalic and atraumatic. Nose: Nose normal.   Mouth/Throat: Oropharynx is clear and moist. No oropharyngeal exudate. Eyes: Pupils are equal and round. Conjunctivae and EOM are normal. No scleral icterus. Neck: Normal range of motion. Neck supple. No JVD. No appreciable thyromegaly. Cardiovascular: Normal rate, regular rhythm, normal heart sounds and intact distal pulses. Exam reveals no gallop, murmurs or friction rub. Pulmonary/Chest: Effort normal and breath sounds normal. No respiratory distress. No wheezes. Abdominal: Soft. Bowel sounds are normal. No organomegally or masses. No tenderness. There is no rebound and no guarding.    Musculoskeletal: Normal range of motion. No edema or tenderness. Lymphadenopathy: No cervical, axillary or inguinal lymphadenopathy. Neurological: Alert and oriented to person, place, and time. Cranial nerves are intact. Neurological exam is nonfocal  Skin: Skin is warm and dry. No rash noted. No erythema. No pallor. Psychiatric: Judgment normal.     Labs:  BMP: No results for input(s): NA, K, CL, CO2, PHOS, BUN, CREATININE, CALCIUM in the last 72 hours. CBC:   Recent Labs     08/12/21  0844   WBC 8.20   HGB 13.8   HCT 43.7   MCV 97.8*   *     LIVER PROFILE: No results for input(s): AST, ALT, LIPASE, BILIDIR, BILITOT, ALKPHOS in the last 72 hours. Invalid input(s): AMYLASE,  ALB  PT/INR: No results for input(s): PROTIME, INR in the last 72 hours. APTT: No results for input(s): APTT in the last 72 hours. BNP:  No results for input(s): BNP in the last 72 hours. Ionized Calcium:No results for input(s): IONCA in the last 72 hours. Magnesium:No results for input(s): MG in the last 72 hours. Phosphorus:No results for input(s): PHOS in the last 72 hours. HgbA1C: No results for input(s): LABA1C in the last 72 hours. Hepatic: No results for input(s): ALKPHOS, ALT, AST, PROT, BILITOT, BILIDIR, LABALBU in the last 72 hours. Lactic Acid: No results for input(s): LACTA in the last 72 hours. Troponin: No results for input(s): CKTOTAL, CKMB, TROPONINT in the last 72 hours. ABGs: No results for input(s): PH, PCO2, PO2, HCO3, O2SAT in the last 72 hours. CRP:  No results for input(s): CRP in the last 72 hours. Sed Rate:  No results for input(s): SEDRATE in the last 72 hours. Cultures:   No results for input(s): CULTURE in the last 72 hours. Radiology reports as per the Radiologist  Radiology: No results found.      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 Reresection by Dr. Vita Oliveira on 1/11/2016. Consolidation XRT to the left nares area at [facility] completed on 3/28/2016. Vandana Brian Is monitored every 6 months, in January and July of each year at this office and  he is seen by Dr. Vita Oliveira every 6 months in April and October of every year for fiberoptic evaluation. Vandana Brian presents for evaluation and monitoring of serology without new developments or complaints on review of systems. Vandana Brian continues to be very healthy without new problems to report. Physical examination today, 8/12/2021, does not reveal abnormalities in the nares area, pharyngeal and oral cavity exam is negative. No palpable lymphadenopathy in the head and neck area. Lungs are clear heart is regular abdomen is soft and benign. Facial asymmetry is noted secondary to the left hemifacial and eye spasm problem, a chronic issue. CBC 1/23/2020 reveals a WBC of 11.63. Hgb is 14.8 with MCV of 99.2 and platelet count of 562,662. CBC 7/23/2020 revealed a WBC of 11.35. Hgb is 14.1 with an MCV of 100.4 and platelet count of 088,597. CBC 2/4/2021 revealed a WBC of 8.83. Hgb is 14.1 with an MCV of 99.5 and platelet count of 889,365. CBC today 8/12/2021 reveals a WBC of 8.20. Hgb is 13.8 with an MCV of 97.8 and platelet count of 173,955. Serology on 1/16/2020 revealed:   IgG 1161  IgA 304  IgM 25  M-spike- 0.5  Kappa light chains- 25.9  Lambda light chains- 10.6  K/L ratio- 2.44    Serology on 7/16/2020 revealed:  CMP with CO2 30, glucose 132, Alk Phos 230, ALT 20   B2M- 1.4  Kappa light chains- 30.7  Lambda light chains- 12.0  K/L ratio- 2.56  IgG 1093,   IgA of 230 on 7/16/2020    DEXA scan on 8/7/2020 showed osteopenia. He is on vitamin D and a multivitamin with calcium      #3   Tumor screening and health maintenance    GI cancer screening  Colonoscopy performed 4/24/2019 by Dr. Pretty Carrera was normal. Recall 3 years. Prostate cancer screening  Routine follow-up with Dr. Jaz Forrest, Urology in [place] on 5/27/2021:  The patient's PSA was 2.62 on 5/27/21 while taking finasteride. His PSA has been relatively stable for the last 4+ years. Options were again reviewed including prostate biopsy versus continued surveillance of his PSA. He would like to continue to follow this. Recommended 6-month intervals. Summary of PLAN:  1. Follow-up appointment given for 6 months   2. Quantitative immunoglobulins, SPEP, B2M, kappa lambda light chains and CMP prior to return visit. 3. Continued follow-up with Dr. Jacky Samson for complete ENT exam as recommended. Fermín Ortega am scribing for Dio Anne MD. Electronically signed by Palma Kelley RN on 8/12/2021 at 8:46 AM        Patricia Reed was seen today for follow-up. Diagnoses and all orders for this visit:    IgA monoclonal gammopathy of uncertain significance  -     Immunoglobulins, Quantitative; Future  -     [place]/Lambda Free Lt Chains, Serum Quant; Future  -     Protein, Total; Future  -     Miscellaneous Sendout 1; Future  -     Beta 2 Microglobulin, Serum; Future  -     Comprehensive Metabolic Panel; Future  -     Protein, 24 Hr Urine; Future    Extramedullary plasmacytoma, unspecified whether remission achieved (HCC)  -     Immunoglobulins, Quantitative; Future  -     [place]/Lambda Free Lt Chains, Serum Quant; Future  -     Protein, Total; Future  -     Miscellaneous Sendout 1; Future  -     Beta 2 Microglobulin, Serum; Future  -     Comprehensive Metabolic Panel; Future  -     Protein, 24 Hr Urine;  Future    Health care maintenance    Osteopenia, unspecified location         Orders Placed This Encounter   Procedures    Immunoglobulins, Quantitative     Standing Status:   Future     Standing Expiration Date:   8/12/2022    [place]/Lambda Free Lt Chains, Serum Quant     Standing Status:   Future     Standing Expiration Date:   8/12/2022    Protein, Total     Standing Status:   Future     Standing Expiration Date:   8/12/2022    Miscellaneous Sendout 1     Standing Status:   Future     Standing Expiration Date:   8/12/2022     Order Specific Question:   Specify Req. Test (1 Test/Order)     Answer:   Immunofixation electrophoresis, serum    Beta 2 Microglobulin, Serum     Standing Status:   Future     Standing Expiration Date:   8/12/2022    Comprehensive Metabolic Panel     Standing Status:   Future     Standing Expiration Date:   8/12/2022    Protein, 24 Hr Urine     Standing Status:   Future     Standing Expiration Date:   8/12/2022         Return in about 6 months (around 2/12/2022) for Follow-up with Dr. Heaven Lombardo with labs prior to. I, Dr. Mann Castellano, personally performed the services described in this documentation as scribed by Hernandez Jaquez RN in my presence, and it's both accurate and complete.

## 2021-08-02 DIAGNOSIS — C90.20 EXTRAMEDULLARY PLASMACYTOMA, UNSPECIFIED WHETHER REMISSION ACHIEVED (HCC): Primary | ICD-10-CM

## 2021-08-02 DIAGNOSIS — D47.2 IGA MONOCLONAL GAMMOPATHY OF UNCERTAIN SIGNIFICANCE: ICD-10-CM

## 2021-08-05 ENCOUNTER — HOSPITAL ENCOUNTER (OUTPATIENT)
Dept: INFUSION THERAPY | Age: 76
Discharge: HOME | End: 2021-08-05
Payer: MEDICARE

## 2021-08-05 DIAGNOSIS — C90.20 EXTRAMEDULLARY PLASMACYTOMA, UNSPECIFIED WHETHER REMISSION ACHIEVED (HCC): ICD-10-CM

## 2021-08-05 DIAGNOSIS — D47.2 IGA MONOCLONAL GAMMOPATHY OF UNCERTAIN SIGNIFICANCE: ICD-10-CM

## 2021-08-05 LAB
ALBUMIN SERPL-MCNC: 4.6 G/DL (ref 3.5–5.2)
ALP BLD-CCNC: 102 U/L (ref 40–130)
ALT SERPL-CCNC: 25 U/L (ref 21–72)
ANION GAP SERPL CALCULATED.3IONS-SCNC: 8 MMOL/L (ref 7–19)
AST SERPL-CCNC: 31 U/L (ref 17–59)
BILIRUB SERPL-MCNC: 0.5 MG/DL (ref 0.2–1.3)
BUN BLDV-MCNC: 12 MG/DL (ref 9–20)
CALCIUM SERPL-MCNC: 9.8 MG/DL (ref 8.4–10.2)
CHLORIDE BLD-SCNC: 98 MMOL/L (ref 98–111)
CO2: 33 MMOL/L (ref 22–29)
CREAT SERPL-MCNC: 0.8 MG/DL (ref 0.6–1.2)
GFR NON-AFRICAN AMERICAN: >60
GLOBULIN: 2.8 G/DL
GLUCOSE BLD-MCNC: 124 MG/DL (ref 74–106)
POTASSIUM SERPL-SCNC: 4.7 MMOL/L (ref 3.5–5.1)
SODIUM BLD-SCNC: 139 MMOL/L (ref 137–145)
TOTAL PROTEIN: 7.4 G/DL (ref 6.3–8.2)

## 2021-08-05 PROCEDURE — 80053 COMPREHEN METABOLIC PANEL: CPT

## 2021-08-12 ENCOUNTER — OFFICE VISIT (OUTPATIENT)
Dept: HEMATOLOGY | Age: 76
End: 2021-08-12
Payer: MEDICARE

## 2021-08-12 ENCOUNTER — HOSPITAL ENCOUNTER (OUTPATIENT)
Dept: INFUSION THERAPY | Age: 76
Discharge: HOME | End: 2021-08-12
Payer: MEDICARE

## 2021-08-12 VITALS
OXYGEN SATURATION: 97 % | SYSTOLIC BLOOD PRESSURE: 132 MMHG | HEART RATE: 64 BPM | WEIGHT: 196 LBS | DIASTOLIC BLOOD PRESSURE: 60 MMHG | HEIGHT: 69 IN | BODY MASS INDEX: 29.03 KG/M2

## 2021-08-12 DIAGNOSIS — C90.20 EXTRAMEDULLARY PLASMACYTOMA, UNSPECIFIED WHETHER REMISSION ACHIEVED (HCC): ICD-10-CM

## 2021-08-12 DIAGNOSIS — D47.2 IGA MONOCLONAL GAMMOPATHY OF UNCERTAIN SIGNIFICANCE: ICD-10-CM

## 2021-08-12 DIAGNOSIS — D47.2 IGA MONOCLONAL GAMMOPATHY OF UNCERTAIN SIGNIFICANCE: Primary | ICD-10-CM

## 2021-08-12 DIAGNOSIS — Z00.00 HEALTH CARE MAINTENANCE: ICD-10-CM

## 2021-08-12 DIAGNOSIS — M85.80 OSTEOPENIA, UNSPECIFIED LOCATION: ICD-10-CM

## 2021-08-12 LAB
BASOPHILS ABSOLUTE: 0.05 K/UL (ref 0.01–0.08)
BASOPHILS RELATIVE PERCENT: 0.6 % (ref 0.1–1.2)
EOSINOPHILS ABSOLUTE: 0.25 K/UL (ref 0.04–0.54)
EOSINOPHILS RELATIVE PERCENT: 3 % (ref 0.7–7)
HCT VFR BLD CALC: 43.7 % (ref 40.1–51)
HEMOGLOBIN: 13.8 G/DL (ref 13.7–17.5)
LYMPHOCYTES ABSOLUTE: 2.33 K/UL (ref 1.18–3.74)
LYMPHOCYTES RELATIVE PERCENT: 28.4 % (ref 19.3–53.1)
MCH RBC QN AUTO: 30.9 PG (ref 25.7–32.2)
MCHC RBC AUTO-ENTMCNC: 31.6 G/DL (ref 32.3–36.5)
MCV RBC AUTO: 97.8 FL (ref 79–92.2)
MONOCYTES ABSOLUTE: 0.58 K/UL (ref 0.24–0.82)
MONOCYTES RELATIVE PERCENT: 7.1 % (ref 4.7–12.5)
NEUTROPHILS ABSOLUTE: 4.99 K/UL (ref 1.56–6.13)
NEUTROPHILS RELATIVE PERCENT: 60.9 % (ref 34–71.1)
PDW BLD-RTO: 13.6 % (ref 11.6–14.4)
PLATELET # BLD: 124 K/UL (ref 163–337)
PMV BLD AUTO: 11.7 FL (ref 7.4–10.4)
RBC # BLD: 4.47 M/UL (ref 4.63–6.08)
WBC # BLD: 8.2 K/UL (ref 4.23–9.07)

## 2021-08-12 PROCEDURE — 85025 COMPLETE CBC W/AUTO DIFF WBC: CPT

## 2021-08-12 PROCEDURE — 99212 OFFICE O/P EST SF 10 MIN: CPT

## 2021-08-12 PROCEDURE — 99213 OFFICE O/P EST LOW 20 MIN: CPT | Performed by: INTERNAL MEDICINE

## 2021-08-12 PROCEDURE — 4040F PNEUMOC VAC/ADMIN/RCVD: CPT | Performed by: INTERNAL MEDICINE

## 2021-08-12 PROCEDURE — G8427 DOCREV CUR MEDS BY ELIG CLIN: HCPCS | Performed by: INTERNAL MEDICINE

## 2021-08-12 PROCEDURE — 36415 COLL VENOUS BLD VENIPUNCTURE: CPT

## 2021-08-12 PROCEDURE — 4004F PT TOBACCO SCREEN RCVD TLK: CPT | Performed by: INTERNAL MEDICINE

## 2021-08-12 PROCEDURE — 1123F ACP DISCUSS/DSCN MKR DOCD: CPT | Performed by: INTERNAL MEDICINE

## 2021-08-12 PROCEDURE — G8417 CALC BMI ABV UP PARAM F/U: HCPCS | Performed by: INTERNAL MEDICINE

## 2021-08-26 ENCOUNTER — TRANSCRIBE ORDERS (OUTPATIENT)
Dept: LAB | Facility: HOSPITAL | Age: 76
End: 2021-08-26

## 2021-08-26 DIAGNOSIS — Z01.818 PREOP TESTING: Primary | ICD-10-CM

## 2021-08-30 ENCOUNTER — LAB (OUTPATIENT)
Dept: LAB | Facility: HOSPITAL | Age: 76
End: 2021-08-30

## 2021-08-30 LAB — SARS-COV-2 ORF1AB RESP QL NAA+PROBE: NOT DETECTED

## 2021-08-30 PROCEDURE — C9803 HOPD COVID-19 SPEC COLLECT: HCPCS | Performed by: OTOLARYNGOLOGY

## 2021-08-30 PROCEDURE — U0005 INFEC AGEN DETEC AMPLI PROBE: HCPCS | Performed by: OTOLARYNGOLOGY

## 2021-08-30 PROCEDURE — U0004 COV-19 TEST NON-CDC HGH THRU: HCPCS | Performed by: OTOLARYNGOLOGY

## 2021-09-01 NOTE — PROGRESS NOTES
YOB: 1945  Location: [facility]  Location Address: [address] [place] [address], [facility], [address] [place] [address]  Location Phone: [phone]    Chief Complaint   Patient presents with   • Follow-up     has had 3 nose bleeds in last 6 months       History of Present Illness  Manjit Seth is a 75 y.o. male.  Manjit Seth is here for follow up of ENT complaints. The patient has had problems with occasional mild epistaxis  The symptoms are localized to the nose head. The patient has had mild symptoms. The symptoms have been present for the last several years There have been no identified factors that aggravate the symptoms. The symptoms are improved by Bactroban and intermittent nasal pressure when necessary.    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      Final Diagnosis                          Amended Report 2016                                                      Excision, left nasal mass:                                 A.     Extramedullary plasmacytoma.                                 B.     The plasmacytic infiltrate can be seen extending to the inked deep                           margin of excision.                                                       Comment:  Flow cytometric analysis performed by [facility] reveals a                           monoclonal IgG kappa plasma cell population consistent with plasma cell neoplasm                           accounting for 5% of total cells.  Please refer to the complete flow cytometry                           report from [facility] which is appended.  Morphologically, the plasma                           cell population is greater with the plasmacytoma packed with atypical plasma                           cells.  FISH myeloma panel studies are pending at this time and will be                           forwarded upon receipt from the reference laboratory.                                                                        1                                                                                 Electronically Signed Out By                           Laxmi Mo MD                                                      Specimen(s) Received:                           Left nasal mass                 Past Medical History:   Diagnosis Date   • Bell's palsy     with facial tremor   • COVID-19 vaccine series completed    • Diabetes mellitus (CMS/HCC)    • Extramedullary plasmacytoma (CMS/HCC)     left nose   • History of radiation therapy 03/28/2016    4000 cGy to nose completed on 3/28/16   • History of tobacco abuse    • Hyperlipidemia    • Hypertension        Past Surgical History:   Procedure Laterality Date   • ANTERIOR CERVICAL DISCECTOMY W/ FUSION N/A 1/16/2019    Procedure: CERVICAL DISCECTOMY ANTERIOR WITH FUSION C3-4,4-5, 5-6 ACDF with neuromonitoring and 1 C-arm;  Surgeon: Pablito Islas MD;  Location: [facility];  Service: Neurosurgery   • COLONOSCOPY     • NASAL ENDOSCOPY      bilateral with excisional biopsy of left intranasal mass 1/11/16       Outpatient Medications Marked as Taking for the 9/2/21 encounter (Office Visit) with Jose Krueger MD   Medication Sig Dispense Refill   • ASPIRIN 81 PO Aspir-81   daily     • atenolol (TENORMIN) 50 MG tablet Take 50 mg by mouth Daily.     • Cranberry 400 MG capsule Take 1 capsule by mouth Daily.     • Ergocalciferol (VITAMIN D2) 400 UNITS tablet Take  by mouth.     • finasteride (PROSCAR) 5 MG tablet Take 5 mg by mouth Daily.     • FLUAD 0.5 ML suspension prefilled syringe ADM 0.5ML IM UTD  0   • Garlic 10 MG capsule Take  by mouth.     • lisinopril (PRINIVIL,ZESTRIL) 5 MG tablet Take 5 mg by mouth Daily.     • Multiple Vitamins-Minerals (MULTIVITAMIN ADULT PO) Take  by mouth.     • mupirocin (BACTROBAN) 2 % ointment Apply  topically to the appropriate area as directed 3 (Three) Times a Day. 22 g 5   • Omega-3 Fatty Acids  (FISH OIL) 1000 MG capsule capsule Take  by mouth Daily With Breakfast.     • simvastatin (ZOCOR) 40 MG tablet      • triamterene-hydrochlorothiazide (MAXZIDE-25) 37.5-25 MG per tablet Take 1 tablet by mouth Daily.         Patient has no known allergies.    Family History   Problem Relation Age of Onset   • Breast cancer Mother    • Heart failure Father    • Cancer Maternal Uncle        Social History     Socioeconomic History   • Marital status:      Spouse name: Not on file   • Number of children: Not on file   • Years of education: Not on file   • Highest education level: Not on file   Tobacco Use   • Smoking status: Former Smoker     Types: Cigarettes     Quit date:      Years since quittin.6   • Smokeless tobacco: Current User     Types: Chew   Vaping Use   • Vaping Use: Never used   Substance and Sexual Activity   • Alcohol use: No   • Drug use: No   • Sexual activity: Defer       Review of Systems    Vitals:    21 0914   BP: 157/76   Pulse: 59   Temp: 97.5 °F (36.4 °C)       Body mass index is 30.04 kg/m².    Objective     Physical Exam  CONSTITUTIONAL: well nourished, well-developed, alert, oriented, in no acute distress     COMMUNICATION AND VOICE: able to communicate normally, normal voice quality    HEAD: normocephalic, no lesions, atraumatic, no tenderness, no masses     FACE: appearance normal, no lesions, no tenderness, no deformities, facial motion symmetric    EYES: ocular motility normal, eyelids normal, orbits normal, no proptosis, conjunctiva normal , pupils equal, round     EARS:  Hearing: hearing to conversational voice intact bilaterally   External Ears: normal bilaterally, no lesions    NOSE:  External Nose: external nasal structure normal, no tenderness on palpation, no nasal discharge, no lesions, no evidence of trauma, nostrils patent   Intranasal exam: See endoscopy    ORAL:  Lips: upper and lower lips without lesion     NECK:  Inspection and Palpation: neck  appearance normal, no masses or tenderness    CHEST/RESPIRATORY: normal respiratory effort     CARDIOVASCULAR: no cyanosis or edema     NEUROLOGICAL/PSYCHIATRIC: oriented to time, place and person, mood normal, affect appropriate, CN II-XII intact grossly    Assessment/Plan   Diagnoses and all orders for this visit:    1. 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 Epistaxis      * Surgery not found *  No orders of the defined types were placed in this encounter.    Return in about 6 months (around 3/2/2022).       Patient Instructions   Call for problems  Use Bactroban intermittently as needed  Nasal cease when needed  Follow-up in 6 months    No evidence of disease

## 2021-09-02 ENCOUNTER — OFFICE VISIT (OUTPATIENT)
Dept: OTOLARYNGOLOGY | Facility: CLINIC | Age: 76
End: 2021-09-02

## 2021-09-02 VITALS
TEMPERATURE: 97.5 F | DIASTOLIC BLOOD PRESSURE: 76 MMHG | HEART RATE: 59 BPM | SYSTOLIC BLOOD PRESSURE: 157 MMHG | BODY MASS INDEX: 30.13 KG/M2 | HEIGHT: 69 IN | WEIGHT: 203.4 LBS

## 2021-09-02 DIAGNOSIS — C90.21 EXTRAMEDULLARY PLASMACYTOMA IN REMISSION (HCC): Primary | ICD-10-CM

## 2021-09-02 DIAGNOSIS — R04.0 EPISTAXIS: ICD-10-CM

## 2021-09-02 PROCEDURE — 99213 OFFICE O/P EST LOW 20 MIN: CPT | Performed by: OTOLARYNGOLOGY

## 2021-09-02 PROCEDURE — 31231 NASAL ENDOSCOPY DX: CPT | Performed by: OTOLARYNGOLOGY

## 2021-09-02 NOTE — PATIENT INSTRUCTIONS
Call for problems  Use Bactroban intermittently as needed  Nasal cease when needed  Follow-up in 6 months    No evidence of disease

## 2021-09-02 NOTE — PROGRESS NOTES
PROCEDURE NOTE    Manjit Seth    DATE OF PROCEDURE: 9/2/2021    PROCEDURE:   Bilateral Diagnostic Rigid Nasal Endoscopy    PREPROCEDURE DIAGNOSIS:   History of extra medullary plasma cell cytoma on the left nasal cavity    POSTPROCEDURE DIAGNOSIS:  SAME    ANESTHESIA:   None    PROCEDURE DESCRIPTION:    With the patient in the chair bilateral diagnostic rigid nasal endoscopy was performed with the Stortz 0° endoscope without difficulty.     An endoscope was passed along the left nasal floor to the nasopharynx.  It was then passed into the region of the middle meatus, middle turbinate, and the sphenoethmoid region. An identical procedure was performed on the right side.  The following findings were noted as stated below:    Findings: Mild left septal deviation without mass or lesion intranasally.  Minimal excoriation with no crusting or bleeding evident.  No intranasal polyposis.  The right nasal cavity has a more cephalad mild septal deflection but no other abnormalities appreciated.    Condition:  Stable.  Patient tolerated procedure well.    Complications:  None  There was no significant bleeding.

## 2022-02-03 ENCOUNTER — HOSPITAL ENCOUNTER (OUTPATIENT)
Dept: INFUSION THERAPY | Age: 77
End: 2022-02-03

## 2022-02-10 ENCOUNTER — HOSPITAL ENCOUNTER (OUTPATIENT)
Dept: INFUSION THERAPY | Age: 77
Discharge: HOME | End: 2022-02-10
Payer: MEDICARE

## 2022-02-10 DIAGNOSIS — C90.20 EXTRAMEDULLARY PLASMACYTOMA, UNSPECIFIED WHETHER REMISSION ACHIEVED (HCC): ICD-10-CM

## 2022-02-10 DIAGNOSIS — D47.2 IGA MONOCLONAL GAMMOPATHY OF UNCERTAIN SIGNIFICANCE: ICD-10-CM

## 2022-02-10 LAB
ALBUMIN SERPL-MCNC: 4.5 G/DL (ref 3.5–5.2)
ALP BLD-CCNC: 147 U/L (ref 40–130)
ALT SERPL-CCNC: 23 U/L (ref 5–41)
ANION GAP SERPL CALCULATED.3IONS-SCNC: 13 MMOL/L (ref 7–19)
AST SERPL-CCNC: 20 U/L (ref 5–40)
BASOPHILS ABSOLUTE: 0.05 K/UL (ref 0.01–0.08)
BASOPHILS RELATIVE PERCENT: 0.7 % (ref 0.1–1.2)
BILIRUB SERPL-MCNC: <0.2 MG/DL (ref 0.2–1.2)
BUN BLDV-MCNC: 19 MG/DL (ref 8–23)
CALCIUM SERPL-MCNC: 9.1 MG/DL (ref 8.8–10.2)
CHLORIDE BLD-SCNC: 102 MMOL/L (ref 98–111)
CO2: 25 MMOL/L (ref 22–29)
CREAT SERPL-MCNC: 1 MG/DL (ref 0.5–1.2)
EOSINOPHILS ABSOLUTE: 0.24 K/UL (ref 0.04–0.54)
EOSINOPHILS RELATIVE PERCENT: 3.3 % (ref 0.7–7)
GFR AFRICAN AMERICAN: >59
GFR NON-AFRICAN AMERICAN: >60
GLUCOSE BLD-MCNC: 149 MG/DL (ref 74–109)
HCT VFR BLD CALC: 38.9 % (ref 40.1–51)
HEMOGLOBIN: 12.9 G/DL (ref 13.7–17.5)
LYMPHOCYTES ABSOLUTE: 1.5 K/UL (ref 1.18–3.74)
LYMPHOCYTES RELATIVE PERCENT: 20.8 % (ref 19.3–53.1)
MCH RBC QN AUTO: 31.5 PG (ref 25.7–32.2)
MCHC RBC AUTO-ENTMCNC: 33.2 G/DL (ref 32.3–36.5)
MCV RBC AUTO: 95.1 FL (ref 79–92.2)
MONOCYTES ABSOLUTE: 0.52 K/UL (ref 0.24–0.82)
MONOCYTES RELATIVE PERCENT: 7.2 % (ref 4.7–12.5)
NEUTROPHILS ABSOLUTE: 4.9 K/UL (ref 1.56–6.13)
NEUTROPHILS RELATIVE PERCENT: 68 % (ref 34–71.1)
PDW BLD-RTO: 13.8 % (ref 11.6–14.4)
PLATELET # BLD: 166 K/UL (ref 163–337)
PMV BLD AUTO: 10.5 FL (ref 7.4–10.4)
POTASSIUM SERPL-SCNC: 4.1 MMOL/L (ref 3.5–5)
PROTEIN PROTEIN: 5 MG/DL (ref 15–45)
RBC # BLD: 4.09 M/UL (ref 4.63–6.08)
SODIUM BLD-SCNC: 140 MMOL/L (ref 136–145)
TOTAL PROTEIN: 6.7 G/DL (ref 6.6–8.7)
WBC # BLD: 7.21 K/UL (ref 4.23–9.07)

## 2022-02-10 PROCEDURE — 85025 COMPLETE CBC W/AUTO DIFF WBC: CPT

## 2022-02-10 PROCEDURE — 36415 COLL VENOUS BLD VENIPUNCTURE: CPT

## 2022-02-11 LAB
24HR URINE VOLUME (ML): 1950 ML
CREATININE 24 HOUR URINE: 1.5 G/24HR (ref 1–2)
PROTEIN 24 HOUR URINE: 78 MG/24HR (ref 50–100)

## 2022-02-14 LAB — BETA-2 MICROGLOBULIN: 1.9 MG/L (ref 1.1–2.4)

## 2022-02-15 LAB
+IMM: ABNORMAL
ALBUMIN SERPL-MCNC: 3.93 G/DL (ref 3.75–5.01)
ALPHA-1-GLOBULIN: 0.34 G/DL (ref 0.19–0.46)
ALPHA-2-GLOBULIN: 0.83 G/DL (ref 0.48–1.05)
BETA GLOBULIN: 0.77 G/DL (ref 0.48–1.1)
GAMMA GLOBULIN: 1.12 G/DL (ref 0.62–1.51)
IGA: 233 MG/DL (ref 68–408)
IGG: 1188 MG/DL (ref 768–1632)
IGM: 22 MG/DL (ref 35–263)
KAPPA FREE LIGHT CHAINS QNT: 38.7 MG/L (ref 3.3–19.4)
KAPPA/LAMBDA FREE LIGHT CHAIN RATIO: 3.2 (ref 0.26–1.65)
LAMBDA FREE LIGHT CHAINS QNT: 12.09 MG/L (ref 5.71–26.3)
SPE/IFE INTERPRETATION: ABNORMAL
TOTAL PROTEIN: 7 G/DL (ref 6.3–8.2)
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
## 2022-02-17 ENCOUNTER — HOSPITAL ENCOUNTER (OUTPATIENT)
Dept: INFUSION THERAPY | Age: 77
Discharge: HOME | End: 2022-02-17
Payer: MEDICARE

## 2022-02-17 ENCOUNTER — OFFICE VISIT (OUTPATIENT)
Dept: HEMATOLOGY | Age: 77
End: 2022-02-17
Payer: MEDICARE

## 2022-02-17 VITALS
BODY MASS INDEX: 31.37 KG/M2 | WEIGHT: 211.8 LBS | SYSTOLIC BLOOD PRESSURE: 146 MMHG | HEIGHT: 69 IN | OXYGEN SATURATION: 99 % | HEART RATE: 76 BPM | DIASTOLIC BLOOD PRESSURE: 64 MMHG

## 2022-02-17 DIAGNOSIS — D47.2 IGA MONOCLONAL GAMMOPATHY OF UNCERTAIN SIGNIFICANCE: Primary | ICD-10-CM

## 2022-02-17 DIAGNOSIS — Z00.00 HEALTH CARE MAINTENANCE: ICD-10-CM

## 2022-02-17 DIAGNOSIS — C90.20 EXTRAMEDULLARY PLASMACYTOMA, UNSPECIFIED WHETHER REMISSION ACHIEVED (HCC): ICD-10-CM

## 2022-02-17 DIAGNOSIS — D47.2 IGA MONOCLONAL GAMMOPATHY OF UNCERTAIN SIGNIFICANCE: ICD-10-CM

## 2022-02-17 DIAGNOSIS — M85.80 OSTEOPENIA, UNSPECIFIED LOCATION: ICD-10-CM

## 2022-02-17 LAB
HCT VFR BLD CALC: 39.7 % (ref 40.1–51)
HEMOGLOBIN: 12.7 G/DL (ref 13.7–17.5)
MCH RBC QN AUTO: 30.7 PG (ref 25.7–32.2)
MCHC RBC AUTO-ENTMCNC: 32 G/DL (ref 32.3–36.5)
MCV RBC AUTO: 95.9 FL (ref 79–92.2)
PDW BLD-RTO: 13.5 % (ref 11.6–14.4)
PLATELET # BLD: 157 K/UL (ref 163–337)
PMV BLD AUTO: 10.9 FL (ref 7.4–10.4)
RBC # BLD: 4.14 M/UL (ref 4.63–6.08)
WBC # BLD: 7.1 K/UL (ref 4.23–9.07)

## 2022-02-17 PROCEDURE — 4040F PNEUMOC VAC/ADMIN/RCVD: CPT | Performed by: INTERNAL MEDICINE

## 2022-02-17 PROCEDURE — 85027 COMPLETE CBC AUTOMATED: CPT

## 2022-02-17 PROCEDURE — 1123F ACP DISCUSS/DSCN MKR DOCD: CPT | Performed by: INTERNAL MEDICINE

## 2022-02-17 PROCEDURE — 99214 OFFICE O/P EST MOD 30 MIN: CPT | Performed by: INTERNAL MEDICINE

## 2022-02-17 PROCEDURE — G8484 FLU IMMUNIZE NO ADMIN: HCPCS | Performed by: INTERNAL MEDICINE

## 2022-02-17 PROCEDURE — G8417 CALC BMI ABV UP PARAM F/U: HCPCS | Performed by: INTERNAL MEDICINE

## 2022-02-17 PROCEDURE — 4004F PT TOBACCO SCREEN RCVD TLK: CPT | Performed by: INTERNAL MEDICINE

## 2022-02-17 PROCEDURE — 99211 OFF/OP EST MAY X REQ PHY/QHP: CPT

## 2022-02-17 PROCEDURE — G8427 DOCREV CUR MEDS BY ELIG CLIN: HCPCS | Performed by: INTERNAL MEDICINE

## 2022-02-23 DIAGNOSIS — Z01.818 PRE-OPERATIVE EXAMINATION: Primary | ICD-10-CM

## 2022-02-28 ENCOUNTER — LAB (OUTPATIENT)
Dept: LAB | Facility: HOSPITAL | Age: 77
End: 2022-02-28

## 2022-02-28 DIAGNOSIS — Z01.818 PRE-OPERATIVE EXAMINATION: ICD-10-CM

## 2022-02-28 LAB — SARS-COV-2 ORF1AB RESP QL NAA+PROBE: NOT DETECTED

## 2022-02-28 PROCEDURE — U0004 COV-19 TEST NON-CDC HGH THRU: HCPCS

## 2022-02-28 PROCEDURE — U0005 INFEC AGEN DETEC AMPLI PROBE: HCPCS

## 2022-02-28 PROCEDURE — C9803 HOPD COVID-19 SPEC COLLECT: HCPCS

## 2022-03-02 NOTE — PROGRESS NOTES
"YOB: 1945  Location: [facility]  Location Address: [facility] [place], [facility] [address], [address] [place] [address]  Location Phone: [phone]    Chief Complaint   Patient presents with   • Follow-up     nose bleeds       History of Present Illness  Manjit Seth is a 75 y.o. male.  Manjit Seth is here for follow up of ENT complaints. The patient has had problems with occasional mild epistaxis. He states he has 4-5 in a 6 month period. He denies issues with nasal congestion. The patient has had mild symptoms. There have been no identified factors that aggravate the symptoms. The symptoms are improved by Bactroban and intermittent nasal pressure when necessary. 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    He also complains of a place in the left cheek that has been bothering him tinnitus tender to touch but does not bother him and is not being manipulated.  It also is \"rough\".        Final Diagnosis                          Amended Report 2016                                                      Excision, left nasal mass:                                 A.     Extramedullary plasmacytoma.                                 B.     The plasmacytic infiltrate can be seen extending to the inked deep                           margin of excision.                                                       Comment:  Flow cytometric analysis performed by [facility] reveals a                           monoclonal IgG kappa plasma cell population consistent with plasma cell neoplasm                           accounting for 5% of total cells.  Please refer to the complete flow cytometry                           report from [facility] which is appended.  Morphologically, the plasma                           cell population is greater with the plasmacytoma packed with atypical plasma                           cells.  FISH myeloma panel studies are pending " at this time and will be                           forwarded upon receipt from the reference laboratory.                                                                       1                                                                                 Electronically Signed Out By                           Laxmi Mo MD                                                      Specimen(s) Received:                           Left nasal mass                   Past Medical History:   Diagnosis Date   • Bell's palsy     with facial tremor   • COVID-19 vaccine series completed    • Diabetes mellitus (HCC)    • Extramedullary plasmacytoma (HCC)     left nose   • History of radiation therapy 03/28/2016    4000 cGy to nose completed on 3/28/16   • History of tobacco abuse    • Hyperlipidemia    • Hypertension        Past Surgical History:   Procedure Laterality Date   • ANTERIOR CERVICAL DISCECTOMY W/ FUSION N/A 1/16/2019    Procedure: CERVICAL DISCECTOMY ANTERIOR WITH FUSION C3-4,4-5, 5-6 ACDF with neuromonitoring and 1 C-arm;  Surgeon: Pablito Islas MD;  Location: [facility];  Service: Neurosurgery   • COLONOSCOPY     • NASAL ENDOSCOPY      bilateral with excisional biopsy of left intranasal mass 1/11/16       Outpatient Medications Marked as Taking for the 3/3/22 encounter (Office Visit) with Jose Krueger MD   Medication Sig Dispense Refill   • ASPIRIN 81 PO Aspir-81   daily     • atenolol (TENORMIN) 50 MG tablet Take 50 mg by mouth Daily.     • Cranberry 400 MG capsule Take 1 capsule by mouth Daily.     • Ergocalciferol (VITAMIN D2) 400 UNITS tablet Take  by mouth.     • finasteride (PROSCAR) 5 MG tablet Take 5 mg by mouth Daily.     • FLUAD 0.5 ML suspension prefilled syringe ADM 0.5ML IM UTD  0   • Garlic 10 MG capsule Take  by mouth.     • lisinopril (PRINIVIL,ZESTRIL) 5 MG tablet Take 5 mg by mouth Daily.     • Multiple Vitamins-Minerals (MULTIVITAMIN ADULT PO) Take  by mouth.     • mupirocin  (BACTROBAN) 2 % ointment Apply  topically to the appropriate area as directed 3 (Three) Times a Day. 22 g 5   • Omega-3 Fatty Acids (FISH OIL) 1000 MG capsule capsule Take  by mouth Daily With Breakfast.     • simvastatin (ZOCOR) 40 MG tablet      • triamterene-hydrochlorothiazide (MAXZIDE-25) 37.5-25 MG per tablet Take 1 tablet by mouth Daily.         Patient has no known allergies.    Family History   Problem Relation Age of Onset   • Breast cancer Mother    • Heart failure Father    • Cancer Maternal Uncle        Social History     Socioeconomic History   • Marital status:    Tobacco Use   • Smoking status: Former Smoker     Types: Cigarettes     Quit date:      Years since quittin.1   • Smokeless tobacco: Current User     Types: Chew   Vaping Use   • Vaping Use: Never used   Substance and Sexual Activity   • Alcohol use: No   • Drug use: No   • Sexual activity: Defer       Review of Systems   Constitutional: Negative.    HENT: Positive for nosebleeds.    Eyes: Negative.    Respiratory: Negative.    Cardiovascular: Negative.    Gastrointestinal: Negative.    Endocrine: Negative.    Genitourinary: Negative.    Musculoskeletal: Negative.    Skin: Negative.    Allergic/Immunologic: Negative.    Neurological: Negative.    Hematological: Negative.    Psychiatric/Behavioral: Negative.        Vitals:    22 0922   BP: 150/75   Pulse: 65   Resp: 20   Temp: 98 °F (36.7 °C)       Body mass index is 31.32 kg/m².    Objective     Physical Exam  CONSTITUTIONAL: well nourished, well-developed, alert, oriented, in no acute distress     COMMUNICATION AND VOICE: able to communicate normally, normal voice quality    HEAD: normocephalic, no lesions, atraumatic, no tenderness, no masses     FACE: appearance normal, 2 mm RKP left cheek , no tenderness, no deformities, facial motion symmetric    EYES: ocular motility normal, eyelids normal, orbits normal, no proptosis, conjunctiva normal , pupils equal, round      EARS:  Hearing: hearing to conversational voice intact bilaterally   External Ears: normal bilaterally, no lesions    NOSE:  External Nose: external nasal structure normal, no tenderness on palpation, no nasal discharge, no lesions, no evidence of trauma, nostrils patent   Intranasal exam: See endoscopy    ORAL:  Lips: upper and lower lips without lesion     NECK:  Inspection and Palpation: neck appearance normal, no masses or tenderness    CHEST/RESPIRATORY: normal respiratory effort     CARDIOVASCULAR: no cyanosis or edema     NEUROLOGICAL/PSYCHIATRIC: oriented to time, place and person, mood normal, affect appropriate, CN II-XII intact grossly    Assessment/Plan   Diagnoses and all orders for this visit:    1. 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 History of radiation therapy- Left Nose    3. Neoplasm of uncertain behavior  Comments:  Left cheek      * Surgery not found *  No orders of the defined types were placed in this encounter.    Return in about 6 months (around 9/3/2022).       Patient Instructions   3 mm punch biopsy left cheek  Call or return for problems  Wound care as instructed; clean 3 times a day with warm soapy water and apply ointment provided  Follow up in 10 days for suture removal    Be aware of the signs and symptoms of recurrent head and neck cancer including neck mass, persistent or recurrent sore throat, ear pain, hemoptysis, weight loss and hoarseness. If any of these symptoms recur and or persist, call for an evaluation.     D/W patient increasing caloric intake and discussed cruciferous vegetables and protein shakes etc to maintain optimal nutrition.  The necessity of abstaining from tobacco products was also discussed particularly with respect to not smoking.    Continue F/U and/or treatment with Jean Carlos     No evidence of disease with respect to plasma cell cytoma  Continue to treat nose conservatively and will consider septoplasty or cauterization with more  significant epistaxis.

## 2022-03-03 ENCOUNTER — OFFICE VISIT (OUTPATIENT)
Dept: OTOLARYNGOLOGY | Facility: CLINIC | Age: 77
End: 2022-03-03

## 2022-03-03 VITALS
DIASTOLIC BLOOD PRESSURE: 75 MMHG | HEIGHT: 68 IN | TEMPERATURE: 98 F | RESPIRATION RATE: 20 BRPM | HEART RATE: 65 BPM | WEIGHT: 206 LBS | SYSTOLIC BLOOD PRESSURE: 150 MMHG | BODY MASS INDEX: 31.22 KG/M2

## 2022-03-03 DIAGNOSIS — D48.9 NEOPLASM OF UNCERTAIN BEHAVIOR: ICD-10-CM

## 2022-03-03 DIAGNOSIS — Z92.3 HISTORY OF RADIATION THERAPY: ICD-10-CM

## 2022-03-03 DIAGNOSIS — C90.21 EXTRAMEDULLARY PLASMACYTOMA IN REMISSION: Primary | ICD-10-CM

## 2022-03-03 PROCEDURE — 11104 PUNCH BX SKIN SINGLE LESION: CPT | Performed by: OTOLARYNGOLOGY

## 2022-03-03 PROCEDURE — 99213 OFFICE O/P EST LOW 20 MIN: CPT | Performed by: OTOLARYNGOLOGY

## 2022-03-03 PROCEDURE — 31231 NASAL ENDOSCOPY DX: CPT | Performed by: OTOLARYNGOLOGY

## 2022-03-03 NOTE — PATIENT INSTRUCTIONS
3 mm punch biopsy left cheek  Call or return for problems  Wound care as instructed; clean 3 times a day with warm soapy water and apply ointment provided  Follow up in 10 days for suture removal    Be aware of the signs and symptoms of recurrent head and neck cancer including neck mass, persistent or recurrent sore throat, ear pain, hemoptysis, weight loss and hoarseness. If any of these symptoms recur and or persist, call for an evaluation.     D/W patient increasing caloric intake and discussed cruciferous vegetables and protein shakes etc to maintain optimal nutrition.  The necessity of abstaining from tobacco products was also discussed particularly with respect to not smoking.    Continue F/U and/or treatment with Jean Carlos     No evidence of disease with respect to plasma cell cytoma  Continue to treat nose conservatively and will consider septoplasty or cauterization with more significant epistaxis.

## 2022-03-03 NOTE — PROGRESS NOTES
PROCEDURE NOTE    Manjit Seth    DATE OF PROCEDURE: 03/03/2022    PROCEDURE:   Punch Biopsy    PREPROCEDURE DIAGNOSIS:   Neoplasm of uncertain origin of the left cheek    POSTPROCEDURE DIAGNOSIS:  SAME      ANESTHESIA:   Injected 1% Lidocaine with 1:100,000 parts epinephrine solution -1 cc    PROCEDURE DESCRIPTION:    Following injection of local anesthesia, 3 mm punch biopsy was performed of the lesion. The epidermis was re-approximated with interrupted 5-0 nylon.  The specimen was submitted for permanent pathologic examination.  The patient tolerated the procedure well with no complications.

## 2022-03-03 NOTE — PROGRESS NOTES
PROCEDURE NOTE    Manjit Seth    DATE OF PROCEDURE: 3/3/2022    PROCEDURE:   Bilateral Diagnostic Rigid Nasal Endoscopy    PREPROCEDURE DIAGNOSIS:   History of plasma cell cytoma the left nasal cavity, history of epistaxis    POSTPROCEDURE DIAGNOSIS:  SAME    ANESTHESIA:   None    PROCEDURE DESCRIPTION:    With the patient in the chair bilateral diagnostic rigid nasal endoscopy was performed with the Stortz 0° endoscope without difficulty.     An endoscope was passed along the left nasal floor to the nasopharynx.  It was then passed into the region of the middle meatus, middle turbinate, and the sphenoethmoid region. An identical procedure was performed on the right side.  The following findings were noted as stated below:    Findings: Mild right septal deviation with hypervascularity of the left septum and Julia area of Kiesselbach's plexus.  No clotting is appreciated no significant other intranasal abnormalities are appreciated.  There is no evidence of recidivistic Plasma cell cytoma.    Condition:  Stable.  Patient tolerated procedure well.    Complications:  None  There was no significant bleeding.

## 2022-03-17 ENCOUNTER — TELEPHONE (OUTPATIENT)
Dept: OTOLARYNGOLOGY | Facility: CLINIC | Age: 77
End: 2022-03-17

## 2022-04-13 DIAGNOSIS — R04.0 EPISTAXIS: ICD-10-CM

## 2022-07-19 ENCOUNTER — HOSPITAL ENCOUNTER (OUTPATIENT)
Dept: INFUSION THERAPY | Age: 77
Discharge: HOME | End: 2022-07-19
Payer: MEDICARE

## 2022-07-19 DIAGNOSIS — D47.2 IGA MONOCLONAL GAMMOPATHY OF UNCERTAIN SIGNIFICANCE: ICD-10-CM

## 2022-07-19 LAB
CHOLESTEROL, TOTAL: 160 MG/DL (ref 160–199)
HBA1C MFR BLD: 7.2 % (ref 4–6)
HDLC SERPL-MCNC: 39 MG/DL (ref 55–121)
LDL CHOLESTEROL CALCULATED: 91 MG/DL
TRIGL SERPL-MCNC: 150 MG/DL (ref 0–149)

## 2022-07-19 PROCEDURE — 83883 ASSAY NEPHELOMETRY NOT SPEC: CPT

## 2022-07-19 PROCEDURE — 82784 ASSAY IGA/IGD/IGG/IGM EACH: CPT

## 2022-07-19 PROCEDURE — 82232 ASSAY OF BETA-2 PROTEIN: CPT

## 2022-07-20 LAB
BETA-2 MICROGLOBULIN: 2.4 MG/L
IGA: 228 MG/DL (ref 68–408)
IGG: 1260 MG/DL (ref 768–1632)
IGM: 22 MG/DL (ref 35–263)

## 2022-07-22 LAB
FREE KAPPA LIGHT CHAINS: 3.34 MG/DL (ref 0.37–1.94)
FREE KAPPA/LAMBDA RATIO: 2.88 (ref 0.26–1.65)
FREE LAMBDA LIGHT CHAINS: 1.16 MG/DL (ref 0.57–2.63)
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
## 2022-07-26 ENCOUNTER — OFFICE VISIT (OUTPATIENT)
Dept: HEMATOLOGY | Age: 77
End: 2022-07-26
Payer: MEDICARE

## 2022-07-26 ENCOUNTER — HOSPITAL ENCOUNTER (OUTPATIENT)
Dept: INFUSION THERAPY | Age: 77
Discharge: HOME | End: 2022-07-26
Payer: MEDICARE

## 2022-07-26 VITALS
HEIGHT: 69 IN | DIASTOLIC BLOOD PRESSURE: 58 MMHG | OXYGEN SATURATION: 96 % | WEIGHT: 205.1 LBS | SYSTOLIC BLOOD PRESSURE: 116 MMHG | BODY MASS INDEX: 30.38 KG/M2 | HEART RATE: 64 BPM

## 2022-07-26 DIAGNOSIS — D47.2 IGA MONOCLONAL GAMMOPATHY OF UNCERTAIN SIGNIFICANCE: Primary | ICD-10-CM

## 2022-07-26 DIAGNOSIS — Z00.00 HEALTH CARE MAINTENANCE: ICD-10-CM

## 2022-07-26 DIAGNOSIS — D47.2 IGA MONOCLONAL GAMMOPATHY OF UNCERTAIN SIGNIFICANCE: ICD-10-CM

## 2022-07-26 DIAGNOSIS — M85.80 OSTEOPENIA, UNSPECIFIED LOCATION: ICD-10-CM

## 2022-07-26 DIAGNOSIS — C90.20 EXTRAMEDULLARY PLASMACYTOMA, UNSPECIFIED WHETHER REMISSION ACHIEVED (HCC): ICD-10-CM

## 2022-07-26 LAB
BASOPHILS ABSOLUTE: 0.07 K/UL (ref 0.01–0.08)
BASOPHILS RELATIVE PERCENT: 0.8 % (ref 0.1–1.2)
EOSINOPHILS ABSOLUTE: 0.27 K/UL (ref 0.04–0.54)
EOSINOPHILS RELATIVE PERCENT: 3.1 % (ref 0.7–7)
HCT VFR BLD CALC: 41.4 % (ref 40.1–51)
HEMOGLOBIN: 13 G/DL (ref 13.7–17.5)
LYMPHOCYTES ABSOLUTE: 2.81 K/UL (ref 1.18–3.74)
LYMPHOCYTES RELATIVE PERCENT: 32.2 % (ref 19.3–53.1)
MCH RBC QN AUTO: 31 PG (ref 25.7–32.2)
MCHC RBC AUTO-ENTMCNC: 31.4 G/DL (ref 32.3–36.5)
MCV RBC AUTO: 98.8 FL (ref 79–92.2)
MONOCYTES ABSOLUTE: 0.51 K/UL (ref 0.24–0.82)
MONOCYTES RELATIVE PERCENT: 5.8 % (ref 4.7–12.5)
NEUTROPHILS ABSOLUTE: 5.04 K/UL (ref 1.56–6.13)
NEUTROPHILS RELATIVE PERCENT: 57.9 % (ref 34–71.1)
PDW BLD-RTO: 13.7 % (ref 11.6–14.4)
PLATELET # BLD: 178 K/UL (ref 163–337)
PMV BLD AUTO: 11.2 FL (ref 7.4–10.4)
RBC # BLD: 4.19 M/UL (ref 4.63–6.08)
WBC # BLD: 8.72 K/UL (ref 4.23–9.07)

## 2022-07-26 PROCEDURE — 99213 OFFICE O/P EST LOW 20 MIN: CPT | Performed by: INTERNAL MEDICINE

## 2022-07-26 PROCEDURE — 99212 OFFICE O/P EST SF 10 MIN: CPT

## 2022-07-26 PROCEDURE — 85025 COMPLETE CBC W/AUTO DIFF WBC: CPT

## 2022-07-26 PROCEDURE — G8427 DOCREV CUR MEDS BY ELIG CLIN: HCPCS | Performed by: INTERNAL MEDICINE

## 2022-07-26 PROCEDURE — G8417 CALC BMI ABV UP PARAM F/U: HCPCS | Performed by: INTERNAL MEDICINE

## 2022-07-26 PROCEDURE — 1123F ACP DISCUSS/DSCN MKR DOCD: CPT | Performed by: INTERNAL MEDICINE

## 2022-07-26 PROCEDURE — 4004F PT TOBACCO SCREEN RCVD TLK: CPT | Performed by: INTERNAL MEDICINE

## 2022-09-07 NOTE — PROGRESS NOTES
YOB: 1945  Location: [facility]  Location Address: [address] [place] [address], [facility], [address] [place] [address]  Location Phone: [phone]    Chief Complaint   Patient presents with   • Nose Problem       History of Present Illness  Manjit Seth is a 76 y.o. male.  Manjit Seth is here for follow up of ENT complaints. The patient has had problems with occasional mild epistaxis.  He states that he has only had 2-3 nose bleeds in the last 6 months.  The patient has had mild to moderate symptoms. The symptoms have been present for the last several years. There have been no identified factors that aggravate the symptoms. The symptoms are improved by nightly Bactroban ointment and intermittent nasal pressure when necessary.    He declines any new skin lesions or any recurrence      DERMATOLOGY - SCAN - DERMATOPATHOLOGY REPORT (2022)    Past Medical History:   Diagnosis Date   • Bell's palsy     with facial tremor   • COVID-19 vaccine series completed    • Diabetes mellitus (HCC)    • Extramedullary plasmacytoma (HCC)     left nose   • History of radiation therapy 2016    4000 cGy to nose completed on 3/28/16   • History of tobacco abuse    • Hyperlipidemia    • Hypertension    • Nosebleed occasional post radiation       Past Surgical History:   Procedure Laterality Date   • ANTERIOR CERVICAL DISCECTOMY W/ FUSION N/A 2019    Procedure: CERVICAL DISCECTOMY ANTERIOR WITH FUSION C3-4,4-5, 5-6 ACDF with neuromonitoring and 1 C-arm;  Surgeon: Pablito Islas MD;  Location: [facility];  Service: Neurosurgery   • COLONOSCOPY     • NASAL ENDOSCOPY      bilateral with excisional biopsy of left intranasal mass 16       Outpatient Medications Marked as Taking for the 22 encounter (Office Visit) with Jose Krueger MD   Medication Sig Dispense Refill   • ASPIRIN 81 PO Aspir-81   daily     • atenolol (TENORMIN) 50 MG tablet Take 50 mg by mouth Daily.     • Cranberry 400 MG  capsule Take 1 capsule by mouth Daily.     • Ergocalciferol (VITAMIN D2) 400 UNITS tablet Take  by mouth.     • finasteride (PROSCAR) 5 MG tablet Take 5 mg by mouth Daily.     • Garlic 10 MG capsule Take  by mouth.     • lisinopril (PRINIVIL,ZESTRIL) 5 MG tablet Take 5 mg by mouth Daily.     • metFORMIN (GLUCOPHAGE) 500 MG tablet      • Multiple Vitamins-Minerals (MULTIVITAMIN ADULT PO) Take  by mouth.     • mupirocin (BACTROBAN) 2 % ointment Apply  topically to the appropriate area as directed 3 (Three) Times a Day. 22 g 6   • Omega-3 Fatty Acids (FISH OIL) 1000 MG capsule capsule Take  by mouth Daily With Breakfast.     • simvastatin (ZOCOR) 40 MG tablet      • triamterene-hydrochlorothiazide (MAXZIDE-25) 37.5-25 MG per tablet Take 1 tablet by mouth Daily.         Patient has no known allergies.    Family History   Problem Relation Age of Onset   • Breast cancer Mother    • Cancer Mother         breast cancer   • Heart failure Father    • Diabetes Father    • Hypertension Father    • Cancer Maternal Uncle        Social History     Socioeconomic History   • Marital status:    Tobacco Use   • Smoking status: Former Smoker     Packs/day: 1.00     Years: 10.00     Pack years: 10.00     Types: Cigarettes     Start date: 1970     Quit date: 1977     Years since quittin.7   • Smokeless tobacco: Current User     Types: Chew   • Tobacco comment: dip when fishing   Vaping Use   • Vaping Use: Never used   Substance and Sexual Activity   • Alcohol use: No   • Drug use: No   • Sexual activity: Defer       Review of Systems   Constitutional: Negative.    HENT: Positive for nosebleeds.    Eyes: Negative.    Respiratory: Negative.    Cardiovascular: Negative.    Gastrointestinal: Negative.    Endocrine: Negative.    Genitourinary: Negative.    Musculoskeletal: Negative.    Skin: Negative.    Neurological: Negative.    Hematological: Negative.        Vitals:    22 0902   BP: 141/69   Pulse: 61   Resp: 16    Temp: 97.5 °F (36.4 °C)       Body mass index is 31.47 kg/m².    Objective     Physical Exam  Vitals reviewed.   Constitutional:       Appearance: Normal appearance. He is normal weight.   HENT:      Head: Normocephalic.      Right Ear: Hearing, tympanic membrane, ear canal and external ear normal.      Left Ear: Hearing, tympanic membrane, ear canal and external ear normal.      Nose: Nose normal.      Mouth/Throat:      Lips: Pink.      Mouth: Mucous membranes are moist.      Pharynx: Oropharynx is clear. Uvula midline.   Musculoskeletal:      Cervical back: Full passive range of motion without pain.   Neurological:      Mental Status: He is alert.   Psychiatric:         Behavior: Behavior is cooperative.         Assessment & Plan   Diagnoses and all orders for this visit:    1. 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 Epistaxis    3. History of radiation therapy- Left Nose    4. History of tobacco abuse      * Surgery not found *  No orders of the defined types were placed in this encounter.    Call with any new/worsening problems or concerns  Continue nasal ointment     Dr. Krueger examined and discussed care with patient and agrees with treatment plan.     Return in about 1 year (around 9/8/2023) for Recheck.       Patient Instructions   Call with any new/worsening problems or concerns  Continue nasal ointment    CONTACT INFORMATION:  The main office phone number is [phone]. For emergencies after hours and on weekends, this number will convert over to our answering service and the on call provider will answer. Please try to keep non emergent phone calls/ questions to office hours 9am-5pm Monday through Friday.      [employer]  As an alternative, you can sign up and use the Epic MyChart system for more direct and quicker access for non emergent questions/ problems.  [facility] [employer] allows you to send messages to your doctor, view your test results, renew your prescriptions,  schedule appointments, and more. To sign up, go to [email or web address] and click on the Sign Up Now link in the New User? box. Enter your [employer] Activation Code exactly as it appears below along with the last four digits of your Social Security Number and your Date of Birth () to complete the sign-up process. If you do not sign up before the expiration date, you must request a new code.     [employer] Activation Code: Activation code not generated  Current [employer] Status: Active     If you have questions, you can email [email or web address] or call [phone] to talk to our [employer] staff. Remember, [employer] is NOT to be used for urgent needs. For medical emergencies, dial 911.     IF YOU SMOKE OR USE TOBACCO PLEASE READ THE FOLLOWING:  Why is smoking bad for me?  Smoking increases the risk of heart disease, lung disease, vascular disease, stroke, and cancer. If you smoke, STOP!        IF YOU SMOKE OR USE TOBACCO PLEASE READ THE FOLLOWING:  Why is smoking bad for me?  Smoking increases the risk of heart disease, lung disease, vascular disease, stroke, and cancer. If you smoke, STOP!     For more information:  Quit Now [place]  1-800-QUIT-NOW  https://[place].quitlogix.org/en-US/

## 2022-09-08 ENCOUNTER — OFFICE VISIT (OUTPATIENT)
Dept: OTOLARYNGOLOGY | Facility: CLINIC | Age: 77
End: 2022-09-08

## 2022-09-08 VITALS
HEART RATE: 61 BPM | RESPIRATION RATE: 16 BRPM | WEIGHT: 210 LBS | SYSTOLIC BLOOD PRESSURE: 141 MMHG | TEMPERATURE: 97.5 F | HEIGHT: 69 IN | BODY MASS INDEX: 31.1 KG/M2 | DIASTOLIC BLOOD PRESSURE: 69 MMHG

## 2022-09-08 DIAGNOSIS — Z87.891 HISTORY OF TOBACCO ABUSE: ICD-10-CM

## 2022-09-08 DIAGNOSIS — C90.21 EXTRAMEDULLARY PLASMACYTOMA IN REMISSION: Primary | ICD-10-CM

## 2022-09-08 DIAGNOSIS — R04.0 EPISTAXIS: ICD-10-CM

## 2022-09-08 DIAGNOSIS — Z92.3 HISTORY OF RADIATION THERAPY: ICD-10-CM

## 2022-09-08 PROCEDURE — 99213 OFFICE O/P EST LOW 20 MIN: CPT | Performed by: NURSE PRACTITIONER

## 2022-09-08 NOTE — PATIENT INSTRUCTIONS
Call with any new/worsening problems or concerns  Continue nasal ointment    CONTACT INFORMATION:  The main office phone number is [phone]. For emergencies after hours and on weekends, this number will convert over to our answering service and the on call provider will answer. Please try to keep non emergent phone calls/ questions to office hours 9am-5pm Monday through Friday.      [employer]  As an alternative, you can sign up and use the Epic MyChart system for more direct and quicker access for non emergent questions/ problems.  [facility] allows you to send messages to your doctor, view your test results, renew your prescriptions, schedule appointments, and more. To sign up, go to [employer] and click on the Sign Up Now link in the New User? box. Enter your [employer] Activation Code exactly as it appears below along with the last four digits of your Social Security Number and your Date of Birth () to complete the sign-up process. If you do not sign up before the expiration date, you must request a new code.     [employer] Activation Code: Activation code not generated  Current [employer] Status: Active     If you have questions, you can email [email or web address] or call [phone] to talk to our [employer] staff. Remember, [employer] is NOT to be used for urgent needs. For medical emergencies, dial 911.     IF YOU SMOKE OR USE TOBACCO PLEASE READ THE FOLLOWING:  Why is smoking bad for me?  Smoking increases the risk of heart disease, lung disease, vascular disease, stroke, and cancer. If you smoke, STOP!        IF YOU SMOKE OR USE TOBACCO PLEASE READ THE FOLLOWING:  Why is smoking bad for me?  Smoking increases the risk of heart disease, lung disease, vascular disease, stroke, and cancer. If you smoke, STOP!     For more information:  Quit Now [place]  -QUIT-NOW  https://[place].quitlogix.org/en-US/

## 2023-01-17 ENCOUNTER — HOSPITAL ENCOUNTER (OUTPATIENT)
Dept: INFUSION THERAPY | Age: 78
Discharge: HOME | End: 2023-01-17
Payer: MEDICARE

## 2023-01-17 DIAGNOSIS — D47.2 IGA MONOCLONAL GAMMOPATHY OF UNCERTAIN SIGNIFICANCE: ICD-10-CM

## 2023-01-17 DIAGNOSIS — D47.2 IGA MONOCLONAL GAMMOPATHY OF UNCERTAIN SIGNIFICANCE: Primary | ICD-10-CM

## 2023-01-17 LAB
CHOLESTEROL, TOTAL: 133 MG/DL (ref 160–199)
HBA1C MFR BLD: 6.8 % (ref 4–6)
HCT VFR BLD CALC: 38.6 % (ref 40.1–51)
HDLC SERPL-MCNC: 43 MG/DL (ref 55–121)
HEMOGLOBIN: 12.2 G/DL (ref 13.7–17.5)
LDL CHOLESTEROL CALCULATED: 54 MG/DL
LYMPHOCYTES ABSOLUTE: 1.95 K/UL (ref 1.18–3.74)
LYMPHOCYTES RELATIVE PERCENT: 23.9 % (ref 19.3–53.1)
MCH RBC QN AUTO: 30 PG (ref 25.7–32.2)
MCHC RBC AUTO-ENTMCNC: 31.6 G/DL (ref 32.3–36.5)
MCV RBC AUTO: 94.8 FL (ref 79–92.2)
MONOCYTES ABSOLUTE: 0.45 K/UL (ref 0.24–0.82)
MONOCYTES RELATIVE PERCENT: 5.5 % (ref 4.7–12.5)
NEUTROPHILS ABSOLUTE: 5.34 K/UL (ref 1.56–6.13)
NEUTROPHILS RELATIVE PERCENT: 65.4 % (ref 34–71.1)
PDW BLD-RTO: 14.1 % (ref 11.6–14.4)
PLATELET # BLD: 191 K/UL (ref 163–337)
PMV BLD AUTO: 11.3 FL (ref 7.4–10.4)
RBC # BLD: 4.07 M/UL (ref 4.63–6.08)
TRIGL SERPL-MCNC: 178 MG/DL (ref 0–149)
WBC # BLD: 8.16 K/UL (ref 4.23–9.07)

## 2023-01-17 PROCEDURE — 85025 COMPLETE CBC W/AUTO DIFF WBC: CPT

## 2023-01-17 PROCEDURE — 36415 COLL VENOUS BLD VENIPUNCTURE: CPT

## 2023-01-18 LAB — BETA-2 MICROGLOBULIN: 2 MG/L

## 2023-01-19 LAB
+IMM: ABNORMAL
ALBUMIN SERPL-MCNC: 4.01 G/DL (ref 3.75–5.01)
ALPHA-1-GLOBULIN: 0.27 G/DL (ref 0.19–0.46)
ALPHA-2-GLOBULIN: 0.75 G/DL (ref 0.48–1.05)
BETA GLOBULIN: 0.73 G/DL (ref 0.48–1.1)
GAMMA GLOBULIN: 1.04 G/DL (ref 0.62–1.51)
IGA: 190 MG/DL (ref 68–408)
IGG: 1117 MG/DL (ref 768–1632)
IGM: 23 MG/DL (ref 35–263)
KAPPA FREE LIGHT CHAINS QNT: 41.92 MG/L (ref 3.3–19.4)
KAPPA/LAMBDA FREE LIGHT CHAIN RATIO: 3.37 (ref 0.26–1.65)
LAMBDA FREE LIGHT CHAINS QNT: 12.44 MG/L (ref 5.71–26.3)
SPE/IFE INTERPRETATION: ABNORMAL
TOTAL PROTEIN: 6.8 G/DL (ref 6.3–8.2)
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
## 2023-01-24 ENCOUNTER — OFFICE VISIT (OUTPATIENT)
Dept: HEMATOLOGY | Age: 78
End: 2023-01-24

## 2023-01-24 ENCOUNTER — HOSPITAL ENCOUNTER (OUTPATIENT)
Dept: INFUSION THERAPY | Age: 78
Discharge: HOME | End: 2023-01-24
Payer: MEDICARE

## 2023-01-24 VITALS
SYSTOLIC BLOOD PRESSURE: 130 MMHG | DIASTOLIC BLOOD PRESSURE: 70 MMHG | HEIGHT: 69 IN | WEIGHT: 216 LBS | BODY MASS INDEX: 31.99 KG/M2 | OXYGEN SATURATION: 95 % | HEART RATE: 72 BPM

## 2023-01-24 DIAGNOSIS — C90.20 EXTRAMEDULLARY PLASMACYTOMA, UNSPECIFIED WHETHER REMISSION ACHIEVED (HCC): ICD-10-CM

## 2023-01-24 DIAGNOSIS — D47.2 IGA MONOCLONAL GAMMOPATHY OF UNCERTAIN SIGNIFICANCE: Primary | ICD-10-CM

## 2023-01-24 DIAGNOSIS — Z00.00 HEALTH CARE MAINTENANCE: ICD-10-CM

## 2023-01-24 DIAGNOSIS — D47.2 IGA MONOCLONAL GAMMOPATHY OF UNCERTAIN SIGNIFICANCE: ICD-10-CM

## 2023-01-24 LAB
ALBUMIN SERPL-MCNC: 4.4 G/DL (ref 3.5–5.2)
ALP BLD-CCNC: 94 U/L (ref 40–130)
ALT SERPL-CCNC: 44 U/L (ref 21–72)
ANION GAP SERPL CALCULATED.3IONS-SCNC: 7 MMOL/L (ref 7–19)
AST SERPL-CCNC: 38 U/L (ref 17–59)
BILIRUB SERPL-MCNC: 0.6 MG/DL (ref 0.2–1.3)
BUN BLDV-MCNC: 14 MG/DL (ref 9–20)
CALCIUM SERPL-MCNC: 9.6 MG/DL (ref 8.4–10.2)
CHLORIDE BLD-SCNC: 103 MMOL/L (ref 98–111)
CO2: 29 MMOL/L (ref 22–29)
CREAT SERPL-MCNC: 0.7 MG/DL (ref 0.6–1.2)
GFR SERPL CREATININE-BSD FRML MDRD: >60 ML/MIN/{1.73_M2}
GLOBULIN: 3.4 G/DL
GLUCOSE BLD-MCNC: 153 MG/DL (ref 74–106)
POTASSIUM SERPL-SCNC: 4.9 MMOL/L (ref 3.5–5.1)
SODIUM BLD-SCNC: 139 MMOL/L (ref 137–145)
TOTAL PROTEIN: 7.7 G/DL (ref 6.3–8.2)

## 2023-01-24 PROCEDURE — 99212 OFFICE O/P EST SF 10 MIN: CPT

## 2023-01-24 PROCEDURE — 80053 COMPREHEN METABOLIC PANEL: CPT

## 2023-01-24 PROCEDURE — 36415 COLL VENOUS BLD VENIPUNCTURE: CPT

## 2023-07-18 ENCOUNTER — HOSPITAL ENCOUNTER (OUTPATIENT)
Dept: INFUSION THERAPY | Age: 78
Discharge: HOME | End: 2023-07-18
Payer: MEDICARE

## 2023-07-18 DIAGNOSIS — C90.20 EXTRAMEDULLARY PLASMACYTOMA, UNSPECIFIED WHETHER REMISSION ACHIEVED (HCC): ICD-10-CM

## 2023-07-18 DIAGNOSIS — D47.2 IGA MONOCLONAL GAMMOPATHY OF UNCERTAIN SIGNIFICANCE: ICD-10-CM

## 2023-07-18 LAB
ALBUMIN SERPL-MCNC: 4.5 G/DL (ref 3.5–5.2)
ALP SERPL-CCNC: 87 U/L (ref 40–130)
ALT SERPL-CCNC: 41 U/L (ref 21–72)
ANION GAP SERPL CALCULATED.3IONS-SCNC: 13 MMOL/L (ref 7–19)
AST SERPL-CCNC: 35 U/L (ref 17–59)
BILIRUB SERPL-MCNC: 0.5 MG/DL (ref 0.2–1.3)
BUN SERPL-MCNC: 17 MG/DL (ref 9–20)
CALCIUM SERPL-MCNC: 9.2 MG/DL (ref 8.4–10.2)
CHLORIDE SERPL-SCNC: 101 MMOL/L (ref 98–111)
CO2 SERPL-SCNC: 29 MMOL/L (ref 22–29)
CREAT SERPL-MCNC: 0.8 MG/DL (ref 0.6–1.2)
ERYTHROCYTE [DISTWIDTH] IN BLOOD BY AUTOMATED COUNT: 14.1 % (ref 11.6–14.4)
GLOBULIN: 3.2 G/DL
GLUCOSE SERPL-MCNC: 115 MG/DL (ref 74–106)
HCT VFR BLD AUTO: 40.8 % (ref 40.1–51)
HGB BLD-MCNC: 13.2 G/DL (ref 13.7–17.5)
LYMPHOCYTES # BLD: 2.28 K/UL (ref 1.18–3.74)
LYMPHOCYTES NFR BLD: 27.4 % (ref 19.3–53.1)
MCH RBC QN AUTO: 31.3 PG (ref 25.7–32.2)
MCHC RBC AUTO-ENTMCNC: 32.4 G/DL (ref 32.3–36.5)
MCV RBC AUTO: 96.7 FL (ref 79–92.2)
MONOCYTES # BLD: 0.46 K/UL (ref 0.24–0.82)
MONOCYTES NFR BLD: 5.5 % (ref 4.7–12.5)
NEUTROPHILS # BLD: 5.16 K/UL (ref 1.56–6.13)
NEUTS SEG NFR BLD: 62.2 % (ref 34–71.1)
PLATELET # BLD AUTO: 178 K/UL (ref 163–337)
PMV BLD AUTO: 10.8 FL (ref 7.4–10.4)
POTASSIUM SERPL-SCNC: 4.5 MMOL/L (ref 3.5–5.1)
PROT SERPL-MCNC: 7.7 G/DL (ref 6.3–8.2)
RBC # BLD AUTO: 4.22 M/UL (ref 4.63–6.08)
SODIUM SERPL-SCNC: 143 MMOL/L (ref 137–145)
WBC # BLD AUTO: 8.31 K/UL (ref 4.23–9.07)

## 2023-07-18 PROCEDURE — 80053 COMPREHEN METABOLIC PANEL: CPT

## 2023-07-18 PROCEDURE — 85025 COMPLETE CBC W/AUTO DIFF WBC: CPT

## 2023-07-18 PROCEDURE — 36415 COLL VENOUS BLD VENIPUNCTURE: CPT

## 2023-07-19 LAB — B2 MICROGLOB SERPL-MCNC: 2.2 MG/L

## 2023-07-20 LAB
ALBUMIN SERPL-MCNC: 4.26 G/DL (ref 3.75–5.01)
ALPHA1 GLOB SERPL ELPH-MCNC: 0.3 G/DL (ref 0.19–0.46)
ALPHA2 GLOB SERPL ELPH-MCNC: 0.75 G/DL (ref 0.48–1.05)
B-GLOBULIN SERPL ELPH-MCNC: 0.78 G/DL (ref 0.48–1.1)
DEPRECATED KAPPA LC FREE/LAMBDA SER: 5.93 {RATIO} (ref 0.26–1.65)
GAMMA GLOB SERPL ELPH-MCNC: 1.11 G/DL (ref 0.62–1.51)
IGA SERPL-MCNC: 189 MG/DL (ref 68–408)
IGG SERPL-MCNC: 1191 MG/DL (ref 768–1632)
IGM SERPL-MCNC: 27 MG/DL (ref 35–263)
INTERPRETATION SERPL IFE-IMP: ABNORMAL
INTERPRETATION SERPL IFE-IMP: ABNORMAL
KAPPA LC FREE SER-MCNC: 47.47 MG/L (ref 3.3–19.4)
LAMBDA LC FREE SERPL-MCNC: 8 MG/L (ref 5.71–26.3)
PROT SERPL-MCNC: 7.2 G/DL (ref 6.3–8.2)
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
## 2023-07-25 ENCOUNTER — HOSPITAL ENCOUNTER (OUTPATIENT)
Dept: INFUSION THERAPY | Age: 78
Discharge: HOME | End: 2023-07-25
Payer: MEDICARE

## 2023-07-25 ENCOUNTER — OFFICE VISIT (OUTPATIENT)
Dept: HEMATOLOGY | Age: 78
End: 2023-07-25
Payer: MEDICARE

## 2023-07-25 VITALS
SYSTOLIC BLOOD PRESSURE: 128 MMHG | HEIGHT: 69 IN | OXYGEN SATURATION: 98 % | DIASTOLIC BLOOD PRESSURE: 64 MMHG | WEIGHT: 217 LBS | HEART RATE: 71 BPM | TEMPERATURE: 98 F | BODY MASS INDEX: 32.14 KG/M2

## 2023-07-25 DIAGNOSIS — M85.80 OSTEOPENIA, UNSPECIFIED LOCATION: ICD-10-CM

## 2023-07-25 DIAGNOSIS — D47.2 IGA MONOCLONAL GAMMOPATHY OF UNCERTAIN SIGNIFICANCE: Primary | ICD-10-CM

## 2023-07-25 DIAGNOSIS — Z71.2 ENCOUNTER TO DISCUSS TEST RESULTS: ICD-10-CM

## 2023-07-25 DIAGNOSIS — Z00.00 HEALTH CARE MAINTENANCE: ICD-10-CM

## 2023-07-25 DIAGNOSIS — C90.20 EXTRAMEDULLARY PLASMACYTOMA, UNSPECIFIED WHETHER REMISSION ACHIEVED (HCC): ICD-10-CM

## 2023-07-25 PROCEDURE — G8427 DOCREV CUR MEDS BY ELIG CLIN: HCPCS | Performed by: INTERNAL MEDICINE

## 2023-07-25 PROCEDURE — 4004F PT TOBACCO SCREEN RCVD TLK: CPT | Performed by: INTERNAL MEDICINE

## 2023-07-25 PROCEDURE — 99214 OFFICE O/P EST MOD 30 MIN: CPT | Performed by: INTERNAL MEDICINE

## 2023-07-25 PROCEDURE — 99212 OFFICE O/P EST SF 10 MIN: CPT

## 2023-07-25 PROCEDURE — 1123F ACP DISCUSS/DSCN MKR DOCD: CPT | Performed by: INTERNAL MEDICINE

## 2023-07-25 PROCEDURE — G8417 CALC BMI ABV UP PARAM F/U: HCPCS | Performed by: INTERNAL MEDICINE

## 2023-10-31 ENCOUNTER — OFFICE VISIT (OUTPATIENT)
Dept: OTOLARYNGOLOGY | Facility: CLINIC | Age: 78
End: 2023-10-31
Payer: MEDICARE

## 2023-10-31 VITALS
RESPIRATION RATE: 16 BRPM | BODY MASS INDEX: 31.1 KG/M2 | WEIGHT: 210 LBS | HEIGHT: 69 IN | DIASTOLIC BLOOD PRESSURE: 73 MMHG | SYSTOLIC BLOOD PRESSURE: 137 MMHG | TEMPERATURE: 98 F | HEART RATE: 63 BPM

## 2023-10-31 DIAGNOSIS — C90.21 EXTRAMEDULLARY PLASMACYTOMA IN REMISSION: Primary | ICD-10-CM

## 2023-10-31 DIAGNOSIS — Z87.891 HISTORY OF TOBACCO ABUSE: ICD-10-CM

## 2023-10-31 DIAGNOSIS — R04.0 EPISTAXIS: ICD-10-CM

## 2023-10-31 DIAGNOSIS — Z92.3 HISTORY OF RADIATION THERAPY: ICD-10-CM

## 2023-10-31 RX ORDER — MULTIVITAMIN WITH IRON
1 TABLET ORAL DAILY
COMMUNITY

## 2023-10-31 NOTE — PROGRESS NOTES
PROCEDURE NOTE    Manjit Seth    DATE OF PROCEDURE: 10/31/2023    PROCEDURE:   Bilateral Diagnostic Rigid Nasal Endoscopy    PREPROCEDURE DIAGNOSIS:   History of extra-medullary plasma cell cytoma  History of epistaxis    POSTPROCEDURE DIAGNOSIS:  SAME    ANESTHESIA:   None    PROCEDURE DESCRIPTION:    With the patient in the chair bilateral diagnostic rigid nasal endoscopy was performed with the Stortz 0° endoscope without difficulty.     An endoscope was passed along the left nasal floor to the nasopharynx.  It was then passed into the region of the middle meatus, middle turbinate, and the sphenoethmoid region. An identical procedure was performed on the right side.  The following findings were noted as stated below:    Findings: Moderate hypervascularity in Julia area of Vahid box plexus on the left with no evidence of recent bleeding.  The septum was mildly deviated to the right with no obstruction and no intranasal polyposis appreciated bilaterally.    Condition:  Stable.  Patient tolerated procedure well.    Complications:  None  There was no significant bleeding.

## 2023-10-31 NOTE — PROGRESS NOTES
YOB: 1945  Location: [facility]  Location Address: [address] [place] [address], [facility], [address] [place] [address]  Location Phone: [phone]    Chief Complaint   Patient presents with    Cancer Surveillance       History of Present Illness  Manjit Seth is a 77 y.o. male.  Manjit Seth is here for follow up of ENT complaints. The patient has had problems with intermittent mild epistaxis.   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 He completed radiation in 2016. He is still followed by Dr. Medel every 6 months.   He is using nasal ointment as needed. He has not had a nose bleed for the last 3 months.     Past Medical History:   Diagnosis Date    Bell's palsy     with facial tremor    COVID-19 vaccine series completed     Diabetes mellitus     Extramedullary plasmacytoma     left nose    History of radiation therapy 2016    4000 cGy to nose completed on 3/28/16    History of tobacco abuse     Hyperlipidemia     Hypertension     Nosebleed occasional post radiation       Past Surgical History:   Procedure Laterality Date    ANTERIOR CERVICAL DISCECTOMY W/ FUSION N/A 2019    Procedure: CERVICAL DISCECTOMY ANTERIOR WITH FUSION C3-4,4-5, 5-6 ACDF with neuromonitoring and 1 C-arm;  Surgeon: Pablito Islas MD;  Location: [facility];  Service: Neurosurgery    CATARACT EXTRACTION Bilateral     COLONOSCOPY      NASAL ENDOSCOPY      bilateral with excisional biopsy of left intranasal mass 16       Outpatient Medications Marked as Taking for the 10/31/23 encounter (Office Visit) with Jose Krueger MD   Medication Sig Dispense Refill    ASPIRIN 81 PO Aspir-81   daily      atenolol (TENORMIN) 50 MG tablet Take 1 tablet by mouth Daily.      Cranberry 400 MG capsule Take 1 capsule by mouth Daily.      Ergocalciferol (VITAMIN D2) 400 UNITS tablet Take  by mouth.      finasteride (PROSCAR) 5 MG tablet Take 1 tablet by mouth Daily.      Garlic 10 MG  capsule Take  by mouth.      lisinopril (PRINIVIL,ZESTRIL) 5 MG tablet Take 1 tablet by mouth Daily.      metFORMIN (GLUCOPHAGE) 500 MG tablet       Multiple Vitamins-Minerals (MULTIVITAMIN ADULT PO) Take  by mouth.      mupirocin (BACTROBAN) 2 % ointment Apply  topically to the appropriate area as directed 3 (Three) Times a Day. 22 g 6    Omega-3 Fatty Acids (FISH OIL) 1000 MG capsule capsule Take  by mouth Daily With Breakfast.      simvastatin (ZOCOR) 40 MG tablet       triamterene-hydrochlorothiazide (MAXZIDE-25) 37.5-25 MG per tablet Take 1 tablet by mouth Daily.         Patient has no known allergies.    Family History   Problem Relation Age of Onset    Breast cancer Mother     Cancer Mother         breast cancer    Heart failure Father     Diabetes Father     Hypertension Father     Cancer Maternal Uncle         pancreatic cancer       Social History     Socioeconomic History    Marital status:    Tobacco Use    Smoking status: Former     Packs/day: 1.00     Years: 10.00     Additional pack years: 0.00     Total pack years: 10.00     Types: Cigarettes     Start date: 1970     Quit date: 1977     Years since quittin.8    Smokeless tobacco: Current     Types: Chew    Tobacco comments:     dip when fishing   Vaping Use    Vaping Use: Never used   Substance and Sexual Activity    Alcohol use: No    Drug use: No    Sexual activity: Defer       Review of Systems   Constitutional: Negative.    HENT:  Positive for nosebleeds.    Respiratory: Negative.     Neurological: Negative.        Vitals:    10/31/23 1002   BP: 137/73   Pulse: 63   Resp: 16   Temp: 98 °F (36.7 °C)       Body mass index is 31.01 kg/m².    Objective     Physical Exam  Vitals reviewed.   Constitutional:       Appearance: Normal appearance. He is normal weight.   HENT:      Head: Normocephalic.      Right Ear: Hearing and external ear normal.      Left Ear: Hearing and external ear normal.      Nose:      Comments: See  endoscopy     Mouth/Throat:      Lips: Pink.      Mouth: Mucous membranes are moist.   Musculoskeletal:      Cervical back: Full passive range of motion without pain.   Neurological:      Mental Status: He is alert.   Psychiatric:         Behavior: Behavior is cooperative.         Assessment & Plan   Diagnoses and all orders for this visit:    1. 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 History of radiation therapy- Left Nose    3. History of tobacco abuse    4. Epistaxis      * Surgery not found *  No orders of the defined types were placed in this encounter.    Be aware of the signs and symptoms of recurrent head and neck cancer including neck mass, persistent or recurrent sore throat, ear pain, hemoptysis, weight loss and hoarseness. If any of these symptoms recur and or persist, call for an evaluation.      D/W patient increasing caloric intake and discussed cruciferous vegetables and protein shakes etc to maintain optimal nutrition.  The necessity of abstaining from tobacco products was also discussed particularly with respect to not smoking     Dr. Krueger examined and discussed care with patient and agrees with treatment plan.     Return in about 1 year (around 10/31/2024) for Recheck.       Patient Instructions   Be aware of the signs and symptoms of recurrent head and neck cancer including neck mass, persistent or recurrent sore throat, ear pain, hemoptysis, weight loss and hoarseness. If any of these symptoms recur and or persist, call for an evaluation.      D/W patient increasing caloric intake and discussed cruciferous vegetables and protein shakes etc to maintain optimal nutrition.  The necessity of abstaining from tobacco products was also discussed particularly with respect to not smoking

## 2024-01-11 ENCOUNTER — TELEPHONE (OUTPATIENT)
Dept: OTOLARYNGOLOGY | Facility: CLINIC | Age: 79
End: 2024-01-11

## 2024-01-11 DIAGNOSIS — R04.0 EPISTAXIS: ICD-10-CM

## 2024-01-11 RX ORDER — CEFUROXIME AXETIL 500 MG/1
500 TABLET ORAL 2 TIMES DAILY
Qty: 20 TABLET | Refills: 0 | Status: SHIPPED | OUTPATIENT
Start: 2024-01-11 | End: 2024-01-21

## 2024-01-11 NOTE — PROGRESS NOTES
Patient called and states he started having a sore throat. That has progressed to congestion, sinus pain and pressure and hoarseness. He feels a roaring in his ear. He is having increased nosebleeds. We will give bactroban refill and abx.

## 2024-01-11 NOTE — TELEPHONE ENCOUNTER
Caller: HERVE DAVIS    Relationship to patient: SELF    Best call back number: [phone]    Patient is needing: ESTABLISHED PT HAVING HOARSENESS AND NOSE BLEEDS SEVERAL TIMES A DAY WITH HIS PLASMA CYTOMA. TINNITUS  PLEASE CALL PT DISCUSS NEXT STEP

## 2024-01-18 ENCOUNTER — OFFICE VISIT (OUTPATIENT)
Dept: OTOLARYNGOLOGY | Facility: CLINIC | Age: 79
End: 2024-01-18
Payer: MEDICARE

## 2024-01-18 ENCOUNTER — HOSPITAL ENCOUNTER (OUTPATIENT)
Dept: INFUSION THERAPY | Age: 79
Discharge: HOME | End: 2024-01-18
Payer: MEDICARE

## 2024-01-18 VITALS
WEIGHT: 210 LBS | BODY MASS INDEX: 31.1 KG/M2 | HEIGHT: 69 IN | HEART RATE: 58 BPM | DIASTOLIC BLOOD PRESSURE: 68 MMHG | TEMPERATURE: 97.5 F | SYSTOLIC BLOOD PRESSURE: 137 MMHG

## 2024-01-18 DIAGNOSIS — Z92.3 HISTORY OF RADIATION THERAPY: ICD-10-CM

## 2024-01-18 DIAGNOSIS — D47.2 IGA MONOCLONAL GAMMOPATHY OF UNCERTAIN SIGNIFICANCE: ICD-10-CM

## 2024-01-18 DIAGNOSIS — C90.21 EXTRAMEDULLARY PLASMACYTOMA IN REMISSION: Primary | ICD-10-CM

## 2024-01-18 DIAGNOSIS — R04.0 ANTERIOR EPISTAXIS: ICD-10-CM

## 2024-01-18 DIAGNOSIS — H93.12 TINNITUS AURIUM, LEFT: ICD-10-CM

## 2024-01-18 LAB
ALBUMIN SERPL-MCNC: 4.1 G/DL (ref 3.5–5.2)
ALP SERPL-CCNC: 128 U/L (ref 40–130)
ALT SERPL-CCNC: 62 U/L (ref 21–72)
ANION GAP SERPL CALCULATED.3IONS-SCNC: 4 MMOL/L (ref 7–19)
AST SERPL-CCNC: 47 U/L (ref 17–59)
BASOPHILS # BLD: 0.05 K/UL (ref 0.01–0.08)
BASOPHILS NFR BLD: 0.5 % (ref 0.1–1.2)
BILIRUB SERPL-MCNC: <0.2 MG/DL (ref 0.2–1.3)
BUN SERPL-MCNC: 17 MG/DL (ref 9–20)
CALCIUM SERPL-MCNC: 9.3 MG/DL (ref 8.4–10.2)
CHLORIDE SERPL-SCNC: 107 MMOL/L (ref 98–111)
CHOLEST SERPL-MCNC: 125 MG/DL (ref 160–199)
CO2 SERPL-SCNC: 30 MMOL/L (ref 22–29)
CREAT SERPL-MCNC: 1 MG/DL (ref 0.6–1.2)
EOSINOPHIL # BLD: 0.28 K/UL (ref 0.04–0.54)
EOSINOPHIL NFR BLD: 3 % (ref 0.7–7)
ERYTHROCYTE [DISTWIDTH] IN BLOOD BY AUTOMATED COUNT: 13.6 % (ref 11.6–14.4)
GLOBULIN: 3.3 G/DL
GLUCOSE SERPL-MCNC: 147 MG/DL (ref 74–106)
HBA1C MFR BLD: 6.6 % (ref 4–6)
HCT VFR BLD AUTO: 34.8 % (ref 40.1–51)
HDLC SERPL-MCNC: 33 MG/DL (ref 55–121)
HGB BLD-MCNC: 11.4 G/DL (ref 13.7–17.5)
LDLC SERPL CALC-MCNC: 50 MG/DL
LYMPHOCYTES # BLD: 2.35 K/UL (ref 1.18–3.74)
LYMPHOCYTES NFR BLD: 24.9 % (ref 19.3–53.1)
MCH RBC QN AUTO: 30.8 PG (ref 25.7–32.2)
MCHC RBC AUTO-ENTMCNC: 32.8 G/DL (ref 32.3–36.5)
MCV RBC AUTO: 94.1 FL (ref 79–92.2)
MONOCYTES # BLD: 0.54 K/UL (ref 0.24–0.82)
MONOCYTES NFR BLD: 5.7 % (ref 4.7–12.5)
NEUTROPHILS # BLD: 6.14 K/UL (ref 1.56–6.13)
NEUTS SEG NFR BLD: 65.3 % (ref 34–71.1)
PLATELET # BLD AUTO: 240 K/UL (ref 163–337)
PMV BLD AUTO: 10.4 FL (ref 7.4–10.4)
POTASSIUM SERPL-SCNC: 4.7 MMOL/L (ref 3.5–5.1)
PROT SERPL-MCNC: 7.3 G/DL (ref 6.3–8.2)
RBC # BLD AUTO: 3.7 M/UL (ref 4.63–6.08)
SODIUM SERPL-SCNC: 141 MMOL/L (ref 137–145)
TRIGL SERPL-MCNC: 212 MG/DL (ref 0–149)
WBC # BLD AUTO: 9.42 K/UL (ref 4.23–9.07)

## 2024-01-18 PROCEDURE — 80053 COMPREHEN METABOLIC PANEL: CPT

## 2024-01-18 PROCEDURE — 99212 OFFICE O/P EST SF 10 MIN: CPT

## 2024-01-18 PROCEDURE — 36415 COLL VENOUS BLD VENIPUNCTURE: CPT

## 2024-01-18 PROCEDURE — 85025 COMPLETE CBC W/AUTO DIFF WBC: CPT

## 2024-01-18 NOTE — PROGRESS NOTES
YOB: 1945  Location: [facility]  Location Address: [address] [place] [address],  [address], [address] [place] [address]  Location Phone: [phone]    Chief Complaint   Patient presents with    Nose Bleed     Nose bleeds started about 2 weeks ago , they are better now     Hoarse     Pt complains of some hoarseness that had gotten better    Sinus Problem     Pt on abx; pt has lots of drainage in throat     Ear Problem     Roaring in right ear        History of Present Illness  Manjit Seth is a 77 y.o. male.  Manjit Seth is here for follow up of ENT complaints. 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 He completed radiation in 2016. He is still followed by Dr. Medel every 6 months. The patient has had increased nosebleeds. He started using the bactroban and has not had one in two days. The patient called on 24 and has facial pain, hoarseness, drainage, and tinnitus in his right ear. He was placed on Ceftin and is feeling better today.     He does have some ringing in his left ear.  He does note that he has had a little bit more trouble hearing in the last 6 to 8 months than he had noted previously.    He is a [place]  and served in the [employer]       Past Medical History:   Diagnosis Date    Bell's palsy     with facial tremor    COVID-19 vaccine series completed     Diabetes mellitus     Extramedullary plasmacytoma     left nose    History of radiation therapy 2016    4000 cGy to nose completed on 3/28/16    History of tobacco abuse     Hyperlipidemia     Hypertension     Nosebleed occasional post radiation       Past Surgical History:   Procedure Laterality Date    ANTERIOR CERVICAL DISCECTOMY W/ FUSION N/A 2019    Procedure: CERVICAL DISCECTOMY ANTERIOR WITH FUSION C3-4,4-5, 5-6 ACDF with neuromonitoring and 1 C-arm;  Surgeon: Pablito Islas MD;  Location: [facility];  Service: Neurosurgery    CATARACT EXTRACTION Bilateral      COLONOSCOPY      NASAL ENDOSCOPY      bilateral with excisional biopsy of left intranasal mass 16       Outpatient Medications Marked as Taking for the 24 encounter (Office Visit) with Jose Krueger MD   Medication Sig Dispense Refill    ASPIRIN 81 PO self      atenolol (TENORMIN) 50 MG tablet Take 1 tablet by mouth Daily.      cefuroxime (CEFTIN) 500 MG tablet Take 1 tablet by mouth 2 (Two) Times a Day for 10 days. 20 tablet 0    Cranberry 400 MG capsule Take 1 capsule by mouth Daily.      Ergocalciferol (VITAMIN D2) 400 UNITS tablet Take  by mouth.      finasteride (PROSCAR) 5 MG tablet Take 1 tablet by mouth Daily.      FLUAD 0.5 ML suspension prefilled syringe   0    Garlic 10 MG capsule Take  by mouth.      lisinopril (PRINIVIL,ZESTRIL) 5 MG tablet Take 1 tablet by mouth Daily.      Magnesium 250 MG tablet Take 1 tablet by mouth Daily.      metFORMIN (GLUCOPHAGE) 500 MG tablet       Multiple Vitamins-Minerals (MULTIVITAMIN ADULT PO) Take  by mouth.      mupirocin (BACTROBAN) 2 % ointment Apply  topically to the appropriate area as directed 3 (Three) Times a Day. 22 g 6    Omega-3 Fatty Acids (FISH OIL) 1000 MG capsule capsule Take  by mouth Daily With Breakfast.      simvastatin (ZOCOR) 40 MG tablet       triamterene-hydrochlorothiazide (MAXZIDE-25) 37.5-25 MG per tablet Take 1 tablet by mouth Daily.         Patient has no known allergies.    Family History   Problem Relation Age of Onset    Breast cancer Mother     Cancer Mother         breast cancer    Heart failure Father     Diabetes Father     Hypertension Father     Cancer Maternal Uncle         pancreatic cancer       Social History     Socioeconomic History    Marital status:    Tobacco Use    Smoking status: Former     Packs/day: 1.00     Years: 10.00     Additional pack years: 0.00     Total pack years: 10.00     Types: Cigarettes     Start date: 1970     Quit date: 1977     Years since quittin.0    Smokeless  tobacco: Current     Types: Chew    Tobacco comments:     dip when fishing   Vaping Use    Vaping Use: Never used   Substance and Sexual Activity    Alcohol use: No    Drug use: No    Sexual activity: Defer       Review of Systems   Constitutional: Negative.    HENT:  Positive for congestion, postnasal drip, rhinorrhea and voice change.    Eyes: Negative.    Respiratory: Negative.     Cardiovascular: Negative.    Gastrointestinal: Negative.    Endocrine: Negative.    Genitourinary: Negative.    Musculoskeletal: Negative.    Skin: Negative.    Allergic/Immunologic: Negative.    Neurological: Negative.    Hematological: Negative.    Psychiatric/Behavioral: Negative.         Vitals:    01/18/24 0921   BP: 137/68   Pulse: 58   Temp: 97.5 °F (36.4 °C)       Body mass index is 31.01 kg/m².    Objective     Physical Exam  CONSTITUTIONAL: well nourished, well-developed, alert, oriented, in no acute distress     COMMUNICATION AND VOICE: able to communicate normally, normal voice quality    HEAD: normocephalic, no lesions, atraumatic, no tenderness, no masses     FACE: appearance normal, no lesions, no tenderness, no deformities, facial motion symmetric    EYES: ocular motility normal, eyelids normal, orbits normal, no proptosis, conjunctiva normal , pupils equal, round     EARS:  Hearing: hearing to conversational voice intact bilaterally   External Ears: normal bilaterally, no lesions    NOSE:  External Nose: external nasal structure normal, no tenderness on palpation, no nasal discharge, no lesions, no evidence of trauma, nostrils patent   Intranasal exam: See endoscopy    ORAL:  Lips: upper and lower lips without lesion     NECK:  Inspection and Palpation: neck appearance normal, no masses or tenderness    CHEST/RESPIRATORY: normal respiratory effort     CARDIOVASCULAR: no cyanosis or edema     NEUROLOGICAL/PSYCHIATRIC: oriented to time, place and person, mood normal, affect appropriate, CN II-XII intact grossly      Assessment & Plan   Diagnoses and all orders for this visit:    1. 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 History of radiation therapy- Left Nose    3. Anterior epistaxis    4. Tinnitus aurium, left      * Surgery not found *  No orders of the defined types were placed in this encounter.    No follow-ups on file.       Patient Instructions   Continue mupirocin and finish oral antibiotics  Call or return for problems  As tinnitus is improving and tympanogram demonstrates a low type A the left without evidence of fluid we will follow-up with audiometric evaluation in 3 to 4 months unless symptoms become progressively worse or recur.    No evidence of recurrent plasma cell cytoma

## 2024-01-18 NOTE — PROGRESS NOTES
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     An endoscope was passed along the left nasal floor to the nasopharynx.  It was then passed into the region of the middle meatus, middle turbinate, and the sphenoethmoid region. An identical procedure was performed on the right side.  The following findings were noted as stated below:    Findings: Moderate excoriation of the left anterior inferior septum or Julia area.  No active bleeding and no crusting noted.  Intranasal exam otherwise within normal limits.  The right nasal cavities essentially normal with mild inferior turbinate hypertrophy.  No evidence of recent recidivistic plasma cell cytoma or extra medullary disease intranasally    Condition:  Stable.  Patient tolerated procedure well.    Complications:  None  There was no significant bleeding.

## 2024-01-18 NOTE — PATIENT INSTRUCTIONS
Continue mupirocin and finish oral antibiotics  Call or return for problems  As tinnitus is improving and tympanogram demonstrates a low type A the left without evidence of fluid we will follow-up with audiometric evaluation in 3 to 4 months unless symptoms become progressively worse or recur.    No evidence of recurrent plasma cell cytoma

## 2024-01-20 LAB — B2 MICROGLOB SERPL-MCNC: 2.2 MG/L

## 2024-01-21 LAB
ALBUMIN SERPL-MCNC: 3.75 G/DL (ref 3.75–5.01)
ALPHA1 GLOB SERPL ELPH-MCNC: 0.36 G/DL (ref 0.19–0.46)
ALPHA2 GLOB SERPL ELPH-MCNC: 0.87 G/DL (ref 0.48–1.05)
B-GLOBULIN SERPL ELPH-MCNC: 0.76 G/DL (ref 0.48–1.1)
DEPRECATED KAPPA LC FREE/LAMBDA SER: 3.59 {RATIO} (ref 0.26–1.65)
EER MONOCLONAL PROTEIN AND FLC, SERUM: ABNORMAL
GAMMA GLOB SERPL ELPH-MCNC: 1.06 G/DL (ref 0.62–1.51)
IGA SERPL-MCNC: 189 MG/DL (ref 68–408)
IGG SERPL-MCNC: 1126 MG/DL (ref 768–1632)
IGM SERPL-MCNC: 23 MG/DL (ref 35–263)
INTERPRETATION SERPL IFE-IMP: ABNORMAL
INTERPRETATION SERPL IFE-IMP: ABNORMAL
KAPPA LC FREE SER-MCNC: 51.32 MG/L (ref 3.3–19.4)
LAMBDA LC FREE SERPL-MCNC: 14.28 MG/L (ref 5.71–26.3)
MONOCLONAL PROTEIN, SERUM: 0.75 G/DL
PROT SERPL-MCNC: 6.8 G/DL (ref 6.3–8.2)

## 2024-01-23 ENCOUNTER — TELEPHONE (OUTPATIENT)
Dept: HEMATOLOGY | Age: 79
End: 2024-01-23

## 2024-01-23 NOTE — TELEPHONE ENCOUNTER
Called pt. to remind them of appointment on 1/25/2024 and had to leave a detailed voicemail with appointment date and time.

## 2024-01-24 LAB
ALBUMIN ?TM MFR UR ELPH: 93.1 %
ALPHA1 GLOB ?TM MFR UR ELPH: 5.6 %
ALPHA2 GLOB ?TM MFR UR ELPH: 1.3 %
B-GLOBULIN ?TM MFR UR ELPH: 0 %
COLLECT DURATION TIME SPEC: 24 H
INTERPRETATION UR IFE-IMP: NORMAL
M PROTEIN 24H MFR UR ELPH: 0 %
M PROTEIN 24H UR ELPH-MRATE: 0 MG/24 HRS
PATHOLOGY STUDY: NORMAL
PROT 24H UR-MRATE: <116 MG/D (ref 40–150)
PROT UR-MCNC: <4 MG/DL
SPECIMEN VOL ?TM UR: 2900 ML

## 2024-01-24 NOTE — PROGRESS NOTES
left hemifacial and eye spasm problem, a chronic issue.    CBC 1/18/2024 reveals a WBC of 9.42 Hgb is 11.4 with an MCV of 94.1 and platelet count of 240,000.    Serology on 1/16/2020 revealed:  IgG 1161  IgA 304  IgM 25  M-spike- 0.5  Kappa light chains- 25.9  Lambda light chains- 10.6  K/L ratio- 2.44    Serology on 7/16/2020 revealed:  CMP with CO2 30, glucose 132, Alk Phos 230, ALT 20   B2M- 1.4  Kappa light chains- 30.7  Lambda light chains- 12.0  K/L ratio- 2.56  IgG 1093,   IgA 269,   IgM 22    Serology on 1/14/2021 revealed:  CMP with glucose 108  B2M- 5.4  Kappa light chains- 34.1  Lambda light chains- 9.7  K/L ratio- 3.52  IgG 1141,   IgA 258,   IgM 22  M-spike- 0.5  Hgb A1C- 6.1  TSH- 3.060    Serology on 8/5/2021 revealed:  B2M-1.7  Kappa light chains- 42.6  Lambda light chains-12.0  K/L ratio-3.55  IgG-1188 ,IgA-242 , IgM-26  M-spike-0.6    [facility] + Protein Electrophoresis, 24 hour urine on 8/5/2021  Total protein - 4.5 mg/dl  24 hour protein, calculated - 135 mg/hr  Albumin - 9.3%  Alpha 1 globulin - 4.3%  Alpha 2-globulin - 25%  Beta globulin - 22.5%  Gamma globuulin- 28.9%  M-spike%- due to small quantiiy of monoclonal protein, unable to quantitate the M-spike.  Immunofixation: kappa type    Serology on 2/10/2022 revealed:  B2M-1.9  Kappa light chains- 38.7  Lambda light chains-12.09  K/L ratio-3.20  IgG-1188 ,IgA-233 , IgM-22  M-spike-0.74    [facility] + Protein Electrophoresis, 24 hour urine on 2/10/2022:  Total protein - 4.5 mg/dl  24 hour protein, calculated - 78 mg/hr  Albumin - 3.93%  Alpha 1 globulin - 0.34%  Alpha 2-globulin - 0.83%  Beta globulin - 0.77%  Gamma globuulin- 1.12%    Serology on 7/19/2022 revealed:  B2M-2.4  Kappa light chains- 33.4  Lambda light chains-11.6  K/L ratio-2.88  IgG-1260 ,IgA-228 , IgM-22    Serology on 1/17/2023 revealed:  B2M-2.0  Kappa light chains- 41.92  Lambda light chains-12.44  K/L ratio-3.37  IgG-1117 ,IgA-190 , IgM-23    Serology on 7/18/2023 revealed:   B2M-

## 2024-01-25 ENCOUNTER — OFFICE VISIT (OUTPATIENT)
Dept: HEMATOLOGY | Age: 79
End: 2024-01-25
Payer: MEDICARE

## 2024-01-25 ENCOUNTER — HOSPITAL ENCOUNTER (OUTPATIENT)
Dept: INFUSION THERAPY | Age: 79
Discharge: HOME | End: 2024-01-25
Payer: MEDICARE

## 2024-01-25 VITALS
BODY MASS INDEX: 30.07 KG/M2 | WEIGHT: 203 LBS | HEART RATE: 90 BPM | DIASTOLIC BLOOD PRESSURE: 74 MMHG | HEIGHT: 69 IN | SYSTOLIC BLOOD PRESSURE: 152 MMHG | TEMPERATURE: 98.2 F | OXYGEN SATURATION: 95 %

## 2024-01-25 DIAGNOSIS — D47.2 IGA MONOCLONAL GAMMOPATHY OF UNCERTAIN SIGNIFICANCE: Primary | ICD-10-CM

## 2024-01-25 DIAGNOSIS — Z00.00 HEALTH CARE MAINTENANCE: ICD-10-CM

## 2024-01-25 DIAGNOSIS — C90.20 EXTRAMEDULLARY PLASMACYTOMA, UNSPECIFIED WHETHER REMISSION ACHIEVED (HCC): ICD-10-CM

## 2024-01-25 PROCEDURE — G8427 DOCREV CUR MEDS BY ELIG CLIN: HCPCS | Performed by: INTERNAL MEDICINE

## 2024-01-25 PROCEDURE — 99214 OFFICE O/P EST MOD 30 MIN: CPT | Performed by: INTERNAL MEDICINE

## 2024-01-25 PROCEDURE — 4004F PT TOBACCO SCREEN RCVD TLK: CPT | Performed by: INTERNAL MEDICINE

## 2024-01-25 PROCEDURE — 1123F ACP DISCUSS/DSCN MKR DOCD: CPT | Performed by: INTERNAL MEDICINE

## 2024-01-25 PROCEDURE — G8417 CALC BMI ABV UP PARAM F/U: HCPCS | Performed by: INTERNAL MEDICINE

## 2024-01-25 PROCEDURE — 99212 OFFICE O/P EST SF 10 MIN: CPT

## 2024-01-25 PROCEDURE — G8484 FLU IMMUNIZE NO ADMIN: HCPCS | Performed by: INTERNAL MEDICINE

## 2024-05-21 NOTE — PROGRESS NOTES
YOB: 1945  Location: [facility]  Location Address: [address] [place] [address], [facility] [address], [address] [place] [address]  Location Phone: [phone]    Chief Complaint   Patient presents with    Follow-up     Hearing loss, tinnitus       History of Present Illness  Manjit Seth is a 78 y.o. male.  Manjit Seth is here for follow up of ENT complaints. The patient has had problems with intermittent tinnitus   Patient states this is not constant in nature and does not bother him significantly   Patient feels as if his hearing is not significantly decreased   He has a history of excision of plasmacytoma on 2016 and completed radiation in 2016   He is followed by Dr. Medel every 6 months  He is using mupirocin as needed and has had a decrease in episodes of epistaxis       Procedure visit with Hamida Ortega AUD (2024)      Past Medical History:   Diagnosis Date    Bell's palsy     with facial tremor    COVID-19 vaccine series completed     Diabetes mellitus     Extramedullary plasmacytoma     left nose    History of radiation therapy 2016    4000 cGy to nose completed on 3/28/16    History of tobacco abuse     Hyperlipidemia     Hypertension     Nosebleed occasional post radiation       Past Surgical History:   Procedure Laterality Date    ANTERIOR CERVICAL DISCECTOMY W/ FUSION N/A 2019    Procedure: CERVICAL DISCECTOMY ANTERIOR WITH FUSION C3-4,4-5, 5-6 ACDF with neuromonitoring and 1 C-arm;  Surgeon: Pablito Islas MD;  Location: [facility];  Service: Neurosurgery    CATARACT EXTRACTION Bilateral     COLONOSCOPY      NASAL ENDOSCOPY      bilateral with excisional biopsy of left intranasal mass 16       No outpatient medications have been marked as taking for the 24 encounter (Office Visit) with Jose Krueger MD.       Patient has no known allergies.    Family History   Problem Relation Age of Onset    Breast cancer Mother     Cancer Mother         breast  cancer    Heart failure Father     Diabetes Father     Hypertension Father     Cancer Maternal Uncle         pancreatic cancer       Social History     Socioeconomic History    Marital status:    Tobacco Use    Smoking status: Former     Current packs/day: 0.00     Average packs/day: 1 pack/day for 10.0 years (10.0 ttl pk-yrs)     Types: Cigarettes     Start date: 1970     Quit date: 1977     Years since quittin.4    Smokeless tobacco: Current     Types: Chew    Tobacco comments:     dip when fishing   Vaping Use    Vaping status: Never Used   Substance and Sexual Activity    Alcohol use: No    Drug use: No    Sexual activity: Defer       Review of Systems   Constitutional: Negative.    HENT:  Positive for tinnitus.        Vitals:    24 1006   BP: 143/66   Pulse: 65   Temp: 97.5 °F (36.4 °C)       Body mass index is 30.86 kg/m².    Objective     Physical Exam  Vitals reviewed.   Constitutional:       Appearance: He is obese.   HENT:      Head: Normocephalic.      Right Ear: Tympanic membrane and external ear normal. Decreased hearing noted.      Left Ear: Tympanic membrane and external ear normal. Decreased hearing noted.      Nose: Nose normal.      Mouth/Throat:      Lips: Pink.   Neurological:      Mental Status: He is alert.         Assessment & Plan   Problems Addressed this Visit          ENT    Tinnitus aurium, left - Primary     Other Visit Diagnoses       Mixed conductive and sensorineural hearing loss of left ear with restricted hearing of right ear              Diagnoses         Codes Comments    Tinnitus aurium, left    -  Primary ICD-10-CM: H93.12  ICD-9-CM: 388.31     Mixed conductive and sensorineural hearing loss of left ear with restricted hearing of right ear     ICD-10-CM: H90.A32  ICD-9-CM: 389.22           * Surgery not found *  No orders of the defined types were placed in this encounter.    No follow-ups on file.     Discussed annual hearing test patient wishes to  defer at this time     There are no Patient Instructions on file for this visit.

## 2024-05-22 NOTE — PROGRESS NOTES
AUDIOMETRIC EVALUATION      Name:  Manjit Seth  :  1945  Age:  78 y.o.  Date of Evaluation:  2024       History:  Reason for visit:  Mr. Seth is seen today at the request of Jose Krueger MD for a hearing evaluation. Patient was seen by ENT provider on 2024 with complaints of tinnitus.  He does not think he has much difficulty hearing, but reports bass/low pitched sounds tend to sound funny on the TV. He also reports an occasional roaring tinnitus. He has not had a hearing test recently.    PE Tubes:  no, both ears   Other otologic surgical history: no, both ears  Tinnitus:  yes, occasional roaring in both ears, with the right ear worse than the left   Dizziness:  no  Noise Exposure: yes, guns (left-handed)  Aural Fullness:  yes, right ear  Otalgia: no, both ears  Family history of hearing loss: yes, mother  Other significant history:  high blood pressure, type II diabetes, plasmacytoma of left nose with radiation  Head trauma requiring hospital stay: no  Previous brain MRI: no      EVALUATION:        RESULTS:    Otoscopic Evaluation:  Bilateral: minimal cerumen, tympanic membrane visualized     Tympanometry (226 Hz):  Bilateral: Type A- normal    Pure Tone Audiometry (via inserts with good reliability):    Right: normal precipitously sloping to moderately severe sensorineural hearing loss  Left: mild rising to normal, precipitously sloping to severe mixed hearing loss    Speech Audiometry:   Right: Speech Reception Threshold (SRT) was obtained at 15 dBHL  Word Recognition scores-  92% (excellent/within normal limits, %)   Presented at 55dBHL with 35dB of masking in opposite ear  Using NU-6 List 4A, 25 words  Left: Speech Reception Threshold (SRT) was obtained at 30 dBHL  Word Recognition scores-  96% (excellent/within normal limits, %)   Presented at 70dBHL with 50dB of masking in opposite ear  Using NU-6 List 4A, 25 words    IMPRESSIONS:  Tympanometry showed  normal middle ear pressure and static compliance, for both ears. Pure tone thresholds for the right ear show a normal precipitously sloping to moderately severe sensorineural hearing loss, suggesting normal outer/middle ear function and abnormal cochlear/retrocochlear function. Pure tone thresholds for the left ear show a mild rising to normal, precipitously sloping to severe mixed hearing loss, suggesting abnormal outer/middle ear function and abnormal cochlear/retrocochlear function. Patient was counseled with regard to the findings.    Amplification needs:  Patient could benefit from amplification if they feel increased communication difficulties.    Diagnosis:  1. Mixed conductive and sensorineural hearing loss of left ear with restricted hearing of right ear    2. Sensorineural hearing loss (SNHL) of right ear with restricted hearing of left ear    3. Tinnitus of both ears         RECOMMENDATIONS/PLAN:  Follow-up recommendations per Jose Krueger MD    Audiologic follow-up after medical intervention or in 3 months  Return for audiologic testing if you begin to changes in hearing or concerns arise  Hearing aid evaluation and counseling upon medical clearance and patient motivation  Use communication strategies such as looking at the speaker when they talk, have them get your attention before they speak, have them rephrase instead of repeat if you cannot understand them, limit background noise and be in the same room as the speaker  Use hearing protection around loud noises such as lawn equipment, power tools, and firearms  Avoid silence when possible. Sleep with white noise/fan, or listen to nature sounds     EDUCATION:  Provided list of hearing aid places  Discussed results and recommendations with patient. Questions were addressed and the patient was encouraged to contact our department should concerns arise.        Sonia Rahman, CCC-A, F-AAA  Licensed Audiologist

## 2024-05-23 ENCOUNTER — PROCEDURE VISIT (OUTPATIENT)
Dept: OTOLARYNGOLOGY | Facility: CLINIC | Age: 79
End: 2024-05-23
Payer: MEDICARE

## 2024-05-23 ENCOUNTER — OFFICE VISIT (OUTPATIENT)
Dept: OTOLARYNGOLOGY | Facility: CLINIC | Age: 79
End: 2024-05-23
Payer: MEDICARE

## 2024-05-23 VITALS
HEIGHT: 69 IN | HEART RATE: 65 BPM | SYSTOLIC BLOOD PRESSURE: 143 MMHG | DIASTOLIC BLOOD PRESSURE: 66 MMHG | BODY MASS INDEX: 30.96 KG/M2 | TEMPERATURE: 97.5 F | WEIGHT: 209 LBS

## 2024-05-23 DIAGNOSIS — H93.12 TINNITUS AURIUM, LEFT: Primary | ICD-10-CM

## 2024-05-23 DIAGNOSIS — H90.A32 MIXED CONDUCTIVE AND SENSORINEURAL HEARING LOSS OF LEFT EAR WITH RESTRICTED HEARING OF RIGHT EAR: Primary | ICD-10-CM

## 2024-05-23 DIAGNOSIS — H90.A21 SENSORINEURAL HEARING LOSS (SNHL) OF RIGHT EAR WITH RESTRICTED HEARING OF LEFT EAR: ICD-10-CM

## 2024-05-23 DIAGNOSIS — H90.A32 MIXED CONDUCTIVE AND SENSORINEURAL HEARING LOSS OF LEFT EAR WITH RESTRICTED HEARING OF RIGHT EAR: ICD-10-CM

## 2024-05-23 DIAGNOSIS — H93.13 TINNITUS OF BOTH EARS: ICD-10-CM

## 2024-06-10 ENCOUNTER — TELEPHONE (OUTPATIENT)
Dept: OTOLARYNGOLOGY | Facility: CLINIC | Age: 79
End: 2024-06-10
Payer: MEDICARE

## 2024-06-10 NOTE — TELEPHONE ENCOUNTER
Caller: HERVE DAVIS    Relationship to patient: SELF    Best call back number: [phone]    Patient is needing: PATIENT CALLED WANTED TO SCHEDULE AN APPOINTMENT WITH DR ANN. HE SAID HE BELIEVES HE HAS A CANCER ON THE END OF HIS NOSE. HE HAS A HISTORY OF PLASMA AND DR ANN DID SURGERY ON HIM IN 2016. HE'S CONCERNED IT MAY BE SQUAMOUS AND WOULD LIKE A CALL BACK FOR ASSISTANCE.

## 2024-06-18 NOTE — PROGRESS NOTES
YOB: 1945  Location: [facility]  Location Address: [address] [place] [address], [facility], [address] [place] [address]  Location Phone: [phone]    Chief Complaint   Patient presents with    Skin Lesion     Nose       History of Present Illness  Manjit Seth is a 78 y.o. male.  Manjit Seth is here for evaluation of ENT complaints. The patient has had problems with lesion on the right nasal tip for approximately 6 months. It is gradually getting larger but is not tender.  He has no other complaints.       Past Medical History:   Diagnosis Date    Bell's palsy     with facial tremor    COVID-19 vaccine series completed     Diabetes mellitus     Extramedullary plasmacytoma     left nose    History of radiation therapy 2016    4000 cGy to nose completed on 3/28/16    History of tobacco abuse     Hyperlipidemia     Hypertension     Nosebleed occasional post radiation       Past Surgical History:   Procedure Laterality Date    ANTERIOR CERVICAL DISCECTOMY W/ FUSION N/A 2019    Procedure: CERVICAL DISCECTOMY ANTERIOR WITH FUSION C3-4,4-5, 5-6 ACDF with neuromonitoring and 1 C-arm;  Surgeon: Pablito Islas MD;  Location: [facility];  Service: Neurosurgery    CATARACT EXTRACTION Bilateral     COLONOSCOPY      NASAL ENDOSCOPY      bilateral with excisional biopsy of left intranasal mass 16       No outpatient medications have been marked as taking for the 24 encounter (Office Visit) with Jose Krueger MD.       Patient has no known allergies.    Family History   Problem Relation Age of Onset    Breast cancer Mother     Cancer Mother         breast cancer    Heart failure Father     Diabetes Father     Hypertension Father     Cancer Maternal Uncle         pancreatic cancer       Social History     Socioeconomic History    Marital status:    Tobacco Use    Smoking status: Former     Current packs/day: 0.00     Average packs/day: 1 pack/day for 10.0 years (10.0 ttl pk-yrs)      Types: Cigarettes     Start date: 1970     Quit date: 1977     Years since quittin.4    Smokeless tobacco: Current     Types: Chew    Tobacco comments:     dip when fishing   Vaping Use    Vaping status: Never Used   Substance and Sexual Activity    Alcohol use: No    Drug use: No    Sexual activity: Defer       Review of Systems  Negative except as above in HPI  Vitals:    24 1529   BP: 148/62   Pulse: 67   Temp: 98.6 °F (37 °C)       Body mass index is 31.01 kg/m².    Objective     Physical Exam     CONSTITUTIONAL: well nourished, well-developed, alert, oriented, in no acute distress     COMMUNICATION AND VOICE: able to communicate normally, normal voice quality    HEAD: normocephalic, no lesions, atraumatic, no tenderness, no masses     FACE: appearance normal, no lesions, no tenderness, no deformities, facial motion symmetric    EYES: ocular motility normal, eyelids normal, orbits normal, no proptosis, conjunctiva normal , pupils equal, round     EARS:  Hearing: hearing to conversational voice intact bilaterally   External Ears: normal bilaterally, no lesions    NOSE:  External Nose: external nasal structure normal, no tenderness on palpation, no nasal discharge, 6 mm RPP with mild sclerosis right nasal tip, no evidence of trauma, nostrils patent     ORAL:  Lips: upper and lower lips without lesion     NECK:  Inspection and Palpation: neck appearance normal, no masses or tenderness    CHEST/RESPIRATORY: normal respiratory effort     CARDIOVASCULAR: no cyanosis or edema     NEUROLOGICAL/PSYCHIATRIC: oriented to time, place and person, mood normal, affect appropriate, CN II-XII intact grossly     Assessment & Plan   Diagnoses and all orders for this visit:    1. Neoplasm of uncertain behavior of skin of nose (Primary)      * Surgery not found *  No orders of the defined types were placed in this encounter.    No follow-ups on file.       Patient Instructions   Risk benefits and options of  "biopsy (3 mm punch) were discussed with patient at length.  He understands and wishes to proceed.    He will be tentatively scheduled for surgery in early August.  If the biopsy comes back negative for malignancy this may be reconsidered.    Excision of lesion of the right nasal tip with frozen section possible flap or graft.  The risks, benefits and options of the procedure were explained to the patient. The possiblity of a persistent cosmetic defect as well as a \"flap\" or a graft to close the defect was discussed. The patient was instructed to stop all aspirin, NSAIDs and VIT E etc.  If appropriate, clearance with primary MD or specialist will be obtained preoperatively.  The patient was scheduled for a LOCAL/MAC Anesthesia with a frozen section for margin control.    For instructions on the proper use, care and disposal of controled substances, ask you doctor or refer to the [place] Board of Medicine website: http://kbml.[place].gov/hb1/Pages/Considerations-For-Patient-Education.aspx   "

## 2024-06-20 ENCOUNTER — OFFICE VISIT (OUTPATIENT)
Dept: OTOLARYNGOLOGY | Facility: CLINIC | Age: 79
End: 2024-06-20
Payer: MEDICARE

## 2024-06-20 VITALS
WEIGHT: 210 LBS | SYSTOLIC BLOOD PRESSURE: 148 MMHG | HEIGHT: 69 IN | DIASTOLIC BLOOD PRESSURE: 62 MMHG | TEMPERATURE: 98.6 F | BODY MASS INDEX: 31.1 KG/M2 | HEART RATE: 67 BPM

## 2024-06-20 DIAGNOSIS — D48.5 NEOPLASM OF UNCERTAIN BEHAVIOR OF SKIN OF NOSE: Primary | ICD-10-CM

## 2024-06-20 NOTE — PROGRESS NOTES
PROCEDURE NOTE    Manjit Seth    DATE OF PROCEDURE: 06/20/2024    PROCEDURE:   Punch Biopsy    PREPROCEDURE DIAGNOSIS:   Neoplasm uncertain behavior of the right nasal tip      POSTPROCEDURE DIAGNOSIS:  SAME      ANESTHESIA:   Injected 1% Lidocaine with 1:100,000 parts epinephrine solution -1    PROCEDURE DESCRIPTION:    Following injection of local anesthesia, 3 mm punch biopsy was performed of the lesion of the right nasal tip. The epidermis was re-approximated with interrupted 5-0 nylon.  The specimen was submitted for permanent pathologic examination.  The patient tolerated the procedure well with no complications.

## 2024-06-20 NOTE — PATIENT INSTRUCTIONS
"Risk benefits and options of biopsy (3 mm punch) were discussed with patient at length.  He understands and wishes to proceed.    He will be tentatively scheduled for surgery in early August.  If the biopsy comes back negative for malignancy this may be reconsidered.    Excision of lesion of the right nasal tip with frozen section possible flap or graft.  The risks, benefits and options of the procedure were explained to the patient. The possiblity of a persistent cosmetic defect as well as a \"flap\" or a graft to close the defect was discussed. The patient was instructed to stop all aspirin, NSAIDs and VIT E etc.  If appropriate, clearance with primary MD or specialist will be obtained preoperatively.  The patient was scheduled for a LOCAL/MAC Anesthesia with a frozen section for margin control.    For instructions on the proper use, care and disposal of controled substances, ask you doctor or refer to the [place] Board of Medicine website: http://kbml.[place].gov/hb1/Pages/Considerations-For-Patient-Education.aspx   "

## 2024-06-24 ENCOUNTER — TELEPHONE (OUTPATIENT)
Dept: OTOLARYNGOLOGY | Facility: CLINIC | Age: 79
End: 2024-06-24

## 2024-06-24 NOTE — TELEPHONE ENCOUNTER
Hub staff attempted to follow warm transfer process and was unsuccessful     Caller: Manjit Seth    Relationship to patient: Self    Best call back number: [phone]    Patient is needing: PT CALLED BECAUSE HE RECEIVED A MESSAGE STATING THAT HE NEEDS TO COMPLETE LABS BY TOMORROW 6/25/24. HE STATED HE CAN NOT COMPLETE LABS BY 6/25/24 AND NEEDS MORE INFORMATION BECAUSE HE HAS OTHER LAB ORDERS TO BE COMPLETED AT HIS DR. ARNOLD'S OFFICE.

## 2024-07-18 ENCOUNTER — HOSPITAL ENCOUNTER (OUTPATIENT)
Dept: INFUSION THERAPY | Age: 79
Discharge: HOME | End: 2024-07-18
Payer: MEDICARE

## 2024-07-18 DIAGNOSIS — C90.20 EXTRAMEDULLARY PLASMACYTOMA, UNSPECIFIED WHETHER REMISSION ACHIEVED (HCC): ICD-10-CM

## 2024-07-18 DIAGNOSIS — D47.2 IGA MONOCLONAL GAMMOPATHY OF UNCERTAIN SIGNIFICANCE: ICD-10-CM

## 2024-07-18 LAB
ALBUMIN SERPL-MCNC: 4.3 G/DL (ref 3.5–5.2)
ALP SERPL-CCNC: 176 U/L (ref 40–130)
ALT SERPL-CCNC: 45 U/L (ref 21–72)
ANION GAP SERPL CALCULATED.3IONS-SCNC: 10 MMOL/L (ref 7–19)
AST SERPL-CCNC: 38 U/L (ref 17–59)
BASOPHILS # BLD: 0.05 K/UL (ref 0.01–0.08)
BASOPHILS NFR BLD: 0.7 % (ref 0.1–1.2)
BILIRUB SERPL-MCNC: 0.6 MG/DL (ref 0.2–1.3)
BUN SERPL-MCNC: 20 MG/DL (ref 9–20)
CALCIUM SERPL-MCNC: 9.3 MG/DL (ref 8.4–10.2)
CHLORIDE SERPL-SCNC: 101 MMOL/L (ref 98–111)
CO2 SERPL-SCNC: 29 MMOL/L (ref 22–29)
CREAT SERPL-MCNC: 0.9 MG/DL (ref 0.6–1.2)
EOSINOPHIL # BLD: 0.23 K/UL (ref 0.04–0.54)
EOSINOPHIL NFR BLD: 3.3 % (ref 0.7–7)
ERYTHROCYTE [DISTWIDTH] IN BLOOD BY AUTOMATED COUNT: 13.9 % (ref 11.6–14.4)
GLOBULIN: 3 G/DL
GLUCOSE SERPL-MCNC: 164 MG/DL (ref 74–106)
HCT VFR BLD AUTO: 38.5 % (ref 40.1–51)
HGB BLD-MCNC: 12.7 G/DL (ref 13.7–17.5)
LYMPHOCYTES # BLD: 2.03 K/UL (ref 1.18–3.74)
LYMPHOCYTES NFR BLD: 28.8 % (ref 19.3–53.1)
MCH RBC QN AUTO: 31.1 PG (ref 25.7–32.2)
MCHC RBC AUTO-ENTMCNC: 33 G/DL (ref 32.3–36.5)
MCV RBC AUTO: 94.1 FL (ref 79–92.2)
MONOCYTES # BLD: 0.38 K/UL (ref 0.24–0.82)
MONOCYTES NFR BLD: 5.4 % (ref 4.7–12.5)
NEUTROPHILS # BLD: 4.35 K/UL (ref 1.56–6.13)
NEUTS SEG NFR BLD: 61.5 % (ref 34–71.1)
PLATELET # BLD AUTO: 178 K/UL (ref 163–337)
PMV BLD AUTO: 10.4 FL (ref 7.4–10.4)
POTASSIUM SERPL-SCNC: 4.2 MMOL/L (ref 3.5–5.1)
PROT SERPL-MCNC: 7.3 G/DL (ref 6.3–8.2)
RBC # BLD AUTO: 4.09 M/UL (ref 4.63–6.08)
SODIUM SERPL-SCNC: 140 MMOL/L (ref 137–145)
WBC # BLD AUTO: 7.06 K/UL (ref 4.23–9.07)

## 2024-07-18 PROCEDURE — 36415 COLL VENOUS BLD VENIPUNCTURE: CPT | Performed by: INTERNAL MEDICINE

## 2024-07-18 PROCEDURE — 36415 COLL VENOUS BLD VENIPUNCTURE: CPT

## 2024-07-18 PROCEDURE — 80053 COMPREHEN METABOLIC PANEL: CPT

## 2024-07-18 PROCEDURE — 85025 COMPLETE CBC W/AUTO DIFF WBC: CPT

## 2024-07-19 ENCOUNTER — TELEPHONE (OUTPATIENT)
Dept: HEMATOLOGY | Age: 79
End: 2024-07-19

## 2024-07-19 LAB
B2 MICROGLOB SERPL-MCNC: 2.1 MG/L
DEPRECATED KAPPA LC FREE/LAMBDA SER: 4.41 {RATIO} (ref 0.26–1.65)
KAPPA LC FREE SER-MCNC: 51.16 MG/L (ref 3.3–19.4)
LAMBDA LC FREE SERPL-MCNC: 11.59 MG/L (ref 5.71–26.3)

## 2024-07-19 NOTE — TELEPHONE ENCOUNTER
Called Patient and reminded patient of their appointment on 07/29/2024 and patient confirmed they would be here. Reminded patient to just come at appointment time, and to not come at the lab appointment time. Reminded patient that we will not check them in any more than 30 minutes before appointment time.  We have now moved to the [facility] that is located between our old office and the ER at the [facility]  
[Negative] : Heme/Lymph

## 2024-07-26 NOTE — PROGRESS NOTES
1093,   IgA 269,   IgM 22    Serology on 1/14/2021 revealed:  CMP with glucose 108  B2M- 5.4  Kappa light chains- 34.1  Lambda light chains- 9.7  K/L ratio- 3.52  IgG 1141,   IgA 258,   IgM 22  M-spike- 0.5  Hgb A1C- 6.1  TSH- 3.060    Serology on 8/5/2021 revealed:  B2M-1.7  Kappa light chains- 42.6  Lambda light chains-12.0  K/L ratio-3.55  IgG-1188 ,IgA-242 , IgM-26  M-spike-0.6    CHUCHO + Protein Electrophoresis, 24 hour urine on 8/5/2021  Total protein - 4.5 mg/dl  24 hour protein, calculated - 135 mg/hr  Albumin - 9.3%  Alpha 1 globulin - 4.3%  Alpha 2-globulin - 25%  Beta globulin - 22.5%  Gamma globuulin- 28.9%      Serology on 2/10/2022 revealed:  B2M-1.9  Kappa light chains- 38.7  Lambda light chains-12.09  K/L ratio-3.20  IgG-1188 ,IgA-233 , IgM-22  M-spike-0.74    CHUCHO + Protein Electrophoresis, 24 hour urine on 2/10/2022:  Total protein - 4.5 mg/dl  24 hour protein, calculated - 78 mg/hr  Albumin - 3.93%  Alpha 1 globulin - 0.34%  Alpha 2-globulin - 0.83%  Beta globulin - 0.77%  Gamma globuulin- 1.12%    Serology on 7/19/2022 revealed:  B2M-2.4  Kappa light chains- 33.4  Lambda light chains-11.6  K/L ratio-2.88  IgG-1260 ,IgA-228 , IgM-22    Serology on 1/17/2023 revealed:  B2M-2.0  Kappa light chains- 41.92  Lambda light chains-12.44  K/L ratio-3.37  IgG-1117 ,IgA-190 , IgM-23    Serology on 7/18/2023 revealed:   B2M- 2.2  Kappa- 47.47  Lambda- 8.00  K/L ratio- 5.93  IgG 1191 ,IgA 189, IgM 27  M-spike-  0.83    Serology on 1/18/2024 revealed:   B2M- 2.2  Kappa- 51.31  Lambda- 14.28  K/L ratio- 3.59  IgG 1126 ,IgA 189, IgM 23  M-spike- CHUCHO gel pattern shows an IgG type kappa monoclonal protein.      Serology on 7/18/2024 revealed:  B2M- 2.1  Kappa- 51.16  Lambda- 11.59  K/L ratio- 4.41      Follow up with Dr. Isaias Mcdonough at [facility] on 10/31/2023:  D/W patient increasing caloric intake and discussed cruciferous vegetables and protein shakes etc to maintain optimal nutrition. The necessity of abstaining from

## 2024-07-29 ENCOUNTER — OFFICE VISIT (OUTPATIENT)
Dept: HEMATOLOGY | Age: 79
End: 2024-07-29
Payer: MEDICARE

## 2024-07-29 ENCOUNTER — HOSPITAL ENCOUNTER (OUTPATIENT)
Dept: INFUSION THERAPY | Age: 79
Discharge: HOME | End: 2024-07-29
Payer: MEDICARE

## 2024-07-29 DIAGNOSIS — C90.20 EXTRAMEDULLARY PLASMACYTOMA, UNSPECIFIED WHETHER REMISSION ACHIEVED (HCC): ICD-10-CM

## 2024-07-29 DIAGNOSIS — Z71.2 ENCOUNTER TO DISCUSS TEST RESULTS: ICD-10-CM

## 2024-07-29 DIAGNOSIS — Z00.00 HEALTH CARE MAINTENANCE: ICD-10-CM

## 2024-07-29 DIAGNOSIS — D47.2 IGA MONOCLONAL GAMMOPATHY OF UNCERTAIN SIGNIFICANCE: Primary | ICD-10-CM

## 2024-07-29 PROCEDURE — 99214 OFFICE O/P EST MOD 30 MIN: CPT | Performed by: INTERNAL MEDICINE

## 2024-07-29 PROCEDURE — 99212 OFFICE O/P EST SF 10 MIN: CPT

## 2024-07-29 PROCEDURE — 1123F ACP DISCUSS/DSCN MKR DOCD: CPT | Performed by: INTERNAL MEDICINE

## 2024-07-29 PROCEDURE — G8428 CUR MEDS NOT DOCUMENT: HCPCS | Performed by: INTERNAL MEDICINE

## 2024-07-29 PROCEDURE — G8417 CALC BMI ABV UP PARAM F/U: HCPCS | Performed by: INTERNAL MEDICINE

## 2024-07-29 PROCEDURE — 4004F PT TOBACCO SCREEN RCVD TLK: CPT | Performed by: INTERNAL MEDICINE

## 2024-08-05 NOTE — PROGRESS NOTES
YOB: 1945  Location: [facility]  Location Address: [address] [place] [address], [facility], [address] [place] [address]  Location Phone: [phone]    Chief Complaint   Patient presents with    Skin Lesion       History of Present Illness  Manjit Seth is a 78 y.o. male.  Manjit Seth is here for follow up of ENT complaints. The patient has had problems lesion of nasal tip. The lesion has been crusty and will scab over. The lesion was biopsied and path indicated actinic keratosis.   He still has some crusting on the right nasal tip.         Past Medical History:   Diagnosis Date    Bell's palsy     with facial tremor    COVID-19 vaccine series completed     Diabetes mellitus     Extramedullary plasmacytoma     left nose    History of radiation therapy 2016    4000 cGy to nose completed on 3/28/16    History of tobacco abuse     Hyperlipidemia     Hypertension     Nosebleed occasional post radiation       Past Surgical History:   Procedure Laterality Date    ANTERIOR CERVICAL DISCECTOMY W/ FUSION N/A 2019    Procedure: CERVICAL DISCECTOMY ANTERIOR WITH FUSION C3-4,4-5, 5-6 ACDF with neuromonitoring and 1 C-arm;  Surgeon: Pablito Islas MD;  Location: [facility];  Service: Neurosurgery    CATARACT EXTRACTION Bilateral     COLONOSCOPY      NASAL ENDOSCOPY      bilateral with excisional biopsy of left intranasal mass 16       Outpatient Medications Marked as Taking for the 24 encounter (Office Visit) with Jose Krueger MD   Medication Sig Dispense Refill    ASPIRIN 81 PO self      atenolol (TENORMIN) 50 MG tablet Take 1 tablet by mouth Daily.      Cranberry 400 MG capsule Take 1 capsule by mouth Daily.      Ergocalciferol (VITAMIN D2) 400 UNITS tablet Take  by mouth.      finasteride (PROSCAR) 5 MG tablet Take 1 tablet by mouth Daily.      FLUAD 0.5 ML suspension prefilled syringe   0    Garlic 10 MG capsule Take  by mouth.      lisinopril (PRINIVIL,ZESTRIL) 5 MG tablet Take 1  tablet by mouth Daily.      Magnesium 250 MG tablet Take 1 tablet by mouth Daily.      metFORMIN (GLUCOPHAGE) 500 MG tablet       Multiple Vitamins-Minerals (MULTIVITAMIN ADULT PO) Take  by mouth.      mupirocin (BACTROBAN) 2 % ointment Apply  topically to the appropriate area as directed 3 (Three) Times a Day. 22 g 6    Omega-3 Fatty Acids (FISH OIL) 1000 MG capsule capsule Take  by mouth Daily With Breakfast.      simvastatin (ZOCOR) 40 MG tablet       triamterene-hydrochlorothiazide (MAXZIDE-25) 37.5-25 MG per tablet Take 1 tablet by mouth Daily.         Patient has no known allergies.    Family History   Problem Relation Age of Onset    Breast cancer Mother     Cancer Mother         breast cancer    Heart failure Father     Diabetes Father     Hypertension Father     Cancer Maternal Uncle         pancreatic cancer       Social History     Socioeconomic History    Marital status:    Tobacco Use    Smoking status: Former     Current packs/day: 0.00     Average packs/day: 1 pack/day for 10.0 years (10.0 ttl pk-yrs)     Types: Cigarettes     Start date: 1970     Quit date: 1977     Years since quittin.6    Smokeless tobacco: Current     Types: Chew    Tobacco comments:     dip when fishing   Vaping Use    Vaping status: Never Used   Substance and Sexual Activity    Alcohol use: No    Drug use: No    Sexual activity: Defer       Review of Systems   Constitutional: Negative.    HENT: Negative.     Eyes: Negative.    Respiratory: Negative.     Cardiovascular: Negative.    Gastrointestinal: Negative.    Endocrine: Negative.    Genitourinary: Negative.    Musculoskeletal: Negative.    Skin:         Lesion of nasal tip   Allergic/Immunologic: Negative.    Neurological: Negative.    Hematological: Negative.    Psychiatric/Behavioral: Negative.         Vitals:    24 0950   BP: 146/70   Pulse: 65   Resp: 16   Temp: 98.6 °F (37 °C)       Body mass index is 31.01 kg/m².    Objective     Physical  Exam        CONSTITUTIONAL: well nourished, well-developed, alert, oriented, in no acute distress     COMMUNICATION AND VOICE: able to communicate normally, normal voice quality    HEAD: normocephalic, no lesions, atraumatic, no tenderness, no masses     FACE: appearance normal, no lesions, no tenderness, no deformities, facial motion symmetric    EYES: ocular motility normal, eyelids normal, orbits normal, no proptosis, conjunctiva normal , pupils equal, round     EARS:  Hearing: hearing to conversational voice intact bilaterally   External Ears: normal bilaterally, no lesions    NOSE:  External Nose: external nasal structure normal, no tenderness on palpation, no nasal discharge, 3 mm RKP right nasal tip-treated with cryotherapy (liquid nitrogen), no evidence of trauma, nostrils patent     ORAL:  Lips: upper and lower lips without lesion     NECK:  Inspection and Palpation: neck appearance normal, no masses or tenderness    CHEST/RESPIRATORY: normal respiratory effort     CARDIOVASCULAR: no cyanosis or edema     NEUROLOGICAL/PSYCHIATRIC: oriented to time, place and person, mood normal, affect appropriate, CN II-XII intact grossly     Assessment & Plan   Diagnoses and all orders for this visit:    1. Actinic keratosis (Primary)      * Surgery not found *  No orders of the defined types were placed in this encounter.    Return in about 6 weeks (around 9/17/2024).       Patient Instructions   Cryotherapy performed right nasal tip  Follow-up in 6 to 8 weeks  May need repeat cryotherapy or excision if persistent  Call return for problems

## 2024-08-06 ENCOUNTER — OFFICE VISIT (OUTPATIENT)
Dept: OTOLARYNGOLOGY | Facility: CLINIC | Age: 79
End: 2024-08-06
Payer: MEDICARE

## 2024-08-06 VITALS
TEMPERATURE: 98.6 F | WEIGHT: 210 LBS | DIASTOLIC BLOOD PRESSURE: 70 MMHG | BODY MASS INDEX: 31.1 KG/M2 | SYSTOLIC BLOOD PRESSURE: 146 MMHG | HEART RATE: 65 BPM | RESPIRATION RATE: 16 BRPM | HEIGHT: 69 IN

## 2024-08-06 DIAGNOSIS — L57.0 ACTINIC KERATOSIS: Primary | ICD-10-CM

## 2024-08-06 PROCEDURE — 3077F SYST BP >= 140 MM HG: CPT | Performed by: OTOLARYNGOLOGY

## 2024-08-06 PROCEDURE — 17000 DESTRUCT PREMALG LESION: CPT | Performed by: OTOLARYNGOLOGY

## 2024-08-06 PROCEDURE — 1159F MED LIST DOCD IN RCRD: CPT | Performed by: OTOLARYNGOLOGY

## 2024-08-06 PROCEDURE — 1160F RVW MEDS BY RX/DR IN RCRD: CPT | Performed by: OTOLARYNGOLOGY

## 2024-08-06 PROCEDURE — 3078F DIAST BP <80 MM HG: CPT | Performed by: OTOLARYNGOLOGY

## 2024-08-06 NOTE — PATIENT INSTRUCTIONS
Cryotherapy performed right nasal tip  Follow-up in 6 to 8 weeks  May need repeat cryotherapy or excision if persistent  Call return for problems

## 2024-08-30 ENCOUNTER — TELEPHONE (OUTPATIENT)
Dept: VASCULAR SURGERY | Facility: CLINIC | Age: 79
End: 2024-08-30
Payer: MEDICARE

## 2024-09-03 ENCOUNTER — OFFICE VISIT (OUTPATIENT)
Dept: VASCULAR SURGERY | Facility: CLINIC | Age: 79
End: 2024-09-03
Payer: MEDICARE

## 2024-09-03 VITALS
DIASTOLIC BLOOD PRESSURE: 70 MMHG | HEART RATE: 63 BPM | BODY MASS INDEX: 31.4 KG/M2 | WEIGHT: 212 LBS | HEIGHT: 69 IN | OXYGEN SATURATION: 94 % | SYSTOLIC BLOOD PRESSURE: 138 MMHG

## 2024-09-03 DIAGNOSIS — I70.202 FEMORAL ARTERY STENOSIS, LEFT: Primary | ICD-10-CM

## 2024-09-03 DIAGNOSIS — I74.09 AORTOILIAC OCCLUSIVE DISEASE: ICD-10-CM

## 2024-09-03 DIAGNOSIS — I10 PRIMARY HYPERTENSION: ICD-10-CM

## 2024-09-03 PROCEDURE — 3078F DIAST BP <80 MM HG: CPT | Performed by: SURGERY

## 2024-09-03 PROCEDURE — 99204 OFFICE O/P NEW MOD 45 MIN: CPT | Performed by: SURGERY

## 2024-09-03 PROCEDURE — 1160F RVW MEDS BY RX/DR IN RCRD: CPT | Performed by: SURGERY

## 2024-09-03 PROCEDURE — 3075F SYST BP GE 130 - 139MM HG: CPT | Performed by: SURGERY

## 2024-09-03 PROCEDURE — 1159F MED LIST DOCD IN RCRD: CPT | Performed by: SURGERY

## 2024-09-03 RX ORDER — GLIPIZIDE 10 MG/1
10 TABLET ORAL
COMMUNITY

## 2024-09-03 NOTE — LETTER
September 3, 2024     EDWIN Patioñ  [address] [facility] [place] [address]    Patient: Manjit Seth   YOB: 1945   Date of Visit: 9/3/2024     Dear EDWIN Patiño:       Thank you for referring Manjit Seth to me for evaluation. Below are the relevant portions of my assessment and plan of care.    If you have questions, please do not hesitate to call me. I look forward to following Manjit along with you.         Sincerely,        Richard Bal DO        CC: No Recipients    Richard Bal DO  09/03/24 1246  Sign when Signing Visit  09/03/2024      Preethi Mccullough APRN  [address] [place] [id number]    Manjit Seth  1945    Chief Complaint   Patient presents with   • NEW PATIENT     Referred from Preethi Mccullough for left femoral artery stenosis. Testing done 8/20/24 @ [facility], in chart under imaging. Patient complains of left left hurts and gets heavy to lift after walk for short period of time. Former smoker of 10 years, quit 40 years.        Dear EDWIN Patiño    HPI  I had the pleasure of seeing your patient Manjit Seth in the office today.  Thank you kindly for this consultation.  As you recall, Manjit Seth is a 78 y.o.  male who you are currently following for routine health maintenance.  He has complaints of heaviness and aching to the left leg.  He does feel like his leg wants to give out on him. He is maintained on aspirin and Zocor.  He did have noninvasive testing performed which I did review in office.         Past Medical History:   Diagnosis Date   • Bell's palsy     with facial tremor   • COVID-19 vaccine series completed    • Diabetes mellitus    • Extramedullary plasmacytoma     left nose   • History of radiation therapy 03/28/2016    4000 cGy to nose completed on 3/28/16   • History of tobacco abuse    • Hyperlipidemia    • Hypertension    • Nosebleed occasional post radiation       Past Surgical History:    Procedure Laterality Date   • ANTERIOR CERVICAL DISCECTOMY W/ FUSION N/A 2019    Procedure: CERVICAL DISCECTOMY ANTERIOR WITH FUSION C3-4,4-5, 5-6 ACDF with neuromonitoring and 1 C-arm;  Surgeon: Pablito Islas MD;  Location: [facility];  Service: Neurosurgery   • CATARACT EXTRACTION Bilateral    • COLONOSCOPY     • NASAL ENDOSCOPY      bilateral with excisional biopsy of left intranasal mass 16       Family History   Problem Relation Age of Onset   • Breast cancer Mother    • Cancer Mother         breast cancer   • Heart failure Father    • Diabetes Father    • Hypertension Father    • Cancer Maternal Uncle         pancreatic cancer       Social History     Socioeconomic History   • Marital status:    Tobacco Use   • Smoking status: Former     Current packs/day: 0.00     Average packs/day: 1 pack/day for 10.0 years (10.0 ttl pk-yrs)     Types: Cigarettes     Start date: 1970     Quit date: 1977     Years since quittin.7   • Smokeless tobacco: Current     Types: Chew   • Tobacco comments:     dip when fishing   Vaping Use   • Vaping status: Never Used   Substance and Sexual Activity   • Alcohol use: No   • Drug use: No   • Sexual activity: Defer       No Known Allergies      Current Outpatient Medications:   •  ASPIRIN 81 PO, self, Disp: , Rfl:   •  atenolol (TENORMIN) 50 MG tablet, Take 1 tablet by mouth Daily., Disp: , Rfl:   •  Cranberry 400 MG capsule, Take 1 capsule by mouth Daily., Disp: , Rfl:   •  Ergocalciferol (VITAMIN D2) 400 UNITS tablet, Take  by mouth., Disp: , Rfl:   •  finasteride (PROSCAR) 5 MG tablet, Take 1 tablet by mouth Daily., Disp: , Rfl:   •  Garlic 10 MG capsule, Take  by mouth., Disp: , Rfl:   •  glipizide (GLUCOTROL) 10 MG tablet, Take 1 tablet by mouth 2 (Two) Times a Day Before Meals., Disp: , Rfl:   •  lisinopril (PRINIVIL,ZESTRIL) 5 MG tablet, Take 1 tablet by mouth Daily., Disp: , Rfl:   •  Magnesium 250 MG tablet, Take 1 tablet by mouth Daily.,  "Disp: , Rfl:   •  metFORMIN (GLUCOPHAGE) 500 MG tablet, , Disp: , Rfl:   •  Multiple Vitamins-Minerals (MULTIVITAMIN ADULT PO), Take  by mouth., Disp: , Rfl:   •  mupirocin (BACTROBAN) 2 % ointment, Apply  topically to the appropriate area as directed 3 (Three) Times a Day., Disp: 22 g, Rfl: 6  •  Omega-3 Fatty Acids (FISH OIL) 1000 MG capsule capsule, Take  by mouth Daily With Breakfast., Disp: , Rfl:   •  simvastatin (ZOCOR) 40 MG tablet, , Disp: , Rfl:   •  triamterene-hydrochlorothiazide (MAXZIDE-25) 37.5-25 MG per tablet, Take 1 tablet by mouth Daily., Disp: , Rfl:     Review of Systems   Constitutional: Negative.    HENT: Negative.     Eyes: Negative.    Respiratory: Negative.     Cardiovascular: Negative.    Gastrointestinal: Negative.    Endocrine: Negative.    Genitourinary: Negative.    Musculoskeletal:  Positive for arthralgias and back pain.   Skin: Negative.    Allergic/Immunologic: Negative.    Neurological: Negative.    Hematological: Negative.    Psychiatric/Behavioral: Negative.     All other systems reviewed and are negative.    /70   Pulse 63   Ht 175.3 cm (69\")   Wt 96.2 kg (212 lb)   SpO2 94%   BMI 31.31 kg/m²     Physical Exam  Vitals and nursing note reviewed.   Constitutional:       Appearance: Normal appearance. He is well-developed.   HENT:      Head: Normocephalic and atraumatic.   Eyes:      General: No scleral icterus.     Pupils: Pupils are equal, round, and reactive to light.   Neck:      Thyroid: No thyromegaly.      Vascular: No carotid bruit or JVD.   Cardiovascular:      Rate and Rhythm: Normal rate and regular rhythm.      Pulses:           Carotid pulses are 2+ on the right side and 2+ on the left side.       Femoral pulses are 2+ on the right side and 2+ on the left side.       Dorsalis pedis pulses are 1+ on the right side and detected w/ Doppler on the left side.        Posterior tibial pulses are 2+ on the right side and detected w/ Doppler on the left side.      " Heart sounds: Normal heart sounds.   Pulmonary:      Effort: Pulmonary effort is normal.      Breath sounds: Normal breath sounds.   Abdominal:      General: Bowel sounds are normal. There is no distension or abdominal bruit.      Palpations: Abdomen is soft. There is no mass.      Tenderness: There is no abdominal tenderness.   Musculoskeletal:         General: Normal range of motion.      Cervical back: Neck supple.   Lymphadenopathy:      Cervical: No cervical adenopathy.   Skin:     General: Skin is warm and dry.   Neurological:      Mental Status: He is alert and oriented to person, place, and time.      Cranial Nerves: No cranial nerve deficit.      Sensory: No sensory deficit.        Diagnostic Data:        Patient Active Problem List   Diagnosis   • Extramedullary plasmacytoma- left nose   • History of radiation therapy- Left Nose   • Encounter for aftercare   • Hypertension   • Neoplasm of uncertain behavior   • Spinal stenosis in cervical region   • Intervertebral cervical disc disorder with myelopathy, cervical region   • Cervical radiculopathy   • BMI 26.0-26.9,adult   • Snuff user   • Cervical spondylosis with myelopathy   • Degeneration of cervical intervertebral disc   • Abnormal ECG   • PAC (premature atrial contraction)   • BMI 29.0-29.9,adult   • Benign prostatic hyperplasia with urinary obstruction   • Raised prostate specific antigen   • History of tobacco abuse   • Anterior epistaxis   • Tinnitus aurium, left   • Neoplasm of uncertain behavior of skin of nose   • Actinic keratosis        Diagnosis Plan   1. Femoral artery stenosis, left  CT Angio Abdominal Aorta Bilateral Iliofem Runoff      2. Primary hypertension        3. Aortoiliac occlusive disease  CT Angio Abdominal Aorta Bilateral Iliofem Runoff          Plan: After thoroughly [place], I believe the best course of action is to proceed with a CTA of the abdomen pelvis with lower extremity runoff to further evaluate  his femoral artery stenosis.  I did review testing which does give a significantly high velocities and would like to take a closer look to see if he would need a femoral endarterectomy versus just an angiogram.  I do think majority of his discomfort sounds neurogenic in nature versus arterial.  Upon exam he has Doppler signals noted to the left lower extremity and palpable pulses to the right.  I will see him back in the next 2 to 3 weeks to review his testing.  I did discuss vascular risk factors as they pertain to the progression of vascular disease including controlling his hypertension.  His blood pressure is stable on his current medications.  He should continue his aspirin 81 mg daily and Zocor 40 mg daily in addition to his other medications.   This was all discussed in full with complete understanding.  Thank you for allowing me to participate in the care of your patient.  Please do not hesitate to call with any questions or concerns.  We will keep you aware of any further encounters with Manjit ESTUARDO Seth.        Sincerely yours,         Richard Bal, Preethi Clark APRN

## 2024-09-03 NOTE — PROGRESS NOTES
09/03/2024      Preethi Mccullough APRN  [address] [place] [address],  [place] [address]    Manjit Seth  1945    Chief Complaint   Patient presents with    NEW PATIENT     Referred from Preethi Mccullough for left femoral artery stenosis. Testing done 8/20/24 @ [facility], in chart under imaging. Patient complains of left left hurts and gets heavy to lift after walk for short period of time. Former smoker of 10 years, quit 40 years.        Dear EDWIN Patiño    HPI  I had the pleasure of seeing your patient Manjit Seth in the office today.  Thank you kindly for this consultation.  As you recall, Manjit Seth is a 78 y.o.  male who you are currently following for routine health maintenance.  He has complaints of heaviness and aching to the left leg.  He does feel like his leg wants to give out on him. He is maintained on aspirin and Zocor.  He did have noninvasive testing performed which I did review in office.         Past Medical History:   Diagnosis Date    Bell's palsy     with facial tremor    COVID-19 vaccine series completed     Diabetes mellitus     Extramedullary plasmacytoma     left nose    History of radiation therapy 03/28/2016    4000 cGy to nose completed on 3/28/16    History of tobacco abuse     Hyperlipidemia     Hypertension     Nosebleed occasional post radiation       Past Surgical History:   Procedure Laterality Date    ANTERIOR CERVICAL DISCECTOMY W/ FUSION N/A 01/16/2019    Procedure: CERVICAL DISCECTOMY ANTERIOR WITH FUSION C3-4,4-5, 5-6 ACDF with neuromonitoring and 1 C-arm;  Surgeon: Pablito Islas MD;  Location: [facility] OR;  Service: Neurosurgery    CATARACT EXTRACTION Bilateral     COLONOSCOPY      NASAL ENDOSCOPY      bilateral with excisional biopsy of left intranasal mass 1/11/16       Family History   Problem Relation Age of Onset    Breast cancer Mother     Cancer Mother         breast cancer    Heart failure Father     Diabetes Father     Hypertension Father      Cancer Maternal Uncle         pancreatic cancer       Social History     Socioeconomic History    Marital status:    Tobacco Use    Smoking status: Former     Current packs/day: 0.00     Average packs/day: 1 pack/day for 10.0 years (10.0 ttl pk-yrs)     Types: Cigarettes     Start date: 1970     Quit date: 1977     Years since quittin.7    Smokeless tobacco: Current     Types: Chew    Tobacco comments:     dip when fishing   Vaping Use    Vaping status: Never Used   Substance and Sexual Activity    Alcohol use: No    Drug use: No    Sexual activity: Defer       No Known Allergies      Current Outpatient Medications:     ASPIRIN 81 PO, self, Disp: , Rfl:     atenolol (TENORMIN) 50 MG tablet, Take 1 tablet by mouth Daily., Disp: , Rfl:     Cranberry 400 MG capsule, Take 1 capsule by mouth Daily., Disp: , Rfl:     Ergocalciferol (VITAMIN D2) 400 UNITS tablet, Take  by mouth., Disp: , Rfl:     finasteride (PROSCAR) 5 MG tablet, Take 1 tablet by mouth Daily., Disp: , Rfl:     Garlic 10 MG capsule, Take  by mouth., Disp: , Rfl:     glipizide (GLUCOTROL) 10 MG tablet, Take 1 tablet by mouth 2 (Two) Times a Day Before Meals., Disp: , Rfl:     lisinopril (PRINIVIL,ZESTRIL) 5 MG tablet, Take 1 tablet by mouth Daily., Disp: , Rfl:     Magnesium 250 MG tablet, Take 1 tablet by mouth Daily., Disp: , Rfl:     metFORMIN (GLUCOPHAGE) 500 MG tablet, , Disp: , Rfl:     Multiple Vitamins-Minerals (MULTIVITAMIN ADULT PO), Take  by mouth., Disp: , Rfl:     mupirocin (BACTROBAN) 2 % ointment, Apply  topically to the appropriate area as directed 3 (Three) Times a Day., Disp: 22 g, Rfl: 6    Omega-3 Fatty Acids (FISH OIL) 1000 MG capsule capsule, Take  by mouth Daily With Breakfast., Disp: , Rfl:     simvastatin (ZOCOR) 40 MG tablet, , Disp: , Rfl:     triamterene-hydrochlorothiazide (MAXZIDE-25) 37.5-25 MG per tablet, Take 1 tablet by mouth Daily., Disp: , Rfl:     Review of Systems   Constitutional: Negative.    HENT:  "Negative.     Eyes: Negative.    Respiratory: Negative.     Cardiovascular: Negative.    Gastrointestinal: Negative.    Endocrine: Negative.    Genitourinary: Negative.    Musculoskeletal:  Positive for arthralgias and back pain.   Skin: Negative.    Allergic/Immunologic: Negative.    Neurological: Negative.    Hematological: Negative.    Psychiatric/Behavioral: Negative.     All other systems reviewed and are negative.    /70   Pulse 63   Ht 175.3 cm (69\")   Wt 96.2 kg (212 lb)   SpO2 94%   BMI 31.31 kg/m²     Physical Exam  Vitals and nursing note reviewed.   Constitutional:       Appearance: Normal appearance. He is well-developed.   HENT:      Head: Normocephalic and atraumatic.   Eyes:      General: No scleral icterus.     Pupils: Pupils are equal, round, and reactive to light.   Neck:      Thyroid: No thyromegaly.      Vascular: No carotid bruit or JVD.   Cardiovascular:      Rate and Rhythm: Normal rate and regular rhythm.      Pulses:           Carotid pulses are 2+ on the right side and 2+ on the left side.       Femoral pulses are 2+ on the right side and 2+ on the left side.       Dorsalis pedis pulses are 1+ on the right side and detected w/ Doppler on the left side.        Posterior tibial pulses are 2+ on the right side and detected w/ Doppler on the left side.      Heart sounds: Normal heart sounds.   Pulmonary:      Effort: Pulmonary effort is normal.      Breath sounds: Normal breath sounds.   Abdominal:      General: Bowel sounds are normal. There is no distension or abdominal bruit.      Palpations: Abdomen is soft. There is no mass.      Tenderness: There is no abdominal tenderness.   Musculoskeletal:         General: Normal range of motion.      Cervical back: Neck supple.   Lymphadenopathy:      Cervical: No cervical adenopathy.   Skin:     General: Skin is warm and dry.   Neurological:      Mental Status: He is alert and oriented to person, place, and time.      Cranial Nerves: No " cranial nerve deficit.      Sensory: No sensory deficit.        Diagnostic Data:        Patient Active Problem List   Diagnosis    Extramedullary plasmacytoma- left nose    History of radiation therapy- Left Nose    Encounter for aftercare    Hypertension    Neoplasm of uncertain behavior    Spinal stenosis in cervical region    Intervertebral cervical disc disorder with myelopathy, cervical region    Cervical radiculopathy    BMI 26.0-26.9,adult    Snuff user    Cervical spondylosis with myelopathy    Degeneration of cervical intervertebral disc    Abnormal ECG    PAC (premature atrial contraction)    BMI 29.0-29.9,adult    Benign prostatic hyperplasia with urinary obstruction    Raised prostate specific antigen    History of tobacco abuse    Anterior epistaxis    Tinnitus aurium, left    Neoplasm of uncertain behavior of skin of nose    Actinic keratosis        Diagnosis Plan   1. Femoral artery stenosis, left  CT Angio Abdominal Aorta Bilateral Iliofem Runoff      2. Primary hypertension        3. Aortoiliac occlusive disease  CT Angio Abdominal Aorta Bilateral Iliofem Runoff          Plan: After thoroughly evaluating Manjit Seth, I believe the best course of action is to proceed with a CTA of the abdomen pelvis with lower extremity runoff to further evaluate his femoral artery stenosis.  I did review testing which does give a significantly high velocities and would like to take a closer look to see if he would need a femoral endarterectomy versus just an angiogram.  I do think majority of his discomfort sounds neurogenic in nature versus arterial.  Upon exam he has Doppler signals noted to the left lower extremity and palpable pulses to the right.  I will see him back in the next 2 to 3 weeks to review his testing.  I did discuss vascular risk factors as they pertain to the progression of vascular disease including controlling his hypertension.  His blood pressure is stable on his current medications.  He  should continue his aspirin 81 mg daily and Zocor 40 mg daily in addition to his other medications.   This was all discussed in full with complete understanding.  Thank you for allowing me to participate in the care of your patient.  Please do not hesitate to call with any questions or concerns.  We will keep you aware of any further encounters with Manjit Seth.        Sincerely yours,         Richard Bal, Preethi Clark, APRN

## 2024-09-06 ENCOUNTER — PATIENT ROUNDING (BHMG ONLY) (OUTPATIENT)
Dept: VASCULAR SURGERY | Facility: CLINIC | Age: 79
End: 2024-09-06
Payer: MEDICARE

## 2024-09-06 NOTE — PROGRESS NOTES
September 6, 2024    Hello, may I speak with Manjit Seth?    My name is JASON      I am  with [facility]  [address], [address]  [facility] [place] [id number] [phone].    Before we get started may I verify your date of birth? 1945    I am calling to officially welcome you to our practice and ask about your recent visit. Is this a good time to talk? yes    Tell me about your visit with us. What things went well?  EVERYTHING WAS GREAT       We're always looking for ways to make our patients' experiences even better. Do you have recommendations on ways we may improve?  CAN'T THINK OF ANYTHING AT ALL    Overall were you satisfied with your first visit to our practice? yes       I appreciate you taking the time to speak with me today. Is there anything else I can do for you? no      Thank you, and have a great day.

## 2024-09-16 ENCOUNTER — TELEPHONE (OUTPATIENT)
Dept: VASCULAR SURGERY | Facility: CLINIC | Age: 79
End: 2024-09-16
Payer: MEDICARE

## 2024-09-17 ENCOUNTER — OFFICE VISIT (OUTPATIENT)
Dept: VASCULAR SURGERY | Facility: CLINIC | Age: 79
End: 2024-09-17
Payer: MEDICARE

## 2024-09-17 ENCOUNTER — HOSPITAL ENCOUNTER (OUTPATIENT)
Dept: CT IMAGING | Facility: HOSPITAL | Age: 79
Discharge: HOME | End: 2024-09-17
Admitting: SURGERY
Payer: MEDICARE

## 2024-09-17 VITALS
BODY MASS INDEX: 31.1 KG/M2 | HEIGHT: 69 IN | SYSTOLIC BLOOD PRESSURE: 110 MMHG | DIASTOLIC BLOOD PRESSURE: 58 MMHG | HEART RATE: 65 BPM | OXYGEN SATURATION: 100 % | WEIGHT: 210 LBS

## 2024-09-17 DIAGNOSIS — I73.9 PAD (PERIPHERAL ARTERY DISEASE): Primary | ICD-10-CM

## 2024-09-17 DIAGNOSIS — I70.202 FEMORAL ARTERY STENOSIS, LEFT: ICD-10-CM

## 2024-09-17 DIAGNOSIS — Z01.818 PREOP TESTING: ICD-10-CM

## 2024-09-17 DIAGNOSIS — I74.09 AORTOILIAC OCCLUSIVE DISEASE: ICD-10-CM

## 2024-09-17 DIAGNOSIS — Z51.81 ENCOUNTER FOR MONITORING ANTIPLATELET THERAPY: ICD-10-CM

## 2024-09-17 DIAGNOSIS — Z79.02 ENCOUNTER FOR MONITORING ANTIPLATELET THERAPY: ICD-10-CM

## 2024-09-17 LAB — CREAT BLDA-MCNC: 0.8 MG/DL (ref 0.6–1.3)

## 2024-09-17 PROCEDURE — 3078F DIAST BP <80 MM HG: CPT | Performed by: NURSE PRACTITIONER

## 2024-09-17 PROCEDURE — 82565 ASSAY OF CREATININE: CPT

## 2024-09-17 PROCEDURE — 1160F RVW MEDS BY RX/DR IN RCRD: CPT | Performed by: NURSE PRACTITIONER

## 2024-09-17 PROCEDURE — 1159F MED LIST DOCD IN RCRD: CPT | Performed by: NURSE PRACTITIONER

## 2024-09-17 PROCEDURE — 3074F SYST BP LT 130 MM HG: CPT | Performed by: NURSE PRACTITIONER

## 2024-09-17 PROCEDURE — 75635 CT ANGIO ABDOMINAL ARTERIES: CPT

## 2024-09-17 PROCEDURE — 99214 OFFICE O/P EST MOD 30 MIN: CPT | Performed by: NURSE PRACTITIONER

## 2024-09-17 PROCEDURE — 25510000001 IOPAMIDOL PER 1 ML: Performed by: SURGERY

## 2024-09-17 RX ORDER — IOPAMIDOL 755 MG/ML
100 INJECTION, SOLUTION INTRAVASCULAR
Status: COMPLETED | OUTPATIENT
Start: 2024-09-17 | End: 2024-09-17

## 2024-09-17 RX ADMIN — IOPAMIDOL 100 ML: 755 INJECTION, SOLUTION INTRAVENOUS at 10:39

## 2024-09-19 ENCOUNTER — TELEPHONE (OUTPATIENT)
Dept: VASCULAR SURGERY | Facility: CLINIC | Age: 79
End: 2024-09-19
Payer: MEDICARE

## 2024-09-20 ENCOUNTER — TELEPHONE (OUTPATIENT)
Dept: VASCULAR SURGERY | Facility: CLINIC | Age: 79
End: 2024-09-20
Payer: MEDICARE

## 2024-09-20 PROBLEM — Z01.818 PREOP TESTING: Status: ACTIVE | Noted: 2024-09-17

## 2024-09-20 PROBLEM — I73.9 PAD (PERIPHERAL ARTERY DISEASE): Status: ACTIVE | Noted: 2024-09-17

## 2024-09-23 ENCOUNTER — TELEPHONE (OUTPATIENT)
Dept: VASCULAR SURGERY | Facility: CLINIC | Age: 79
End: 2024-09-23
Payer: MEDICARE

## 2024-09-27 ENCOUNTER — HOSPITAL ENCOUNTER (OUTPATIENT)
Dept: GENERAL RADIOLOGY | Facility: HOSPITAL | Age: 79
Discharge: HOME | End: 2024-09-27
Payer: MEDICARE

## 2024-09-27 ENCOUNTER — PRE-ADMISSION TESTING (OUTPATIENT)
Dept: PREADMISSION TESTING | Facility: HOSPITAL | Age: 79
End: 2024-09-27
Payer: MEDICARE

## 2024-09-27 VITALS
HEIGHT: 67 IN | WEIGHT: 211.64 LBS | SYSTOLIC BLOOD PRESSURE: 149 MMHG | HEART RATE: 77 BPM | DIASTOLIC BLOOD PRESSURE: 64 MMHG | OXYGEN SATURATION: 98 % | BODY MASS INDEX: 33.22 KG/M2 | RESPIRATION RATE: 18 BRPM

## 2024-09-27 DIAGNOSIS — I73.9 PAD (PERIPHERAL ARTERY DISEASE): ICD-10-CM

## 2024-09-27 DIAGNOSIS — Z51.81 ENCOUNTER FOR MONITORING ANTIPLATELET THERAPY: ICD-10-CM

## 2024-09-27 DIAGNOSIS — Z79.02 ENCOUNTER FOR MONITORING ANTIPLATELET THERAPY: ICD-10-CM

## 2024-09-27 DIAGNOSIS — Z01.818 PREOP TESTING: ICD-10-CM

## 2024-09-27 LAB
ANION GAP SERPL CALCULATED.3IONS-SCNC: 12 MMOL/L (ref 5–15)
APTT PPP: 26.3 SECONDS (ref 24.5–36)
BASOPHILS # BLD AUTO: 0.06 10*3/MM3 (ref 0–0.2)
BASOPHILS NFR BLD AUTO: 0.7 % (ref 0–1.5)
BUN SERPL-MCNC: 18 MG/DL (ref 8–23)
BUN/CREAT SERPL: 22 (ref 7–25)
CALCIUM SPEC-SCNC: 9.4 MG/DL (ref 8.6–10.5)
CHLORIDE SERPL-SCNC: 100 MMOL/L (ref 98–107)
CO2 SERPL-SCNC: 26 MMOL/L (ref 22–29)
CREAT SERPL-MCNC: 0.82 MG/DL (ref 0.76–1.27)
DEPRECATED RDW RBC AUTO: 47.8 FL (ref 37–54)
EGFRCR SERPLBLD CKD-EPI 2021: 89.4 ML/MIN/1.73
EOSINOPHIL # BLD AUTO: 0.29 10*3/MM3 (ref 0–0.4)
EOSINOPHIL NFR BLD AUTO: 3.4 % (ref 0.3–6.2)
ERYTHROCYTE [DISTWIDTH] IN BLOOD BY AUTOMATED COUNT: 14.1 % (ref 12.3–15.4)
GLUCOSE SERPL-MCNC: 160 MG/DL (ref 65–99)
HCT VFR BLD AUTO: 39.9 % (ref 37.5–51)
HGB BLD-MCNC: 13 G/DL (ref 13–17.7)
IMM GRANULOCYTES # BLD AUTO: 0.02 10*3/MM3 (ref 0–0.05)
IMM GRANULOCYTES NFR BLD AUTO: 0.2 % (ref 0–0.5)
INR PPP: 0.96 (ref 0.91–1.09)
LYMPHOCYTES # BLD AUTO: 2.15 10*3/MM3 (ref 0.7–3.1)
LYMPHOCYTES NFR BLD AUTO: 25.4 % (ref 19.6–45.3)
MCH RBC QN AUTO: 30.5 PG (ref 26.6–33)
MCHC RBC AUTO-ENTMCNC: 32.6 G/DL (ref 31.5–35.7)
MCV RBC AUTO: 93.7 FL (ref 79–97)
MONOCYTES # BLD AUTO: 0.5 10*3/MM3 (ref 0.1–0.9)
MONOCYTES NFR BLD AUTO: 5.9 % (ref 5–12)
NEUTROPHILS NFR BLD AUTO: 5.45 10*3/MM3 (ref 1.7–7)
NEUTROPHILS NFR BLD AUTO: 64.4 % (ref 42.7–76)
NRBC BLD AUTO-RTO: 0 /100 WBC (ref 0–0.2)
PLATELET # BLD AUTO: 187 10*3/MM3 (ref 140–450)
PMV BLD AUTO: 11.1 FL (ref 6–12)
POTASSIUM SERPL-SCNC: 4.3 MMOL/L (ref 3.5–5.2)
PROTHROMBIN TIME: 13.1 SECONDS (ref 11.8–14.8)
RBC # BLD AUTO: 4.26 10*6/MM3 (ref 4.14–5.8)
SODIUM SERPL-SCNC: 138 MMOL/L (ref 136–145)
WBC NRBC COR # BLD AUTO: 8.47 10*3/MM3 (ref 3.4–10.8)

## 2024-09-27 PROCEDURE — 85025 COMPLETE CBC W/AUTO DIFF WBC: CPT

## 2024-09-27 PROCEDURE — 93010 ELECTROCARDIOGRAM REPORT: CPT | Performed by: INTERNAL MEDICINE

## 2024-09-27 PROCEDURE — 85610 PROTHROMBIN TIME: CPT

## 2024-09-27 PROCEDURE — 93005 ELECTROCARDIOGRAM TRACING: CPT

## 2024-09-27 PROCEDURE — 71046 X-RAY EXAM CHEST 2 VIEWS: CPT

## 2024-09-27 PROCEDURE — 85730 THROMBOPLASTIN TIME PARTIAL: CPT

## 2024-09-27 PROCEDURE — 80048 BASIC METABOLIC PNL TOTAL CA: CPT

## 2024-09-27 PROCEDURE — 36415 COLL VENOUS BLD VENIPUNCTURE: CPT

## 2024-09-27 NOTE — DISCHARGE INSTRUCTIONS

## 2024-09-28 LAB
QT INTERVAL: 412 MS
QTC INTERVAL: 453 MS

## 2024-09-30 ENCOUNTER — TELEPHONE (OUTPATIENT)
Dept: MRI IMAGING | Facility: HOSPITAL | Age: 79
End: 2024-09-30
Payer: MEDICARE

## 2024-09-30 NOTE — TELEPHONE ENCOUNTER
I spoke with the patient regarding an incidental lung and pancreas finding seen on a recent imaging exam.The patient verbalized understanding the Radiologist's recommendations for follow-up. Per the patient request, the report was routed to the PCP.  Patient also requested I sent the CT Runnoff images with pancreas abnormality to his Gastro Dr. Swenson. I left a message at PCP office about both abnormal findings.

## 2024-10-04 ENCOUNTER — TELEPHONE (OUTPATIENT)
Dept: VASCULAR SURGERY | Facility: CLINIC | Age: 79
End: 2024-10-04

## 2024-10-04 NOTE — TELEPHONE ENCOUNTER
Spoke with patient he states he would like to take care of his lung issues first. Patient was transferred to surgery scheduler to post pone surgery.

## 2024-10-04 NOTE — TELEPHONE ENCOUNTER
Caller: Manjit Seth    Relationship: Self    Best call back number: [phone]      What is the best time to reach you: ANY    Who are you requesting to speak with (clinical staff, provider,  specific staff member): SURG [facility] TEAM/DR WHITNEY    Do you know the name of the person who called: PT    What was the call regarding: PT STATES HE HAD A CT AND THEY FOUND A LESION ON LUNG AND HE WANTS TO KNOW IF HE NEEDS TO PUT OFF SURG-PROVIDER IS RECOMMENDING A BX FOR LUNG AS THEY BELIEVE IT IS MALIGNANT-PT PRAKASH FOR SURG ON 10/14/24-HE WANTS DR DESIR INPUT-STATED HIS FOOT HE DOES NOT BELIEVE IS MOST IMPORTANT    Is it okay if the provider responds through MyChart: NO

## 2024-10-07 ENCOUNTER — TELEPHONE (OUTPATIENT)
Dept: VASCULAR SURGERY | Facility: CLINIC | Age: 79
End: 2024-10-07
Payer: MEDICARE

## 2024-10-07 NOTE — TELEPHONE ENCOUNTER
Patient spouse called in and stated during prework there was an issue with the Xray finding a 3 cm nodule on lung.  They stated want to wait to take care of this till the issue with the lung has been taken care of.  Surgery has been cancelled at this time.  Will contact patient back in 1 month just to check in.

## 2024-10-28 ENCOUNTER — TRANSCRIBE ORDERS (OUTPATIENT)
Dept: ADMINISTRATIVE | Facility: HOSPITAL | Age: 79
End: 2024-10-28
Payer: MEDICARE

## 2024-10-28 DIAGNOSIS — R91.1 SOLITARY PULMONARY NODULE: Primary | ICD-10-CM

## 2024-10-30 NOTE — PROGRESS NOTES
YOB: 1945  Location: [facility]  Location Address: [address] [place] [address], [facility], [address] [place] [address]  Location Phone: [phone]    Chief Complaint   Patient presents with    Follow-up       History of Present Illness  Manjit Seth is a 78 y.o. male.  Manjit Seth is here for follow up of ENT complaints. 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  Patient completed radiation in 2016 he is followed by Dr. Medel every 6 months     Patient also complains of a lesion to the nasal tip this underwent cryotherapy at last visit patient states that has gotten better but has not completely resolved. He was seen by dermatology last week      Patient has had a new diagnosis of lung nodule and is scheduled for a pet scan on Monday       Past Medical History:   Diagnosis Date    BPH (benign prostatic hyperplasia)     COVID-19 vaccine series completed     Diabetes mellitus     Extramedullary plasmacytoma     left nose    Hemifacial spasm     Left side    History of radiation therapy 2016    4000 cGy to nose completed on 3/28/16    History of tobacco abuse     Hyperlipidemia     Hypertension     Nosebleed occasional post radiation       Past Surgical History:   Procedure Laterality Date    ANTERIOR CERVICAL DISCECTOMY W/ FUSION N/A 2019    Procedure: CERVICAL DISCECTOMY ANTERIOR WITH FUSION C3-4,4-5, 5-6 ACDF with neuromonitoring and 1 C-arm;  Surgeon: Pablito Islas MD;  Location: [facility];  Service: Neurosurgery    CATARACT EXTRACTION Bilateral     COLONOSCOPY      NASAL ENDOSCOPY      bilateral with excisional biopsy of left intranasal mass 16       Outpatient Medications Marked as Taking for the 10/31/24 encounter (Office Visit) with Jose Krueger MD   Medication Sig Dispense Refill    ASPIRIN 81 PO Take 81 mg by mouth Daily. self      atenolol (TENORMIN) 50 MG tablet Take 1 tablet  by mouth Daily.      Cranberry 400 MG capsule Take 1 capsule by mouth Daily.      Ergocalciferol (VITAMIN D2) 400 UNITS tablet Take 400 Units by mouth Daily.      finasteride (PROSCAR) 5 MG tablet Take 1 tablet by mouth Daily.      Garlic 10 MG capsule Take 10 mg by mouth Daily.      lisinopril (PRINIVIL,ZESTRIL) 5 MG tablet Take 1 tablet by mouth Daily.      Magnesium 250 MG tablet Take 1 tablet by mouth Daily.      metFORMIN (GLUCOPHAGE) 500 MG tablet Take 1 tablet by mouth 2 (Two) Times a Day With Meals.      Multiple Vitamins-Minerals (MULTIVITAMIN ADULT PO) Take 1 tablet by mouth Daily.      mupirocin (BACTROBAN) 2 % ointment Apply  topically to the appropriate area as directed 3 (Three) Times a Day. 22 g 6    Omega-3 Fatty Acids (FISH OIL) 1000 MG capsule capsule Take  by mouth Daily With Breakfast.      simvastatin (ZOCOR) 40 MG tablet Take 1 tablet by mouth Every Night.      triamterene-hydrochlorothiazide (MAXZIDE-25) 37.5-25 MG per tablet Take 1 tablet by mouth Daily.         Patient has no known allergies.    Family History   Problem Relation Age of Onset    Breast cancer Mother     Cancer Mother         breast cancer    Heart failure Father     Diabetes Father     Hypertension Father     Cancer Maternal Uncle         pancreatic cancer       Social History     Socioeconomic History    Marital status:    Tobacco Use    Smoking status: Former     Current packs/day: 0.00     Average packs/day: 1 pack/day for 10.0 years (10.0 ttl pk-yrs)     Types: Cigarettes     Start date: 1970     Quit date: 1977     Years since quittin.8    Smokeless tobacco: Current     Types: Chew    Tobacco comments:     dip when fishing   Vaping Use    Vaping status: Never Used   Substance and Sexual Activity    Alcohol use: No    Drug use: No    Sexual activity: Defer       Review of Systems   Constitutional: Negative.    HENT:  Positive for tinnitus.        Vitals:    10/31/24 0902   BP: 151/65   Pulse: 65    Temp: 98.6 °F (37 °C)       Body mass index is 33.66 kg/m².    Objective     Physical Exam  Vitals reviewed.   Constitutional:       Appearance: He is obese.   HENT:      Head: Normocephalic.      Comments: Left blepharospasm          Right Ear: Tympanic membrane and external ear normal. Decreased hearing noted.      Left Ear: Tympanic membrane and external ear normal. Decreased hearing noted.      Nose: Nose normal.      Mouth/Throat:      Lips: Pink.   Musculoskeletal:      Cervical back: Full passive range of motion without pain.   Lymphadenopathy:      Cervical: No cervical adenopathy.   Neurological:      Mental Status: He is alert.       Excision    Date/Time: 10/31/2024 9:34 AM    Performed by: Amanda Rodney APRN  Authorized by: Amanda Rodney APRN    Number of Lesions: 1  Lesion 1:     Body area: head/neck    Head/neck location: nose    Skin lesion 1 size (mm): 2 mm.    Malignancy: benign lesion      Destruction method: cryotherapy        Assessment & Plan   Diagnoses and all orders for this visit:    1. History of radiation therapy- Left Nose (Primary)    2. Actinic keratosis  -     Excision    3. Mixed conductive and sensorineural hearing loss of left ear with restricted hearing of right ear    4. Neoplasm of uncertain behavior of skin of nose  -     Excision    5. Extramedullary plasmacytoma in remission      * Surgery not found *  No orders of the defined types were placed in this encounter.    No follow-ups on file.     Call for new/worsening concerns   Will continue to monitor with annual exams     There are no Patient Instructions on file for this visit.

## 2024-10-31 ENCOUNTER — OFFICE VISIT (OUTPATIENT)
Dept: OTOLARYNGOLOGY | Facility: CLINIC | Age: 79
End: 2024-10-31
Payer: MEDICARE

## 2024-10-31 VITALS
TEMPERATURE: 98.6 F | SYSTOLIC BLOOD PRESSURE: 151 MMHG | HEART RATE: 65 BPM | BODY MASS INDEX: 33.27 KG/M2 | DIASTOLIC BLOOD PRESSURE: 65 MMHG | WEIGHT: 212 LBS | HEIGHT: 67 IN

## 2024-10-31 DIAGNOSIS — C90.21 EXTRAMEDULLARY PLASMACYTOMA IN REMISSION: ICD-10-CM

## 2024-10-31 DIAGNOSIS — D48.5 NEOPLASM OF UNCERTAIN BEHAVIOR OF SKIN OF NOSE: ICD-10-CM

## 2024-10-31 DIAGNOSIS — L57.0 ACTINIC KERATOSIS: ICD-10-CM

## 2024-10-31 DIAGNOSIS — Z92.3 HISTORY OF RADIATION THERAPY: Primary | ICD-10-CM

## 2024-10-31 DIAGNOSIS — H90.A32 MIXED CONDUCTIVE AND SENSORINEURAL HEARING LOSS OF LEFT EAR WITH RESTRICTED HEARING OF RIGHT EAR: ICD-10-CM

## 2024-10-31 NOTE — PROGRESS NOTES
PROCEDURE NOTE    Manjit Seth    DATE OF PROCEDURE: 10/31/2024    PROCEDURE:   Bilateral Diagnostic Rigid Nasal Endoscopy    PREPROCEDURE DIAGNOSIS:   History of extra-medullary plasma cell cytoma    POSTPROCEDURE DIAGNOSIS:  SAME    ANESTHESIA:   None    PROCEDURE DESCRIPTION:    With the patient in the chair bilateral diagnostic rigid nasal endoscopy was performed with the Stortz 0° endoscope without difficulty.     An endoscope was passed along the left nasal floor to the nasopharynx.  It was then passed into the region of the middle meatus, middle turbinate, and the sphenoethmoid region. An identical procedure was performed on the right side.  The following findings were noted as stated below:    Findings: No significant abnormalities masses lesions or other ulcerations are appreciated bilaterally.  Mild hypervascularity is noted on the left.    Condition:  Stable.  Patient tolerated procedure well.    Complications:  None  There was no significant bleeding.

## 2024-11-04 ENCOUNTER — HOSPITAL ENCOUNTER (OUTPATIENT)
Dept: CT IMAGING | Facility: HOSPITAL | Age: 79
Discharge: HOME | End: 2024-11-04
Payer: MEDICARE

## 2024-11-04 DIAGNOSIS — R91.1 SOLITARY PULMONARY NODULE: ICD-10-CM

## 2024-11-04 PROCEDURE — A9552 F18 FDG: HCPCS | Performed by: INTERNAL MEDICINE

## 2024-11-04 PROCEDURE — 78815 PET IMAGE W/CT SKULL-THIGH: CPT

## 2024-11-04 PROCEDURE — 0 FLUDEOXYGLUCOSE F18 SOLUTION: Performed by: INTERNAL MEDICINE

## 2024-11-04 RX ADMIN — FLUDEOXYGLUCOSE F18 1 DOSE: 300 INJECTION INTRAVENOUS at 09:23

## 2024-11-12 ENCOUNTER — OFFICE VISIT (OUTPATIENT)
Dept: CARDIAC SURGERY | Facility: CLINIC | Age: 79
End: 2024-11-12
Payer: MEDICARE

## 2024-11-12 ENCOUNTER — LAB (OUTPATIENT)
Dept: LAB | Facility: HOSPITAL | Age: 79
End: 2024-11-12
Payer: MEDICARE

## 2024-11-12 VITALS
DIASTOLIC BLOOD PRESSURE: 65 MMHG | HEART RATE: 91 BPM | WEIGHT: 210.4 LBS | BODY MASS INDEX: 33.02 KG/M2 | OXYGEN SATURATION: 94 % | SYSTOLIC BLOOD PRESSURE: 124 MMHG | HEIGHT: 67 IN

## 2024-11-12 DIAGNOSIS — R91.1 LUNG NODULE: Primary | ICD-10-CM

## 2024-11-12 DIAGNOSIS — E11.65 TYPE 2 DIABETES MELLITUS WITH HYPERGLYCEMIA, WITHOUT LONG-TERM CURRENT USE OF INSULIN: ICD-10-CM

## 2024-11-12 DIAGNOSIS — R06.02 SHORTNESS OF BREATH: ICD-10-CM

## 2024-11-12 DIAGNOSIS — Z87.891 HISTORY OF TOBACCO ABUSE: ICD-10-CM

## 2024-11-12 LAB
ALBUMIN SERPL-MCNC: 4.3 G/DL (ref 3.5–5.2)
ALBUMIN/GLOB SERPL: 1.4 G/DL
ALP SERPL-CCNC: 98 U/L (ref 39–117)
ALT SERPL W P-5'-P-CCNC: 32 U/L (ref 1–41)
ANION GAP SERPL CALCULATED.3IONS-SCNC: 15 MMOL/L (ref 5–15)
AST SERPL-CCNC: 24 U/L (ref 1–40)
BILIRUB SERPL-MCNC: 0.3 MG/DL (ref 0–1.2)
BUN SERPL-MCNC: 21 MG/DL (ref 8–23)
BUN/CREAT SERPL: 23.3 (ref 7–25)
CALCIUM SPEC-SCNC: 9.7 MG/DL (ref 8.6–10.5)
CHLORIDE SERPL-SCNC: 98 MMOL/L (ref 98–107)
CO2 SERPL-SCNC: 25 MMOL/L (ref 22–29)
CREAT SERPL-MCNC: 0.9 MG/DL (ref 0.76–1.27)
DEPRECATED RDW RBC AUTO: 48.2 FL (ref 37–54)
EGFRCR SERPLBLD CKD-EPI 2021: 86.3 ML/MIN/1.73
ERYTHROCYTE [DISTWIDTH] IN BLOOD BY AUTOMATED COUNT: 13.8 % (ref 12.3–15.4)
GLOBULIN UR ELPH-MCNC: 3.1 GM/DL
GLUCOSE SERPL-MCNC: 152 MG/DL (ref 65–99)
HBA1C MFR BLD: 7.1 % (ref 4.8–5.6)
HCT VFR BLD AUTO: 41.2 % (ref 37.5–51)
HGB BLD-MCNC: 13.3 G/DL (ref 13–17.7)
MCH RBC QN AUTO: 30.6 PG (ref 26.6–33)
MCHC RBC AUTO-ENTMCNC: 32.3 G/DL (ref 31.5–35.7)
MCV RBC AUTO: 94.9 FL (ref 79–97)
PLATELET # BLD AUTO: 199 10*3/MM3 (ref 140–450)
PMV BLD AUTO: 11.2 FL (ref 6–12)
POTASSIUM SERPL-SCNC: 4.5 MMOL/L (ref 3.5–5.2)
PROT SERPL-MCNC: 7.4 G/DL (ref 6–8.5)
RBC # BLD AUTO: 4.34 10*6/MM3 (ref 4.14–5.8)
SODIUM SERPL-SCNC: 138 MMOL/L (ref 136–145)
WBC NRBC COR # BLD AUTO: 9.31 10*3/MM3 (ref 3.4–10.8)

## 2024-11-12 PROCEDURE — 83036 HEMOGLOBIN GLYCOSYLATED A1C: CPT

## 2024-11-12 PROCEDURE — 3074F SYST BP LT 130 MM HG: CPT | Performed by: THORACIC SURGERY (CARDIOTHORACIC VASCULAR SURGERY)

## 2024-11-12 PROCEDURE — 3078F DIAST BP <80 MM HG: CPT | Performed by: THORACIC SURGERY (CARDIOTHORACIC VASCULAR SURGERY)

## 2024-11-12 PROCEDURE — 1159F MED LIST DOCD IN RCRD: CPT | Performed by: THORACIC SURGERY (CARDIOTHORACIC VASCULAR SURGERY)

## 2024-11-12 PROCEDURE — 1160F RVW MEDS BY RX/DR IN RCRD: CPT | Performed by: THORACIC SURGERY (CARDIOTHORACIC VASCULAR SURGERY)

## 2024-11-12 PROCEDURE — 85027 COMPLETE CBC AUTOMATED: CPT

## 2024-11-12 PROCEDURE — 36415 COLL VENOUS BLD VENIPUNCTURE: CPT

## 2024-11-12 PROCEDURE — 80053 COMPREHEN METABOLIC PANEL: CPT

## 2024-11-12 PROCEDURE — 99204 OFFICE O/P NEW MOD 45 MIN: CPT | Performed by: THORACIC SURGERY (CARDIOTHORACIC VASCULAR SURGERY)

## 2024-11-15 ENCOUNTER — PATIENT ROUNDING (BHMG ONLY) (OUTPATIENT)
Dept: CARDIAC SURGERY | Facility: CLINIC | Age: 79
End: 2024-11-15
Payer: MEDICARE

## 2024-11-15 NOTE — PROGRESS NOTES
A [employer] message has been sent to the patient for PATIENT ROUNDING with [facility].

## 2024-11-25 ENCOUNTER — HOSPITAL ENCOUNTER (OUTPATIENT)
Dept: CARDIOLOGY | Facility: HOSPITAL | Age: 79
Discharge: HOME | End: 2024-11-25
Payer: MEDICARE

## 2024-11-25 ENCOUNTER — TELEPHONE (OUTPATIENT)
Dept: CARDIAC SURGERY | Facility: CLINIC | Age: 79
End: 2024-11-25
Payer: MEDICARE

## 2024-11-25 VITALS
DIASTOLIC BLOOD PRESSURE: 67 MMHG | HEIGHT: 67 IN | BODY MASS INDEX: 33.01 KG/M2 | WEIGHT: 210.32 LBS | SYSTOLIC BLOOD PRESSURE: 147 MMHG | HEART RATE: 68 BPM

## 2024-11-25 DIAGNOSIS — R06.02 SHORTNESS OF BREATH: ICD-10-CM

## 2024-11-25 DIAGNOSIS — R91.1 LUNG NODULE: ICD-10-CM

## 2024-11-25 PROCEDURE — 93017 CV STRESS TEST TRACING ONLY: CPT

## 2024-11-25 PROCEDURE — 25010000002 REGADENOSON 0.4 MG/5ML SOLUTION: Performed by: INTERNAL MEDICINE

## 2024-11-25 PROCEDURE — 78452 HT MUSCLE IMAGE SPECT MULT: CPT

## 2024-11-25 PROCEDURE — A9502 TC99M TETROFOSMIN: HCPCS | Performed by: NURSE PRACTITIONER

## 2024-11-25 PROCEDURE — 34310000005 TECHNETIUM TETROFOSMIN KIT: Performed by: NURSE PRACTITIONER

## 2024-11-25 RX ORDER — REGADENOSON 0.08 MG/ML
0.4 INJECTION, SOLUTION INTRAVENOUS ONCE
Status: COMPLETED | OUTPATIENT
Start: 2024-11-25 | End: 2024-11-25

## 2024-11-25 RX ADMIN — REGADENOSON 0.4 MG: 0.08 INJECTION, SOLUTION INTRAVENOUS at 08:44

## 2024-11-25 RX ADMIN — TETROFOSMIN 1 DOSE: 1.38 INJECTION, POWDER, LYOPHILIZED, FOR SOLUTION INTRAVENOUS at 08:45

## 2024-11-25 RX ADMIN — TETROFOSMIN 1 DOSE: 1.38 INJECTION, POWDER, LYOPHILIZED, FOR SOLUTION INTRAVENOUS at 07:35

## 2024-11-25 NOTE — TELEPHONE ENCOUNTER
Called pt. He is agreeable with OR on 11/29. I advised I would call back with further information once scheduled.

## 2024-11-26 ENCOUNTER — PREP FOR SURGERY (OUTPATIENT)
Dept: OTHER | Facility: HOSPITAL | Age: 79
End: 2024-11-26
Payer: MEDICARE

## 2024-11-26 DIAGNOSIS — R91.1 LUNG NODULE: Primary | ICD-10-CM

## 2024-11-26 DIAGNOSIS — R06.02 BREATH SHORTNESS: ICD-10-CM

## 2024-11-26 LAB
BH CV NUCLEAR PRIOR STUDY: 3
BH CV REST NUCLEAR ISOTOPE DOSE: 11.1 MCI
BH CV STRESS BP STAGE 1: NORMAL
BH CV STRESS COMMENTS STAGE 1: NORMAL
BH CV STRESS DOSE REGADENOSON STAGE 1: 0.4
BH CV STRESS DURATION MIN STAGE 1: 0
BH CV STRESS DURATION SEC STAGE 1: 10
BH CV STRESS HR STAGE 1: 93
BH CV STRESS NUCLEAR ISOTOPE DOSE: 36 MCI
BH CV STRESS PROTOCOL 1: NORMAL
BH CV STRESS RECOVERY BP: NORMAL MMHG
BH CV STRESS RECOVERY HR: 90 BPM
BH CV STRESS STAGE 1: 1
MAXIMAL PREDICTED HEART RATE: 140 BPM
PERCENT MAX PREDICTED HR: 66.43 %
STRESS BASELINE BP: NORMAL MMHG
STRESS BASELINE HR: 68 BPM
STRESS PERCENT HR: 78 %
STRESS POST EXERCISE DUR MIN: 0 MIN
STRESS POST EXERCISE DUR SEC: 10 SEC
STRESS POST PEAK BP: NORMAL MMHG
STRESS POST PEAK HR: 93 BPM
STRESS TARGET HR: 119 BPM

## 2024-11-26 RX ORDER — ALBUTEROL SULFATE 1.25 MG/3ML
1.25 SOLUTION RESPIRATORY (INHALATION)
Status: CANCELLED | OUTPATIENT
Start: 2024-11-26

## 2024-11-26 RX ORDER — SODIUM CHLORIDE 0.9 % (FLUSH) 0.9 %
10 SYRINGE (ML) INJECTION EVERY 12 HOURS SCHEDULED
Status: CANCELLED | OUTPATIENT
Start: 2024-11-26

## 2024-11-26 RX ORDER — SODIUM CHLORIDE 0.9 % (FLUSH) 0.9 %
10 SYRINGE (ML) INJECTION AS NEEDED
Status: CANCELLED | OUTPATIENT
Start: 2024-11-26

## 2024-11-26 NOTE — TELEPHONE ENCOUNTER
I went ahead and posted his surgery orders.  He is on Ozempic but Dr. Valenzuela said he told him to stop this at a previous visit-just double check.  We are awaiting finalization of his stress test report.  I did call the reading room and left a message on [employer].  Please check periodically throughout the day to make sure his stress test has been read and is deemed low risk in order to proceed with surgery on Friday.

## 2024-11-27 ENCOUNTER — HOSPITAL ENCOUNTER (OUTPATIENT)
Dept: GENERAL RADIOLOGY | Facility: HOSPITAL | Age: 79
Discharge: HOME | End: 2024-11-27
Payer: MEDICARE

## 2024-11-27 ENCOUNTER — ANESTHESIA EVENT (OUTPATIENT)
Dept: PERIOP | Facility: HOSPITAL | Age: 79
DRG: 165 | End: 2024-11-27
Payer: MEDICARE

## 2024-11-27 ENCOUNTER — PRE-ADMISSION TESTING (OUTPATIENT)
Dept: PREADMISSION TESTING | Facility: HOSPITAL | Age: 79
DRG: 165 | End: 2024-11-27
Payer: MEDICARE

## 2024-11-27 VITALS
DIASTOLIC BLOOD PRESSURE: 61 MMHG | BODY MASS INDEX: 32.18 KG/M2 | SYSTOLIC BLOOD PRESSURE: 145 MMHG | WEIGHT: 212.3 LBS | HEART RATE: 84 BPM | RESPIRATION RATE: 16 BRPM | OXYGEN SATURATION: 95 % | HEIGHT: 68 IN

## 2024-11-27 DIAGNOSIS — R91.1 LUNG NODULE: ICD-10-CM

## 2024-11-27 DIAGNOSIS — R06.02 BREATH SHORTNESS: ICD-10-CM

## 2024-11-27 LAB
ABO GROUP BLD: NORMAL
ALBUMIN SERPL-MCNC: 4.4 G/DL (ref 3.5–5.2)
ALBUMIN/GLOB SERPL: 1.4 G/DL
ALP SERPL-CCNC: 103 U/L (ref 39–117)
ALT SERPL W P-5'-P-CCNC: 36 U/L (ref 1–41)
ANION GAP SERPL CALCULATED.3IONS-SCNC: 9 MMOL/L (ref 5–15)
APTT PPP: 23.9 SECONDS (ref 24.5–36)
AST SERPL-CCNC: 30 U/L (ref 1–40)
BASOPHILS # BLD AUTO: 0.07 10*3/MM3 (ref 0–0.2)
BASOPHILS NFR BLD AUTO: 0.8 % (ref 0–1.5)
BILIRUB SERPL-MCNC: 0.3 MG/DL (ref 0–1.2)
BLD GP AB SCN SERPL QL: NEGATIVE
BUN SERPL-MCNC: 21 MG/DL (ref 8–23)
BUN/CREAT SERPL: 24.1 (ref 7–25)
CALCIUM SPEC-SCNC: 9.6 MG/DL (ref 8.6–10.5)
CHLORIDE SERPL-SCNC: 96 MMOL/L (ref 98–107)
CO2 SERPL-SCNC: 29 MMOL/L (ref 22–29)
CREAT SERPL-MCNC: 0.87 MG/DL (ref 0.76–1.27)
DEPRECATED RDW RBC AUTO: 49.1 FL (ref 37–54)
EGFRCR SERPLBLD CKD-EPI 2021: 87.2 ML/MIN/1.73
EOSINOPHIL # BLD AUTO: 0.22 10*3/MM3 (ref 0–0.4)
EOSINOPHIL NFR BLD AUTO: 2.6 % (ref 0.3–6.2)
ERYTHROCYTE [DISTWIDTH] IN BLOOD BY AUTOMATED COUNT: 14.3 % (ref 12.3–15.4)
GLOBULIN UR ELPH-MCNC: 3.1 GM/DL
GLUCOSE SERPL-MCNC: 118 MG/DL (ref 65–99)
HCT VFR BLD AUTO: 38.7 % (ref 37.5–51)
HGB BLD-MCNC: 12.5 G/DL (ref 13–17.7)
IMM GRANULOCYTES # BLD AUTO: 0.02 10*3/MM3 (ref 0–0.05)
IMM GRANULOCYTES NFR BLD AUTO: 0.2 % (ref 0–0.5)
INR PPP: 0.93 (ref 0.91–1.09)
LYMPHOCYTES # BLD AUTO: 2.22 10*3/MM3 (ref 0.7–3.1)
LYMPHOCYTES NFR BLD AUTO: 26.3 % (ref 19.6–45.3)
MCH RBC QN AUTO: 30.3 PG (ref 26.6–33)
MCHC RBC AUTO-ENTMCNC: 32.3 G/DL (ref 31.5–35.7)
MCV RBC AUTO: 93.9 FL (ref 79–97)
MONOCYTES # BLD AUTO: 0.52 10*3/MM3 (ref 0.1–0.9)
MONOCYTES NFR BLD AUTO: 6.2 % (ref 5–12)
NEUTROPHILS NFR BLD AUTO: 5.38 10*3/MM3 (ref 1.7–7)
NEUTROPHILS NFR BLD AUTO: 63.9 % (ref 42.7–76)
NRBC BLD AUTO-RTO: 0 /100 WBC (ref 0–0.2)
PLATELET # BLD AUTO: 191 10*3/MM3 (ref 140–450)
PMV BLD AUTO: 10.8 FL (ref 6–12)
POTASSIUM SERPL-SCNC: 4.5 MMOL/L (ref 3.5–5.2)
PROT SERPL-MCNC: 7.5 G/DL (ref 6–8.5)
PROTHROMBIN TIME: 12.9 SECONDS (ref 11.8–14.8)
RBC # BLD AUTO: 4.12 10*6/MM3 (ref 4.14–5.8)
RH BLD: NEGATIVE
SODIUM SERPL-SCNC: 134 MMOL/L (ref 136–145)
T&S EXPIRATION DATE: NORMAL
WBC NRBC COR # BLD AUTO: 8.43 10*3/MM3 (ref 3.4–10.8)

## 2024-11-27 PROCEDURE — 86900 BLOOD TYPING SEROLOGIC ABO: CPT

## 2024-11-27 PROCEDURE — 85025 COMPLETE CBC W/AUTO DIFF WBC: CPT

## 2024-11-27 PROCEDURE — 86901 BLOOD TYPING SEROLOGIC RH(D): CPT

## 2024-11-27 PROCEDURE — 71046 X-RAY EXAM CHEST 2 VIEWS: CPT

## 2024-11-27 PROCEDURE — 80053 COMPREHEN METABOLIC PANEL: CPT

## 2024-11-27 PROCEDURE — 93010 ELECTROCARDIOGRAM REPORT: CPT | Performed by: INTERNAL MEDICINE

## 2024-11-27 PROCEDURE — 85730 THROMBOPLASTIN TIME PARTIAL: CPT

## 2024-11-27 PROCEDURE — 93005 ELECTROCARDIOGRAM TRACING: CPT

## 2024-11-27 PROCEDURE — 85610 PROTHROMBIN TIME: CPT

## 2024-11-27 PROCEDURE — 36415 COLL VENOUS BLD VENIPUNCTURE: CPT

## 2024-11-27 PROCEDURE — 86850 RBC ANTIBODY SCREEN: CPT

## 2024-11-27 NOTE — DISCHARGE INSTRUCTIONS
Preparing for Surgery  Follow these instructions before the procedure:  Several days or weeks before your procedure  Medication(s) you need to stop   ___1___ week prior to surgery: OZEMPIC      Ask your health care provider about:  Changing or stopping your regular medicines. This is especially important if you are taking diabetes medicines or blood thinners.  Taking medicines such as aspirin and ibuprofen. These medicines can thin your blood. Do not take these medicines unless your health care provider tells you to take them.  Taking over-the-counter medicines, vitamins, herbs, and supplements.    Contact your surgeon if you:  Develop a fever of more than 100.4°F (38°C) or other feelings of illness during the 48 hours before your surgery.  Have symptoms that get worse.  Have questions or concerns about your surgery.  If you are going home the same day of your surgery you will need to arrange for a responsible adult, age 18 years old or older, to drive you home from the hospital and stay with you for 24 hours. Verification of the  will be made prior to any procedure requiring sedation. You may not go home in a taxi or any form of public transportation by yourself.     Day before your procedure  Medication(s) you need to stop the day before your surgery: LISINOPRIL    24 hours before your procedure DO NOT drink alcoholic beverages or smoke.  24 hours before your procedure STOP taking Erectile Dysfunction medication (i.e.,Cialis, Viagra)   You may be asked to shower with a germ-killing soap.  Day of your procedure   You may take the following medication(s) the morning of surgery with a sip of water: ATENOLOL      8 hours before your scheduled arrival time, STOP all food, any dairy products, and full liquids. This includes hard candy, chewing gum or mints. This is extremely important to prevent serious complications.     Up to 2 hours before your scheduled arrival time, you may have clear liquids no cream,  powder, or pulp of any kind. Safe options are water, black coffee, plain tea, soda, Gatorade/Powerade, clear broth, apple juice.    2 hours before your scheduled arrival time, STOP drinking clear liquids.    You may need to take another shower with a germ-killing soap before you leave home in the morning. Do not use perfumes, colognes, or body lotions.  Wear comfortable loose-fitting clothing.  Remove all jewelry including body piercing and rings, dark colored nail polish, and make up prior to arrival at the hospital. Leave all valuables at home.   Bring your hearing aids if you rely on them.  Do not wear contact lenses. If you wear eyeglasses remember to bring a case to store them in while you are in surgery.  Do not use denture adhesives since you will be asked to remove them during your surgery.    You do not need to bring your home medications into the hospital.   Bring your sleep apnea device with you on the day of your surgery (if this applies to you).  If you have an Inspire implant for sleep apnea, please bring the remote with you on the day of surgery.  If you wear portable oxygen, bring it with you.   If you are staying overnight, you may bring a bag of items you may need such as slippers, robe and a change of clothes for your discharge. You may want to leave these items in the car until you are ready for them since your family will take your belongings when you leave the pre-operative area.  Arrive at the hospital as scheduled by the office. You will be asked to arrive 2 hours prior to your surgery time in order to prepare for your procedure.  When you arrive at the hospital  Go to the registration desk located at the main entrance of the hospital.  After registration is completed, you will be given a beeper and a sticker sheet. Take the stickers to Outpatient Surgery and place in the tray at the end of the desk to notify the staff that you have arrived and registered.   Return to the lobby to wait. You  are not always called back according to the time of arrival but rather the time your doctor will be ready.  When your beeper lights up and vibrates proceed through the double doors, under the stairs, and a member of the Outpatient Surgery staff will escort you to your preoperative room.     How to Use Chlorhexidine Before Surgery  Chlorhexidine gluconate (CHG) is a germ-killing (antiseptic) solution that is used to clean the skin. It can get rid of the bacteria that normally live on the skin and can keep them away for about 24 hours. To clean your skin with CHG, you may be given:  A CHG solution to use in the shower or as part of a sponge bath.  A prepackaged cloth that contains CHG.  Cleaning your skin with CHG may help lower the risk for infection:  While you are staying in the intensive care unit of the hospital.  If you have a vascular access, such as a central line, to provide short-term or long-term access to your veins.  If you have a catheter to drain urine from your bladder.  If you are on a ventilator. A ventilator is a machine that helps you breathe by moving air in and out of your lungs.  After surgery.  What are the risks?  Risks of using CHG include:  A skin reaction.  Hearing loss, if CHG gets in your ears and you have a perforated eardrum.  Eye injury, if CHG gets in your eyes and is not rinsed out.  The CHG product catching fire.  Make sure that you avoid smoking and flames after applying CHG to your skin.  Do not use CHG:  If you have a chlorhexidine allergy or have previously reacted to chlorhexidine.  On babies younger than 2 months of age.  How to use CHG solution  Use CHG only as told by your health care provider, and follow the instructions on the label.  Use the full amount of CHG as directed. Usually, this is one bottle.  During a shower    Follow these steps when using CHG solution during a shower (unless your health care provider gives you different instructions):  Start the shower.  Use  your normal soap and shampoo to wash your face and hair.  Turn off the shower or move out of the shower stream.  Pour the CHG onto a clean washcloth. Do not use any type of brush or rough-edged sponge.  Starting at your neck, lather your body down to your toes. Make sure you follow these instructions:  If you will be having surgery, pay special attention to the part of your body where you will be having surgery. Scrub this area for at least 1 minute.  Do not use CHG on your head or face. If the solution gets into your ears or eyes, rinse them well with water.  Avoid your genital area.  Avoid any areas of skin that have broken skin, cuts, or scrapes.  Scrub your back and under your arms. Make sure to wash skin folds.  Let the lather sit on your skin for 1-2 minutes or as long as told by your health care provider.  Thoroughly rinse your entire body in the shower. Make sure that all body creases and crevices are rinsed well.  Dry off with a clean towel. Do not put any substances on your body afterward--such as powder, lotion, or perfume--unless you are told to do so by your health care provider. Only use lotions that are recommended by the .  Put on clean clothes or pajamas.  If it is the night before your surgery, sleep in clean sheets.     During a sponge bath  Follow these steps when using CHG solution during a sponge bath (unless your health care provider gives you different instructions):  Use your normal soap and shampoo to wash your face and hair.  Pour the CHG onto a clean washcloth.  Starting at your neck, lather your body down to your toes. Make sure you follow these instructions:  If you will be having surgery, pay special attention to the part of your body where you will be having surgery. Scrub this area for at least 1 minute.  Do not use CHG on your head or face. If the solution gets into your ears or eyes, rinse them well with water.  Avoid your genital area.  Avoid any areas of skin that  have broken skin, cuts, or scrapes.  Scrub your back and under your arms. Make sure to wash skin folds.  Let the lather sit on your skin for 1-2 minutes or as long as told by your health care provider.  Using a different clean, wet washcloth, thoroughly rinse your entire body. Make sure that all body creases and crevices are rinsed well.  Dry off with a clean towel. Do not put any substances on your body afterward--such as powder, lotion, or perfume--unless you are told to do so by your health care provider. Only use lotions that are recommended by the .  Put on clean clothes or pajamas.  If it is the night before your surgery, sleep in clean sheets.  How to use CHG prepackaged cloths  Only use CHG cloths as told by your health care provider, and follow the instructions on the label.  Use the CHG cloth on clean, dry skin.  Do not use the CHG cloth on your head or face unless your health care provider tells you to.  When washing with the CHG cloth:  Avoid your genital area.  Avoid any areas of skin that have broken skin, cuts, or scrapes.  Before surgery    Follow these steps when using a CHG cloth to clean before surgery (unless your health care provider gives you different instructions):  Using the CHG cloth, vigorously scrub the part of your body where you will be having surgery. Scrub using a back-and-forth motion for 3 minutes. The area on your body should be completely wet with CHG when you are done scrubbing.  Do not rinse. Discard the cloth and let the area air-dry. Do not put any substances on the area afterward, such as powder, lotion, or perfume.  Put on clean clothes or pajamas.  If it is the night before your surgery, sleep in clean sheets.     For general bathing  Follow these steps when using CHG cloths for general bathing (unless your health care provider gives you different instructions).  Use a separate CHG cloth for each area of your body. Make sure you wash between any folds of skin  and between your fingers and toes. Wash your body in the following order, switching to a new cloth after each step:  The front of your neck, shoulders, and chest.  Both of your arms, under your arms, and your hands.  Your stomach and groin area, avoiding the genitals.  Your right leg and foot.  Your left leg and foot.  The back of your neck, your back, and your buttocks.  Do not rinse. Discard the cloth and let the area air-dry. Do not put any substances on your body afterward--such as powder, lotion, or perfume--unless you are told to do so by your health care provider. Only use lotions that are recommended by the .  Put on clean clothes or pajamas.  Contact a health care provider if:  Your skin gets irritated after scrubbing.  You have questions about using your solution or cloth.  You swallow any chlorhexidine. Call your local poison control center (1-972.604.1249 in the [place]  Get help right away if:  Your eyes itch badly, or they become very red or swollen.  Your skin itches badly and is red or swollen.  Your hearing changes.  You have trouble seeing.  You have swelling or tingling in your mouth or throat.  You have trouble breathing.  These symptoms may represent a serious problem that is an emergency. Do not wait to see if the symptoms will go away. Get medical help right away. Call your local emergency services (403 in the [place] Do not drive yourself to the hospital.  Summary  Chlorhexidine gluconate (CHG) is a germ-killing (antiseptic) solution that is used to clean the skin. Cleaning your skin with CHG may help to lower your risk for infection.  You may be given CHG to use for bathing. It may be in a bottle or in a prepackaged cloth to use on your skin. Carefully follow your health care provider's instructions and the instructions on the product label.  Do not use CHG if you have a chlorhexidine allergy.  Contact your health care provider if your skin gets irritated after scrubbing.  This  information is not intended to replace advice given to you by your health care provider. Make sure you discuss any questions you have with your health care provider.  Document Revised: 04/17/2023 Document Reviewed: 02/28/2022  Elsevier Patient Education © 2023 [employer]

## 2024-11-27 NOTE — TELEPHONE ENCOUNTER
Stress test finalized: No evidence of ischemia and consistent with low risk study.  Okay to proceed with planned surgery on Friday.  Please inform patient.

## 2024-11-28 LAB
QT INTERVAL: 390 MS
QTC INTERVAL: 421 MS

## 2024-11-29 ENCOUNTER — HOSPITAL ENCOUNTER (INPATIENT)
Facility: HOSPITAL | Age: 79
LOS: 3 days | Discharge: HOME | DRG: 165 | End: 2024-12-02
Attending: THORACIC SURGERY (CARDIOTHORACIC VASCULAR SURGERY) | Admitting: THORACIC SURGERY (CARDIOTHORACIC VASCULAR SURGERY)
Payer: MEDICARE

## 2024-11-29 ENCOUNTER — APPOINTMENT (OUTPATIENT)
Dept: GENERAL RADIOLOGY | Facility: HOSPITAL | Age: 79
DRG: 165 | End: 2024-11-29
Payer: MEDICARE

## 2024-11-29 ENCOUNTER — ANESTHESIA (OUTPATIENT)
Dept: PERIOP | Facility: HOSPITAL | Age: 79
DRG: 165 | End: 2024-11-29
Payer: MEDICARE

## 2024-11-29 DIAGNOSIS — R91.1 LUNG NODULE: ICD-10-CM

## 2024-11-29 LAB
ANION GAP SERPL CALCULATED.3IONS-SCNC: 13 MMOL/L (ref 5–15)
BUN SERPL-MCNC: 21 MG/DL (ref 8–23)
BUN/CREAT SERPL: 28.8 (ref 7–25)
CALCIUM SPEC-SCNC: 8.7 MG/DL (ref 8.6–10.5)
CHLORIDE SERPL-SCNC: 99 MMOL/L (ref 98–107)
CO2 SERPL-SCNC: 24 MMOL/L (ref 22–29)
CREAT SERPL-MCNC: 0.73 MG/DL (ref 0.76–1.27)
DEPRECATED RDW RBC AUTO: 46.8 FL (ref 37–54)
EGFRCR SERPLBLD CKD-EPI 2021: 92 ML/MIN/1.73
ERYTHROCYTE [DISTWIDTH] IN BLOOD BY AUTOMATED COUNT: 13.8 % (ref 12.3–15.4)
GLUCOSE BLDC GLUCOMTR-MCNC: 122 MG/DL (ref 70–130)
GLUCOSE BLDC GLUCOMTR-MCNC: 176 MG/DL (ref 70–130)
GLUCOSE SERPL-MCNC: 235 MG/DL (ref 65–99)
HCT VFR BLD AUTO: 37.7 % (ref 37.5–51)
HGB BLD-MCNC: 12 G/DL (ref 13–17.7)
MCH RBC QN AUTO: 29.9 PG (ref 26.6–33)
MCHC RBC AUTO-ENTMCNC: 31.8 G/DL (ref 31.5–35.7)
MCV RBC AUTO: 93.8 FL (ref 79–97)
PLATELET # BLD AUTO: 169 10*3/MM3 (ref 140–450)
PMV BLD AUTO: 10.8 FL (ref 6–12)
POTASSIUM SERPL-SCNC: 4.3 MMOL/L (ref 3.5–5.2)
RBC # BLD AUTO: 4.02 10*6/MM3 (ref 4.14–5.8)
SODIUM SERPL-SCNC: 136 MMOL/L (ref 136–145)
WBC NRBC COR # BLD AUTO: 10.97 10*3/MM3 (ref 3.4–10.8)

## 2024-11-29 PROCEDURE — 25010000002 DEXAMETHASONE PER 1 MG: Performed by: NURSE ANESTHETIST, CERTIFIED REGISTERED

## 2024-11-29 PROCEDURE — 0BBC4ZZ EXCISION OF RIGHT UPPER LUNG LOBE, PERCUTANEOUS ENDOSCOPIC APPROACH: ICD-10-PCS | Performed by: THORACIC SURGERY (CARDIOTHORACIC VASCULAR SURGERY)

## 2024-11-29 PROCEDURE — 88321 CONSLTJ&REPRT SLD PREP ELSWR: CPT | Performed by: THORACIC SURGERY (CARDIOTHORACIC VASCULAR SURGERY)

## 2024-11-29 PROCEDURE — 88307 TISSUE EXAM BY PATHOLOGIST: CPT | Performed by: THORACIC SURGERY (CARDIOTHORACIC VASCULAR SURGERY)

## 2024-11-29 PROCEDURE — 25010000002 ONDANSETRON PER 1 MG: Performed by: NURSE ANESTHETIST, CERTIFIED REGISTERED

## 2024-11-29 PROCEDURE — 25010000002 CEFAZOLIN PER 500 MG: Performed by: NURSE PRACTITIONER

## 2024-11-29 PROCEDURE — 94761 N-INVAS EAR/PLS OXIMETRY MLT: CPT

## 2024-11-29 PROCEDURE — 25010000002 SUGAMMADEX 200 MG/2ML SOLUTION: Performed by: NURSE ANESTHETIST, CERTIFIED REGISTERED

## 2024-11-29 PROCEDURE — 25010000002 CEFAZOLIN PER 500 MG: Performed by: THORACIC SURGERY (CARDIOTHORACIC VASCULAR SURGERY)

## 2024-11-29 PROCEDURE — 25010000002 PROPOFOL 10 MG/ML EMULSION: Performed by: NURSE ANESTHETIST, CERTIFIED REGISTERED

## 2024-11-29 PROCEDURE — 0BBD4ZZ EXCISION OF RIGHT MIDDLE LUNG LOBE, PERCUTANEOUS ENDOSCOPIC APPROACH: ICD-10-PCS | Performed by: THORACIC SURGERY (CARDIOTHORACIC VASCULAR SURGERY)

## 2024-11-29 PROCEDURE — 25010000002 GLYCOPYRROLATE 0.4 MG/2ML SOLUTION: Performed by: NURSE ANESTHETIST, CERTIFIED REGISTERED

## 2024-11-29 PROCEDURE — 94799 UNLISTED PULMONARY SVC/PX: CPT

## 2024-11-29 PROCEDURE — 80048 BASIC METABOLIC PNL TOTAL CA: CPT | Performed by: THORACIC SURGERY (CARDIOTHORACIC VASCULAR SURGERY)

## 2024-11-29 PROCEDURE — 25810000003 LACTATED RINGERS PER 1000 ML: Performed by: THORACIC SURGERY (CARDIOTHORACIC VASCULAR SURGERY)

## 2024-11-29 PROCEDURE — 32608 THORACOSCOPY W/BX NODULE: CPT | Performed by: THORACIC SURGERY (CARDIOTHORACIC VASCULAR SURGERY)

## 2024-11-29 PROCEDURE — 25010000002 LABETALOL 5 MG/ML SOLUTION: Performed by: NURSE ANESTHETIST, CERTIFIED REGISTERED

## 2024-11-29 PROCEDURE — 88305 TISSUE EXAM BY PATHOLOGIST: CPT | Performed by: THORACIC SURGERY (CARDIOTHORACIC VASCULAR SURGERY)

## 2024-11-29 PROCEDURE — 82948 REAGENT STRIP/BLOOD GLUCOSE: CPT

## 2024-11-29 PROCEDURE — 88331 PATH CONSLTJ SURG 1 BLK 1SPC: CPT | Performed by: PATHOLOGY

## 2024-11-29 PROCEDURE — 0BBT0ZX EXCISION OF DIAPHRAGM, OPEN APPROACH, DIAGNOSTIC: ICD-10-PCS | Performed by: THORACIC SURGERY (CARDIOTHORACIC VASCULAR SURGERY)

## 2024-11-29 PROCEDURE — C1729 CATH, DRAINAGE: HCPCS | Performed by: THORACIC SURGERY (CARDIOTHORACIC VASCULAR SURGERY)

## 2024-11-29 PROCEDURE — 0BJ08ZZ INSPECTION OF TRACHEOBRONCHIAL TREE, VIA NATURAL OR ARTIFICIAL OPENING ENDOSCOPIC: ICD-10-PCS | Performed by: THORACIC SURGERY (CARDIOTHORACIC VASCULAR SURGERY)

## 2024-11-29 PROCEDURE — 71045 X-RAY EXAM CHEST 1 VIEW: CPT

## 2024-11-29 PROCEDURE — 85027 COMPLETE CBC AUTOMATED: CPT | Performed by: THORACIC SURGERY (CARDIOTHORACIC VASCULAR SURGERY)

## 2024-11-29 PROCEDURE — 25010000002 FENTANYL CITRATE (PF) 50 MCG/ML SOLUTION: Performed by: NURSE ANESTHETIST, CERTIFIED REGISTERED

## 2024-11-29 DEVICE — THE ECHELON, ECHELON ENDOPATH™ AND ECHELON FLEX™ FAMILIES OF ENDOSCOPIC LINEAR CUTTERS AND RELOADS ARE STERILE, SINGLE PATIENT USE INSTRUMENTS THAT SIMULTANEOUSLY CUT AND STAPLE TISSUE. THERE ARE SIX STAGGERED ROWS OF STAPLES, THREE ON EITHER SIDE OF THE CUT LINE. THE 45 MM INSTRUMENTS HAVE A STAPLE LINE THATIS APPROXIMATELY 45 MM LONG AND A CUT LINE THAT IS APPROXIMATELY 42 MM LONG. THE SHAFT CAN ROTATE FREELY IN BOTH DIRECTIONS AND AN ARTICULATION MECHANISM ON ARTICULATING INSTRUMENTS ENABLES BENDING THE DISTAL PORTIONOF THE SHAFT TO FACILITATE LATERAL ACCESS OF THE OPERATIVE SITE.THE INSTRUMENTS ARE SHIPPED WITHOUT A RELOAD AND MUST BE LOADED PRIOR TO USE. A STAPLE RETAINING CAP ON THE RELOAD PROTECTS THE STAPLE LEG POINTS DURING SHIPPING AND TRANSPORTATION. THE INSTRUMENTS’ LOCK-OUT FEATURE IS DESIGNED TO PREVENT A USED RELOAD FROM BEING REFIRED.
Type: IMPLANTABLE DEVICE | Site: LUNG | Status: FUNCTIONAL
Brand: ECHELON ENDOPATH

## 2024-11-29 DEVICE — ENDOPATH ECHELON ENDOSCOPIC LINEAR CUTTER RELOADS, GREEN, 60MM
Type: IMPLANTABLE DEVICE | Site: LUNG | Status: FUNCTIONAL
Brand: ECHELON ENDOPATH

## 2024-11-29 DEVICE — ECHELON 3000 60MM STANDARD
Type: IMPLANTABLE DEVICE | Site: LUNG | Status: FUNCTIONAL
Brand: ECHELON

## 2024-11-29 RX ORDER — DOCUSATE SODIUM 100 MG/1
100 CAPSULE, LIQUID FILLED ORAL 2 TIMES DAILY
Status: DISCONTINUED | OUTPATIENT
Start: 2024-11-29 | End: 2024-12-02 | Stop reason: HOSPADM

## 2024-11-29 RX ORDER — OXYCODONE AND ACETAMINOPHEN 10; 325 MG/1; MG/1
1 TABLET ORAL EVERY 4 HOURS PRN
Status: DISCONTINUED | OUTPATIENT
Start: 2024-11-29 | End: 2024-11-29 | Stop reason: HOSPADM

## 2024-11-29 RX ORDER — ACETAMINOPHEN 160 MG/5ML
1000 SOLUTION ORAL EVERY 6 HOURS PRN
Status: ACTIVE | OUTPATIENT
Start: 2024-11-29 | End: 2024-12-01

## 2024-11-29 RX ORDER — ALBUTEROL SULFATE 1.25 MG/3ML
1.25 SOLUTION RESPIRATORY (INHALATION)
Status: DISCONTINUED | OUTPATIENT
Start: 2024-11-29 | End: 2024-11-29 | Stop reason: HOSPADM

## 2024-11-29 RX ORDER — ONDANSETRON 2 MG/ML
4 INJECTION INTRAMUSCULAR; INTRAVENOUS
Status: DISCONTINUED | OUTPATIENT
Start: 2024-11-29 | End: 2024-11-29 | Stop reason: HOSPADM

## 2024-11-29 RX ORDER — NITROGLYCERIN 0.4 MG/1
0.4 TABLET SUBLINGUAL
Status: DISCONTINUED | OUTPATIENT
Start: 2024-11-29 | End: 2024-12-02 | Stop reason: HOSPADM

## 2024-11-29 RX ORDER — NITROGLYCERIN 0.4 MG/1
0.4 TABLET SUBLINGUAL
Status: DISCONTINUED | OUTPATIENT
Start: 2024-11-29 | End: 2024-11-29 | Stop reason: SDUPTHER

## 2024-11-29 RX ORDER — OXYCODONE HYDROCHLORIDE 5 MG/1
5 TABLET ORAL EVERY 4 HOURS PRN
Status: DISCONTINUED | OUTPATIENT
Start: 2024-11-29 | End: 2024-12-02 | Stop reason: HOSPADM

## 2024-11-29 RX ORDER — ONDANSETRON 2 MG/ML
4 INJECTION INTRAMUSCULAR; INTRAVENOUS EVERY 6 HOURS PRN
Status: DISCONTINUED | OUTPATIENT
Start: 2024-11-29 | End: 2024-12-02 | Stop reason: HOSPADM

## 2024-11-29 RX ORDER — SODIUM CHLORIDE, SODIUM LACTATE, POTASSIUM CHLORIDE, CALCIUM CHLORIDE 600; 310; 30; 20 MG/100ML; MG/100ML; MG/100ML; MG/100ML
100 INJECTION, SOLUTION INTRAVENOUS CONTINUOUS
Status: DISCONTINUED | OUTPATIENT
Start: 2024-11-29 | End: 2024-11-29

## 2024-11-29 RX ORDER — FINASTERIDE 5 MG/1
5 TABLET, FILM COATED ORAL DAILY
Status: DISCONTINUED | OUTPATIENT
Start: 2024-11-29 | End: 2024-12-02 | Stop reason: HOSPADM

## 2024-11-29 RX ORDER — ENOXAPARIN SODIUM 100 MG/ML
40 INJECTION SUBCUTANEOUS DAILY
Status: DISCONTINUED | OUTPATIENT
Start: 2024-11-30 | End: 2024-12-02 | Stop reason: HOSPADM

## 2024-11-29 RX ORDER — SODIUM CHLORIDE 0.9 % (FLUSH) 0.9 %
10 SYRINGE (ML) INJECTION AS NEEDED
Status: DISCONTINUED | OUTPATIENT
Start: 2024-11-29 | End: 2024-11-29 | Stop reason: HOSPADM

## 2024-11-29 RX ORDER — GLYCOPYRROLATE 0.2 MG/ML
INJECTION INTRAMUSCULAR; INTRAVENOUS AS NEEDED
Status: DISCONTINUED | OUTPATIENT
Start: 2024-11-29 | End: 2024-11-29 | Stop reason: SURG

## 2024-11-29 RX ORDER — FLUMAZENIL 0.1 MG/ML
0.2 INJECTION INTRAVENOUS AS NEEDED
Status: DISCONTINUED | OUTPATIENT
Start: 2024-11-29 | End: 2024-11-29 | Stop reason: HOSPADM

## 2024-11-29 RX ORDER — ONDANSETRON 4 MG/1
4 TABLET, ORALLY DISINTEGRATING ORAL EVERY 6 HOURS PRN
Status: DISCONTINUED | OUTPATIENT
Start: 2024-11-29 | End: 2024-12-02 | Stop reason: HOSPADM

## 2024-11-29 RX ORDER — LIDOCAINE HYDROCHLORIDE 10 MG/ML
0.5 INJECTION, SOLUTION EPIDURAL; INFILTRATION; INTRACAUDAL; PERINEURAL ONCE AS NEEDED
Status: DISCONTINUED | OUTPATIENT
Start: 2024-11-29 | End: 2024-11-29 | Stop reason: HOSPADM

## 2024-11-29 RX ORDER — FENTANYL CITRATE 50 UG/ML
50 INJECTION, SOLUTION INTRAMUSCULAR; INTRAVENOUS
Status: DISCONTINUED | OUTPATIENT
Start: 2024-11-29 | End: 2024-11-29 | Stop reason: HOSPADM

## 2024-11-29 RX ORDER — SODIUM CHLORIDE 9 MG/ML
80 INJECTION, SOLUTION INTRAVENOUS CONTINUOUS
Status: DISPENSED | OUTPATIENT
Start: 2024-11-29 | End: 2024-11-30

## 2024-11-29 RX ORDER — DEXAMETHASONE SODIUM PHOSPHATE 4 MG/ML
INJECTION, SOLUTION INTRA-ARTICULAR; INTRALESIONAL; INTRAMUSCULAR; INTRAVENOUS; SOFT TISSUE AS NEEDED
Status: DISCONTINUED | OUTPATIENT
Start: 2024-11-29 | End: 2024-11-29 | Stop reason: SURG

## 2024-11-29 RX ORDER — ATORVASTATIN CALCIUM 10 MG/1
20 TABLET, FILM COATED ORAL NIGHTLY
Status: DISCONTINUED | OUTPATIENT
Start: 2024-11-29 | End: 2024-12-02 | Stop reason: HOSPADM

## 2024-11-29 RX ORDER — PROPOFOL 10 MG/ML
VIAL (ML) INTRAVENOUS AS NEEDED
Status: DISCONTINUED | OUTPATIENT
Start: 2024-11-29 | End: 2024-11-29 | Stop reason: SURG

## 2024-11-29 RX ORDER — SODIUM CHLORIDE, SODIUM LACTATE, POTASSIUM CHLORIDE, CALCIUM CHLORIDE 600; 310; 30; 20 MG/100ML; MG/100ML; MG/100ML; MG/100ML
1000 INJECTION, SOLUTION INTRAVENOUS CONTINUOUS
Status: DISCONTINUED | OUTPATIENT
Start: 2024-11-29 | End: 2024-11-29

## 2024-11-29 RX ORDER — OXYCODONE HYDROCHLORIDE 5 MG/1
10 TABLET ORAL EVERY 4 HOURS PRN
Status: DISCONTINUED | OUTPATIENT
Start: 2024-11-29 | End: 2024-12-02 | Stop reason: HOSPADM

## 2024-11-29 RX ORDER — POLYETHYLENE GLYCOL 3350 17 G/17G
17 POWDER, FOR SOLUTION ORAL DAILY
Status: DISCONTINUED | OUTPATIENT
Start: 2024-11-29 | End: 2024-12-02 | Stop reason: HOSPADM

## 2024-11-29 RX ORDER — ASPIRIN 81 MG/1
81 TABLET ORAL DAILY
Status: DISCONTINUED | OUTPATIENT
Start: 2024-11-30 | End: 2024-12-02 | Stop reason: HOSPADM

## 2024-11-29 RX ORDER — HYDROMORPHONE HYDROCHLORIDE 1 MG/ML
0.5 INJECTION, SOLUTION INTRAMUSCULAR; INTRAVENOUS; SUBCUTANEOUS
Status: DISCONTINUED | OUTPATIENT
Start: 2024-11-29 | End: 2024-11-29 | Stop reason: HOSPADM

## 2024-11-29 RX ORDER — NALOXONE HCL 0.4 MG/ML
0.04 VIAL (ML) INJECTION AS NEEDED
Status: DISCONTINUED | OUTPATIENT
Start: 2024-11-29 | End: 2024-11-29 | Stop reason: HOSPADM

## 2024-11-29 RX ORDER — LABETALOL HYDROCHLORIDE 5 MG/ML
INJECTION, SOLUTION INTRAVENOUS AS NEEDED
Status: DISCONTINUED | OUTPATIENT
Start: 2024-11-29 | End: 2024-11-29 | Stop reason: SURG

## 2024-11-29 RX ORDER — ALBUTEROL SULFATE 1.25 MG/3ML
1.25 SOLUTION RESPIRATORY (INHALATION)
Status: DISCONTINUED | OUTPATIENT
Start: 2024-11-29 | End: 2024-11-29

## 2024-11-29 RX ORDER — SODIUM CHLORIDE 0.9 % (FLUSH) 0.9 %
10 SYRINGE (ML) INJECTION EVERY 12 HOURS SCHEDULED
Status: DISCONTINUED | OUTPATIENT
Start: 2024-11-29 | End: 2024-11-29 | Stop reason: HOSPADM

## 2024-11-29 RX ORDER — SODIUM CHLORIDE 0.9 % (FLUSH) 0.9 %
3-10 SYRINGE (ML) INJECTION AS NEEDED
Status: DISCONTINUED | OUTPATIENT
Start: 2024-11-29 | End: 2024-11-29 | Stop reason: HOSPADM

## 2024-11-29 RX ORDER — BISACODYL 10 MG
10 SUPPOSITORY, RECTAL RECTAL DAILY PRN
Status: DISCONTINUED | OUTPATIENT
Start: 2024-11-29 | End: 2024-12-02 | Stop reason: HOSPADM

## 2024-11-29 RX ORDER — SODIUM CHLORIDE 0.9 % (FLUSH) 0.9 %
3 SYRINGE (ML) INJECTION EVERY 12 HOURS SCHEDULED
Status: DISCONTINUED | OUTPATIENT
Start: 2024-11-29 | End: 2024-11-29 | Stop reason: HOSPADM

## 2024-11-29 RX ORDER — ROCURONIUM BROMIDE 10 MG/ML
INJECTION, SOLUTION INTRAVENOUS AS NEEDED
Status: DISCONTINUED | OUTPATIENT
Start: 2024-11-29 | End: 2024-11-29 | Stop reason: SURG

## 2024-11-29 RX ORDER — ATENOLOL 50 MG/1
50 TABLET ORAL DAILY
Status: DISCONTINUED | OUTPATIENT
Start: 2024-11-30 | End: 2024-12-02 | Stop reason: HOSPADM

## 2024-11-29 RX ORDER — ACETAMINOPHEN 500 MG
1000 TABLET ORAL EVERY 6 HOURS PRN
Status: DISPENSED | OUTPATIENT
Start: 2024-11-29 | End: 2024-12-01

## 2024-11-29 RX ORDER — FENTANYL CITRATE 50 UG/ML
INJECTION, SOLUTION INTRAMUSCULAR; INTRAVENOUS AS NEEDED
Status: DISCONTINUED | OUTPATIENT
Start: 2024-11-29 | End: 2024-11-29 | Stop reason: SURG

## 2024-11-29 RX ORDER — ACETAMINOPHEN 650 MG/1
650 SUPPOSITORY RECTAL EVERY 6 HOURS PRN
Status: ACTIVE | OUTPATIENT
Start: 2024-11-29 | End: 2024-12-01

## 2024-11-29 RX ORDER — ACETYLCYSTEINE 200 MG/ML
3 SOLUTION ORAL; RESPIRATORY (INHALATION)
Status: COMPLETED | OUTPATIENT
Start: 2024-11-29 | End: 2024-12-01

## 2024-11-29 RX ORDER — SODIUM CHLORIDE 9 MG/ML
40 INJECTION, SOLUTION INTRAVENOUS AS NEEDED
Status: DISCONTINUED | OUTPATIENT
Start: 2024-11-29 | End: 2024-11-29 | Stop reason: HOSPADM

## 2024-11-29 RX ORDER — ONDANSETRON 2 MG/ML
INJECTION INTRAMUSCULAR; INTRAVENOUS AS NEEDED
Status: DISCONTINUED | OUTPATIENT
Start: 2024-11-29 | End: 2024-11-29 | Stop reason: SURG

## 2024-11-29 RX ORDER — IPRATROPIUM BROMIDE AND ALBUTEROL SULFATE 2.5; .5 MG/3ML; MG/3ML
3 SOLUTION RESPIRATORY (INHALATION)
Status: COMPLETED | OUTPATIENT
Start: 2024-11-29 | End: 2024-12-01

## 2024-11-29 RX ORDER — LISINOPRIL 5 MG/1
5 TABLET ORAL DAILY
Status: DISCONTINUED | OUTPATIENT
Start: 2024-11-29 | End: 2024-12-02 | Stop reason: HOSPADM

## 2024-11-29 RX ORDER — LABETALOL HYDROCHLORIDE 5 MG/ML
5 INJECTION, SOLUTION INTRAVENOUS
Status: DISCONTINUED | OUTPATIENT
Start: 2024-11-29 | End: 2024-11-29 | Stop reason: HOSPADM

## 2024-11-29 RX ORDER — SODIUM CHLORIDE 0.9 % (FLUSH) 0.9 %
3 SYRINGE (ML) INJECTION AS NEEDED
Status: DISCONTINUED | OUTPATIENT
Start: 2024-11-29 | End: 2024-11-29 | Stop reason: HOSPADM

## 2024-11-29 RX ADMIN — DOCUSATE SODIUM 100 MG: 100 CAPSULE, LIQUID FILLED ORAL at 22:00

## 2024-11-29 RX ADMIN — PROPOFOL 150 MG: 10 INJECTION, EMULSION INTRAVENOUS at 07:48

## 2024-11-29 RX ADMIN — OXYCODONE HYDROCHLORIDE 10 MG: 5 TABLET ORAL at 22:05

## 2024-11-29 RX ADMIN — DEXAMETHASONE SODIUM PHOSPHATE 8 MG: 4 INJECTION, SOLUTION INTRA-ARTICULAR; INTRALESIONAL; INTRAMUSCULAR; INTRAVENOUS; SOFT TISSUE at 07:57

## 2024-11-29 RX ADMIN — FENTANYL CITRATE 150 MCG: 50 INJECTION, SOLUTION INTRAMUSCULAR; INTRAVENOUS at 07:44

## 2024-11-29 RX ADMIN — ONDANSETRON 4 MG: 2 INJECTION INTRAMUSCULAR; INTRAVENOUS at 10:17

## 2024-11-29 RX ADMIN — GLYCOPYRROLATE 0.1 MG: 0.2 INJECTION INTRAMUSCULAR; INTRAVENOUS at 08:08

## 2024-11-29 RX ADMIN — ACETYLCYSTEINE 1.5 ML: 200 SOLUTION ORAL; RESPIRATORY (INHALATION) at 23:07

## 2024-11-29 RX ADMIN — ROCURONIUM BROMIDE 50 MG: 10 INJECTION, SOLUTION INTRAVENOUS at 07:48

## 2024-11-29 RX ADMIN — CEFAZOLIN 2000 MG: 2 INJECTION, POWDER, FOR SOLUTION INTRAMUSCULAR; INTRAVENOUS at 16:38

## 2024-11-29 RX ADMIN — ATORVASTATIN CALCIUM 20 MG: 10 TABLET, FILM COATED ORAL at 22:00

## 2024-11-29 RX ADMIN — FINASTERIDE 5 MG: 5 TABLET, FILM COATED ORAL at 15:33

## 2024-11-29 RX ADMIN — SUGAMMADEX 200 MG: 100 INJECTION, SOLUTION INTRAVENOUS at 10:24

## 2024-11-29 RX ADMIN — FENTANYL CITRATE 50 MCG: 50 INJECTION, SOLUTION INTRAMUSCULAR; INTRAVENOUS at 10:12

## 2024-11-29 RX ADMIN — SODIUM CHLORIDE, POTASSIUM CHLORIDE, SODIUM LACTATE AND CALCIUM CHLORIDE 1000 ML: 600; 310; 30; 20 INJECTION, SOLUTION INTRAVENOUS at 06:13

## 2024-11-29 RX ADMIN — CEFAZOLIN 2000 MG: 2 INJECTION, POWDER, FOR SOLUTION INTRAMUSCULAR; INTRAVENOUS at 07:58

## 2024-11-29 RX ADMIN — LABETALOL HYDROCHLORIDE 15 MG: 5 INJECTION INTRAVENOUS at 10:11

## 2024-11-29 RX ADMIN — LISINOPRIL 5 MG: 5 TABLET ORAL at 15:33

## 2024-11-29 RX ADMIN — FENTANYL CITRATE 50 MCG: 50 INJECTION, SOLUTION INTRAMUSCULAR; INTRAVENOUS at 08:59

## 2024-11-29 RX ADMIN — OXYCODONE HYDROCHLORIDE 10 MG: 5 TABLET ORAL at 13:09

## 2024-11-29 RX ADMIN — ACETYLCYSTEINE 3 ML: 200 SOLUTION ORAL; RESPIRATORY (INHALATION) at 14:14

## 2024-11-29 RX ADMIN — DOCUSATE SODIUM 100 MG: 100 CAPSULE, LIQUID FILLED ORAL at 15:33

## 2024-11-29 RX ADMIN — FENTANYL CITRATE 100 MCG: 50 INJECTION, SOLUTION INTRAMUSCULAR; INTRAVENOUS at 09:56

## 2024-11-29 RX ADMIN — FENTANYL CITRATE 50 MCG: 50 INJECTION, SOLUTION INTRAMUSCULAR; INTRAVENOUS at 08:48

## 2024-11-29 RX ADMIN — FENTANYL CITRATE 100 MCG: 50 INJECTION, SOLUTION INTRAMUSCULAR; INTRAVENOUS at 09:15

## 2024-11-29 RX ADMIN — LABETALOL HYDROCHLORIDE 10 MG: 5 INJECTION INTRAVENOUS at 10:32

## 2024-11-29 RX ADMIN — IPRATROPIUM BROMIDE AND ALBUTEROL SULFATE 3 ML: .5; 3 SOLUTION RESPIRATORY (INHALATION) at 23:07

## 2024-11-29 RX ADMIN — IPRATROPIUM BROMIDE AND ALBUTEROL SULFATE 3 ML: .5; 3 SOLUTION RESPIRATORY (INHALATION) at 14:14

## 2024-11-29 RX ADMIN — ROCURONIUM BROMIDE 50 MG: 10 INJECTION, SOLUTION INTRAVENOUS at 08:57

## 2024-11-29 NOTE — INTERVAL H&P NOTE
H&P reviewed. The patient was examined and there are no changes to the H&P.    Stress test negative.  No new changes.  Mr. Seth verbalizes understanding and has provided consent

## 2024-11-29 NOTE — PLAN OF CARE
Goal Outcome Evaluation:  Plan of Care Reviewed With: patient           Outcome Evaluation: VSS; CT to -20 cm; AOx4; O2 1L; IID; PO pain meds x1; safety maintained

## 2024-11-29 NOTE — ANESTHESIA PROCEDURE NOTES
Airway  Urgency: elective    Date/Time: 11/29/2024 7:53 AM  Airway not difficult    General Information and Staff    Patient location during procedure: OR  CRNA/CAA: Sai Baptiste CRNA    Indications and Patient Condition  Indications for airway management: airway protection    Preoxygenated: yes  MILS maintained throughout  Mask difficulty assessment: 1 - vent by mask    Final Airway Details  Final airway type: endotracheal airway      Successful airway: ETT and EBT - double lumen left  Cuffed: yes   Successful intubation technique: direct laryngoscopy  Endotracheal tube insertion site: oral  Blade: [place]  Blade size: 4  EBT DL size (fr): 39  Cormack-Lehane Classification: grade I - full view of glottis  Placement verified by: chest auscultation and capnometry   Cuff volume (mL): 5  Measured from: gums  ETT/EBT to gums (cm): 31  Number of attempts at approach: 1  Assessment: lips, teeth, and gum same as pre-op and atraumatic intubation

## 2024-11-29 NOTE — ANESTHESIA PROCEDURE NOTES
Arterial Line      Patient reassessed immediately prior to procedure    Start time: 11/29/2024 6:55 AM   Line placed for hemodynamic monitoring, ABGs/Labs/ISTAT and MD/Surgeon request.  Performed By   Anesthesiologist: Chelsea Chase MD  CRNA/CAA: Delvis Nina CRNA   Preanesthetic Checklist  Completed: patient identified, IV checked, site marked, risks and benefits discussed, surgical consent, monitors and equipment checked, pre-op evaluation and timeout performed  Arterial Line Prep    Sterile Tech: cap, gloves and sterile barriers  Prep: ChloraPrep  Patient monitoring: EKG, continuous pulse oximetry and blood pressure monitoring  Arterial Line Procedure   Laterality:left  Location:  radial artery  Catheter size: 20 G   Guidance: ultrasound guided  PROCEDURE NOTE/ULTRASOUND INTERPRETATION.  Using ultrasound guidance the potential vascular sites for insertion of the catheter were visualized to determine the patency of the vessel to be used for vascular access.  After selecting the appropriate site for insertion, the needle was visualized under ultrasound being inserted into the radial artery, followed by ultrasound confirmation of wire and catheter placement. There were no abnormalities seen on ultrasound; an image was taken; and the patient tolerated the procedure with no complications.   Number of attempts: 2 (first attempt by Delvis Nina, second attempt by Chelsea Chase)   Post Assessment   Dressing Type: wrist guard applied, secured with tape and occlusive dressing applied.   Complications no  Circ/Move/Sens Assessment: normal and unchanged.   Patient Tolerance: patient tolerated the procedure well with no apparent complications

## 2024-11-29 NOTE — H&P (VIEW-ONLY)
Chief Complaint   Patient presents with    Lung Nodule     New patient from Dr Edge          Subjective     History of Present Illness  The patient is an 79-year-old male who presents for evaluation of a lung nodule. He is accompanied by his wife.    He reports feeling well overall and has no symptoms such as coughing up blood. He has no chest pain or shortness of breath. His past medical history includes exposure to asbestos during his childhood when his family home was built. He also served in the [employer] during the [place] [employer] but reports no known exposures during that time.    A chest x-ray revealed a nodule in his right lung. A subsequent CT scan revealed a pseudocyst on his pancreas, a 2.8 cm aortic aneurysm, and kidney stones, all of which are currently asymptomatic. He has no neck masses and no leg swelling.    He has a history of claudication in his left leg, which led him to seek vascular consultation. He also has a history of MGUS and is under the care of Dr. Medel, whom he sees every 6 months. 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 Krueger. He underwent 20 radiation treatments in 2016 and has been free of the disease since then. He sees Dr. Krueger annually. His M protein levels are slightly elevated, but Dr. Medel has reassured him that this is not a cause for concern. His kidney function is good.    He has type 2 diabetes, which was well-controlled when he lost weight from 230 to 165 pounds. However, he has regained the weight and his blood sugar levels have increased. He is currently taking Ozempic. His last A1c was 7.2 in 07/2024.    SOCIAL HISTORY  He smoked a pack a day for 10 years in the late 70s and early 80s. He worked as a nurse anesthetist for 48 years.    He denies unintended weight loss, hoarseness of voice, chest pain, hemoptysis, history of pneumonia, or cough.      Review of Systems       Past Medical History:   Diagnosis  Date    BPH (benign prostatic hyperplasia)     Bronchitis     COVID-19 vaccine series completed     Diabetes mellitus     Extramedullary plasmacytoma     left nose    Hemifacial spasm     Left side    History of radiation therapy 2016    4000 cGy to nose completed on 3/28/16    History of tobacco abuse     Hyperlipidemia     Hypertension     Nosebleed occasional post radiation     Past Surgical History:   Procedure Laterality Date    ANTERIOR CERVICAL DISCECTOMY W/ FUSION N/A 2019    Procedure: CERVICAL DISCECTOMY ANTERIOR WITH FUSION C3-4,4-5, 5-6 ACDF with neuromonitoring and 1 C-arm;  Surgeon: Pablito Islas MD;  Location: [facility] OR;  Service: Neurosurgery    CATARACT EXTRACTION Bilateral     COLONOSCOPY      NASAL ENDOSCOPY      bilateral with excisional biopsy of left intranasal mass 16    NOSE SURGERY      removal of plasmacytoma     Family History   Problem Relation Age of Onset    Breast cancer Mother     Cancer Mother         breast cancer    Heart failure Father     Diabetes Father     Hypertension Father     Cancer Maternal Uncle         pancreatic cancer     Social History     Tobacco Use    Smoking status: Former     Current packs/day: 0.00     Average packs/day: 1 pack/day for 10.0 years (10.0 ttl pk-yrs)     Types: Cigarettes     Start date: 1970     Quit date: 1977     Years since quittin.9    Smokeless tobacco: Current     Types: Chew    Tobacco comments:     dip when fishing   Vaping Use    Vaping status: Never Used   Substance Use Topics    Alcohol use: No    Drug use: No     No current facility-administered medications for this visit.     No current outpatient medications on file.     Facility-Administered Medications Ordered in Other Visits   Medication Dose Route Frequency Provider Last Rate Last Admin    albuterol (PROVENTIL) nebulizer solution 0.042% 1.25 mg/3mL  1.25 mg Nebulization 4x Daily - RT Gladis Camp APRN        albuterol (PROVENTIL) nebulizer  "solution 0.042% 1.25 mg/3mL  1.25 mg Nebulization 4x Daily - RT Gladis Camp APRN        ceFAZolin 2000 mg IVPB in 100 mL NS (MBP)  2,000 mg Intravenous Once Gladis Camp APRN        lactated ringers infusion 1,000 mL  1,000 mL Intravenous Continuous Mat Valenzuela MD        lactated ringers infusion 1,000 mL  1,000 mL Intravenous Continuous Mat Valenzuela MD 25 mL/hr at 11/29/24 0613 1,000 mL at 11/29/24 0613    lactated ringers infusion  100 mL/hr Intravenous Continuous Chelsea Chase MD        lidocaine PF 1% (XYLOCAINE) injection 0.5 mL  0.5 mL Intradermal Once PRN Mat Valenzuela MD        sodium chloride 0.9 % flush 10 mL  10 mL Intravenous Q12H Gladis Camp APRN        sodium chloride 0.9 % flush 10 mL  10 mL Intravenous PRN Gladis Camp APRN        sodium chloride 0.9 % flush 10 mL  10 mL Intravenous PRN Mat Valenzuela MD        sodium chloride 0.9 % flush 3 mL  3 mL Intravenous PRN Mat Valenzuela MD        sodium chloride 0.9 % flush 3 mL  3 mL Intravenous Q12H Chelsea Chase MD        sodium chloride 0.9 % flush 3-10 mL  3-10 mL Intravenous PRN Chelsea Chase MD        sodium chloride 0.9 % infusion 40 mL  40 mL Intravenous PRN Chelsea Chase MD         Allergies:  Patient has no known allergies.    Objective      Vital Signs  Visit Vitals  /65   Pulse 91   Ht 169 cm (66.54\")   Wt 95.4 kg (210 lb 6.4 oz)   SpO2 94%   BMI 33.41 kg/m²       Physical Exam  Physical Exam  Physical Exam:    General: No acute distress, in good spirits  Cardiovascular: RRR, no murmur, rubs, or gallops.    Pulmonary: Clear to auscultation bilaterally, no wheezing, rubs, or rales.  Abdomen: Soft, nondistended, and nontender.  Extremities: Warm, moves all extremities.  Neurologic:  No focal deficits, CN II-XII intact grossly.        Results Review:     NM PET/CT Skull Base to Mid Thigh    Result Date: 11/4/2024  Narrative: Exam: NM PET/CT SKULL " BASE TO MID THIGH-  Indication: Right upper lobe pulmonary nodule  Comparison: Outside CT 10/4/2024  10.0 mCi F-18 FDG was administered IV per protocol via the right upper extremity. Blood glucose at the time of injection was 103 mg/dl.  PET/CT images were obtained from the base of the skull to the proximal femurs approximately 60 minutes after radiotracer administration. Noncontrast CT images of the neck, chest, abdomen, and pelvis were obtained for attenuation correction and anatomic localization. DLP: 778 mGy-centimeters. Automated exposure control was also utilized to decrease patient radiation dose.  Findings: Background right hepatic lobe metabolic activity measures max SUV 2.9. Background mediastinal metabolic activity measures max SUV 2.6.  Skull base: Physiologic tracer uptake.  Neck: No hypermetabolic cervical lymphadenopathy or mass is seen.  Chest: Solid nodule with irregular margins in the posterior segment of the right upper lobe measures 2.1 x 2.0 cm and is hypermetabolic with a maximum SUV of 4.9. This is consistent with neoplasm. No hypermetabolic thoracic lymphadenopathy is seen.  Abdomen and pelvis: There is normal tracer uptake in the solid abdominal organs. Long segments of tracer uptake in the GI tract is likely physiologic. No hypermetabolic mesenteric or retroperitoneal lymphadenopathy is seen.  In the pelvis, no hypermetabolic pelvic mass or lymphadenopathy is identified.  Bone: There is no increased skeletal uptake to suggest osseous metastasis. Low-grade tracer uptake which associated with right AC joint arthropathy and lower lumbar facet arthropathy.  Non-FDG avid findings:  There is calcification of the thoracic aorta which is torturous. There is coronary artery calcification. No pericardial or pleural effusion is identified. In the lungs there is probable emphysema and a linear area of groundglass opacity in the left upper lobe with low-grade tracer uptake maximum SUV 1.7 which may be  infectious in etiology. There are calcified granulomas and there is breathing motion artifact. Solid subpleural nodule in the right upper lobe measures 0.5 cm and is not hypermetabolic.  There are right renal cysts and there is a nonobstructing stone at the midpole of the right kidney measuring 0.4 cm. In the pelvis there is significant prostatic hypertrophy without focal increased tracer uptake. Inguinal hernias containing fat. No suspicious osseous lesion identified.      Impression:  1.  Solid irregular right upper lobe nodule is hypermetabolic and consistent with neoplasm. 2.  No PET evidence for metastatic disease.  This report was signed and finalized on 11/4/2024 11:59 AM by Antonino Moreno.     Results  Imaging  CT PET scan shows a hypermetabolic upper lobe lung nodule measuring 2 cm in size with a maximum SUV of 4.9. There is a noted left upper lobe groundglass lesion that is not hypermetabolic metabolically active. There is right upper lobe 5 mm size and not hypermetabolic.    I reviewed the patient's new clinical results.  Discussed with patient and family      Assessment & Plan       Diagnoses and all orders for this visit:    1. Lung nodule (Primary)  -     Stress Test With Myocardial Perfusion - One Day; Future    2. Type 2 diabetes mellitus with hyperglycemia, without long-term current use of insulin  -     Hemoglobin A1c; Future  -     CBC (No Diff); Future  -     Comprehensive Metabolic Panel; Future    3. Shortness of breath  -     Stress Test With Myocardial Perfusion - One Day; Future    4. History of tobacco abuse      Assessment & Plan  1. Lung nodule.  The presence of a hypermetabolic 2 cm lesion in the upper lobe of the lung with a maximum SUV of 4.9 raises concerns for malignancy. Additionally, there is a ground glass lesion on the other side which could potentially be a bronchoalveolar carcinoma.   We discussed the differential diagnoses possible with malignancy high in the differential.   We discussed the importance of staging such that best therapy can be selected and portend prognosis accurately.  We discussed options for care including continued surveillance verses efforts towards diagnosis and treatment.  We discussed options for diagnosis including bronchoscopy, CT-guided needle biopsy, or surgical biopsy with either cervical mediastinoscopy or Thoracoscopy with resection.We discussed the value of clinical staging with a CT/PET scan.  The pros and cons of each option were discussed at length.  I believe the best option for treatment is Right thoracoscopic wedge resection, possible lobectomy with mediastinal lymph node dissection.  The risks of the procedure were discussed to included but not limited to bleeding, infection, stroke, heart attack, anesthesia risks, need for additional procedures, great vessel injury, injury to nerve with resultant hoarseness of voice, mechanical ventilation, ICU stay, chronic pain syndromes, need for thoracotomy, need for prolonged chest tube use, and/or death.  All questions have been answered to the best of my ability and he is agreeable to the aforementioned plan.  Given his lung function tests, he is a suitable candidate for pulmonary resection. Considering his vascular disease and atherosclerosis, a stress test is warranted. A stress test will be ordered. Surgery is scheduled for 11/29/2024 if stress test is negative.         2. Type 2 Diabetes Mellitus.  His blood sugar has been running high, and he has been given a trial dose of Ozempic. He is advised to lose weight to help control his blood sugar levels. An A1c test will be ordered today to ensure it is less than 8 for surgery clearance.    3. Multiple Myeloma.  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 He continues to follow up with Dr. Medel every 6 months and Dr. Krueger once a year. His kidney functions have been  good, and his N protein levels are slightly elevated but not concerning according to Dr. Medel.    4. Pseudocyst on Pancreas.  A pseudocyst on the pancreas was noted during the CT scan. No current symptoms reported, and no immediate intervention required.    5. Aortic Aneurysm.  A 2.8 to 3 cm aortic aneurysm was found during the CT scan. No current symptoms reported, and no immediate intervention required.    6. Kidney Stones.  Kidney stones were noted during the CT scan. He is asymptomatic, and no immediate intervention required.    He is a non smoker.      Many thanks for the opportunity to care for your patient.    I will continue to keep you apprised of provided care as it ensues.            Transcribed from ambient dictation for Mat Valenzuela MD by Mat Valenzuela MD.  11/29/24   07:35 CST    Patient or patient representative verbalized consent for the use of Ambient Listening during the visit with  Mat Valenzuela MD for chart documentation. 11/29/2024  07:39 CST

## 2024-11-29 NOTE — ANESTHESIA PREPROCEDURE EVALUATION
Anesthesia Evaluation     Patient summary reviewed   no history of anesthetic complications:   NPO Solid Status: > 8 hours             Airway   Mallampati: I  TM distance: >3 FB  No difficulty expected  Dental      Pulmonary    (-) asthma, sleep apnea    ROS comment: Lung nodule  Cardiovascular   Exercise tolerance: good (4-7 METS)    (+) hypertension, PVD, hyperlipidemia      Neuro/Psych  (-) seizures, TIA, CVA  GI/Hepatic/Renal/Endo    (+) diabetes mellitus  (-) liver disease, no renal disease    Musculoskeletal     Abdominal    Substance History      OB/GYN          Other                      Anesthesia Plan    ASA 3     general and Heather     intravenous induction     Anesthetic plan, risks, benefits, and alternatives have been provided, discussed and informed consent has been obtained with: patient.    CODE STATUS:

## 2024-11-29 NOTE — PROGRESS NOTES
Chief Complaint   Patient presents with    Lung Nodule     New patient from Dr Edge          Subjective     History of Present Illness  The patient is an 79-year-old male who presents for evaluation of a lung nodule. He is accompanied by his wife.    He reports feeling well overall and has no symptoms such as coughing up blood. He has no chest pain or shortness of breath. His past medical history includes exposure to asbestos during his childhood when his family home was built. He also served in the [employer] during the [place] [employer] but reports no known exposures during that time.    A chest x-ray revealed a nodule in his right lung. A subsequent CT scan revealed a pseudocyst on his pancreas, a 2.8 cm aortic aneurysm, and kidney stones, all of which are currently asymptomatic. He has no neck masses and no leg swelling.    He has a history of claudication in his left leg, which led him to seek vascular consultation. He also has a history of MGUS and is under the care of Dr. Medel, whom he sees every 6 months. 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 Krueger. He underwent 20 radiation treatments in 2016 and has been free of the disease since then. He sees Dr. Krueger annually. His M protein levels are slightly elevated, but Dr. Medel has reassured him that this is not a cause for concern. His kidney function is good.    He has type 2 diabetes, which was well-controlled when he lost weight from 230 to 165 pounds. However, he has regained the weight and his blood sugar levels have increased. He is currently taking Ozempic. His last A1c was 7.2 in 07/2024.    SOCIAL HISTORY  He smoked a pack a day for 10 years in the late 70s and early 80s. He worked as a nurse anesthetist for 48 years.    He denies unintended weight loss, hoarseness of voice, chest pain, hemoptysis, history of pneumonia, or cough.      Review of Systems       Past Medical History:   Diagnosis  Date    BPH (benign prostatic hyperplasia)     Bronchitis     COVID-19 vaccine series completed     Diabetes mellitus     Extramedullary plasmacytoma     left nose    Hemifacial spasm     Left side    History of radiation therapy 2016    4000 cGy to nose completed on 3/28/16    History of tobacco abuse     Hyperlipidemia     Hypertension     Nosebleed occasional post radiation     Past Surgical History:   Procedure Laterality Date    ANTERIOR CERVICAL DISCECTOMY W/ FUSION N/A 2019    Procedure: CERVICAL DISCECTOMY ANTERIOR WITH FUSION C3-4,4-5, 5-6 ACDF with neuromonitoring and 1 C-arm;  Surgeon: Pablito Islas MD;  Location: [facility] OR;  Service: Neurosurgery    CATARACT EXTRACTION Bilateral     COLONOSCOPY      NASAL ENDOSCOPY      bilateral with excisional biopsy of left intranasal mass 16    NOSE SURGERY      removal of plasmacytoma     Family History   Problem Relation Age of Onset    Breast cancer Mother     Cancer Mother         breast cancer    Heart failure Father     Diabetes Father     Hypertension Father     Cancer Maternal Uncle         pancreatic cancer     Social History     Tobacco Use    Smoking status: Former     Current packs/day: 0.00     Average packs/day: 1 pack/day for 10.0 years (10.0 ttl pk-yrs)     Types: Cigarettes     Start date: 1970     Quit date: 1977     Years since quittin.9    Smokeless tobacco: Current     Types: Chew    Tobacco comments:     dip when fishing   Vaping Use    Vaping status: Never Used   Substance Use Topics    Alcohol use: No    Drug use: No     No current facility-administered medications for this visit.     No current outpatient medications on file.     Facility-Administered Medications Ordered in Other Visits   Medication Dose Route Frequency Provider Last Rate Last Admin    albuterol (PROVENTIL) nebulizer solution 0.042% 1.25 mg/3mL  1.25 mg Nebulization 4x Daily - RT Gladis Camp APRN        albuterol (PROVENTIL) nebulizer  "solution 0.042% 1.25 mg/3mL  1.25 mg Nebulization 4x Daily - RT Gladis Camp APRN        ceFAZolin 2000 mg IVPB in 100 mL NS (MBP)  2,000 mg Intravenous Once Gladis Camp APRN        lactated ringers infusion 1,000 mL  1,000 mL Intravenous Continuous Mat Valenzuela MD        lactated ringers infusion 1,000 mL  1,000 mL Intravenous Continuous Mat Valenzuela MD 25 mL/hr at 11/29/24 0613 1,000 mL at 11/29/24 0613    lactated ringers infusion  100 mL/hr Intravenous Continuous Chelsea Chase MD        lidocaine PF 1% (XYLOCAINE) injection 0.5 mL  0.5 mL Intradermal Once PRN Mat Valenzuela MD        sodium chloride 0.9 % flush 10 mL  10 mL Intravenous Q12H Gladis Camp APRN        sodium chloride 0.9 % flush 10 mL  10 mL Intravenous PRN Gladis Camp APRN        sodium chloride 0.9 % flush 10 mL  10 mL Intravenous PRN Mat Valenzuela MD        sodium chloride 0.9 % flush 3 mL  3 mL Intravenous PRN Mat Valenzuela MD        sodium chloride 0.9 % flush 3 mL  3 mL Intravenous Q12H Chelsea Chase MD        sodium chloride 0.9 % flush 3-10 mL  3-10 mL Intravenous PRN Chelsea Chase MD        sodium chloride 0.9 % infusion 40 mL  40 mL Intravenous PRN Chelsea Chase MD         Allergies:  Patient has no known allergies.    Objective      Vital Signs  Visit Vitals  /65   Pulse 91   Ht 169 cm (66.54\")   Wt 95.4 kg (210 lb 6.4 oz)   SpO2 94%   BMI 33.41 kg/m²       Physical Exam  Physical Exam  Physical Exam:    General: No acute distress, in good spirits  Cardiovascular: RRR, no murmur, rubs, or gallops.    Pulmonary: Clear to auscultation bilaterally, no wheezing, rubs, or rales.  Abdomen: Soft, nondistended, and nontender.  Extremities: Warm, moves all extremities.  Neurologic:  No focal deficits, CN II-XII intact grossly.        Results Review:     NM PET/CT Skull Base to Mid Thigh    Result Date: 11/4/2024  Narrative: Exam: NM PET/CT SKULL " BASE TO MID THIGH-  Indication: Right upper lobe pulmonary nodule  Comparison: Outside CT 10/4/2024  10.0 mCi F-18 FDG was administered IV per protocol via the right upper extremity. Blood glucose at the time of injection was 103 mg/dl.  PET/CT images were obtained from the base of the skull to the proximal femurs approximately 60 minutes after radiotracer administration. Noncontrast CT images of the neck, chest, abdomen, and pelvis were obtained for attenuation correction and anatomic localization. DLP: 778 mGy-centimeters. Automated exposure control was also utilized to decrease patient radiation dose.  Findings: Background right hepatic lobe metabolic activity measures max SUV 2.9. Background mediastinal metabolic activity measures max SUV 2.6.  Skull base: Physiologic tracer uptake.  Neck: No hypermetabolic cervical lymphadenopathy or mass is seen.  Chest: Solid nodule with irregular margins in the posterior segment of the right upper lobe measures 2.1 x 2.0 cm and is hypermetabolic with a maximum SUV of 4.9. This is consistent with neoplasm. No hypermetabolic thoracic lymphadenopathy is seen.  Abdomen and pelvis: There is normal tracer uptake in the solid abdominal organs. Long segments of tracer uptake in the GI tract is likely physiologic. No hypermetabolic mesenteric or retroperitoneal lymphadenopathy is seen.  In the pelvis, no hypermetabolic pelvic mass or lymphadenopathy is identified.  Bone: There is no increased skeletal uptake to suggest osseous metastasis. Low-grade tracer uptake which associated with right AC joint arthropathy and lower lumbar facet arthropathy.  Non-FDG avid findings:  There is calcification of the thoracic aorta which is torturous. There is coronary artery calcification. No pericardial or pleural effusion is identified. In the lungs there is probable emphysema and a linear area of groundglass opacity in the left upper lobe with low-grade tracer uptake maximum SUV 1.7 which may be  infectious in etiology. There are calcified granulomas and there is breathing motion artifact. Solid subpleural nodule in the right upper lobe measures 0.5 cm and is not hypermetabolic.  There are right renal cysts and there is a nonobstructing stone at the midpole of the right kidney measuring 0.4 cm. In the pelvis there is significant prostatic hypertrophy without focal increased tracer uptake. Inguinal hernias containing fat. No suspicious osseous lesion identified.      Impression:  1.  Solid irregular right upper lobe nodule is hypermetabolic and consistent with neoplasm. 2.  No PET evidence for metastatic disease.  This report was signed and finalized on 11/4/2024 11:59 AM by Antonino Moreno.     Results  Imaging  CT PET scan shows a hypermetabolic upper lobe lung nodule measuring 2 cm in size with a maximum SUV of 4.9. There is a noted left upper lobe groundglass lesion that is not hypermetabolic metabolically active. There is right upper lobe 5 mm size and not hypermetabolic.    I reviewed the patient's new clinical results.  Discussed with patient and family      Assessment & Plan       Diagnoses and all orders for this visit:    1. Lung nodule (Primary)  -     Stress Test With Myocardial Perfusion - One Day; Future    2. Type 2 diabetes mellitus with hyperglycemia, without long-term current use of insulin  -     Hemoglobin A1c; Future  -     CBC (No Diff); Future  -     Comprehensive Metabolic Panel; Future    3. Shortness of breath  -     Stress Test With Myocardial Perfusion - One Day; Future    4. History of tobacco abuse      Assessment & Plan  1. Lung nodule.  The presence of a hypermetabolic 2 cm lesion in the upper lobe of the lung with a maximum SUV of 4.9 raises concerns for malignancy. Additionally, there is a ground glass lesion on the other side which could potentially be a bronchoalveolar carcinoma.   We discussed the differential diagnoses possible with malignancy high in the differential.   We discussed the importance of staging such that best therapy can be selected and portend prognosis accurately.  We discussed options for care including continued surveillance verses efforts towards diagnosis and treatment.  We discussed options for diagnosis including bronchoscopy, CT-guided needle biopsy, or surgical biopsy with either cervical mediastinoscopy or Thoracoscopy with resection.We discussed the value of clinical staging with a CT/PET scan.  The pros and cons of each option were discussed at length.  I believe the best option for treatment is Right thoracoscopic wedge resection, possible lobectomy with mediastinal lymph node dissection.  The risks of the procedure were discussed to included but not limited to bleeding, infection, stroke, heart attack, anesthesia risks, need for additional procedures, great vessel injury, injury to nerve with resultant hoarseness of voice, mechanical ventilation, ICU stay, chronic pain syndromes, need for thoracotomy, need for prolonged chest tube use, and/or death.  All questions have been answered to the best of my ability and he is agreeable to the aforementioned plan.  Given his lung function tests, he is a suitable candidate for pulmonary resection. Considering his vascular disease and atherosclerosis, a stress test is warranted. A stress test will be ordered. Surgery is scheduled for 11/29/2024 if stress test is negative.         2. Type 2 Diabetes Mellitus.  His blood sugar has been running high, and he has been given a trial dose of Ozempic. He is advised to lose weight to help control his blood sugar levels. An A1c test will be ordered today to ensure it is less than 8 for surgery clearance.    3. Multiple Myeloma.  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 He continues to follow up with Dr. Medel every 6 months and Dr. Krueger once a year. His kidney functions have been  good, and his N protein levels are slightly elevated but not concerning according to Dr. Medel.    4. Pseudocyst on Pancreas.  A pseudocyst on the pancreas was noted during the CT scan. No current symptoms reported, and no immediate intervention required.    5. Aortic Aneurysm.  A 2.8 to 3 cm aortic aneurysm was found during the CT scan. No current symptoms reported, and no immediate intervention required.    6. Kidney Stones.  Kidney stones were noted during the CT scan. He is asymptomatic, and no immediate intervention required.    He is a non smoker.      Many thanks for the opportunity to care for your patient.    I will continue to keep you apprised of provided care as it ensues.            Transcribed from ambient dictation for Mat Valenzuela MD by Mat Valenzuela MD.  11/29/24   07:35 CST    Patient or patient representative verbalized consent for the use of Ambient Listening during the visit with  Mat Valenzuela MD for chart documentation. 11/29/2024  07:39 CST

## 2024-11-29 NOTE — BRIEF OP NOTE
Manjit Seth  11/29/2024    Pre-op Diagnosis:   Lung nodule [R91.1]       Post-Op Diagnosis Codes:  Adenocarcinoma lung cancer    Procedure/CPT® Codes:        Procedure(s):  BRONCHOSCOPY  RIGHT THORACOSCOPY, DIAGNOSTIC  RIGHT UPPER LOBE WEDGE RESECTION  RIGHT MIDDLE LOBE WEDGE RESECTION  PLEURAL BIOPSY, MULTIPLE        Surgeon(s):  Mat Valenzuela MD    Anesthesia: Choice    Staff:   Circulator: Clarice Jay RN  Scrub Person: Su Hopkins; King April  Assistant: Lamont Rice  Orientee: Layla Upton  Assistant: Lamont Rice      Estimated Blood Loss: MINIMAL    Urine Voided: * No values recorded between 11/29/2024  7:44 AM and 11/29/2024 10:44 AM *    Specimens:                Specimens       ID Source Type Tests Collected By Collected At Frozen?    A Diaphragm Tissue TISSUE PATHOLOGY EXAM   Mat Valenzuela MD 11/29/24 0901 Yes    Description: diaphragm lesion history lung cancer    B Diaphragm Tissue TISSUE PATHOLOGY EXAM   Mat Valenzuela MD 11/29/24 0902 Yes    Description: diaphragm lesion history lung cancer #2    C Diaphragm Tissue TISSUE PATHOLOGY EXAM   Mat Valenzuela MD 11/29/24 0907 Yes    Description: diaphragm lesion history lung cancer #3    D Lung, Right Middle Lobe Tissue TISSUE PATHOLOGY EXAM   Mat Valenzuela MD 11/29/24 0927 Yes    Description: wedge resection    E Lung, Right Upper Lobe Tissue TISSUE PATHOLOGY EXAM   Mat Valenzuela MD 11/29/24 0937 Yes    Description: wedge resection right upper lobe    F Pleural Cavity Tissue TISSUE PATHOLOGY EXAM   Mat Valenzuela MD 11/29/24 0957 No    Description: pleural biopsy posterior #1    G Pleural Cavity Tissue TISSUE PATHOLOGY EXAM   Mat Valenzuela MD 11/29/24 0959 No    Description: pleural biopsy posterior #2    H Pleural Cavity Tissue TISSUE PATHOLOGY EXAM   Mat Valenzuela MD 11/29/24 1000 No    Description: pleural biopsy posterior #3    I Pleural Cavity Tissue TISSUE PATHOLOGY EXAM   Nicolas  Mta REYES MD 11/29/24 1006 No    Description: basilar pleural biopsy    J Pleural Cavity Tissue TISSUE PATHOLOGY EXAM   Mat Valenzuela MD 11/29/24 1007 No    Description: basilar pleural biopsy #2                  Drains:   Chest Tube 1 Right Midaxillary;Pleural (Active)   Function To water seal 11/29/24 1130   Air Leak/Fluctuation air leak not present 11/29/24 1130   Patency Intervention Tip/tilt 11/29/24 1045   Drainage Description Dark red 11/29/24 1130   Dressing Type Gauze 11/29/24 1130   Dressing Status Clean;Dry;Intact 11/29/24 1130   Site Assessment Clean;Dry;Intact 11/29/24 1130   Surrounding Skin Dry;Intact;Non reddened 11/29/24 1130   Securement tubing anchored to body distal to insertion site with tape 11/29/24 1045   Left Subcutaneous Emphysema none present 11/29/24 1130   Right Subcutaneous Emphysema none present 11/29/24 1130   Safety all connections secured;all tubing connections taped;2 rubber-tipped hemostats with patient;suction checked 11/29/24 1045       Urethral Catheter Silicone 16 Fr. (Active)   Daily Indications Required activity restriction from trauma, surgery, (e.g. unstable spine, fracture, hemodynamics) 11/29/24 1130   Site Assessment Clean;Skin intact 11/29/24 1045   Collection Container Standard drainage bag 11/29/24 1130   Securement Method Securing device 11/29/24 1130       Findings:  RUL LUNG CANCER, RML BIOPSY GRANULOMA AND SECOND LESION DEFERRED TO FINAL, PLEURAL BIOPSY DEFERRED TO PERMANENT        Complications: NONE    Assistant: VIVIEN VILLEGAS  was responsible for performing the following activities: Retraction, Suction, Suturing, and Placing Dressing and their skilled assistance was necessary for the success of this case.    Mat Valenzuela MD     Date: 11/29/2024  Time: 11:46 CST

## 2024-11-30 ENCOUNTER — APPOINTMENT (OUTPATIENT)
Dept: GENERAL RADIOLOGY | Facility: HOSPITAL | Age: 79
DRG: 165 | End: 2024-11-30
Payer: MEDICARE

## 2024-11-30 LAB
ANION GAP SERPL CALCULATED.3IONS-SCNC: 13 MMOL/L (ref 5–15)
BUN SERPL-MCNC: 16 MG/DL (ref 8–23)
BUN/CREAT SERPL: 17.8 (ref 7–25)
CALCIUM SPEC-SCNC: 8.2 MG/DL (ref 8.6–10.5)
CHLORIDE SERPL-SCNC: 94 MMOL/L (ref 98–107)
CO2 SERPL-SCNC: 26 MMOL/L (ref 22–29)
CREAT SERPL-MCNC: 0.9 MG/DL (ref 0.76–1.27)
DEPRECATED RDW RBC AUTO: 50.5 FL (ref 37–54)
EGFRCR SERPLBLD CKD-EPI 2021: 86.3 ML/MIN/1.73
ERYTHROCYTE [DISTWIDTH] IN BLOOD BY AUTOMATED COUNT: 14.5 % (ref 12.3–15.4)
GLUCOSE SERPL-MCNC: 161 MG/DL (ref 65–99)
HCT VFR BLD AUTO: 36.1 % (ref 37.5–51)
HGB BLD-MCNC: 11.2 G/DL (ref 13–17.7)
MCH RBC QN AUTO: 29.8 PG (ref 26.6–33)
MCHC RBC AUTO-ENTMCNC: 31 G/DL (ref 31.5–35.7)
MCV RBC AUTO: 96 FL (ref 79–97)
PLATELET # BLD AUTO: 203 10*3/MM3 (ref 140–450)
PMV BLD AUTO: 11 FL (ref 6–12)
POTASSIUM SERPL-SCNC: 4.1 MMOL/L (ref 3.5–5.2)
RBC # BLD AUTO: 3.76 10*6/MM3 (ref 4.14–5.8)
SODIUM SERPL-SCNC: 133 MMOL/L (ref 136–145)
WBC NRBC COR # BLD AUTO: 15.36 10*3/MM3 (ref 3.4–10.8)

## 2024-11-30 PROCEDURE — 25010000002 CEFAZOLIN PER 500 MG: Performed by: THORACIC SURGERY (CARDIOTHORACIC VASCULAR SURGERY)

## 2024-11-30 PROCEDURE — 94799 UNLISTED PULMONARY SVC/PX: CPT

## 2024-11-30 PROCEDURE — 80048 BASIC METABOLIC PNL TOTAL CA: CPT | Performed by: THORACIC SURGERY (CARDIOTHORACIC VASCULAR SURGERY)

## 2024-11-30 PROCEDURE — 85027 COMPLETE CBC AUTOMATED: CPT | Performed by: THORACIC SURGERY (CARDIOTHORACIC VASCULAR SURGERY)

## 2024-11-30 PROCEDURE — 71045 X-RAY EXAM CHEST 1 VIEW: CPT

## 2024-11-30 PROCEDURE — 94664 DEMO&/EVAL PT USE INHALER: CPT

## 2024-11-30 PROCEDURE — 25010000002 ENOXAPARIN PER 10 MG: Performed by: THORACIC SURGERY (CARDIOTHORACIC VASCULAR SURGERY)

## 2024-11-30 PROCEDURE — 94761 N-INVAS EAR/PLS OXIMETRY MLT: CPT

## 2024-11-30 PROCEDURE — 99232 SBSQ HOSP IP/OBS MODERATE 35: CPT | Performed by: THORACIC SURGERY (CARDIOTHORACIC VASCULAR SURGERY)

## 2024-11-30 RX ADMIN — ACETYLCYSTEINE 3 ML: 200 SOLUTION ORAL; RESPIRATORY (INHALATION) at 23:27

## 2024-11-30 RX ADMIN — FINASTERIDE 5 MG: 5 TABLET, FILM COATED ORAL at 10:28

## 2024-11-30 RX ADMIN — ACETYLCYSTEINE 3 ML: 200 SOLUTION ORAL; RESPIRATORY (INHALATION) at 14:11

## 2024-11-30 RX ADMIN — CEFAZOLIN 2000 MG: 2 INJECTION, POWDER, FOR SOLUTION INTRAMUSCULAR; INTRAVENOUS at 00:23

## 2024-11-30 RX ADMIN — IPRATROPIUM BROMIDE AND ALBUTEROL SULFATE 3 ML: .5; 3 SOLUTION RESPIRATORY (INHALATION) at 14:11

## 2024-11-30 RX ADMIN — OXYCODONE HYDROCHLORIDE 5 MG: 5 TABLET ORAL at 20:51

## 2024-11-30 RX ADMIN — DOCUSATE SODIUM 100 MG: 100 CAPSULE, LIQUID FILLED ORAL at 10:28

## 2024-11-30 RX ADMIN — ATORVASTATIN CALCIUM 20 MG: 10 TABLET, FILM COATED ORAL at 20:51

## 2024-11-30 RX ADMIN — IPRATROPIUM BROMIDE AND ALBUTEROL SULFATE 3 ML: .5; 3 SOLUTION RESPIRATORY (INHALATION) at 23:27

## 2024-11-30 RX ADMIN — ACETYLCYSTEINE 3 ML: 200 SOLUTION ORAL; RESPIRATORY (INHALATION) at 06:41

## 2024-11-30 RX ADMIN — IPRATROPIUM BROMIDE AND ALBUTEROL SULFATE 3 ML: .5; 3 SOLUTION RESPIRATORY (INHALATION) at 06:41

## 2024-11-30 RX ADMIN — ENOXAPARIN SODIUM 40 MG: 100 INJECTION SUBCUTANEOUS at 10:29

## 2024-11-30 RX ADMIN — POLYETHYLENE GLYCOL 3350 17 G: 17 POWDER, FOR SOLUTION ORAL at 10:29

## 2024-11-30 RX ADMIN — ACETAMINOPHEN 1000 MG: 500 TABLET, FILM COATED ORAL at 15:52

## 2024-11-30 RX ADMIN — ASPIRIN 81 MG: 81 TABLET, COATED ORAL at 10:28

## 2024-11-30 RX ADMIN — ACETAMINOPHEN 1000 MG: 500 TABLET, FILM COATED ORAL at 07:54

## 2024-11-30 RX ADMIN — DOCUSATE SODIUM 100 MG: 100 CAPSULE, LIQUID FILLED ORAL at 20:52

## 2024-11-30 NOTE — PLAN OF CARE
Goal Outcome Evaluation:           Progress: no change                              Patient in chair. O2 at 2L. Dao draining clear yellow urine. No distress noted.

## 2024-11-30 NOTE — PLAN OF CARE
Goal Outcome Evaluation:  Plan of Care Reviewed With: patient        Progress: no change  Outcome Evaluation: Initial nutrition assessment secondary to nutrition consult for lung CA. Pt is s/p R upper lobe and R middle lobe wedge resection and multiple pleural biopsies 11/29. He has a R chest tube in place. Pt is ordered a regular diet. He reports he has a good appetite and has not had any recent weight loss. He consumed 50% of breakfast this morning. Most recent A1c is 7.1, BS has ranged from 122-235. He does have dx of DM2, will add C.CHO restriction to diet order to manage BS. He will be seen daily for meal choices. Will follow.

## 2024-11-30 NOTE — PROGRESS NOTES
"Bronchoscopy, right thoracoscopy-diagnostic, wedge resection of right upper lobe and right middle lobe, and multiple pleural biopsies  POD #1      No events overnight.  2 L/min nasal cannula.  Doing well as a whole.  Feels well.  Pain control is excellent.  Tolerating Tylenol.    Visit Vitals  /40   Pulse 76   Temp 97.6 °F (36.4 °C) (Oral)   Resp 18   Ht 172 cm (67.72\")   Wt 96.3 kg (212 lb 4.9 oz)   SpO2 96%   BMI 32.55 kg/m²         Intake/Output Summary (Last 24 hours) at 11/30/2024 1142  Last data filed at 11/30/2024 0900  Gross per 24 hour   Intake 1171 ml   Output 1273 ml   Net -102 ml   R CT: 148 ml/24 hours (-) AL serosanguinous      CXR: No pneumothorax.  No pleural effusion.  Well ventilated lungs.    Labs:      Physical Exam:    General: No acute distress, in good spirits  Cardiovascular: RRR, no murmur, rubs, or gallops.    Pulmonary: Clear to auscultation bilaterally, no wheezing, rubs, or rales.  Abdomen: Soft, nondistended, and nontender.  Extremities: Warm, moves all extremities.  Neurologic:  No focal deficits, CN II-XII intact grossly.    Chest: dressing C/D/I    Impression:  Adenocarcinoma non-small cell lung cancer  Type 2 diabetes mellitus  Obesity, class I  Past tobacco use  History of extra-medullary plasma cell cytoma   Hyponatremia asymptomatic    Medical decision-making/recommendations/plan:    Doing well as a whole.  Keep chest tube today.  Wean O2.  Probably home tomorrow.  "

## 2024-12-01 ENCOUNTER — APPOINTMENT (OUTPATIENT)
Dept: GENERAL RADIOLOGY | Facility: HOSPITAL | Age: 79
DRG: 165 | End: 2024-12-01
Payer: MEDICARE

## 2024-12-01 PROCEDURE — 94799 UNLISTED PULMONARY SVC/PX: CPT

## 2024-12-01 PROCEDURE — 71045 X-RAY EXAM CHEST 1 VIEW: CPT

## 2024-12-01 PROCEDURE — 99232 SBSQ HOSP IP/OBS MODERATE 35: CPT | Performed by: THORACIC SURGERY (CARDIOTHORACIC VASCULAR SURGERY)

## 2024-12-01 PROCEDURE — 63710000001 DIPHENHYDRAMINE PER 50 MG: Performed by: THORACIC SURGERY (CARDIOTHORACIC VASCULAR SURGERY)

## 2024-12-01 PROCEDURE — 94664 DEMO&/EVAL PT USE INHALER: CPT

## 2024-12-01 PROCEDURE — 94761 N-INVAS EAR/PLS OXIMETRY MLT: CPT

## 2024-12-01 PROCEDURE — 25010000002 ENOXAPARIN PER 10 MG: Performed by: THORACIC SURGERY (CARDIOTHORACIC VASCULAR SURGERY)

## 2024-12-01 RX ORDER — FUROSEMIDE 10 MG/ML
20 INJECTION INTRAMUSCULAR; INTRAVENOUS
Status: DISCONTINUED | OUTPATIENT
Start: 2024-12-02 | End: 2024-12-02 | Stop reason: HOSPADM

## 2024-12-01 RX ORDER — DIPHENHYDRAMINE HCL 25 MG
25 CAPSULE ORAL NIGHTLY
Status: DISCONTINUED | OUTPATIENT
Start: 2024-12-01 | End: 2024-12-02 | Stop reason: HOSPADM

## 2024-12-01 RX ORDER — POTASSIUM CHLORIDE 750 MG/1
20 CAPSULE, EXTENDED RELEASE ORAL 2 TIMES DAILY WITH MEALS
Status: DISCONTINUED | OUTPATIENT
Start: 2024-12-02 | End: 2024-12-02 | Stop reason: HOSPADM

## 2024-12-01 RX ADMIN — ACETAMINOPHEN 1000 MG: 500 TABLET, FILM COATED ORAL at 04:16

## 2024-12-01 RX ADMIN — ATORVASTATIN CALCIUM 20 MG: 10 TABLET, FILM COATED ORAL at 20:11

## 2024-12-01 RX ADMIN — DOCUSATE SODIUM 100 MG: 100 CAPSULE, LIQUID FILLED ORAL at 20:11

## 2024-12-01 RX ADMIN — ENOXAPARIN SODIUM 40 MG: 100 INJECTION SUBCUTANEOUS at 09:54

## 2024-12-01 RX ADMIN — ATENOLOL 50 MG: 50 TABLET ORAL at 09:54

## 2024-12-01 RX ADMIN — ASPIRIN 81 MG: 81 TABLET, COATED ORAL at 09:54

## 2024-12-01 RX ADMIN — POLYETHYLENE GLYCOL 3350 17 G: 17 POWDER, FOR SOLUTION ORAL at 09:54

## 2024-12-01 RX ADMIN — LISINOPRIL 5 MG: 5 TABLET ORAL at 09:54

## 2024-12-01 RX ADMIN — ACETYLCYSTEINE 3 ML: 200 SOLUTION ORAL; RESPIRATORY (INHALATION) at 07:13

## 2024-12-01 RX ADMIN — OXYCODONE HYDROCHLORIDE 5 MG: 5 TABLET ORAL at 19:08

## 2024-12-01 RX ADMIN — DIPHENHYDRAMINE HYDROCHLORIDE 25 MG: 25 CAPSULE ORAL at 21:53

## 2024-12-01 RX ADMIN — IPRATROPIUM BROMIDE AND ALBUTEROL SULFATE 3 ML: .5; 3 SOLUTION RESPIRATORY (INHALATION) at 07:13

## 2024-12-01 RX ADMIN — DOCUSATE SODIUM 100 MG: 100 CAPSULE, LIQUID FILLED ORAL at 09:54

## 2024-12-01 RX ADMIN — FINASTERIDE 5 MG: 5 TABLET, FILM COATED ORAL at 09:54

## 2024-12-01 NOTE — PROGRESS NOTES
"Bronchoscopy, right thoracoscopy-diagnostic, wedge resection of right upper lobe and right middle lobe, and multiple pleural biopsies  POD #2      No events overnight.  Afebrile.  Off NC O2.  Feels well.  Pain control is excellent.  Tolerating Tylenol.  Needs for sleep    Visit Vitals  /96 (BP Location: Left arm, Patient Position: Lying)   Pulse 98   Temp 98.2 °F (36.8 °C)   Resp 18   Ht 172 cm (67.72\")   Wt 96.3 kg (212 lb 4.9 oz)   SpO2 100%   BMI 32.55 kg/m²         Intake/Output Summary (Last 24 hours) at 12/1/2024 0948  Last data filed at 12/1/2024 0417  Gross per 24 hour   Intake 1080 ml   Output 1870 ml   Net -790 ml   R CT: 220 ml/24 hours (-) AL serous      CXR: No pneumothorax.  Increased atelectasis on the right.  No pleural effusion.      Labs:      Physical Exam:    General: No acute distress, in good spirits  Cardiovascular: RRR, no murmur, rubs, or gallops.    Pulmonary: Clear to auscultation bilaterally, no wheezing, rubs, or rales.  Abdomen: Soft, nondistended, and nontender.  Extremities: Warm, moves all extremities.  Neurologic:  No focal deficits, CN II-XII intact grossly.    Chest: dressing C/D/I    Impression:  Adenocarcinoma non-small cell lung cancer  Type 2 diabetes mellitus  Obesity, class I  Past tobacco use  History of extra-medullary plasma cell cytoma   Hyponatremia asymptomatic    Medical decision-making/recommendations/plan:    Doing well as a whole.  Keep chest tube today.  Probably home tomorrow.  Benadryl as needed sleep  Pathology is pending  "

## 2024-12-01 NOTE — PLAN OF CARE
Problem: Adult Inpatient Plan of Care  Goal: Plan of Care Review  Outcome: Progressing  Flowsheets (Taken 12/1/2024 0313)  Progress: improving  Outcome Evaluation: Pt complained of pain x1, see MAR. A&Ox4. Ambulating and Up in chair. Voiding. Chest tube to waterseal. Wife at bedside. Safety maintained.  Plan of Care Reviewed With: patient

## 2024-12-01 NOTE — PLAN OF CARE
Goal Outcome Evaluation:  Plan of Care Reviewed With: patient        Progress: improving  Outcome Evaluation: Pt A&O x4. VSS. C/o mild pain. See MAR. Chest tube to water seal. Pt up in chair. Safety  maintained.

## 2024-12-01 NOTE — OP NOTE
Pre-op diagnosis:   1. Right upper lobe lung mass (clinically if lung nodule is cancer then T1,N0,M0) Stage I lung cancer   2. History of extramedular plasmacytoma   3. Previous history of tobacco use    Post-op diagnosis:   Adenocarcinoma non small cell lung carcinoma    Procedure:  Bronchoscopy  Right thoracoscopy  Right upper lobe wedge resection  Right middle lobe wedge resection  Pleural biopsy, multiple       Surgeon: Mat Valenzuela M.D.  Assistant: Kathryn Rice  Anesthesia: GETA- double lumen    EBL: min  Drains: 28 Faroese straight pleural tube    Specimens:    Specimens         ID Source Type Tests Collected By Collected At Frozen?     A Diaphragm Tissue TISSUE PATHOLOGY EXAM    Mat Valenzuela MD 11/29/24 0901 Yes     Description: diaphragm lesion history lung cancer     B Diaphragm Tissue TISSUE PATHOLOGY EXAM    Mat Valenzuela MD 11/29/24 0902 Yes     Description: diaphragm lesion history lung cancer #2     C Diaphragm Tissue TISSUE PATHOLOGY EXAM    Mat Valenzuela MD 11/29/24 0907 Yes     Description: diaphragm lesion history lung cancer #3     D Lung, Right Middle Lobe Tissue TISSUE PATHOLOGY EXAM    Mat Valenzuela MD 11/29/24 0927 Yes     Description: wedge resection     E Lung, Right Upper Lobe Tissue TISSUE PATHOLOGY EXAM    Mat Valenzuela MD 11/29/24 0937 Yes     Description: wedge resection right upper lobe     F Pleural Cavity Tissue TISSUE PATHOLOGY EXAM    Mat Valenzuela MD 11/29/24 0957 No     Description: pleural biopsy posterior #1     G Pleural Cavity Tissue TISSUE PATHOLOGY EXAM    Mat Valenzuela MD 11/29/24 0959 No     Description: pleural biopsy posterior #2     H Pleural Cavity Tissue TISSUE PATHOLOGY EXAM    Mat Valenzuela MD 11/29/24 1000 No     Description: pleural biopsy posterior #3     I Pleural Cavity Tissue TISSUE PATHOLOGY EXAM    Mat Valenzuela MD 11/29/24 1006 No     Description: basilar pleural biopsy     J Pleural Cavity Tissue TISSUE  PATHOLOGY EXAM    Mat Valenzuela MD 11/29/24 1007 No     Description: basilar pleural biopsy #2                       Operative findings:  Lung nodule is an adenocarcinoma lung cancer with lepidic component.  Diaphragm and basilar pleural cavity with nodular lesions.  Diaphragm and pleural biopsies are deferred to permanent pathology.       Operative description in detail:  The patient was taken to the operative suite where he was placed in a supine position. Induction of general anesthesia and placement of a double lumen endotracheal tube was placed by anesthesia. A timeout was performed. Perioperative antibiotics were administered.  With that bronchoscopy was performed by anesthesia to confirm appropriate position of double lumen endotracheal tube.  The double lumen was positioned appropriately. With that he was positioned in left lateral decubitus position. All pressure points were protected. Single lung ventilation was initiated following confirmation of a satisfactory position of the double lumen endotracheal tube once again. He was then prepped and draped in the usual and sterile fashion.    A limited incision was made at approximately the seventh intercostal space was made sharply. The chest was accessed.   With that, the thoracoport and then subsequently the thoracoscope were advanced to surveyed the chest. There were diaphragmatic nodules and basilar pleural nodules.  Pleural biopsies were performed.  No pleural fluid of remark was identified. To that end additional ports were placed just anterior to the anterior border of the latissimus dorsi at approximately the fifth intercostal space as well as  at approximately the auscultatory triangle inferior to the scapula. Thoracoscopy was performed.  Diaphragm biopsies were performed.  An interlobar lymph node was noted.  With that using multiple Ethicon 45 mm green staple loads a wedge resection was performed to complete a wedge resection of the right middle  lobe. The specimen was extracted from the chest using a small specimen Endo Catch bag. This was labeled right middle lobe wedge resection with request for frozen section and margin.  A wedge resection was also performed of the known lung nodule in the right upper lobe.  This was performed with green Ethicon 60 mm staple loads.  This was submitted.  During this time we surveyed the staple lines and they were hemostatic and pneumostatic. Each access site was hemostatic. I did speak with Dr. Mo who reported adenocarcinoma with lipidic pattern. The middle lobe was a granuloma.  Pleural biopsy was deferred to permanent.  Additional pleural biopsies were performed.  To that end I drained the chest with a 28 Latvian straight pleural chest tube placed within the first access site. Deep musculature was tapered down with 2-0 Vicryl suture. The two remaining sites were closed in layered fashion with absorbable suture.  Instruments, sharps, and sponge counts were reported as correct at the completion of the case. Hemostasis was pristine.  Plans were in place to proceed with diagosis with a high probability of lung cancer for the right upper lobe lung nodule.  Given findings, today's operation was diagnostic and if warranted plans are in place to return for curative intent resection of his lung cancer now diagnosed via wedge resection.      Complications: None  Disposition: Transfer to PACU extubated and in stable condition.

## 2024-12-02 ENCOUNTER — APPOINTMENT (OUTPATIENT)
Dept: GENERAL RADIOLOGY | Facility: HOSPITAL | Age: 79
DRG: 165 | End: 2024-12-02
Payer: MEDICARE

## 2024-12-02 ENCOUNTER — READMISSION MANAGEMENT (OUTPATIENT)
Dept: CALL CENTER | Facility: HOSPITAL | Age: 79
End: 2024-12-02
Payer: MEDICARE

## 2024-12-02 VITALS
DIASTOLIC BLOOD PRESSURE: 61 MMHG | TEMPERATURE: 98.8 F | HEART RATE: 78 BPM | OXYGEN SATURATION: 95 % | RESPIRATION RATE: 18 BRPM | HEIGHT: 68 IN | SYSTOLIC BLOOD PRESSURE: 139 MMHG | WEIGHT: 212.31 LBS | BODY MASS INDEX: 32.18 KG/M2

## 2024-12-02 PROBLEM — E11.9 TYPE 2 DIABETES MELLITUS, WITHOUT LONG-TERM CURRENT USE OF INSULIN: Status: ACTIVE | Noted: 2024-12-02

## 2024-12-02 LAB
ANION GAP SERPL CALCULATED.3IONS-SCNC: 9 MMOL/L (ref 5–15)
BUN SERPL-MCNC: 15 MG/DL (ref 8–23)
BUN/CREAT SERPL: 19.5 (ref 7–25)
CALCIUM SPEC-SCNC: 8.5 MG/DL (ref 8.6–10.5)
CHLORIDE SERPL-SCNC: 96 MMOL/L (ref 98–107)
CO2 SERPL-SCNC: 29 MMOL/L (ref 22–29)
CREAT SERPL-MCNC: 0.77 MG/DL (ref 0.76–1.27)
DEPRECATED RDW RBC AUTO: 51.2 FL (ref 37–54)
EGFRCR SERPLBLD CKD-EPI 2021: 90.5 ML/MIN/1.73
ERYTHROCYTE [DISTWIDTH] IN BLOOD BY AUTOMATED COUNT: 14.6 % (ref 12.3–15.4)
GLUCOSE SERPL-MCNC: 131 MG/DL (ref 65–99)
HCT VFR BLD AUTO: 33.7 % (ref 37.5–51)
HGB BLD-MCNC: 10.6 G/DL (ref 13–17.7)
MCH RBC QN AUTO: 30.2 PG (ref 26.6–33)
MCHC RBC AUTO-ENTMCNC: 31.5 G/DL (ref 31.5–35.7)
MCV RBC AUTO: 96 FL (ref 79–97)
PLATELET # BLD AUTO: 184 10*3/MM3 (ref 140–450)
PMV BLD AUTO: 10.9 FL (ref 6–12)
POTASSIUM SERPL-SCNC: 4.5 MMOL/L (ref 3.5–5.2)
RBC # BLD AUTO: 3.51 10*6/MM3 (ref 4.14–5.8)
SODIUM SERPL-SCNC: 134 MMOL/L (ref 136–145)
WBC NRBC COR # BLD AUTO: 10.36 10*3/MM3 (ref 3.4–10.8)

## 2024-12-02 PROCEDURE — 99232 SBSQ HOSP IP/OBS MODERATE 35: CPT | Performed by: NURSE PRACTITIONER

## 2024-12-02 PROCEDURE — 25010000002 ENOXAPARIN PER 10 MG: Performed by: THORACIC SURGERY (CARDIOTHORACIC VASCULAR SURGERY)

## 2024-12-02 PROCEDURE — 71045 X-RAY EXAM CHEST 1 VIEW: CPT

## 2024-12-02 PROCEDURE — 25010000002 FUROSEMIDE PER 20 MG: Performed by: THORACIC SURGERY (CARDIOTHORACIC VASCULAR SURGERY)

## 2024-12-02 PROCEDURE — 80048 BASIC METABOLIC PNL TOTAL CA: CPT | Performed by: THORACIC SURGERY (CARDIOTHORACIC VASCULAR SURGERY)

## 2024-12-02 PROCEDURE — 85027 COMPLETE CBC AUTOMATED: CPT | Performed by: THORACIC SURGERY (CARDIOTHORACIC VASCULAR SURGERY)

## 2024-12-02 PROCEDURE — 71046 X-RAY EXAM CHEST 2 VIEWS: CPT

## 2024-12-02 RX ADMIN — DOCUSATE SODIUM 100 MG: 100 CAPSULE, LIQUID FILLED ORAL at 08:37

## 2024-12-02 RX ADMIN — POTASSIUM CHLORIDE 20 MEQ: 750 CAPSULE, EXTENDED RELEASE ORAL at 08:37

## 2024-12-02 RX ADMIN — ASPIRIN 81 MG: 81 TABLET, COATED ORAL at 08:37

## 2024-12-02 RX ADMIN — FUROSEMIDE 20 MG: 10 INJECTION, SOLUTION INTRAMUSCULAR; INTRAVENOUS at 05:17

## 2024-12-02 RX ADMIN — POLYETHYLENE GLYCOL 3350 17 G: 17 POWDER, FOR SOLUTION ORAL at 08:36

## 2024-12-02 RX ADMIN — FINASTERIDE 5 MG: 5 TABLET, FILM COATED ORAL at 08:37

## 2024-12-02 RX ADMIN — ENOXAPARIN SODIUM 40 MG: 100 INJECTION SUBCUTANEOUS at 08:37

## 2024-12-02 NOTE — PLAN OF CARE
Problem: Adult Inpatient Plan of Care  Goal: Plan of Care Review  Outcome: Progressing  Flowsheets (Taken 12/2/2024 1391)  Progress: improving  Outcome Evaluation: Pt sleeping between care. A&O x4. Room air. Chest tube to waterseal 90ml out. Wife at bedside. Safety maintained.  Plan of Care Reviewed With: patient

## 2024-12-02 NOTE — PLAN OF CARE
Goal Outcome Evaluation:  Plan of Care Reviewed With: patient        Progress: improving  Outcome Evaluation: Pt disharged home

## 2024-12-02 NOTE — CASE MANAGEMENT/SOCIAL WORK
Discharge Planning Assessment  [facility]     Patient Name: Manjit Seth  MRN: [id number]  Today's Date: 12/2/2024    Admit Date: 11/29/2024        Discharge Needs Assessment       Row Name 12/02/24 1115       Living Environment    People in Home spouse    Name(s) of People in Home Jenelle Seth (Spouse)  [phone] (Mobile)    Current Living Arrangements home    Potentially Unsafe Housing Conditions none    In the past 12 months has the electric, gas, oil, or water company threatened to shut off services in your home? No    Primary Care Provided by self    Provides Primary Care For no one    Family Caregiver if Needed spouse    Family Caregiver Names Jenelle Seth (Spouse)  [phone] ([place])    Quality of Family Relationships helpful;involved;supportive    Able to Return to Prior Arrangements yes       Resource/Environmental Concerns    Resource/Environmental Concerns none    Transportation Concerns none       Transportation Needs    In the past 12 months, has lack of transportation kept you from medical appointments or from getting medications? no    In the past 12 months, has lack of transportation kept you from meetings, work, or from getting things needed for daily living? No       Food Insecurity    Within the past 12 months, you worried that your food would run out before you got the money to buy more. Never true    Within the past 12 months, the food you bought just didn't last and you didn't have money to get more. Never true       Transition Planning    Patient/Family Anticipates Transition to home with family    Patient/Family Anticipated Services at Transition none    Transportation Anticipated family or friend will provide       Discharge Needs Assessment    Equipment Currently Used at Home none    Concerns to be Addressed denies needs/concerns at this time    Do you want help finding or keeping work or a job? I do not need or want help    Do you want help with school or  training? For example, starting or completing job training or getting a high school diploma, GED or equivalent No    Anticipated Changes Related to Illness none    Equipment Needed After Discharge none    Discharge Coordination/Progress Patient lives at home with wife. No DME in use.  Has established primary care with Preethi Mccullough APRN  Provider Role General - Family Medicine.  Has traditional Medicare coverage. Denies any current needs. Possibility of discharging home today.                   Discharge Plan    No documentation.                 Continued Care and Services - Admitted Since 11/29/2024    No active coordination exists for this encounter.          Demographic Summary    No documentation.                  Functional Status    No documentation.                  Psychosocial    No documentation.                  Abuse/Neglect    No documentation.                  Legal    No documentation.                  Substance Abuse    No documentation.                  Patient Forms    No documentation.                     Aria Cancino RN

## 2024-12-02 NOTE — ANESTHESIA POSTPROCEDURE EVALUATION
"Patient: Manjit Seth    Procedure Summary       Date: 11/29/24 Room / Location: [facility] OR  /  [facility] OR    Anesthesia Start: 0744 Anesthesia Stop: 1049    Procedures:       BRONCHOSCOPY, THORACOSCOPY, RIGHT UPPER LOBE WEDGE RESECTION, RIGHT MIDDLE LOBE WEDGE RESECTION, PLEURAL BIOPSY (Bronchus)      THORACOSCOPY (Right: Chest) Diagnosis:       Lung nodule      (Lung nodule [R91.1])    Surgeons: Mat Valenzuela MD Provider: Sai Baptiste CRNA    Anesthesia Type: general, Heather ASA Status: 3            Anesthesia Type: general, [place]    Vitals  Vitals Value Taken Time   /63 11/29/24 1200   Temp 98.3 °F (36.8 °C) 11/29/24 1045   Pulse 79 11/29/24 1211   Resp 16 11/29/24 1200   SpO2 94 % 11/29/24 1211   Vitals shown include unfiled device data.        Post Anesthesia Care and Evaluation    Patient location during evaluation: PACU  Patient participation: complete - patient participated  Level of consciousness: awake and awake and alert  Pain score: 0  Pain management: adequate    Airway patency: patent  Anesthetic complications: No anesthetic complications  PONV Status: none  Cardiovascular status: acceptable  Respiratory status: acceptable  Hydration status: acceptable    Comments: Patient discharged according to acceptable Popeye score per RN assessment. See nursing records for further information.     Blood pressure 106/54, pulse 74, temperature 98.7 °F (37.1 °C), temperature source Oral, resp. rate 18, height 172 cm (67.72\"), weight 96.3 kg (212 lb 4.9 oz), SpO2 92%.      "

## 2024-12-02 NOTE — DISCHARGE SUMMARY
[facility] Cardiothoracic Surgery  DISCHARGE SUMMARY        Date of Admission: 11/29/2024  Date of Discharge:  12/2/2024  Primary Care Physician: Preethi Mccullough APRN    Discharge Diagnoses:  Active Hospital Problems    Diagnosis     **Lung nodule     Type 2 diabetes mellitus, without long-term current use of insulin     PAD (peripheral artery disease)     History of tobacco abuse     Snuff user     Hypertension     Extramedullary plasmacytoma- left nose        Procedures Performed: Bronchoscopy, right thoracoscopy, right upper lobe wedge resection, right middle lobe wedge resection, multiple pleural biopsies performed on November 29, 2024 by Dr. Valenzuela.    HPI:  Mr. Manjit Seth is a 79 y.o. male who was referred to thoracic surgery services for the finding of a lung nodule.  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  A PET CT scan revealed a hypermetabolic right upper lobe nodule with irregular margins.  No evidence of metastatic disease.  He has been recommended to undergo right thoracoscopic wedge resection, possible lobectomy with mediastinal lymph node dissection and is agreeable to proceed.  Stress test was performed given underlying vascular disease and atherosclerosis with negative results.    Hospital Course: Mr. Seth was admitted on the morning of surgery on Friday, November 29, 2024.  Please see a separate op note by Dr. Valenzuela.  Operative findings remarkable for adenocarcinoma lung cancer with lipidic component of the lung nodule.  Diaphragm and pleural biopsies deferred to permanent pathology.  Following surgery, he was extubated and  transferred to PACU in stable condition.  After meeting PACU discharge criteria, he was transferred to  for ongoing recovery.  Time was permitted for chest tube suction and drainage.  He was given multimodality pain control strategies and diuresis.  He was up to the chair and walking.  On postop day 3, he meets criteria for removal of chest tube which was performed without remark.  Follow-up chest x-ray showed no pneumothorax.  He has good pain control with Tylenol only and is agreeable to discharge home with his family.  He is saturating appropriately on room air and ambulating without remark.    Final pathology pending at discharge.    Condition on Discharge:  Neurologically intact and has good pain control.  He is eating well and has demonstrable good bowel function.  All thoracic incisions are healing nicely without evidence of thoracic hernia.  The heart is in normal sinus rhythm.         Discharge Disposition:  Home or Self Care [1]    Discharge Medications:     Discharge Medications        Changes to Medications        Instructions Start Date   mupirocin 2 % ointment  Commonly known as: BACTROBAN  What changed:   how much to take  when to take this  reasons to take this   Topical, 3 Times Daily             Continue These Medications        Instructions Start Date   ASPIRIN 81 PO   Take 81 mg by mouth Daily. self      atenolol 50 MG tablet  Commonly known as: TENORMIN   50 mg, Oral, Daily      Cranberry 400 MG capsule   1 capsule, Oral, Daily      finasteride 5 MG tablet  Commonly known as: PROSCAR   5 mg, Oral, Daily      fish oil 1000 MG capsule capsule   1,000 mg, Oral, Daily With Breakfast      Garlic 10 MG capsule   10 mg, Oral, Daily      lisinopril 5 MG tablet  Commonly known as: PRINIVIL,ZESTRIL   5 mg, Oral, Daily      Magnesium 250 MG tablet   1 tablet, Oral, Daily      metFORMIN 500 MG tablet  Commonly known as: GLUCOPHAGE   500 mg, Oral, 2 Times Daily With Meals      multivitamin with minerals  tablet tablet   1 tablet, Oral, Daily      simvastatin 40 MG tablet  Commonly known as: ZOCOR   40 mg, Oral, Nightly      triamterene-hydrochlorothiazide 37.5-25 MG per tablet  Commonly known as: MAXZIDE-25   1 tablet, Oral, Daily      Vitamin D2 10 MCG (400 UNIT) tablet   400 Units, Oral, Daily               Discharge Diet: No concentrated sweets diet with an effort to maintain acceptable glycemic control.      Discharge Care Plan / Instructions:   Keep incisions clean and open to air.  May wash with soap and water.  Chest tube sites with dressings to keep on for 48 hours.  Ok to reapply dressing as needed after removal.      Activity at Discharge:   No heavy lifting greater than 5 pounds or a gallon of milk and refrain from driving until cleared.      Tobacco: The patient does not use tobacco products and therefore does not need tobacco cessation education.    BMI: BMI is >= 30 and <35. (Class 1 Obesity). The following options were offered after discussion;: weight loss educational material (shared in after visit summary).     Follow-up Appointments: Manjit Seth  is requested to see Preethi Mccullough APRN within 1-2 weeks from time of discharge, to follow-up with Dr. Valenzuela in 4 weeks with CXR,  and to follow-up with Dr. Deyvi Edge of the pulmonary service in 6 weeks.

## 2024-12-02 NOTE — PLAN OF CARE
Goal Outcome Evaluation:  Plan of Care Reviewed With: patient        Progress: improving  Outcome Evaluation: Pt A&O x4. C/o mild pain. See MAR. Room air. Chest tube to water seal. Pt ambulating with SBA. Safety maitnained

## 2024-12-02 NOTE — PROGRESS NOTES
"Patient name: Manjit Seth  Patient : 1945  VISIT # [id number]  MR #[id number]    Procedure:Procedure(s):  BRONCHOSCOPY, THORACOSCOPY, RIGHT UPPER LOBE WEDGE RESECTION, RIGHT MIDDLE LOBE WEDGE RESECTION, PLEURAL BIOPSY THORACOSCOPY  Procedure Date:2024  POD:3 Days Post-Op    Subjective   No overnight events noted per nursing. He is awake and alert sitting up in chair at bedside.  He is on room air.  Chest x-ray is stable this morning.  Chest tube will be pulled today.        Objective     Visit Vitals  /54 (BP Location: Left arm, Patient Position: Sitting)   Pulse 74   Temp 98.7 °F (37.1 °C) (Oral)   Resp 18   Ht 172 cm (67.72\")   Wt 96.3 kg (212 lb 4.9 oz)   SpO2 92%   BMI 32.55 kg/m²   Baseline weight 210 lbs.     Intake/Output Summary (Last 24 hours) at 2024 0840  Last data filed at 2024 0726  Gross per 24 hour   Intake 120 ml   Output 1080 ml   Net -960 ml   Right pleural tube: 130ml over 24 hours, serosanguinous, no air leak    Lab:     CBC:  Results from last 7 days   Lab Units 24  03524  1151   WBC 10*3/mm3 10.36 15.36* 10.97*   HEMATOCRIT % 33.7* 36.1* 37.7   PLATELETS 10*3/mm3 184 203 169          BMP:  Results from last 7 days   Lab Units 24  03524  0625 24  1151   SODIUM mmol/L 134* 133* 136   POTASSIUM mmol/L 4.5 4.1 4.3   CHLORIDE mmol/L 96* 94* 99   CO2 mmol/L 29.0 26.0 24.0   GLUCOSE mg/dL 131* 161* 235*   BUN mg/dL 15 16 21   CREATININE mg/dL 0.77 0.90 0.73*          COAG:  Results from last 7 days   Lab Units 24  1452   INR  0.93   APTT seconds 23.9*       IMAGES:       Imaging Results (Last 24 Hours)       Procedure Component Value Units Date/Time    XR Chest 1 View [[id number]] Collected: 24     Updated: 24 0640    Narrative:      EXAMINATION: XR CHEST 1 VW-     2024 2:05 AM     HISTORY: lung cancer     This patient is 3 days status post partial RIGHT lung resection.     1 view chest " x-ray.     COMPARISON:  Yesterday at 12:52 a.m.     Stable heart size.  The LEFT lung is adequately expanded and remains clear.     RIGHT lung postsurgical change with atelectasis.  Medial RIGHT chest tube remains in place.     Overall slightly lower lung volumes compared with yesterday.       Impression:      1. Stable postoperative chest x-ray                 This report was signed and finalized on 12/2/2024 6:37 AM by Dr. Sadi Askew MD.       XR Chest 1 View [[id number]] Collected: 12/01/24 0836     Updated: 12/01/24 0841    Narrative:      EXAMINATION: XR CHEST 1 VW- 12/1/2024 8:36 AM     HISTORY: lung cancer.     REPORT: A frontal view of the chest was obtained.     COMPARISON: Chest x-ray 11/30/2024 0300 hours.     The right chest tube appears in good position as before, along the  medial aspect of the right hemithorax with the tip near the apex. No  pneumothorax or definite pleural effusion is identified. There are are  postsurgical changes in the right midlung and lung base. Slight increase  in focal opacities in the right lung base is noted. There is moderate  bibasilar atelectasis. Heart size is normal. Previous ACDF is noted.       Impression:      Stable satisfactory position of the right chest tube without  pneumothorax. Extensive volume loss in both lung bases, postop changes  in the right lung, with increasing focal opacity in the right base,  which may be in part related to summation artifact.     This report was signed and finalized on 12/1/2024 8:38 AM by Dr. Chaim Olsen MD.                 Physical Exam:  General: Alert, oriented. No apparent distress.   Cardiovascular: Regular rate and rhythm without murmur, rubs, or gallops.    Pulmonary: Clear to auscultation bilaterally without wheezing, rubs, or rales.  Chest: incisions clean, dry, and intact. Chest tube to 20 cm H2O suction. No air leak. Serosanguineous.   Abdomen: Soft, nondistended, and nontender.  Extremities: Warm, moves all  extremities. No edema.   Neurologic:  Grossly intact with no focal deficits.            Impression:  Adenocarcinoma non-small cell lung cancer  Type 2 diabetes mellitus  Obesity, class I  Past tobacco use  History of extra-medullary plasma cell cytoma   Hyponatremia asymptomatic    Plan:  Pull chest tube today, follow up CXR, if stable then likely home today.   Continue pulmonary hygiene measures and post surgical care.     Gladis Connolly, APRN  12/02/24  08:40 CST

## 2024-12-03 NOTE — OUTREACH NOTE
Prep Survey      Flowsheet Row Responses   [facility] patient discharged from? [facility]   Is LACE score < 7 ? No   Eligibility Readm Mgmt   Discharge diagnosis Lung nodule   Does the patient have one of the following disease processes/diagnoses(primary or secondary)? Other   Does the patient have Home health ordered? No   Is there a DME ordered? No   Prep survey completed? Yes            KUNAL BEE - Registered Nurse

## 2024-12-04 ENCOUNTER — TELEPHONE (OUTPATIENT)
Dept: CARDIAC SURGERY | Facility: CLINIC | Age: 79
End: 2024-12-04
Payer: MEDICARE

## 2024-12-04 ENCOUNTER — OFFICE VISIT (OUTPATIENT)
Dept: CARDIAC SURGERY | Facility: CLINIC | Age: 79
End: 2024-12-04
Payer: MEDICARE

## 2024-12-04 ENCOUNTER — HOSPITAL ENCOUNTER (OUTPATIENT)
Dept: GENERAL RADIOLOGY | Facility: HOSPITAL | Age: 79
Discharge: HOME | End: 2024-12-04
Admitting: NURSE PRACTITIONER
Payer: MEDICARE

## 2024-12-04 VITALS
DIASTOLIC BLOOD PRESSURE: 62 MMHG | WEIGHT: 208.2 LBS | SYSTOLIC BLOOD PRESSURE: 120 MMHG | BODY MASS INDEX: 31.55 KG/M2 | HEART RATE: 84 BPM | OXYGEN SATURATION: 92 % | HEIGHT: 68 IN

## 2024-12-04 DIAGNOSIS — R91.1 LUNG NODULE: ICD-10-CM

## 2024-12-04 DIAGNOSIS — R09.02 HYPOXIA: Primary | ICD-10-CM

## 2024-12-04 PROCEDURE — 71046 X-RAY EXAM CHEST 2 VIEWS: CPT

## 2024-12-04 RX ORDER — FUROSEMIDE 40 MG/1
40 TABLET ORAL DAILY
Qty: 7 TABLET | Refills: 0 | Status: SHIPPED | OUTPATIENT
Start: 2024-12-04 | End: 2024-12-11

## 2024-12-04 RX ORDER — POTASSIUM CHLORIDE 750 MG/1
20 CAPSULE, EXTENDED RELEASE ORAL DAILY
Qty: 14 CAPSULE | Refills: 0 | Status: SHIPPED | OUTPATIENT
Start: 2024-12-04 | End: 2024-12-11

## 2024-12-04 NOTE — PROGRESS NOTES
Subjective   Chief Complaint   Patient presents with    Post-op Follow-up     Patient had Bronchoscopy/Thoracoscopy on 11/29       Patient ID: Manjit Seth is a 79 y.o. male who is here for follow-up having had  Bronchoscopy, right thoracoscopy, right upper lobe wedge resection, right middle lobe wedge resection, multiple pleural biopsies performed on November 29, 2024 by Dr. Valenzuela.     Post-op Follow-up    Post operative recovery was uneventful without any major complications.  He was discharged home on Monday, December 2.  He called our office today to report hypoxia with SpO2 in the mid 80s at home.  He was asked to present to the office with a chest x-ray for evaluation.  He is joined by his wife.  Today, SpO2 92% on room air.  This was verified on both hands.  No shortness of breath at rest.  He does have some dyspnea on exertion.  He is able to complete full sentences without remark.  Sleep habits are fair. Pain control has been good overall with use of Tylenol.  He does have some pain in his right hand, fifth finger, and shoulder into right upper chest, possibly positional from surgery or radiating pain. No fevers/sweats/chills. No drainage from incisions.  Appetite is fair and bowels are moving regularly.  Initially had some constipation but this has resolved. Ambulating multiple times daily without remark.  He does report some degree of anemia and elevated heart rate in the 90s which is atypical for him.  Normally his heart rate is in the 60s.  Has follow-up with PCP next week.  Has follow-up with Dr. Carnes in the near future.    The following portions of the patient's history were reviewed and updated as appropriate: allergies, current medications, past family history, past medical history, past social history, past surgical history and problem list.    Review of Systems   Constitutional:  Negative for chills, diaphoresis and fever.   Respiratory:  Positive for shortness of breath. Negative for cough  "and wheezing.    Cardiovascular:  Positive for chest pain (right chest pain, thoracic incisions).       Objective   Visit Vitals  /62   Pulse 84   Ht 172 cm (67.72\")   Wt 94.4 kg (208 lb 3.2 oz)   SpO2 92%   BMI 31.92 kg/m²       Physical Exam  Vitals reviewed.   Constitutional:       General: He is not in acute distress.     Appearance: He is well-developed. He is not diaphoretic.   HENT:      Head: Normocephalic and atraumatic.   Eyes:      Pupils: Pupils are equal, round, and reactive to light.   Cardiovascular:      Rate and Rhythm: Normal rate and regular rhythm.      Heart sounds: Normal heart sounds.   Pulmonary:      Effort: Pulmonary effort is normal. No respiratory distress.      Breath sounds: No wheezing or rales.      Comments: Right base slightly diminished, no wheezing  Abdominal:      General: There is no distension.      Palpations: Abdomen is soft.      Tenderness: There is no abdominal tenderness. There is no guarding.   Skin:     General: Skin is warm and dry.      Coloration: Skin is not pale.      Findings: No rash.      Comments: Right sided thoracic incisions with Steri-Strips intact.  Normal postoperative appearance.  No drainage or evidence of infection.  Removed dressing from previous chest tube site which is also normal appearance.  Applied light dry gauze dressing.   Neurological:      General: No focal deficit present.      Mental Status: He is alert and oriented to person, place, and time.      Cranial Nerves: No cranial nerve deficit.   Psychiatric:         Behavior: Behavior normal.         XR Chest 2 View    Result Date: 12/4/2024  Narrative: EXAMINATION: XR CHEST 2 VW-  12/4/2024 2:21 PM  HISTORY: right lung nodule; R91.1-Solitary pulmonary nodule  2 view chest x-ray.  COMPARISON: 12/2/2024.  Mild RIGHT diaphragm elevation with RIGHT basilar infiltrate or atelectasis. A 2 cm nodule was present within the midportion of the RIGHT lung on a PET/CT from 1 month ago. This patient " is apparently status post partial RIGHT lung resection.  Normal heart size.  The LEFT lung is fully expanded and clear.  There is moderate degenerative spurring in the lumbar spine.      Impression: 1. Stable chronic and postsurgical change. 2. No pneumothorax or heart failure.    This report was signed and finalized on 12/4/2024 3:28 PM by Dr. Sadi Askew MD.      XR Chest PA & Lateral    Result Date: 12/2/2024  Narrative: EXAM: XR CHEST PA AND LATERAL-  DATE: 12/2/2024 2:12 PM  HISTORY: f/u ct removal; R91.1-Solitary pulmonary nodule   COMPARISON: 12/2/2024.  TECHNIQUE: Two views. Frontal and lateral views.  2 images.   FINDINGS:  Interval removal RIGHT chest tube. No measurable pneumothorax by radiograph. Similar patchy opacity RIGHT lower lung.  Similar appearance of the cardiac mediastinal silhouette. Calcified aortic atherosclerosis.  Small gas of the RIGHT lateral chest wall, may be related to recent chest tube removal. Partially visualized cervical fixation hardware, not optimally evaluated on this exam. Degenerative changes of the spine.       Impression: 1. Interval removal RIGHT chest tube. No measurable pneumothorax by radiograph. 2. Similar patchy opacity RIGHT lower lung.  This report was signed and finalized on 12/2/2024 3:19 PM by Dr Aleena Constantino MD.      XR Chest 1 View    Result Date: 12/2/2024  Narrative: EXAMINATION: XR CHEST 1 VW-  12/2/2024 2:05 AM  HISTORY: lung cancer  This patient is 3 days status post partial RIGHT lung resection.  1 view chest x-ray.  COMPARISON: Yesterday at 12:52 a.m.  Stable heart size. The LEFT lung is adequately expanded and remains clear.  RIGHT lung postsurgical change with atelectasis. Medial RIGHT chest tube remains in place.  Overall slightly lower lung volumes compared with yesterday.      Impression: 1. Stable postoperative chest x-ray      This report was signed and finalized on 12/2/2024 6:37 AM by Dr. Sadi Askew MD.      XR Chest 1 View    Result  Date: 12/1/2024  Narrative: EXAMINATION: XR CHEST 1 VW- 12/1/2024 8:36 AM  HISTORY: lung cancer.  REPORT: A frontal view of the chest was obtained.  COMPARISON: Chest x-ray 11/30/2024 0300 hours.  The right chest tube appears in good position as before, along the medial aspect of the right hemithorax with the tip near the apex. No pneumothorax or definite pleural effusion is identified. There are are postsurgical changes in the right midlung and lung base. Slight increase in focal opacities in the right lung base is noted. There is moderate bibasilar atelectasis. Heart size is normal. Previous ACDF is noted.      Impression: Stable satisfactory position of the right chest tube without pneumothorax. Extensive volume loss in both lung bases, postop changes in the right lung, with increasing focal opacity in the right base, which may be in part related to summation artifact.  This report was signed and finalized on 12/1/2024 8:38 AM by Dr. Chaim Olsen MD.      XR Chest 1 View    Result Date: 11/30/2024  Narrative: EXAMINATION: XR CHEST 1 VW- 11/30/2024 7:40 AM  HISTORY: lung cancer.  REPORT: A frontal view of the chest was obtained.  COMPARISON: Chest x-ray 11/29/2024 1141 hours.  The lungs are mildly hypoaerated as before, there is are surgical suture lines in right perihilar and right lung base, with stable nonconsolidating opacities the right chest tube remains in satisfactory position. No pneumothorax is identified. The heart is enlarged. No evidence of overt CHF is seen. There is previous ACDF. Overall, no significant change.      Impression: Stable 1 day appearance of the chest, including the right chest tube. No pneumothorax is identified.  This report was signed and finalized on 11/30/2024 7:42 AM by Dr. Chaim Olsen MD.      XR Chest 1 View    Result Date: 11/29/2024  Narrative: EXAM/TECHNIQUE: XR CHEST 1 VW-  INDICATION: lung cancer; R91.1-Solitary pulmonary nodule  COMPARISON: 11/27/2024  FINDINGS:   Patient is status post right upper and middle lobe wedge resection. A right-sided chest tube is in place. No visible pneumothorax. No large pleural effusion. Atelectasis is present in both lower lungs. Cardiac silhouette is stable. Postoperative change in the cervical spine. No acute osseous finding.      Impression:  Patient is status post right upper and middle lobe wedge resections with right-sided chest tube in place. No visible pneumothorax or large pleural effusion. Expected mild atelectasis in both lung bases.  This report was signed and finalized on 11/29/2024 12:13 PM by Dr. Jordon Burr MD.      Arterial Line    Result Date: 11/29/2024  Narrative: Sai Baptiste CRNA     11/29/2024  7:49 AM Arterial Line Patient reassessed immediately prior to procedure Start time: 11/29/2024 6:55 AM Line placed for hemodynamic monitoring, ABGs/Labs/ISTAT and MD/Surgeon request. Performed By Anesthesiologist: Chelsea Chase MD CRNA/CAA: Delvis Nina CRNA Preanesthetic Checklist Completed: patient identified, IV checked, site marked, risks and benefits discussed, surgical consent, monitors and equipment checked, pre-op evaluation and timeout performed Arterial Line Prep  Sterile Tech: cap, gloves and sterile barriers Prep: ChloraPrep Patient monitoring: EKG, continuous pulse oximetry and blood pressure monitoring Arterial Line Procedure Laterality:left Location:  radial artery Catheter size: 20 G Guidance: ultrasound guided PROCEDURE NOTE/ULTRASOUND INTERPRETATION.  Using ultrasound guidance the potential vascular sites for insertion of the catheter were visualized to determine the patency of the vessel to be used for vascular access.  After selecting the appropriate site for insertion, the needle was visualized under ultrasound being inserted into the radial artery, followed by ultrasound confirmation of wire and catheter placement. There were no abnormalities seen on ultrasound; an image was taken;  and the patient tolerated the procedure with no complications. Number of attempts: 2 (first attempt by Delvis Nina, second attempt by Chelsea Chase) Post Assessment Dressing Type: wrist guard applied, secured with tape and occlusive dressing applied. Complications no Circ/Move/Sens Assessment: normal and unchanged. Patient Tolerance: patient tolerated the procedure well with no apparent complications     XR Chest PA & Lateral    Result Date: 11/27/2024  Narrative: EXAMINATION: XR CHEST PA AND LATERAL-  11/27/2024 3:12 PM  HISTORY: Preop surgery for lung nodule.  FINDINGS: Today's exam is compared to previous study of 9/27/2024. There is a nodule in the right upper lobe stable from the previous exam. The lungs are hypoventilated with mild basilar atelectasis. There is evidence of remote granulomatous disease. There is spondylosis in the mid and lower thoracic spine.      Impression: 1. Right upper lobe pulmonary nodule. 2. No acute consolidative pneumonia or effusion.  This report was signed and finalized on 11/27/2024 3:13 PM by Dr. Yaya Prado MD.      Stress Test With Myocardial Perfusion - One Day    Result Date: 11/26/2024  Narrative:   Myocardial perfusion imaging indicates a normal myocardial perfusion study with no evidence of ischemia. Impressions are consistent with a low risk study.   Left ventricular ejection fraction is normal.   There is no prior study available for comparison.   Findings consistent with a normal ECG stress test.      Assessment & Plan     Independent Review of Radiographic Study from 12/4/2024::    No pneumothorax, right basilar opacity-slight increase compared to imaging obtained on 12/2    Diagnoses and all orders for this visit:    1. Hypoxia (Primary)    2. Lung nodule  -     XR Chest 2 View; Future    Other orders  -     furosemide (Lasix) 40 MG tablet; Take 1 tablet by mouth Daily for 7 days.  Dispense: 7 tablet; Refill: 0  -     potassium chloride (MICRO-K) 10 MEQ CR  capsule; Take 2 capsules by mouth Daily for 7 days.  Dispense: 14 capsule; Refill: 0           Overall, Manjit Seth appears to be doing well.  Fortunately, he is not hypoxic during exam today.  SpO2 92% on room air.  Reviewed chest x-ray findings with patient and spouse -recommend short course of diuretics.  He will continue to monitor oxygen levels at home and notify us of any changes should they occur.  Dressing was removed from previous chest tube site.  Wound care discussed.  Continue current restrictions.  He has follow-up with his PCP next week.  Offered follow-up with myself next week-he will call if he wishes to be seen.  Reviewed pathology-not finalized yet.  Advised that Dr. Valenzuela would reach out once this has been finalized.  Patient and wife are agreeable.  He will keep planned follow-up with Dr. Carnes, pulmonology services in the near future.     Provided support and encouragement. All questions have been answered to the best of my ability. Patient has been instructed to contact our office with any questions or concerns should they arise prior to the next office visit.  Keep follow-up with Dr. Valenzuela with chest x-ray in 1 month.     BMI is >= 30 and <35. (Class 1 Obesity). The following options were offered after discussion;: weight loss educational material (shared in after visit summary).      Manjit Seth  reports that he quit smoking about 47 years ago. His smoking use included cigarettes. He started smoking about 54 years ago. He has a 10 pack-year smoking history. His smokeless tobacco use includes chew.      Advance Care Planning   ACP discussion was declined by the patient. Patient does not have an advance directive, declines further assistance.

## 2024-12-04 NOTE — TELEPHONE ENCOUNTER
Pt calling.  States O2 sat is showing in the 80's.  Pt denies feeling SOA.  No gasping or discomfort.  Some WALKER but again, not gasping.  Pt denies fever, pain,  and reports incisions look dry.  States the only issue he is having is that is R arm is very sore, even down to his pinkie finger.  States if it hadn't been for the O2 monitor he has, he would not even know.  Calling to find out what to do, if anything.  Can reach him at #[phone]/kah

## 2024-12-04 NOTE — TELEPHONE ENCOUNTER
Called patient and discussed. Reports his O2 sat is 83%.  He checked on his wife's finger and hers is within normal parameters.  He does have some shortness of breath with walking.  He is able to complete full sentences over the phone.  Advise he comes to our office today to be seen.  Please place on my schedule.  He will be here in about an hour

## 2024-12-06 ENCOUNTER — READMISSION MANAGEMENT (OUTPATIENT)
Dept: CALL CENTER | Facility: HOSPITAL | Age: 79
End: 2024-12-06
Payer: MEDICARE

## 2024-12-06 NOTE — OUTREACH NOTE
Medical Week 1 Survey      Flowsheet Row Responses   [facility] patient discharged from? [facility]   Does the patient have one of the following disease processes/diagnoses(primary or secondary)? Other   Week 1 attempt successful? Yes   Call start time 1553   Call end time 1601   Discharge diagnosis Lung nodule  [Bronchoscopy, right thoracoscopy, right upper lobe wedge resection, right middle lobe wedge resection, multiple pleural biopsies performed on November 29, 2024 by Dr. Valenzuela.]   Person spoke with today (if not patient) and relationship Patient   Meds reviewed with patient/caregiver? Yes   Does the patient have all medications ordered at discharge? Yes   Is the patient taking all medications as directed (includes completed medication regime)? Yes   Comments regarding appointments Post op with Dr Valenzuela 1/15  230pm   Does the patient have a primary care provider?  Yes   Does the patient have an appointment with their PCP within 7 days of discharge? Yes   Comments regarding PCP PCP Dr Mccullough. Patient seeing PCP Monday 12/9  1030am   Has the patient kept scheduled appointments due by today? N/A   Has home health visited the patient within 72 hours of discharge? N/A   Psychosocial issues? No   Did the patient receive a copy of their discharge instructions? Yes   Nursing interventions Reviewed instructions with patient   What is the patient's perception of their health status since discharge? New symptoms unrelated to diagnosis  [Patient reports that his pinky finger on his right hand is very painful. He reports that it feels like it is broken but does not know how he got injured. Patient will have PCP to assess on Monday.]   Is the patient/caregiver able to teach back signs and symptoms related to disease process for when to call PCP? Yes   Is the patient/caregiver able to teach back signs and symptoms related to disease process for when to call 911? Yes   Is the patient/caregiver able to teach back the  hierarchy of who to call/visit for symptoms/problems? PCP, Specialist, Home health nurse, Urgent Care, ED, 911 Yes   Week 1 call completed? Yes   Would this patient benefit from a Referral to [facility] Social Work? No   Is the patient interested in additional calls from an ambulatory ? No   Call end time 1601            MELVA MEJIA - Registered Nurse

## 2024-12-09 LAB
CYTO UR: NORMAL
LAB AP CASE REPORT: NORMAL
LAB AP DIAGNOSIS COMMENT: NORMAL
LAB AP SYNOPTIC CHECKLIST: NORMAL
Lab: NORMAL
Lab: NORMAL
PATH REPORT.FINAL DX SPEC: NORMAL
PATH REPORT.GROSS SPEC: NORMAL

## 2024-12-12 ENCOUNTER — TELEPHONE (OUTPATIENT)
Dept: CARDIAC SURGERY | Facility: CLINIC | Age: 79
End: 2024-12-12
Payer: MEDICARE

## 2024-12-12 NOTE — TELEPHONE ENCOUNTER
"Daughter calling requesting office call pt with bx results.  States it has been 2 wks since bx done and Dr Valenzuela told them the results would be back in \"just a few days\".  Pt is most anxious for results and would like a call back as soon as possible as Dr Valenzuela was stating this may be stage 4 lung cancer or mesothelioma.  Can reach pt at #[phone]/janie  "

## 2024-12-15 ENCOUNTER — DOCUMENTATION (OUTPATIENT)
Dept: CARDIAC SURGERY | Facility: CLINIC | Age: 79
End: 2024-12-15
Payer: MEDICARE

## 2024-12-15 NOTE — PROGRESS NOTES
Called and spoke with Mr. Seth.  Discussed his pathology.  We discussed options for treatment: redo thoracoscopy with planned lobectomy vs. Wedge as definitive treatment with cervical mediastinoscopy.  Pros/cons discussed.  He favors the latter- cervical mediastinoscopy.  RBA reviewed.  Operative conduct reviewed.  He is agreeable to the plan of care. WIll select a date for surgery.

## 2024-12-15 NOTE — TELEPHONE ENCOUNTER
Called and spoke with me.  We have jointly decided to proceed with cervical mediastinoscopy.  We need to select a date.  Friday? Tuesday?

## 2024-12-16 ENCOUNTER — PREP FOR SURGERY (OUTPATIENT)
Dept: OTHER | Facility: HOSPITAL | Age: 79
End: 2024-12-16
Payer: MEDICARE

## 2024-12-16 DIAGNOSIS — R06.02 BREATH SHORTNESS: ICD-10-CM

## 2024-12-16 DIAGNOSIS — C34.11 MALIGNANT NEOPLASM OF UPPER LOBE OF RIGHT LUNG: Primary | ICD-10-CM

## 2024-12-16 RX ORDER — SODIUM CHLORIDE 0.9 % (FLUSH) 0.9 %
3-10 SYRINGE (ML) INJECTION AS NEEDED
OUTPATIENT
Start: 2024-12-16

## 2024-12-16 RX ORDER — SODIUM CHLORIDE 0.9 % (FLUSH) 0.9 %
3 SYRINGE (ML) INJECTION EVERY 12 HOURS SCHEDULED
OUTPATIENT
Start: 2024-12-16

## 2024-12-16 RX ORDER — CHLORHEXIDINE GLUCONATE 500 MG/1
CLOTH TOPICAL EVERY 12 HOURS PRN
OUTPATIENT
Start: 2024-12-16

## 2024-12-16 NOTE — TELEPHONE ENCOUNTER
Called pt to provide prework and surgery information. He is unable to have surgery tomorrow as his wife has a very important appointment scheduled tomorrow with one of her doctors that she has had scheduled for a while to address kidney issues. Informed EDWIN Rea. Will place pt in depot, discuss with Dr. Valenzuela, and call pt back with new prework/surgery date.

## 2024-12-16 NOTE — TELEPHONE ENCOUNTER
Dr. Valenzuela would like to proceed with mediastinoscopy tomorrow.  I have placed order.  Please schedule and notify patient.  Schedule this for after [employer]

## 2024-12-27 ENCOUNTER — PRE-ADMISSION TESTING (OUTPATIENT)
Dept: PREADMISSION TESTING | Facility: HOSPITAL | Age: 79
End: 2024-12-27
Payer: MEDICARE

## 2024-12-27 ENCOUNTER — HOSPITAL ENCOUNTER (OUTPATIENT)
Dept: GENERAL RADIOLOGY | Facility: HOSPITAL | Age: 79
Discharge: HOME | End: 2024-12-27
Payer: MEDICARE

## 2024-12-27 VITALS
SYSTOLIC BLOOD PRESSURE: 144 MMHG | BODY MASS INDEX: 33.46 KG/M2 | DIASTOLIC BLOOD PRESSURE: 53 MMHG | HEIGHT: 67 IN | OXYGEN SATURATION: 98 % | HEART RATE: 76 BPM | WEIGHT: 213.19 LBS | RESPIRATION RATE: 20 BRPM

## 2024-12-27 DIAGNOSIS — R06.02 BREATH SHORTNESS: ICD-10-CM

## 2024-12-27 DIAGNOSIS — C34.11 MALIGNANT NEOPLASM OF UPPER LOBE OF RIGHT LUNG: ICD-10-CM

## 2024-12-27 DIAGNOSIS — R91.1 LUNG NODULE: ICD-10-CM

## 2024-12-27 LAB
ABO GROUP BLD: NORMAL
ALBUMIN SERPL-MCNC: 4 G/DL (ref 3.5–5.2)
ALBUMIN/GLOB SERPL: 1.3 G/DL
ALP SERPL-CCNC: 85 U/L (ref 39–117)
ALT SERPL W P-5'-P-CCNC: 35 U/L (ref 1–41)
ANION GAP SERPL CALCULATED.3IONS-SCNC: 13 MMOL/L (ref 5–15)
APTT PPP: 24.7 SECONDS (ref 24.5–36)
AST SERPL-CCNC: 36 U/L (ref 1–40)
BASOPHILS # BLD AUTO: 0.05 10*3/MM3 (ref 0–0.2)
BASOPHILS NFR BLD AUTO: 0.7 % (ref 0–1.5)
BILIRUB SERPL-MCNC: 0.3 MG/DL (ref 0–1.2)
BLD GP AB SCN SERPL QL: NEGATIVE
BUN SERPL-MCNC: 11 MG/DL (ref 8–23)
BUN/CREAT SERPL: 15.5 (ref 7–25)
CALCIUM SPEC-SCNC: 8.9 MG/DL (ref 8.6–10.5)
CHLORIDE SERPL-SCNC: 100 MMOL/L (ref 98–107)
CO2 SERPL-SCNC: 26 MMOL/L (ref 22–29)
CREAT SERPL-MCNC: 0.71 MG/DL (ref 0.76–1.27)
DEPRECATED RDW RBC AUTO: 51.6 FL (ref 37–54)
EGFRCR SERPLBLD CKD-EPI 2021: 92.7 ML/MIN/1.73
EOSINOPHIL # BLD AUTO: 0.63 10*3/MM3 (ref 0–0.4)
EOSINOPHIL NFR BLD AUTO: 8.9 % (ref 0.3–6.2)
ERYTHROCYTE [DISTWIDTH] IN BLOOD BY AUTOMATED COUNT: 14.9 % (ref 12.3–15.4)
GLOBULIN UR ELPH-MCNC: 3.1 GM/DL
GLUCOSE SERPL-MCNC: 141 MG/DL (ref 65–99)
HCT VFR BLD AUTO: 36.9 % (ref 37.5–51)
HGB BLD-MCNC: 11.7 G/DL (ref 13–17.7)
IMM GRANULOCYTES # BLD AUTO: 0.03 10*3/MM3 (ref 0–0.05)
IMM GRANULOCYTES NFR BLD AUTO: 0.4 % (ref 0–0.5)
INR PPP: 0.96 (ref 0.91–1.09)
LYMPHOCYTES # BLD AUTO: 0.91 10*3/MM3 (ref 0.7–3.1)
LYMPHOCYTES NFR BLD AUTO: 12.8 % (ref 19.6–45.3)
MCH RBC QN AUTO: 30.5 PG (ref 26.6–33)
MCHC RBC AUTO-ENTMCNC: 31.7 G/DL (ref 31.5–35.7)
MCV RBC AUTO: 96.3 FL (ref 79–97)
MONOCYTES # BLD AUTO: 0.49 10*3/MM3 (ref 0.1–0.9)
MONOCYTES NFR BLD AUTO: 6.9 % (ref 5–12)
NEUTROPHILS NFR BLD AUTO: 4.99 10*3/MM3 (ref 1.7–7)
NEUTROPHILS NFR BLD AUTO: 70.3 % (ref 42.7–76)
NRBC BLD AUTO-RTO: 0 /100 WBC (ref 0–0.2)
PLATELET # BLD AUTO: 167 10*3/MM3 (ref 140–450)
PMV BLD AUTO: 11 FL (ref 6–12)
POTASSIUM SERPL-SCNC: 4.3 MMOL/L (ref 3.5–5.2)
PROT SERPL-MCNC: 7.1 G/DL (ref 6–8.5)
PROTHROMBIN TIME: 13.2 SECONDS (ref 11.8–14.8)
QT INTERVAL: 396 MS
QTC INTERVAL: 433 MS
RBC # BLD AUTO: 3.83 10*6/MM3 (ref 4.14–5.8)
RH BLD: NEGATIVE
SODIUM SERPL-SCNC: 139 MMOL/L (ref 136–145)
T&S EXPIRATION DATE: NORMAL
WBC NRBC COR # BLD AUTO: 7.1 10*3/MM3 (ref 3.4–10.8)

## 2024-12-27 PROCEDURE — 36415 COLL VENOUS BLD VENIPUNCTURE: CPT

## 2024-12-27 PROCEDURE — 85610 PROTHROMBIN TIME: CPT

## 2024-12-27 PROCEDURE — 86900 BLOOD TYPING SEROLOGIC ABO: CPT

## 2024-12-27 PROCEDURE — 85025 COMPLETE CBC W/AUTO DIFF WBC: CPT

## 2024-12-27 PROCEDURE — 86850 RBC ANTIBODY SCREEN: CPT

## 2024-12-27 PROCEDURE — 86901 BLOOD TYPING SEROLOGIC RH(D): CPT

## 2024-12-27 PROCEDURE — 71046 X-RAY EXAM CHEST 2 VIEWS: CPT

## 2024-12-27 PROCEDURE — 80053 COMPREHEN METABOLIC PANEL: CPT

## 2024-12-27 PROCEDURE — 85730 THROMBOPLASTIN TIME PARTIAL: CPT

## 2024-12-27 PROCEDURE — 93005 ELECTROCARDIOGRAM TRACING: CPT

## 2024-12-27 NOTE — DISCHARGE INSTRUCTIONS
Preparing for Surgery  Follow these instructions before the procedure:  Several days or weeks before your procedure    Ask your health care provider about:  Changing or stopping your regular medicines. This is especially important if you are taking diabetes medicines or blood thinners.  Taking medicines such as aspirin and ibuprofen. These medicines can thin your blood. Do not take these medicines unless your health care provider tells you to take them.  Taking over-the-counter medicines, vitamins, herbs, and supplements.    Contact your surgeon if you:  Develop a fever of more than 100.4°F (38°C) or other feelings of illness during the 48 hours before your surgery.  Have symptoms that get worse.  Have questions or concerns about your surgery.  If you are going home the same day of your surgery you will need to arrange for a responsible adult, age 18 years old or older, to drive you home from the hospital and stay with you for 24 hours. Verification of the  will be made prior to any procedure requiring sedation. You may not go home in a taxi or any form of public transportation by yourself.     Day before your procedure  Medication(s) you need to stop the day before your surgery: Lisinopril - Hold for 24 hours prior to surgery    24 hours before your procedure DO NOT drink alcoholic beverages or smoke.  24 hours before your procedure STOP taking Erectile Dysfunction medication (i.e.,Cialis, Viagra)   You may be asked to shower with a germ-killing soap.  Day of your procedure   You may take the following medication(s) the morning of surgery with a sip of water: Atenolol      8 hours before your scheduled arrival time, STOP all food, any dairy products, and full liquids. This includes hard candy, chewing gum or mints. This is extremely important to prevent serious complications.     Up to 2 hours before your scheduled arrival time, you may have clear liquids no cream, powder, or pulp of any kind. Safe options  are water, black coffee, plain tea, soda, Gatorade/Powerade, clear broth, apple juice.    2 hours before your scheduled arrival time, STOP drinking clear liquids.    You may need to take another shower with a germ-killing soap before you leave home in the morning. Do not use perfumes, colognes, or body lotions.  Wear comfortable loose-fitting clothing.  Remove all jewelry including body piercing and rings, dark colored nail polish, and make up prior to arrival at the hospital. Leave all valuables at home.   Bring your hearing aids if you rely on them.  Do not wear contact lenses. If you wear eyeglasses remember to bring a case to store them in while you are in surgery.  Do not use denture adhesives since you will be asked to remove them during your surgery.    You do not need to bring your home medications into the hospital.   Bring your sleep apnea device with you on the day of your surgery (if this applies to you).  If you have an Inspire implant for sleep apnea, please bring the remote with you on the day of surgery.  If you wear portable oxygen, bring it with you.   If you are staying overnight, you may bring a bag of items you may need such as slippers, robe and a change of clothes for your discharge. You may want to leave these items in the car until you are ready for them since your family will take your belongings when you leave the pre-operative area.  Arrive at the hospital as scheduled by the office. You will be asked to arrive 2 hours prior to your surgery time in order to prepare for your procedure.  When you arrive at the hospital  Go to the registration desk located at the main entrance of the hospital.  After registration is completed, you will be given a beeper and a sticker sheet. Take the stickers to Outpatient Surgery and place in the tray at the end of the desk to notify the staff that you have arrived and registered.   Return to the lobby to wait. You are not always called back according to the  time of arrival but rather the time your doctor will be ready.  When your beeper lights up and vibrates proceed through the double doors, under the stairs, and a member of the Outpatient Surgery staff will escort you to your preoperative room.       How to Use Chlorhexidine Before Surgery  Chlorhexidine gluconate (CHG) is a germ-killing (antiseptic) solution that is used to clean the skin. It can get rid of the bacteria that normally live on the skin and can keep them away for about 24 hours. To clean your skin with CHG, you may be given:  A CHG solution to use in the shower or as part of a sponge bath.  A prepackaged cloth that contains CHG.  Cleaning your skin with CHG may help lower the risk for infection:  While you are staying in the intensive care unit of the hospital.  If you have a vascular access, such as a central line, to provide short-term or long-term access to your veins.  If you have a catheter to drain urine from your bladder.  If you are on a ventilator. A ventilator is a machine that helps you breathe by moving air in and out of your lungs.  After surgery.  What are the risks?  Risks of using CHG include:  A skin reaction.  Hearing loss, if CHG gets in your ears and you have a perforated eardrum.  Eye injury, if CHG gets in your eyes and is not rinsed out.  The CHG product catching fire.  Make sure that you avoid smoking and flames after applying CHG to your skin.  Do not use CHG:  If you have a chlorhexidine allergy or have previously reacted to chlorhexidine.  On babies younger than 2 months of age.  How to use CHG solution  Use CHG only as told by your health care provider, and follow the instructions on the label.  Use the full amount of CHG as directed. Usually, this is one bottle.  During a shower    Follow these steps when using CHG solution during a shower (unless your health care provider gives you different instructions):  Start the shower.  Use your normal soap and shampoo to wash your  face and hair.  Turn off the shower or move out of the shower stream.  Pour the CHG onto a clean washcloth. Do not use any type of brush or rough-edged sponge.  Starting at your neck, lather your body down to your toes. Make sure you follow these instructions:  If you will be having surgery, pay special attention to the part of your body where you will be having surgery. Scrub this area for at least 1 minute.  Do not use CHG on your head or face. If the solution gets into your ears or eyes, rinse them well with water.  Avoid your genital area.  Avoid any areas of skin that have broken skin, cuts, or scrapes.  Scrub your back and under your arms. Make sure to wash skin folds.  Let the lather sit on your skin for 1-2 minutes or as long as told by your health care provider.  Thoroughly rinse your entire body in the shower. Make sure that all body creases and crevices are rinsed well.  Dry off with a clean towel. Do not put any substances on your body afterward--such as powder, lotion, or perfume--unless you are told to do so by your health care provider. Only use lotions that are recommended by the .  Put on clean clothes or pajamas.  If it is the night before your surgery, sleep in clean sheets.     During a sponge bath  Follow these steps when using CHG solution during a sponge bath (unless your health care provider gives you different instructions):  Use your normal soap and shampoo to wash your face and hair.  Pour the CHG onto a clean washcloth.  Starting at your neck, lather your body down to your toes. Make sure you follow these instructions:  If you will be having surgery, pay special attention to the part of your body where you will be having surgery. Scrub this area for at least 1 minute.  Do not use CHG on your head or face. If the solution gets into your ears or eyes, rinse them well with water.  Avoid your genital area.  Avoid any areas of skin that have broken skin, cuts, or scrapes.  Scrub  your back and under your arms. Make sure to wash skin folds.  Let the lather sit on your skin for 1-2 minutes or as long as told by your health care provider.  Using a different clean, wet washcloth, thoroughly rinse your entire body. Make sure that all body creases and crevices are rinsed well.  Dry off with a clean towel. Do not put any substances on your body afterward--such as powder, lotion, or perfume--unless you are told to do so by your health care provider. Only use lotions that are recommended by the .  Put on clean clothes or pajamas.  If it is the night before your surgery, sleep in clean sheets.  How to use CHG prepackaged cloths  Only use CHG cloths as told by your health care provider, and follow the instructions on the label.  Use the CHG cloth on clean, dry skin.  Do not use the CHG cloth on your head or face unless your health care provider tells you to.  When washing with the CHG cloth:  Avoid your genital area.  Avoid any areas of skin that have broken skin, cuts, or scrapes.  Before surgery    Follow these steps when using a CHG cloth to clean before surgery (unless your health care provider gives you different instructions):  Using the CHG cloth, vigorously scrub the part of your body where you will be having surgery. Scrub using a back-and-forth motion for 3 minutes. The area on your body should be completely wet with CHG when you are done scrubbing.  Do not rinse. Discard the cloth and let the area air-dry. Do not put any substances on the area afterward, such as powder, lotion, or perfume.  Put on clean clothes or pajamas.  If it is the night before your surgery, sleep in clean sheets.     For general bathing  Follow these steps when using CHG cloths for general bathing (unless your health care provider gives you different instructions).  Use a separate CHG cloth for each area of your body. Make sure you wash between any folds of skin and between your fingers and toes. Wash your  body in the following order, switching to a new cloth after each step:  The front of your neck, shoulders, and chest.  Both of your arms, under your arms, and your hands.  Your stomach and groin area, avoiding the genitals.  Your right leg and foot.  Your left leg and foot.  The back of your neck, your back, and your buttocks.  Do not rinse. Discard the cloth and let the area air-dry. Do not put any substances on your body afterward--such as powder, lotion, or perfume--unless you are told to do so by your health care provider. Only use lotions that are recommended by the .  Put on clean clothes or pajamas.  Contact a health care provider if:  Your skin gets irritated after scrubbing.  You have questions about using your solution or cloth.  You swallow any chlorhexidine. Call your local poison control center (1-934.545.7346 in the [place]  Get help right away if:  Your eyes itch badly, or they become very red or swollen.  Your skin itches badly and is red or swollen.  Your hearing changes.  You have trouble seeing.  You have swelling or tingling in your mouth or throat.  You have trouble breathing.  These symptoms may represent a serious problem that is an emergency. Do not wait to see if the symptoms will go away. Get medical help right away. Call your local emergency services (004 in the [place] Do not drive yourself to the hospital.  Summary  Chlorhexidine gluconate (CHG) is a germ-killing (antiseptic) solution that is used to clean the skin. Cleaning your skin with CHG may help to lower your risk for infection.  You may be given CHG to use for bathing. It may be in a bottle or in a prepackaged cloth to use on your skin. Carefully follow your health care provider's instructions and the instructions on the product label.  Do not use CHG if you have a chlorhexidine allergy.  Contact your health care provider if your skin gets irritated after scrubbing.  This information is not intended to replace advice  given to you by your health care provider. Make sure you discuss any questions you have with your health care provider.  Document Revised: 04/17/2023 Document Reviewed: 02/28/2022  Elsevier Patient Education © 2023 [employer]

## 2024-12-30 ENCOUNTER — TELEPHONE (OUTPATIENT)
Dept: CARDIAC SURGERY | Facility: CLINIC | Age: 79
End: 2024-12-30
Payer: MEDICARE

## 2024-12-30 NOTE — TELEPHONE ENCOUNTER
Patient called over the weekend and spoke with Dr. Conde to inform he was sick and mediastinoscopy was cancelled. Need to r/s when he is better

## 2025-01-02 NOTE — TELEPHONE ENCOUNTER
Called patient to check and see how he is feeling.  Reports he has been afebrile for 48 hours but he did have a fever for 2 to 3 days.  He had a cough and wheezing but this has improved.  He is feeling a lot better now and says he thinks he contracted an upper respiratory virus over Christmas holidays.  He is ready to proceed with surgery when cleared safe to do so from Dr. Valenzuela standpoint.  Advised him that I would update Dr. Valenzuela and someone would reach out in the next couple business days for a new surgery date.  He is agreeable to this plan.

## 2025-01-05 LAB
QT INTERVAL: 396 MS
QTC INTERVAL: 433 MS

## 2025-01-07 DIAGNOSIS — C34.11 MALIGNANT NEOPLASM OF UPPER LOBE OF RIGHT LUNG: ICD-10-CM

## 2025-01-07 DIAGNOSIS — R79.1 ABNORMAL COAGULATION PROFILE: ICD-10-CM

## 2025-01-07 DIAGNOSIS — R91.1 LUNG NODULE: Primary | ICD-10-CM

## 2025-01-07 NOTE — TELEPHONE ENCOUNTER
Advised patient of the above and he voiced understanding. He states he will make sure his PCP office faxes the office note to us.

## 2025-01-07 NOTE — TELEPHONE ENCOUNTER
[facility] [place] [facility].  Please repost.  I will order repeat labs to be done ahead of time.

## 2025-01-07 NOTE — TELEPHONE ENCOUNTER
Agree-he needs to be seen by PCP given cough and wheezing with recent URI. This may impact OR date 1/17. Will need records after seen by PCP.   Please inform

## 2025-01-07 NOTE — TELEPHONE ENCOUNTER
Patient aware of OR date/arrival time 0500/npo/no meds. Advised pt to obtain labs/cxr on 1/15 at his convenience at [facility]. He voiced understanding.

## 2025-01-07 NOTE — TELEPHONE ENCOUNTER
Per verbal instruction from Gladis Camp, APRN - cancel OV on 1/15. OR date 1/17.     Called pt to advise of this. He is agreeable with this date. He does state he still has a terrible cough and wheezing. No fever. He advises he is not taking any medication for this. I advised him to contact PCP to see if they can prescribe something to help. He voiced understanding.

## 2025-01-14 NOTE — TELEPHONE ENCOUNTER
Called patient to check and see how he is feeling.  He was seen by his PCP last week and given an antibiotic shot.  He has completed a Z-Marquise and will take his last dose of cefdinir tomorrow.  No more wheezing and reports his breathing is at his baseline status of his baseline COPD.  Confirmed appointment tomorrow for chest x-ray and labs prior to surgery.  Reports he will arrive around 9 AM tomorrow for testing.  Will follow for results.  Confirmed he did not receive any steroids.

## 2025-01-14 NOTE — TELEPHONE ENCOUNTER
Per verbal instruction from Dr Valenzuela/Gladis Camp APRN - pt needs to be finished with current antibiotics at least one week prior to OR. Case has been placed in depot. Called patient and advised of this and to hold off on completing CXR/labs that he was going to get tomorrow. I advised once we get a new OR date we will call him and then we will do the labs/CXR a day or two before that date. He voiced understanding to all.

## 2025-01-14 NOTE — TELEPHONE ENCOUNTER
Pt called back stating he has appts with Dr Medel on 1-22-25 and 1-29-25 for rountine MGUS follow up.  Pt requests we avoid these dates when rescheduling his med if possible.  Can reach pt at #[phone] if any questions or instructions/kahm

## 2025-01-21 NOTE — TELEPHONE ENCOUNTER
Patient aware of OR date/arrival time 0500/npo/no meds. I advised pt we still need him to obtain labs and CXR prior to OR. He states he is getting some labs at Dr Medel's office tomorrow (out of the ones we need, he will complete CBC and CMP there) - I advised him to tell their office to fax his lab results to our office and then when he comes to [facility] on Thursday or Friday, he can obtain PT/INR and CXR then. He voiced understanding.

## 2025-01-22 ENCOUNTER — HOSPITAL ENCOUNTER (OUTPATIENT)
Dept: INFUSION THERAPY | Age: 80
Discharge: HOME | End: 2025-01-22
Payer: MEDICARE

## 2025-01-22 DIAGNOSIS — D47.2 IGA MONOCLONAL GAMMOPATHY OF UNCERTAIN SIGNIFICANCE: ICD-10-CM

## 2025-01-22 DIAGNOSIS — C90.20 EXTRAMEDULLARY PLASMACYTOMA, UNSPECIFIED WHETHER REMISSION ACHIEVED (HCC): ICD-10-CM

## 2025-01-22 LAB
ALBUMIN SERPL-MCNC: 3.9 G/DL (ref 3.5–5.2)
ALP SERPL-CCNC: 102 U/L (ref 40–129)
ALT SERPL-CCNC: 56 U/L (ref 5–41)
ANION GAP SERPL CALCULATED.3IONS-SCNC: 10 MMOL/L (ref 7–19)
AST SERPL-CCNC: 45 U/L (ref 5–40)
BASOPHILS # BLD: 0.04 K/UL (ref 0–0.2)
BASOPHILS NFR BLD: 0.5 % (ref 0–1)
BILIRUB SERPL-MCNC: <0.2 MG/DL (ref 0–1.2)
BUN SERPL-MCNC: 13 MG/DL (ref 8–23)
CALCIUM SERPL-MCNC: 9.2 MG/DL (ref 8.8–10.2)
CHLORIDE SERPL-SCNC: 101 MMOL/L (ref 98–107)
CO2 SERPL-SCNC: 28 MMOL/L (ref 22–29)
CREAT SERPL-MCNC: 0.8 MG/DL (ref 0.7–1.2)
EOSINOPHIL # BLD: 0.39 K/UL (ref 0–0.6)
EOSINOPHIL NFR BLD: 5.3 % (ref 0–5)
ERYTHROCYTE [DISTWIDTH] IN BLOOD BY AUTOMATED COUNT: 14.8 % (ref 11.5–14.5)
GLUCOSE SERPL-MCNC: 153 MG/DL (ref 70–99)
HCT VFR BLD AUTO: 36.5 % (ref 42–52)
HGB BLD-MCNC: 11.6 G/DL (ref 14–18)
LYMPHOCYTES # BLD: 1.96 K/UL (ref 1.1–4.5)
LYMPHOCYTES NFR BLD: 26.9 % (ref 20–40)
MCH RBC QN AUTO: 30.9 PG (ref 27–31)
MCHC RBC AUTO-ENTMCNC: 31.8 G/DL (ref 33–37)
MCV RBC AUTO: 97.1 FL (ref 80–94)
MONOCYTES # BLD: 0.45 K/UL (ref 0–0.9)
MONOCYTES NFR BLD: 6.2 % (ref 1–10)
NEUTROPHILS # BLD: 4.42 K/UL (ref 1.5–7.5)
NEUTS SEG NFR BLD: 60.7 % (ref 50–65)
PLATELET # BLD AUTO: 186 K/UL (ref 130–400)
PMV BLD AUTO: 10.3 FL (ref 9.4–12.4)
POTASSIUM SERPL-SCNC: 4.3 MMOL/L (ref 3.5–5.1)
PROT SERPL-MCNC: 6.8 G/DL (ref 6.4–8.3)
RBC # BLD AUTO: 3.76 M/UL (ref 4.7–6.1)
SODIUM SERPL-SCNC: 139 MMOL/L (ref 136–145)
WBC # BLD AUTO: 7.29 K/UL (ref 4.8–10.8)

## 2025-01-22 PROCEDURE — 82232 ASSAY OF BETA-2 PROTEIN: CPT

## 2025-01-22 PROCEDURE — 80053 COMPREHEN METABOLIC PANEL: CPT

## 2025-01-22 PROCEDURE — 83883 ASSAY NEPHELOMETRY NOT SPEC: CPT

## 2025-01-22 PROCEDURE — 86334 IMMUNOFIX E-PHORESIS SERUM: CPT

## 2025-01-22 PROCEDURE — 36415 COLL VENOUS BLD VENIPUNCTURE: CPT

## 2025-01-22 PROCEDURE — 82784 ASSAY IGA/IGD/IGG/IGM EACH: CPT

## 2025-01-22 PROCEDURE — 85025 COMPLETE CBC W/AUTO DIFF WBC: CPT

## 2025-01-22 PROCEDURE — 84165 PROTEIN E-PHORESIS SERUM: CPT

## 2025-01-22 PROCEDURE — 83521 IG LIGHT CHAINS FREE EACH: CPT

## 2025-01-22 PROCEDURE — 84155 ASSAY OF PROTEIN SERUM: CPT

## 2025-01-23 LAB — B2 MICROGLOB SERPL-MCNC: 2.4 MG/L

## 2025-01-24 ENCOUNTER — LAB (OUTPATIENT)
Dept: LAB | Facility: HOSPITAL | Age: 80
End: 2025-01-24
Payer: MEDICARE

## 2025-01-24 ENCOUNTER — HOSPITAL ENCOUNTER (OUTPATIENT)
Dept: GENERAL RADIOLOGY | Facility: HOSPITAL | Age: 80
Discharge: HOME | End: 2025-01-24
Payer: MEDICARE

## 2025-01-24 DIAGNOSIS — C34.11 MALIGNANT NEOPLASM OF UPPER LOBE OF RIGHT LUNG: ICD-10-CM

## 2025-01-24 DIAGNOSIS — R79.1 ABNORMAL COAGULATION PROFILE: ICD-10-CM

## 2025-01-24 LAB
INR PPP: 0.96 (ref 0.91–1.09)
PROTHROMBIN TIME: 13.2 SECONDS (ref 11.8–14.8)

## 2025-01-24 PROCEDURE — 71046 X-RAY EXAM CHEST 2 VIEWS: CPT

## 2025-01-24 PROCEDURE — 36415 COLL VENOUS BLD VENIPUNCTURE: CPT

## 2025-01-24 PROCEDURE — 85610 PROTHROMBIN TIME: CPT

## 2025-01-24 NOTE — SIGNIFICANT NOTE
Home Rx;  Lisinopril; instructed Not to take x 24 hours before DOS-1/27/25.  Tenormin (atenolol); instructed to take, w/ sip of water the DOS-1/27/25.

## 2025-01-24 NOTE — PROGRESS NOTES
again reviewed including prostate biopsy versus continued surveillance of his PSA. He would like to continue to follow this. Recommended 6-month intervals.     PSA=2.35 on 6/2/2022    IIsa MA am precharting for Jaiden Childers MD. Electronically signed by Isa Olivera MA  on 1/29/2025 at 4:09 PM       Blake was seen today for follow-up.    Diagnoses and all orders for this visit:    IgA monoclonal gammopathy of uncertain significance    Health care maintenance    Adenocarcinoma (HCC)  -     CEA; Future  -     Cancer Antigen 19-9; Future  -     Cancer Antigen 19-9  -     CEA    Other abnormal tumor markers  -     CEA; Future  -     Cancer Antigen 19-9; Future  -     Cancer Antigen 19-9  -     CEA               Orders Placed This Encounter   Procedures    CEA     Standing Status:   Future     Number of Occurrences:   1     Standing Expiration Date:   1/29/2026    Cancer Antigen 19-9     Standing Status:   Future     Number of Occurrences:   1     Standing Expiration Date:   1/29/2026         Return in about 6 months (around 7/29/2025) for Follow up with Dr. Childers labs prior to.

## 2025-01-24 NOTE — TELEPHONE ENCOUNTER
Received a call from Mary Lou DESAI At [facility] registration at [facility]. She is calling to confirm which labs pt will need drawn and if pt will need CXR. She did confirm with pt that he had a CBC and CMP at Dr. Medel's office a couple of days ago. Informed Mary Lou that pt will need to have PT/INR and CXR. She voiced understanding.

## 2025-01-25 LAB
ALBUMIN SERPL-MCNC: 4.02 G/DL (ref 3.75–5.01)
ALPHA1 GLOB SERPL ELPH-MCNC: 0.31 G/DL (ref 0.19–0.46)
ALPHA2 GLOB SERPL ELPH-MCNC: 0.84 G/DL (ref 0.48–1.05)
B-GLOBULIN SERPL ELPH-MCNC: 0.78 G/DL (ref 0.48–1.1)
DEPRECATED KAPPA LC FREE/LAMBDA SER: 4.21 {RATIO} (ref 0.26–1.65)
EER MONOCLONAL PROTEIN AND FLC, SERUM: ABNORMAL
GAMMA GLOB SERPL ELPH-MCNC: 1.26 G/DL (ref 0.62–1.51)
IGA SERPL-MCNC: 176 MG/DL (ref 68–408)
IGG SERPL-MCNC: 1438 MG/DL (ref 768–1632)
IGM SERPL-MCNC: 31 MG/DL (ref 35–263)
INTERPRETATION SERPL IFE-IMP: ABNORMAL
INTERPRETATION SERPL IFE-IMP: ABNORMAL
KAPPA LC FREE SER-MCNC: 65.55 MG/L (ref 3.3–19.4)
LAMBDA LC FREE SERPL-MCNC: 15.58 MG/L (ref 5.71–26.3)
MONOCLONAL PROTEIN, SERUM: 0.87 G/DL
PROT SERPL-MCNC: 7.2 G/DL (ref 6.3–8.2)

## 2025-01-27 ENCOUNTER — TELEPHONE (OUTPATIENT)
Dept: HEMATOLOGY | Age: 80
End: 2025-01-27

## 2025-01-27 ENCOUNTER — ANESTHESIA (OUTPATIENT)
Dept: PERIOP | Facility: HOSPITAL | Age: 80
End: 2025-01-27
Payer: MEDICARE

## 2025-01-27 ENCOUNTER — ANESTHESIA EVENT (OUTPATIENT)
Dept: PERIOP | Facility: HOSPITAL | Age: 80
End: 2025-01-27
Payer: MEDICARE

## 2025-01-27 ENCOUNTER — PREP FOR SURGERY (OUTPATIENT)
Dept: OTHER | Facility: HOSPITAL | Age: 80
End: 2025-01-27
Payer: MEDICARE

## 2025-01-27 ENCOUNTER — HOSPITAL ENCOUNTER (OUTPATIENT)
Facility: HOSPITAL | Age: 80
Setting detail: HOSPITAL OUTPATIENT SURGERY
Discharge: HOME | End: 2025-01-27
Attending: INTERNAL MEDICINE | Admitting: INTERNAL MEDICINE
Payer: MEDICARE

## 2025-01-27 ENCOUNTER — HOSPITAL ENCOUNTER (OUTPATIENT)
Facility: HOSPITAL | Age: 80
Setting detail: HOSPITAL OUTPATIENT SURGERY
Discharge: STILL ADMITTED OR ADMINISTRATIVE | End: 2025-01-27
Attending: THORACIC SURGERY (CARDIOTHORACIC VASCULAR SURGERY) | Admitting: THORACIC SURGERY (CARDIOTHORACIC VASCULAR SURGERY)
Payer: MEDICARE

## 2025-01-27 VITALS
RESPIRATION RATE: 16 BRPM | OXYGEN SATURATION: 93 % | SYSTOLIC BLOOD PRESSURE: 106 MMHG | DIASTOLIC BLOOD PRESSURE: 62 MMHG | TEMPERATURE: 98 F | HEART RATE: 57 BPM

## 2025-01-27 VITALS
DIASTOLIC BLOOD PRESSURE: 91 MMHG | HEIGHT: 67 IN | SYSTOLIC BLOOD PRESSURE: 142 MMHG | WEIGHT: 208.56 LBS | OXYGEN SATURATION: 94 % | HEART RATE: 62 BPM | BODY MASS INDEX: 32.73 KG/M2 | RESPIRATION RATE: 16 BRPM | TEMPERATURE: 97.2 F

## 2025-01-27 DIAGNOSIS — C34.11 MALIGNANT NEOPLASM OF UPPER LOBE OF RIGHT LUNG: ICD-10-CM

## 2025-01-27 DIAGNOSIS — C34.11 MALIGNANT NEOPLASM OF UPPER LOBE OF RIGHT LUNG: Primary | ICD-10-CM

## 2025-01-27 LAB
ABO GROUP BLD: NORMAL
BLD GP AB SCN SERPL QL: NEGATIVE
GLUCOSE BLDC GLUCOMTR-MCNC: 127 MG/DL (ref 70–130)
GLUCOSE BLDC GLUCOMTR-MCNC: 133 MG/DL (ref 70–130)
RH BLD: NEGATIVE
T&S EXPIRATION DATE: NORMAL

## 2025-01-27 PROCEDURE — 86901 BLOOD TYPING SEROLOGIC RH(D): CPT | Performed by: NURSE PRACTITIONER

## 2025-01-27 PROCEDURE — 88305 TISSUE EXAM BY PATHOLOGIST: CPT | Performed by: INTERNAL MEDICINE

## 2025-01-27 PROCEDURE — 88172 CYTP DX EVAL FNA 1ST EA SITE: CPT | Performed by: INTERNAL MEDICINE

## 2025-01-27 PROCEDURE — 25010000002 PROPOFOL 10 MG/ML EMULSION: Performed by: NURSE ANESTHETIST, CERTIFIED REGISTERED

## 2025-01-27 PROCEDURE — 31652 BRONCH EBUS SAMPLNG 1/2 NODE: CPT | Performed by: INTERNAL MEDICINE

## 2025-01-27 PROCEDURE — 25010000002 ONDANSETRON PER 1 MG: Performed by: NURSE ANESTHETIST, CERTIFIED REGISTERED

## 2025-01-27 PROCEDURE — 82948 REAGENT STRIP/BLOOD GLUCOSE: CPT

## 2025-01-27 PROCEDURE — 88112 CYTOPATH CELL ENHANCE TECH: CPT | Performed by: INTERNAL MEDICINE

## 2025-01-27 PROCEDURE — 86850 RBC ANTIBODY SCREEN: CPT | Performed by: NURSE PRACTITIONER

## 2025-01-27 PROCEDURE — 25010000002 MIDAZOLAM PER 1MG: Performed by: ANESTHESIOLOGY

## 2025-01-27 PROCEDURE — 86900 BLOOD TYPING SEROLOGIC ABO: CPT | Performed by: NURSE PRACTITIONER

## 2025-01-27 PROCEDURE — 25010000002 LIDOCAINE PF 2% 2 % SOLUTION: Performed by: NURSE ANESTHETIST, CERTIFIED REGISTERED

## 2025-01-27 PROCEDURE — C1726 CATH, BAL DIL, NON-VASCULAR: HCPCS | Performed by: INTERNAL MEDICINE

## 2025-01-27 PROCEDURE — 25810000003 LACTATED RINGERS PER 1000 ML: Performed by: ANESTHESIOLOGY

## 2025-01-27 PROCEDURE — 99203 OFFICE O/P NEW LOW 30 MIN: CPT | Performed by: INTERNAL MEDICINE

## 2025-01-27 RX ORDER — FENTANYL CITRATE 50 UG/ML
50 INJECTION, SOLUTION INTRAMUSCULAR; INTRAVENOUS
Status: DISCONTINUED | OUTPATIENT
Start: 2025-01-27 | End: 2025-01-27 | Stop reason: HOSPADM

## 2025-01-27 RX ORDER — SODIUM CHLORIDE 9 MG/ML
40 INJECTION, SOLUTION INTRAVENOUS AS NEEDED
Status: CANCELLED | OUTPATIENT
Start: 2025-01-27

## 2025-01-27 RX ORDER — HYDROCODONE BITARTRATE AND ACETAMINOPHEN 5; 325 MG/1; MG/1
1 TABLET ORAL EVERY 4 HOURS PRN
Status: CANCELLED | OUTPATIENT
Start: 2025-01-27

## 2025-01-27 RX ORDER — SODIUM CHLORIDE 0.9 % (FLUSH) 0.9 %
10 SYRINGE (ML) INJECTION EVERY 12 HOURS SCHEDULED
Status: CANCELLED | OUTPATIENT
Start: 2025-01-27

## 2025-01-27 RX ORDER — LABETALOL HYDROCHLORIDE 5 MG/ML
5 INJECTION, SOLUTION INTRAVENOUS
Status: CANCELLED | OUTPATIENT
Start: 2025-01-27

## 2025-01-27 RX ORDER — SODIUM CHLORIDE 9 MG/ML
40 INJECTION, SOLUTION INTRAVENOUS AS NEEDED
Status: DISCONTINUED | OUTPATIENT
Start: 2025-01-27 | End: 2025-01-27 | Stop reason: HOSPADM

## 2025-01-27 RX ORDER — HYDROCODONE BITARTRATE AND ACETAMINOPHEN 10; 325 MG/1; MG/1
1 TABLET ORAL EVERY 4 HOURS PRN
Status: DISCONTINUED | OUTPATIENT
Start: 2025-01-27 | End: 2025-01-27 | Stop reason: HOSPADM

## 2025-01-27 RX ORDER — FLUMAZENIL 0.1 MG/ML
0.2 INJECTION INTRAVENOUS AS NEEDED
Status: CANCELLED | OUTPATIENT
Start: 2025-01-27

## 2025-01-27 RX ORDER — SODIUM CHLORIDE, SODIUM LACTATE, POTASSIUM CHLORIDE, CALCIUM CHLORIDE 600; 310; 30; 20 MG/100ML; MG/100ML; MG/100ML; MG/100ML
100 INJECTION, SOLUTION INTRAVENOUS CONTINUOUS
Status: DISCONTINUED | OUTPATIENT
Start: 2025-01-27 | End: 2025-01-27 | Stop reason: HOSPADM

## 2025-01-27 RX ORDER — HYDROCODONE BITARTRATE AND ACETAMINOPHEN 5; 325 MG/1; MG/1
1 TABLET ORAL EVERY 4 HOURS PRN
Status: DISCONTINUED | OUTPATIENT
Start: 2025-01-27 | End: 2025-01-27 | Stop reason: HOSPADM

## 2025-01-27 RX ORDER — ONDANSETRON 2 MG/ML
INJECTION INTRAMUSCULAR; INTRAVENOUS AS NEEDED
Status: DISCONTINUED | OUTPATIENT
Start: 2025-01-27 | End: 2025-01-27 | Stop reason: SURG

## 2025-01-27 RX ORDER — SODIUM CHLORIDE 0.9 % (FLUSH) 0.9 %
3 SYRINGE (ML) INJECTION EVERY 12 HOURS SCHEDULED
Status: DISCONTINUED | OUTPATIENT
Start: 2025-01-27 | End: 2025-01-27 | Stop reason: HOSPADM

## 2025-01-27 RX ORDER — LABETALOL HYDROCHLORIDE 5 MG/ML
5 INJECTION, SOLUTION INTRAVENOUS
Status: DISCONTINUED | OUTPATIENT
Start: 2025-01-27 | End: 2025-01-27 | Stop reason: HOSPADM

## 2025-01-27 RX ORDER — ACETAMINOPHEN 500 MG
1000 TABLET ORAL ONCE
Status: COMPLETED | OUTPATIENT
Start: 2025-01-27 | End: 2025-01-27

## 2025-01-27 RX ORDER — MIDAZOLAM HYDROCHLORIDE 2 MG/2ML
0.5 INJECTION, SOLUTION INTRAMUSCULAR; INTRAVENOUS
Status: DISCONTINUED | OUTPATIENT
Start: 2025-01-27 | End: 2025-01-27 | Stop reason: HOSPADM

## 2025-01-27 RX ORDER — HYDROCODONE BITARTRATE AND ACETAMINOPHEN 10; 325 MG/1; MG/1
1 TABLET ORAL EVERY 4 HOURS PRN
Status: CANCELLED | OUTPATIENT
Start: 2025-01-27 | End: 2025-01-29

## 2025-01-27 RX ORDER — ONDANSETRON 2 MG/ML
4 INJECTION INTRAMUSCULAR; INTRAVENOUS ONCE AS NEEDED
Status: CANCELLED | OUTPATIENT
Start: 2025-01-27

## 2025-01-27 RX ORDER — SODIUM CHLORIDE 0.9 % (FLUSH) 0.9 %
3-10 SYRINGE (ML) INJECTION AS NEEDED
Status: DISCONTINUED | OUTPATIENT
Start: 2025-01-27 | End: 2025-01-27 | Stop reason: HOSPADM

## 2025-01-27 RX ORDER — SODIUM CHLORIDE 0.9 % (FLUSH) 0.9 %
10 SYRINGE (ML) INJECTION AS NEEDED
Status: DISCONTINUED | OUTPATIENT
Start: 2025-01-27 | End: 2025-01-27 | Stop reason: HOSPADM

## 2025-01-27 RX ORDER — SODIUM CHLORIDE, SODIUM LACTATE, POTASSIUM CHLORIDE, CALCIUM CHLORIDE 600; 310; 30; 20 MG/100ML; MG/100ML; MG/100ML; MG/100ML
50 INJECTION, SOLUTION INTRAVENOUS CONTINUOUS
Status: DISCONTINUED | OUTPATIENT
Start: 2025-01-27 | End: 2025-01-27 | Stop reason: HOSPADM

## 2025-01-27 RX ORDER — PROPOFOL 10 MG/ML
VIAL (ML) INTRAVENOUS AS NEEDED
Status: DISCONTINUED | OUTPATIENT
Start: 2025-01-27 | End: 2025-01-27 | Stop reason: SURG

## 2025-01-27 RX ORDER — ACETAMINOPHEN 500 MG
1000 TABLET ORAL ONCE
Status: DISCONTINUED | OUTPATIENT
Start: 2025-01-27 | End: 2025-01-27 | Stop reason: HOSPADM

## 2025-01-27 RX ORDER — NALOXONE HCL 0.4 MG/ML
0.4 VIAL (ML) INJECTION AS NEEDED
Status: DISCONTINUED | OUTPATIENT
Start: 2025-01-27 | End: 2025-01-27 | Stop reason: HOSPADM

## 2025-01-27 RX ORDER — FLUMAZENIL 0.1 MG/ML
0.2 INJECTION INTRAVENOUS AS NEEDED
Status: DISCONTINUED | OUTPATIENT
Start: 2025-01-27 | End: 2025-01-27 | Stop reason: HOSPADM

## 2025-01-27 RX ORDER — ROCURONIUM BROMIDE 10 MG/ML
INJECTION, SOLUTION INTRAVENOUS AS NEEDED
Status: DISCONTINUED | OUTPATIENT
Start: 2025-01-27 | End: 2025-01-27 | Stop reason: SURG

## 2025-01-27 RX ORDER — LIDOCAINE HYDROCHLORIDE 20 MG/ML
INJECTION, SOLUTION EPIDURAL; INFILTRATION; INTRACAUDAL; PERINEURAL AS NEEDED
Status: DISCONTINUED | OUTPATIENT
Start: 2025-01-27 | End: 2025-01-27 | Stop reason: SURG

## 2025-01-27 RX ORDER — SODIUM CHLORIDE 0.9 % (FLUSH) 0.9 %
3 SYRINGE (ML) INJECTION AS NEEDED
Status: DISCONTINUED | OUTPATIENT
Start: 2025-01-27 | End: 2025-01-27 | Stop reason: HOSPADM

## 2025-01-27 RX ORDER — NALOXONE HCL 0.4 MG/ML
0.4 VIAL (ML) INJECTION AS NEEDED
Status: CANCELLED | OUTPATIENT
Start: 2025-01-27

## 2025-01-27 RX ORDER — SODIUM CHLORIDE 0.9 % (FLUSH) 0.9 %
10 SYRINGE (ML) INJECTION EVERY 12 HOURS SCHEDULED
Status: DISCONTINUED | OUTPATIENT
Start: 2025-01-27 | End: 2025-01-27 | Stop reason: HOSPADM

## 2025-01-27 RX ORDER — IBUPROFEN 600 MG/1
600 TABLET, FILM COATED ORAL EVERY 6 HOURS PRN
Status: DISCONTINUED | OUTPATIENT
Start: 2025-01-27 | End: 2025-01-27 | Stop reason: HOSPADM

## 2025-01-27 RX ORDER — FENTANYL CITRATE 50 UG/ML
50 INJECTION, SOLUTION INTRAMUSCULAR; INTRAVENOUS
Status: CANCELLED | OUTPATIENT
Start: 2025-01-27

## 2025-01-27 RX ORDER — CHLORHEXIDINE GLUCONATE 500 MG/1
CLOTH TOPICAL EVERY 12 HOURS PRN
Status: DISCONTINUED | OUTPATIENT
Start: 2025-01-27 | End: 2025-01-27 | Stop reason: HOSPADM

## 2025-01-27 RX ORDER — SUCCINYLCHOLINE/SOD CL,ISO/PF 200MG/10ML
SYRINGE (ML) INTRAVENOUS AS NEEDED
Status: DISCONTINUED | OUTPATIENT
Start: 2025-01-27 | End: 2025-01-27 | Stop reason: SURG

## 2025-01-27 RX ORDER — SODIUM CHLORIDE 0.9 % (FLUSH) 0.9 %
10 SYRINGE (ML) INJECTION AS NEEDED
Status: CANCELLED | OUTPATIENT
Start: 2025-01-27

## 2025-01-27 RX ORDER — ONDANSETRON 2 MG/ML
4 INJECTION INTRAMUSCULAR; INTRAVENOUS ONCE AS NEEDED
Status: DISCONTINUED | OUTPATIENT
Start: 2025-01-27 | End: 2025-01-27 | Stop reason: HOSPADM

## 2025-01-27 RX ORDER — LIDOCAINE HYDROCHLORIDE 10 MG/ML
0.5 INJECTION, SOLUTION EPIDURAL; INFILTRATION; INTRACAUDAL; PERINEURAL ONCE AS NEEDED
Status: DISCONTINUED | OUTPATIENT
Start: 2025-01-27 | End: 2025-01-27 | Stop reason: HOSPADM

## 2025-01-27 RX ADMIN — SODIUM CHLORIDE, POTASSIUM CHLORIDE, SODIUM LACTATE AND CALCIUM CHLORIDE 100 ML/HR: 600; 310; 30; 20 INJECTION, SOLUTION INTRAVENOUS at 06:25

## 2025-01-27 RX ADMIN — ONDANSETRON 4 MG: 2 INJECTION INTRAMUSCULAR; INTRAVENOUS at 13:08

## 2025-01-27 RX ADMIN — ROCURONIUM BROMIDE 10 MG: 10 INJECTION, SOLUTION INTRAVENOUS at 12:49

## 2025-01-27 RX ADMIN — Medication: at 06:45

## 2025-01-27 RX ADMIN — ACETAMINOPHEN 1000 MG: 500 TABLET, FILM COATED ORAL at 07:00

## 2025-01-27 RX ADMIN — MIDAZOLAM HYDROCHLORIDE 0.5 MG: 1 INJECTION, SOLUTION INTRAMUSCULAR; INTRAVENOUS at 07:00

## 2025-01-27 RX ADMIN — PROPOFOL 200 MG: 10 INJECTION, EMULSION INTRAVENOUS at 12:49

## 2025-01-27 RX ADMIN — SODIUM CHLORIDE, POTASSIUM CHLORIDE, SODIUM LACTATE AND CALCIUM CHLORIDE: 600; 310; 30; 20 INJECTION, SOLUTION INTRAVENOUS at 12:45

## 2025-01-27 RX ADMIN — LIDOCAINE HYDROCHLORIDE 100 MG: 20 INJECTION, SOLUTION EPIDURAL; INFILTRATION; INTRACAUDAL; PERINEURAL at 12:49

## 2025-01-27 RX ADMIN — Medication 120 MG: at 12:49

## 2025-01-27 NOTE — TELEPHONE ENCOUNTER
Procedure with Dr. Valenzuela today was canceled, he underwent EBUS with Dr. Griffith instead.  Further documentation detailing the changes available in EMR for review.  Will follow-up for pathology results.

## 2025-01-27 NOTE — ANESTHESIA PREPROCEDURE EVALUATION
Anesthesia Evaluation     Patient summary reviewed   no history of anesthetic complications:   NPO Solid Status: > 8 hours  NPO Liquid Status: > 8 hours           Airway   Mallampati: I  TM distance: >3 FB  No difficulty expected  Dental      Pulmonary    (+) lung cancer,  (-) asthma, sleep apnea    ROS comment: Lung nodule  Cardiovascular   Exercise tolerance: good (4-7 METS)    (+) hypertension, PVD, hyperlipidemia  (-) CAD      Neuro/Psych  (-) seizures, TIA, CVA    ROS Comment: Hemifacial spasm left side  GI/Hepatic/Renal/Endo    (+) obesity, liver disease fatty liver disease, diabetes mellitus type 2  (-) no renal disease    Musculoskeletal     Abdominal    Substance History      OB/GYN          Other   arthritis,   history of cancer active                      Anesthesia Plan    ASA 3     general     intravenous induction     Anesthetic plan, risks, benefits, and alternatives have been provided, discussed and informed consent has been obtained with: patient.      CODE STATUS:

## 2025-01-27 NOTE — TELEPHONE ENCOUNTER

## 2025-01-27 NOTE — ANESTHESIA POSTPROCEDURE EVALUATION
Patient: Manjit Seth    Procedure Summary       Date: 01/27/25 Room / Location: [facility] OR 08 /  [facility] OR    Anesthesia Start: 1245 Anesthesia Stop: 1316    Procedure: BRONCHOSCOPY WITH ENDOBRONCHIAL ULTRASOUND, BIOPSY, TRANSBRONCHIAL NEEDLE ASPIRATE (Bronchus) Diagnosis:       Malignant neoplasm of upper lobe of right lung      (Malignant neoplasm of upper lobe of right lung [C34.11])    Surgeons: Peter Griffith MD Provider: Dariusz Burns CRNA    Anesthesia Type: general ASA Status: 3            Anesthesia Type: general    Vitals  Vitals Value Taken Time   /56 01/27/25 1333   Temp 97.8 °F (36.6 °C) 01/27/25 1314   Pulse 64 01/27/25 1333   Resp 14 01/27/25 1330   SpO2 89 % 01/27/25 1333   Vitals shown include unfiled device data.        Post Anesthesia Care and Evaluation    Patient location during evaluation: PACU  Patient participation: complete - patient participated  Level of consciousness: awake and awake and alert  Pain score: 0  Pain management: adequate    Airway patency: patent  Anesthetic complications: No anesthetic complications  PONV Status: none  Cardiovascular status: acceptable  Respiratory status: acceptable  Hydration status: acceptable    Comments: Patient discharged according to acceptable Popeye score per RN assessment. See nursing records for further information.     Blood pressure 100/60, pulse 65, temperature 97.8 °F (36.6 °C), temperature source Temporal, resp. rate 16, SpO2 90%.

## 2025-01-27 NOTE — PROGRESS NOTES
Mr. Seth presents today for planned cervical mediastinoscopy.  On examination today he has markedly limited range of motion.  I suspect that access successfully of level 7 and level 4 lymph nodes will be reduced.  He will carry increased risk of neck injury.  There is a suitable alternative which is to proceed forward with EBUS.  Discussed the conduct procedure as well as risks of the procedure briefly.  I did reach out to Dr. Griffith who graciously agreed to address him this afternoon.  All questions have been answered to the best of my ability and is agreeable to the plan of care.  If Level II lymph nodes remain a concern, we could proceed with cervical mediastinoscopy.  Alternatively alternatively if sampling remains problematic could reconsider original plan to proceed forward with completion lobectomy and mediastinal lymph node sampling at time of completion resection.  That being said the latter was originally recommended and jointly decision was made to defer lobectomy and mediastinal node sampling and instead to proceed forward with staging mediastinal only.  We will continue to follow.  Dr. Griffith to proceed forward with EBUS this afternoon.  Discussed with the patient and his wife at length.

## 2025-01-27 NOTE — ANESTHESIA PROCEDURE NOTES
Arterial Line    Pre-sedation assessment completed: 1/27/2025 6:56 AM    Patient reassessed immediately prior to procedure    Patient location during procedure: pre-op  Start time: 1/27/2025 6:57 AM  Stop Time:1/27/2025 6:58 AM       Line placed for hemodynamic monitoring.  Performed By   Anesthesiologist: Scarlet Blair MD   Preanesthetic Checklist  Completed: patient identified, IV checked, site marked, risks and benefits discussed, surgical consent, monitors and equipment checked, pre-op evaluation and timeout performed  Arterial Line Prep    Sterile Tech: gloves  Prep: ChloraPrep  Patient monitoring: continuous pulse oximetry  Arterial Line Procedure   Laterality:right  Location:  radial artery  Catheter size: 20 G   Guidance: ultrasound guided  PROCEDURE NOTE/ULTRASOUND INTERPRETATION.  Using ultrasound guidance the potential vascular sites for insertion of the catheter were visualized to determine the patency of the vessel to be used for vascular access.  After selecting the appropriate site for insertion, the needle was visualized under ultrasound being inserted into the radial artery, followed by ultrasound confirmation of wire and catheter placement. There were no abnormalities seen on ultrasound; an image was taken; and the patient tolerated the procedure with no complications.   Number of attempts: 1  Successful placement: yes Images: still images not obtained  Post Assessment   Dressing Type: biopatch applied and line sutured.   Complications no  Circ/Move/Sens Assessment: normal and unchanged.   Patient Tolerance: patient tolerated the procedure well with no apparent complications  Additional Notes  Initial attempt by crna student, I was able to take needle and advance with direct u's visualization with out removing the needle

## 2025-01-27 NOTE — OP NOTE
Bronchoscopy Procedure Note (Endobronchial Ultrasound Bronchoscopy)    Date of Operation: 01/27/25  Pre-op Diagnosis: Malignant neoplasm of upper lobe of right lung  Post-op Diagnosis: Same  Surgeon: Peter Griffith MD  Anesthesia: General    Operation: Flexible fiberoptic bronchoscopy Bronchoscopy, Diagnostic and Transbronchial needle aspirate was performed  Findings: normal endobronchial anatomy, enlarged station 7 node, abilio -  Specimen:   Specimens       ID Source Type Tests Collected By Collected At Frozen?    A Mediastinum Lymph Node FINE NEEDLE ASPIRATION   Peter Griffith MD 1/27/25 1305     Description: station 7    B Bronchus Wash NON-GYNECOLOGIC CYTOLOGY   Peter Griffith MD 1/27/25 1305     Description: bronch wash          Estimated Blood Loss: Minimal  Complications: none    Indications and History:  The patient is a 79 y.o.  male with Malignant neoplasm of upper lobe of right lung.  The risks, benefits, complications, treatment options and expected outcomes were discussed with the patient.  The possibilities of reaction to medication, pulmonary aspiration, perforation of a viscus, bleeding, failure to diagnose a condition and creating a complication requiring transfusion or operation were discussed with the patient who freely signed the consent.  Time out was taken prior to the procedure.    Description of Procedure:    After the induction of general anesthesia, the patient was positioned supine and the bronchoscope was passed through the endotracheal tube.  The scope was then passed into the trachea.     The trachea, mainstem bronchi, RUL, RML, Bronchus Intermedius, RLL, DEA, DEA upper division and lingula, and LLL, and all primary segmental airways were examined and were normal except as described below:    Endobronchial findings:  Trachea: Normal mucosa  Lo: Normal mucosa  Right main bronchus: Normal mucosa  Right upper lobe bronchus: Normal mucosa  Right middle lobe bronchus:  Normal mucosa  Right lower lobe bronchus: Normal mucosa  Left main bronchus: Normal mucosa  Left upper lobe bronchus: Normal mucosa  Left lower lobe bronchus: Normal mucosa    The conventional bronchoscope was removed and the EBUS scope was inserted and the mediastinum was examined via linear ultrasound.  Findings: stations 4r, 10 r, 110 r, 4L, 10L, 11 L negative.  Station 7 1.9 cm nod3.  TBNA under ultrasound guidance was collected from station 7 x 3 passes, Rapid onsite evaluation: lymphocytes.  Iced saline was instilled and wash aspirated in small aliquots between needle passes for hemostasis.  After satisfactory confirmation of no persistent abnormal bleeding, the bronchoscope was removed.    The Patient was taken to the Recovery Room in satisfactory condition.      Peter Griffith MD at 13:08 CST, 1/27/2025

## 2025-01-27 NOTE — ANESTHESIA PROCEDURE NOTES
Airway  Urgency: elective    Date/Time: 1/27/2025 12:50 PM  Airway not difficult    General Information and Staff    Patient location during procedure: OR  CRNA/CAA: Dariusz Burns CRNA    Indications and Patient Condition  Indications for airway management: airway protection    Preoxygenated: yes  Mask difficulty assessment: 1 - vent by mask    Final Airway Details  Final airway type: endotracheal airway      Successful airway: ETT  Cuffed: yes   Successful intubation technique: direct laryngoscopy  Facilitating devices/methods: intubating stylet  Endotracheal tube insertion site: oral  Blade: Cameron  Blade size: 2  ETT size (mm): 8.0  Cormack-Lehane Classification: grade I - full view of glottis  Placement verified by: capnometry   Cuff volume (mL): 7  Measured from: lips  ETT/EBT  to lips (cm): 22  Number of attempts at approach: 1  Assessment: lips, teeth, and gum same as pre-op and atraumatic intubation

## 2025-01-27 NOTE — NURSING NOTE
Called outpatient center and spoke with Arabella DELGADO and at this time updated her that  may be performing a procedure today prior to the patient's discharge

## 2025-01-27 NOTE — H&P
[facility] PULMONARY H&P for procedure - [facility]    25, 09:14 CST  Patient Care Team:  Preethi Mccullough APRN as PCP - General (Family Medicine)  Pablito Islas MD as Referring Physician (Neurosurgery)  Turner Morley MD as Cardiologist (Cardiology)  Mat Valenzuela MD as Surgeon (Cardiothoracic Surgery)  Ruiz Edge MD (Pulmonary Disease)  Name: Manjit Seth  : 1945  MRN: [id number]  Contact Serial Number [id number]    Chief complaint: lung cancer  HPI:  We have been asked by Dr. Valenzuela to see this 79 y.o. male.  He has hx recent dx lung cancer resected by wedge resection.  Was planned for mediastinoscopy today but this could not be achieved due to anatomic concerns.  Dr. Valenzuela has asked me to see him for bronchoscopy with linear ebus.  Pt reports mild asymptomatic emphysema and outpatient PFT showing mild obstruction.  Does not use inhalers.  Has been taking asa, off for 5 days.  No other complaints  Past Medical History:   has a past medical history of BPH (benign prostatic hyperplasia), Bronchitis, COVID-19 vaccine series completed, Diabetes mellitus, Extramedullary plasmacytoma (2016), Hemifacial spasm, History of radiation therapy (2016), History of tobacco abuse, Hyperlipidemia, Hypertension, Lung cancer (2024), Nosebleed (occasional post radiation), PAC (premature atrial contraction), and PAD (peripheral artery disease).   has a past surgical history that includes Colonoscopy; Nasal endoscopy; Anterior cervical discectomy w/ fusion (N/A, 2019); Cataract extraction (Bilateral); Nose surgery; Bronchoscopy (N/A, 2024); and Thoracoscopy (Right, 2024).  Allergies   Allergen Reactions    Other Dermatitis and Rash      - WHITE SURGICAL TAPE -      Medications:    No current facility-administered medications for this visit.    Family History:  Family History   Problem Relation Age of Onset    Breast cancer Mother     Cancer  Mother         breast cancer    Heart failure Father     Diabetes Father     Hypertension Father     Cancer Maternal Uncle         pancreatic cancer     Social History:   reports that he quit smoking about 48 years ago. His smoking use included cigarettes. He started smoking about 55 years ago. He has a 10 pack-year smoking history. His smokeless tobacco use includes chew. He reports that he does not drink alcohol and does not use drugs.  Review of Systems:  Review of Systems   Respiratory:  Negative for cough and wheezing.    Cardiovascular:  Negative for chest pain.   Hematological:  Negative for adenopathy. Does not bruise/bleed easily.      Physical Exam:  Temp:  [97.2 °F (36.2 °C)] 97.2 °F (36.2 °C)  Heart Rate:  [59-65] 62  Resp:  [16] 16  BP: (116-142)/(64-91) 142/91No intake or output data in the 24 hours ending 01/27/25 0914There were no vitals filed for this visit.There were no vitals filed for this visit.  There is no height or weight on file to calculate BMI.   Physical Exam  Constitutional:       General: He is not in acute distress.     Appearance: He is not ill-appearing.   Pulmonary:      Effort: Pulmonary effort is normal.      Breath sounds: No wheezing or rhonchi.   Neurological:      Mental Status: He is alert.       Result Review  Results from last 7 days   Lab Units 01/22/25  0802   WBC K/uL 7.29   HEMOGLOBIN g/dL 11.6*   PLATELETS K/uL 186     Results from last 7 days   Lab Units 01/22/25  0802   SODIUM mmol/L 139   POTASSIUM mmol/L 4.3   TOTAL CO2 mmol/L 28   BUN mg/dL 13   CREATININE mg/dL 0.8   GLUCOSE mg/dL 153*         Microbiology Results (last 10 days)       ** No results found for the last 240 hours. **          Recent radiology:   Imaging Results (Last 72 Hours)       ** No results found for the last 72 hours. **          Personal review of imaging : CT scan shows no suspicious adenopathy on external film from 10/2024      Pulmonary Assessment:    Lung cancer s/p wedge  resection  Emphysema, asymptomatic    Recommend/plan:   Bronchoscopy with ebus.  Risks were discussed at length; pt wishes to proceed.  We will convert today's hospital encounter from mediastinoscopy to bronchoscopy with ebus.  Discussed ebus.    Electronically signed by Peter Griffith MD, 01/27/25, 9:14 AM CST.

## 2025-01-27 NOTE — H&P
See other H&P.  The current document is created to clear an Epic chart completion notification, but is not necessary, and will not be used for billing

## 2025-01-29 ENCOUNTER — CLINICAL DOCUMENTATION (OUTPATIENT)
Dept: HEMATOLOGY | Age: 80
End: 2025-01-29

## 2025-01-29 ENCOUNTER — OFFICE VISIT (OUTPATIENT)
Dept: HEMATOLOGY | Age: 80
End: 2025-01-29
Payer: MEDICARE

## 2025-01-29 ENCOUNTER — HOSPITAL ENCOUNTER (OUTPATIENT)
Dept: INFUSION THERAPY | Age: 80
Discharge: HOME | End: 2025-01-29
Payer: MEDICARE

## 2025-01-29 VITALS
WEIGHT: 212.2 LBS | BODY MASS INDEX: 32.16 KG/M2 | TEMPERATURE: 98.8 F | SYSTOLIC BLOOD PRESSURE: 140 MMHG | HEIGHT: 68 IN | OXYGEN SATURATION: 98 % | DIASTOLIC BLOOD PRESSURE: 70 MMHG | HEART RATE: 68 BPM

## 2025-01-29 DIAGNOSIS — D47.2 IGA MONOCLONAL GAMMOPATHY OF UNCERTAIN SIGNIFICANCE: Primary | ICD-10-CM

## 2025-01-29 DIAGNOSIS — Z00.00 HEALTH CARE MAINTENANCE: ICD-10-CM

## 2025-01-29 DIAGNOSIS — R97.8 OTHER ABNORMAL TUMOR MARKERS: ICD-10-CM

## 2025-01-29 DIAGNOSIS — C80.1 ADENOCARCINOMA (HCC): ICD-10-CM

## 2025-01-29 LAB
BEAKER LAB AP INTRAOPERATIVE CONSULTATION: NORMAL
CANCER AG19-9 SERPL-ACNC: 21 U/ML (ref 0–35)
CEA SERPL-MCNC: 1.8 NG/ML (ref 0–4.7)
CYTO UR: NORMAL
LAB AP CASE REPORT: NORMAL
LAB AP DIAGNOSIS COMMENT: NORMAL
Lab: NORMAL
PATH REPORT.FINAL DX SPEC: NORMAL
PATH REPORT.GROSS SPEC: NORMAL

## 2025-01-29 PROCEDURE — G2211 COMPLEX E/M VISIT ADD ON: HCPCS | Performed by: INTERNAL MEDICINE

## 2025-01-29 PROCEDURE — G8427 DOCREV CUR MEDS BY ELIG CLIN: HCPCS | Performed by: INTERNAL MEDICINE

## 2025-01-29 PROCEDURE — 1159F MED LIST DOCD IN RCRD: CPT | Performed by: INTERNAL MEDICINE

## 2025-01-29 PROCEDURE — G8417 CALC BMI ABV UP PARAM F/U: HCPCS | Performed by: INTERNAL MEDICINE

## 2025-01-29 PROCEDURE — 99212 OFFICE O/P EST SF 10 MIN: CPT

## 2025-01-29 PROCEDURE — 1126F AMNT PAIN NOTED NONE PRSNT: CPT | Performed by: INTERNAL MEDICINE

## 2025-01-29 PROCEDURE — 4004F PT TOBACCO SCREEN RCVD TLK: CPT | Performed by: INTERNAL MEDICINE

## 2025-01-29 PROCEDURE — 1123F ACP DISCUSS/DSCN MKR DOCD: CPT | Performed by: INTERNAL MEDICINE

## 2025-01-29 PROCEDURE — 99215 OFFICE O/P EST HI 40 MIN: CPT | Performed by: INTERNAL MEDICINE

## 2025-01-29 NOTE — PROGRESS NOTES
EBUS per Dr. Ryan Jeffers at [facility] on 01/27/2025  Description of Procedure:    After the induction of general anesthesia, the patient was positioned supine and the bronchoscope was passed through the endotracheal tube. The scope was then passed into the trachea.     The trachea, mainstem bronchi, RUL, RML, Bronchus Intermedius, RLL, BRAXTON, BRAXTON upper division and lingula, and LLL, and all primary segmental airways were examined and were normal except as described below:    Endobronchial findings:  Trachea: Normal mucosa  [place]: Normal mucosa  Right main bronchus: Normal mucosa  Right upper lobe bronchus: Normal mucosa  Right middle lobe bronchus: Normal mucosa  Right lower lobe bronchus: Normal mucosa  Left main bronchus: Normal mucosa  Left upper lobe bronchus: Normal mucosa  Left lower lobe bronchus: Normal mucosa    The conventional bronchoscope was removed and the EBUS scope was inserted and the mediastinum was examined via linear ultrasound. Findings: stations 4r, 10 r, 110 r, 4L, 10L, 11 L negative. Station 7 1.9 cm nod3. TBNA under ultrasound guidance was collected from station 7 x 3 passes, Rapid onsite evaluation: lymphocytes. Iced saline was instilled and wash aspirated in small aliquots between needle passes for hemostasis. After satisfactory confirmation of no persistent abnormal bleeding, the bronchoscope was removed.

## 2025-01-29 NOTE — PROGRESS NOTES
CHEST XRAY at [facility] on 9/27/2024:  3 cm right upper lobe nodule. CT chest is recommended for further evaluation.     PET SKULL BASE TO MID THIGH at [facility] on 11/04/2024:  Solid irregular right upper lobe nodule is hypermetabolic and consistent with neoplasm.   No PET evidence for metastatic disease.   2.1 x 2.0 cm solid nodule in RUL. SUV 4.9. Consistent with neoplasm.     NEW PATIENT with Sen Almeida at [facility]  on 11/12/2024  Assessment & Plan  1. Lung nodule.  The presence of a hypermetabolic 2 cm lesion in the upper lobe of the lung with a maximum SUV of 4.9 raises concerns for malignancy. Additionally, there is a ground glass lesion on the other side which could potentially be a bronchoalveolar carcinoma.   We discussed the differential diagnoses possible with malignancy high in the differential. We discussed the importance of staging such that best therapy can be selected and portend prognosis accurately. We discussed options for care including continued surveillance verses efforts towards diagnosis and treatment. We discussed options for diagnosis including bronchoscopy, CT-guided needle biopsy, or surgical biopsy with either cervical mediastinoscopy or Thoracoscopy with resection.We discussed the value of clinical staging with a CT/PET scan. The pros and cons of each option were discussed at length. I believe the best option for treatment is Right thoracoscopic wedge resection, possible lobectomy with mediastinal lymph node dissection. The risks of the procedure were discussed to included but not limited to bleeding, infection, stroke, heart attack, anesthesia risks, need for additional procedures, great vessel injury, injury to nerve with resultant hoarseness of voice, mechanical ventilation, ICU stay, chronic pain syndromes, need for thoracotomy, need for prolonged chest tube use, and/or death. All questions have been answered to the best of my ability and he is agreeable to the aforementioned plan.  Given his

## 2025-01-30 NOTE — PROGRESS NOTES
Discussed negative result with patient.  He reports favorable cancer blood markers with Dr. Medel also  Make sure he has follow up in a few months in the summer.

## 2025-01-31 ENCOUNTER — TELEPHONE (OUTPATIENT)
Dept: HEMATOLOGY | Age: 80
End: 2025-01-31

## 2025-01-31 DIAGNOSIS — C80.1 ADENOCARCINOMA (HCC): Primary | ICD-10-CM

## 2025-01-31 NOTE — TELEPHONE ENCOUNTER
Called Patient and reminded patient of their appointment on 02/05/2025 and patient confirmed they would be here. Reminded patient to just come at appointment time, and to not come at the lab appointment time. Reminded patient that we will not check them in any more than 30 minutes before appointment time.  We have now moved to the [facility] that is located between our old office and the ER at the [facility]. Letting the Pt know that our front entrance faces the  [employer]. Reminded pt to eat well and be well hydrated for their labs.

## 2025-01-31 NOTE — PROGRESS NOTES
feet bilaterally (bilateral posterior tibial arteries).   Diffuse thickening of the urinary bladder, which can be seen with cystitis or chronic bladder outlet obstruction. Correlate with patient symptoms. Markedly enlarged prostate measuring 7 cm in transverse   dimension.   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 Recommend follow-up MRI abdomen without and with contrast in 1 year per ACR incidental findings recommendations.     CHEST XRAY at [facility] on 9/27/2024:  3 cm right upper lobe nodule. CT chest is recommended for further evaluation.     PET SKULL BASE TO MID THIGH at [facility] on 11/04/2024:  Solid irregular right upper lobe nodule is hypermetabolic and consistent with neoplasm.   2.1 x 2.0 cm solid nodule in RUL. SUV 4.9. Consistent with neoplasm.   No PET evidence for metastatic disease.     NEW PATIENT with Sen Almeida at [facility]  on 11/12/2024  Assessment & Plan  1. Lung nodule.  The presence of a hypermetabolic 2 cm lesion in the upper lobe of the lung with a maximum SUV of 4.9 raises concerns for malignancy. Additionally, there is a ground glass lesion on the other side which could potentially be a bronchoalveolar carcinoma.   We discussed the differential diagnoses possible with malignancy high in the differential. We discussed the importance of staging such that best therapy can be selected and portend prognosis accurately. We discussed options for care including continued surveillance verses efforts towards diagnosis and treatment. We discussed options for diagnosis including bronchoscopy, CT-guided needle biopsy, or surgical biopsy with either cervical mediastinoscopy or Thoracoscopy with resection.We discussed the value of clinical staging with a CT/PET scan. The pros and cons of each option were discussed at length. I believe the best option for treatment is Right thoracoscopic wedge resection, possible lobectomy with

## 2025-02-05 ENCOUNTER — TRANSCRIBE ORDERS (OUTPATIENT)
Dept: ADMINISTRATIVE | Facility: HOSPITAL | Age: 80
End: 2025-02-05
Payer: MEDICARE

## 2025-02-05 ENCOUNTER — OFFICE VISIT (OUTPATIENT)
Dept: HEMATOLOGY | Age: 80
End: 2025-02-05
Payer: MEDICARE

## 2025-02-05 ENCOUNTER — HOSPITAL ENCOUNTER (OUTPATIENT)
Dept: INFUSION THERAPY | Age: 80
Discharge: HOME | End: 2025-02-05
Payer: MEDICARE

## 2025-02-05 VITALS
HEIGHT: 68 IN | WEIGHT: 214.3 LBS | TEMPERATURE: 98.7 F | DIASTOLIC BLOOD PRESSURE: 88 MMHG | SYSTOLIC BLOOD PRESSURE: 162 MMHG | OXYGEN SATURATION: 99 % | HEART RATE: 64 BPM | BODY MASS INDEX: 32.48 KG/M2

## 2025-02-05 DIAGNOSIS — C34.11 MALIGNANT NEOPLASM OF UPPER LOBE OF RIGHT LUNG (HCC): ICD-10-CM

## 2025-02-05 DIAGNOSIS — C34.11 MALIGNANT NEOPLASM OF UPPER LOBE OF RIGHT LUNG: Primary | ICD-10-CM

## 2025-02-05 DIAGNOSIS — C80.1 ADENOCARCINOMA (HCC): ICD-10-CM

## 2025-02-05 DIAGNOSIS — Z71.2 ENCOUNTER TO DISCUSS TEST RESULTS: Primary | ICD-10-CM

## 2025-02-05 LAB
ALBUMIN SERPL-MCNC: 4.1 G/DL (ref 3.5–5.2)
ALP SERPL-CCNC: 81 U/L (ref 40–129)
ALT SERPL-CCNC: 54 U/L (ref 5–41)
ANION GAP SERPL CALCULATED.3IONS-SCNC: 9 MMOL/L (ref 7–19)
AST SERPL-CCNC: 47 U/L (ref 5–40)
BASOPHILS # BLD: 0.04 K/UL (ref 0–0.2)
BASOPHILS NFR BLD: 0.6 % (ref 0–1)
BILIRUB SERPL-MCNC: 0.3 MG/DL (ref 0–1.2)
BUN SERPL-MCNC: 14 MG/DL (ref 8–23)
CALCIUM SERPL-MCNC: 9.5 MG/DL (ref 8.8–10.2)
CHLORIDE SERPL-SCNC: 101 MMOL/L (ref 98–107)
CO2 SERPL-SCNC: 28 MMOL/L (ref 22–29)
CREAT SERPL-MCNC: 0.9 MG/DL (ref 0.7–1.2)
EOSINOPHIL # BLD: 0.33 K/UL (ref 0–0.6)
EOSINOPHIL NFR BLD: 4.7 % (ref 0–5)
ERYTHROCYTE [DISTWIDTH] IN BLOOD BY AUTOMATED COUNT: 14.8 % (ref 11.5–14.5)
GLUCOSE SERPL-MCNC: 141 MG/DL (ref 70–99)
HCT VFR BLD AUTO: 36.9 % (ref 42–52)
HGB BLD-MCNC: 11.7 G/DL (ref 14–18)
LYMPHOCYTES # BLD: 2.19 K/UL (ref 1.1–4.5)
LYMPHOCYTES NFR BLD: 31.4 % (ref 20–40)
MCH RBC QN AUTO: 30.6 PG (ref 27–31)
MCHC RBC AUTO-ENTMCNC: 31.7 G/DL (ref 33–37)
MCV RBC AUTO: 96.6 FL (ref 80–94)
MONOCYTES # BLD: 0.46 K/UL (ref 0–0.9)
MONOCYTES NFR BLD: 6.6 % (ref 1–10)
NEUTROPHILS # BLD: 3.95 K/UL (ref 1.5–7.5)
NEUTS SEG NFR BLD: 56.6 % (ref 50–65)
PLATELET # BLD AUTO: 183 K/UL (ref 130–400)
PMV BLD AUTO: 10.6 FL (ref 9.4–12.4)
POTASSIUM SERPL-SCNC: 4.4 MMOL/L (ref 3.5–5.1)
PROT SERPL-MCNC: 7.1 G/DL (ref 6.4–8.3)
RBC # BLD AUTO: 3.82 M/UL (ref 4.7–6.1)
SODIUM SERPL-SCNC: 138 MMOL/L (ref 136–145)
WBC # BLD AUTO: 6.98 K/UL (ref 4.8–10.8)

## 2025-02-05 PROCEDURE — 36415 COLL VENOUS BLD VENIPUNCTURE: CPT

## 2025-02-05 PROCEDURE — 80053 COMPREHEN METABOLIC PANEL: CPT

## 2025-02-05 PROCEDURE — 99213 OFFICE O/P EST LOW 20 MIN: CPT

## 2025-02-05 PROCEDURE — 85025 COMPLETE CBC W/AUTO DIFF WBC: CPT

## 2025-02-05 RX ORDER — OMEGA-3S/DHA/EPA/FISH OIL/D3 300MG-1000
400 CAPSULE ORAL DAILY
COMMUNITY

## 2025-02-18 ENCOUNTER — HOSPITAL ENCOUNTER (OUTPATIENT)
Dept: CT IMAGING | Facility: HOSPITAL | Age: 80
Discharge: HOME | End: 2025-02-18
Admitting: INTERNAL MEDICINE
Payer: MEDICARE

## 2025-02-18 DIAGNOSIS — C34.11 MALIGNANT NEOPLASM OF UPPER LOBE OF RIGHT LUNG: ICD-10-CM

## 2025-02-18 LAB — CREAT BLDA-MCNC: 1 MG/DL (ref 0.6–1.3)

## 2025-02-18 PROCEDURE — 25510000001 IOPAMIDOL 61 % SOLUTION: Performed by: INTERNAL MEDICINE

## 2025-02-18 PROCEDURE — 82565 ASSAY OF CREATININE: CPT

## 2025-02-18 PROCEDURE — 71260 CT THORAX DX C+: CPT

## 2025-02-18 RX ORDER — IOPAMIDOL 612 MG/ML
100 INJECTION, SOLUTION INTRAVASCULAR
Status: COMPLETED | OUTPATIENT
Start: 2025-02-18 | End: 2025-02-18

## 2025-02-18 RX ADMIN — IOPAMIDOL 100 ML: 612 INJECTION, SOLUTION INTRAVENOUS at 07:56

## 2025-03-14 ENCOUNTER — OFFICE VISIT (OUTPATIENT)
Dept: OTOLARYNGOLOGY | Facility: CLINIC | Age: 80
End: 2025-03-14
Payer: MEDICARE

## 2025-03-14 VITALS — WEIGHT: 210 LBS | HEIGHT: 67 IN | TEMPERATURE: 97.9 F | BODY MASS INDEX: 32.96 KG/M2

## 2025-03-14 DIAGNOSIS — H90.A21 SENSORINEURAL HEARING LOSS (SNHL) OF RIGHT EAR WITH RESTRICTED HEARING OF LEFT EAR: Primary | ICD-10-CM

## 2025-03-14 DIAGNOSIS — H90.A32 MIXED CONDUCTIVE AND SENSORINEURAL HEARING LOSS OF LEFT EAR WITH RESTRICTED HEARING OF RIGHT EAR: ICD-10-CM

## 2025-03-14 DIAGNOSIS — H93.13 TINNITUS OF BOTH EARS: ICD-10-CM

## 2025-03-14 NOTE — PATIENT INSTRUCTIONS
HEARING LOSS       Hearing loss is the third most common health condition in the [place] affecting more than 1 of every 10 people.  This means that over 28 million [place] suffer from hearing loss.  The condition varies with age: 1 out of every 25 children, 1 out of every 14 aged 14-44 years old, 1 out of every 7 adults aged 45-65 year old, and 1 out of every 3 adults over the age of 65 years old.    The ear works by receiving sound vibrations, amplifying the sound, and then converting the mechanical vibration into an electrical signal that is sent to the hearing center of the brain.  The ear has three basic parts, each with very specific functions:  The External Ear   This is made up of the parts of the ear you can see (the auricle), and the ear canal.  These structures function to funnel the sound vibrations down into the ear so that they can be processed.  The Middle Ear  The external ear and the middle ear are  by the eardrum (tympanic membrane).  The middle ear is an air-filled space that houses the middle ear bones (ossicles).  Sound waves hit the eardrum and cause it to vibrate.  The vibrations are then transferred to the ear bones that amplify the sound and transfer it to the inner ear (cochlea).  The Inner Ear  The inner ear is a snail-shaped bone that contains a fluid-filled membrane inside another fluid-filled membrane.  It acts like a battery to transform the mechanical vibrations into and electrical signal.  It does this with very delicate hair cells that rest on top of the inner ear membranes.  The electrical signal is then shipped out of the inner ear to the brain where it is processed and understood as sound.    Disease in any one of these parts of the ear can cause hearing loss, but can do so in two different ways.  When there is a problem with the process of bringing the sound to the inner ear (i.e. problems in the external or middle ear) it is called a conductive hearing loss.   Problems in the inner ear or nerves of hearing care called sensorineural hearing losses.  Often times, however, there is a combination of the types of hearing losses or a mixed hearing loss.  Conductive Hearing Loss  Impairment of the transfer of sound into the inner ear because of problems with the external ear, the eardrum or the middle ear can cause conductive hearing loss.  Conductive losses can often be lessened or cured with medication or surgery, depending on the etiology.  Causes of Conductive Hearing Losses:  Wax occlusion of the ear canal  Eardrum perforations  Stiffened or scarred eardrums  Fluid in the middle earspace (otitis media with effusion)  Middle ear infections (acute otitis media)  Scarring or fixation of the ear bones (tympanosclerosis, otosclerosis)  Dislocation of the ear bones  Cholesteatoma: this is a “skin cyst” that develops due to severely retracted eardrums or from skin growth into the middle ear from a chronic eardrum perforation.  Over time the cyst can grow and erode the bones of hearing.  Middle ear tumors or masses  Sensorineural Hearing Loss  Deficits in hearing due to problems in the inner ear or nerves of hearing are termed sensorineural losses.  These are usually due to damage to the microscopic hair cells in the cochlea, or due to loss of the nerve cells in the hearing nerve.  For the most part, this type of hearing loss cannot be repaired with medication or surgery, except in rare instances.  Often times, however, it can be helped with hearing aids and rarely with cochlear implantation.  This type of loss can also be associated with tinnitus (ringing of the ears) and vertigo (dizziness).  Causes of Sensorineural Hearing Losses:  Damage from noise exposure  Aging:  this is often a slow, progressive loss of the high frequencies  Infection (labrynthitis)  Secondary loss from the effects of meningitis  Genetic/familial related hearing loss  Autoimmune hearing loss (a  rheumatoid-like process)  Temporal bond fracture (severe fracture of the bone around or through the inner ear)  Medication induced damage (chemotherapy, high dose IV antibiotics, diuretics)  Inner ear and skull base tumors (acoustic neuroma, memingioma)        Specific Causes    Ear Infections and Effusion in Children  Any time the middle ear is filled with fluid, as in an active infection or in middle ear effusion, there can be a conductive hearing.  Ear infections are the most common cause of infant hearing loss and are a very treatable type of hearing loss.  Usually the middle ear is filled with air, which allows the bones of hearing to freely move.  When there is fluid in this space it slows the vibration and is literally like trying to hear underwater.  Most of the time this infection or fluid can be treated with medication.  If this does not happen, often myringotomy tube placement can allow for drainage of fluid and allow the middle ear space to remain ventilated.  Congenital Hearing Loss  Hearing loss is the most common birth defect, affecting 3 in every 1000 children born in the [place].  If untreated, this can lead to significant problems in speech, language, and learning.  Recently, most hospitals have early infant screening that can identify newborns with hearing problems.  With accurate diagnosis, these children can often be treated with hearing aids and other forms of treatment that can allow them to develop normal speech and learning.  Noise Induced Hearing Loss  According to the [employer], approximately 10 million Americans have hearing losses that are a least partially attributable to loud noise exposure.  Exposures that can cause permanent damage vary, but short exposures of very loud quality (gunfire) can be as bad as longer exposures at lower levels.  Most conversations are at about 65-70 decibels.  Levels over 85dB, with exposures of up to 8 hours a day, will produce  permanent hearing loss after many years of exposure.  A stereo headset turned up full blast (about 110 dB) can cause a significant hearing loss in less than a half an hour.  These losses are due to damage of the hair cells of the inner ear from the excessive vibrational acoustic trauma of the loud noise.  Since this loss is usually nonreversible, hearing protection is essential.  Foam earplugs can provide 15-30dB of noise protection.  Age Related Hearing Loss (Presbyacusis)  Age related hearing loss is another very common form of hearing loss in the elderly.  It is usually a slowly progressive, high frequency sensorineural loss that in nonreversible.  Not all elderly people will have hearing loss as this is very much related to the genetic makeup of the individual.  This can cause the patient to withdraw from social situations, or cause them to seem to have a mood change because of the frustration of not being able to hear a conversation or having to ask people to repeat things.  Most of the time, this can be well treated by amplifying sounds with a hearing aid.    Hearing loss can have a large impact in the way we function on a day to day basis with our environment.  We are fortunate that most hearing loss can be treated by hearing aids or medical and surgical treatments.  If you think you may be suffering from hearing loss, an evaluation by our doctor can help identify the cause and the specific treatment that can help improve your hearing.     TINNITUS  Tinnitus (latin for ringing) is the sensation of noise in the ears. This symptom can be quite variable and disconcerting. Most people have had ringing in the ears but most do not require treatment. Only 6% of people have ringing troubling enough to seek treatment.    Symptoms can range from ringing to “noise” of any sort in the ear or ears. Timing can vary as well. There are no specific symptom requirements to have tinnitus. It is speculated that the inner ear  hair cells (responsible for hearing) may be dying and causing the brain to seek additional input for sound that is missing. There are many theories for the etiology of tinnitus or ringing.    You may be referred for hearing testing or imaging of the ears/brain to try to determine what is causing ringing and how to best treat the condition.    Tinnitus Recommendations-  >Avoid loud or sudden noise  >Wear hearing protection in the presence of loud noise  >Avoid irritants like caffeine, nicotine, tonic water, malaria medications, alcohol, aspirin- please tell MD if you are taking any of these medications  >In a quiet environment or while sleeping, use a white noise generator or radio station to provide background noise  >Relaxation and stress management techniques are useful  >Biofeedback  >Complementary medicine may be of benefit  >Wear a hearing aid or tinnitus masker/retrainer  >Psychological counseling may be beneficial in some situations  >Exercise!!  >Restorative Sleep!!    Medical therapy for ringing (may include)-  Do nothing  Medications for ringing  IV medication  Ear perfusion- inner ear surgery to reduce ringing  Hearing Aids, Tinnitus maskers, Tinnitus retrainers  Biofeedback  Psychological counseling    All options will be reviewed at your visit. Treating tinnitus can be difficult and frustrating. There really is no cure but rather control. This can be time consuming to treat. The patient determines how much treatment is warranted if there are no associated medical conditions requiring therapy. Please be patient.

## 2025-03-14 NOTE — PROGRESS NOTES
YOB: 1945  Location: [facility]  Location Address: [address] [place] [address], [facility], [address] [place] [address]  Location Phone: [phone]    Chief Complaint   Patient presents with    Sudden hearing loss     Left ear       History of Present Illness  Manjit Seth is a 79 y.o. male.      History of Present Illness  The patient presents for evaluation of hearing loss.    He has not experienced any recent episodes of dizziness.  He had a respiratory flu type symptoms in 2025, which lasted for 2 weeks. He received antibiotic therapy for bronchitis. After that, he noticed a marked increase in the loss of hearing in his left ear. He describes the sound as muffled. He denies ear pain and drainage.        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    Hemifacial spasm     Left side    History of radiation therapy 2016    4000 cGy to nose completed on 3/28/16    History of tobacco abuse     Hyperlipidemia     Hypertension     Lung cancer 2024    upper lobe right lung    Nosebleed occasional post radiation    PAC (premature atrial contraction)     PAD (peripheral artery disease)        Past Surgical History:   Procedure Laterality Date    ANTERIOR CERVICAL DISCECTOMY W/ FUSION N/A 2019    Procedure: CERVICAL DISCECTOMY ANTERIOR WITH FUSION C3-4,4-5, 5-6 ACDF with neuromonitoring and 1 C-arm;  Surgeon: Pablito Islas MD;  Location:  [facility] OR;  Service: Neurosurgery    BRONCHOSCOPY N/A 2024    Procedure: BRONCHOSCOPY, THORACOSCOPY, RIGHT UPPER LOBE WEDGE RESECTION, RIGHT MIDDLE LOBE WEDGE RESECTION, PLEURAL BIOPSY;  Surgeon: Mat Valenzuela MD;  Location:  [facility] OR;  Service: Cardiothoracic;  Laterality:  N/A;    BRONCHOSCOPY N/A 01/27/2025    Procedure: BRONCHOSCOPY WITH ENDOBRONCHIAL ULTRASOUND, BIOPSY, TRANSBRONCHIAL NEEDLE ASPIRATE;  Surgeon: Peter Griffith MD;  Location: [facility] OR;  Service: Pulmonary;  Laterality: N/A;  pre: malignant neoplasm of RUL   post: same    BRONCHOSCOPY      CATARACT EXTRACTION Bilateral     COLONOSCOPY      NASAL ENDOSCOPY      bilateral with excisional biopsy of left intranasal mass 1/11/16    NOSE SURGERY      removal of plasmacytoma    SPINAL FUSION  3 level acdf    Jan 16,2019    THORACOSCOPY Right 11/29/2024    Procedure: THORACOSCOPY;  Surgeon: Mat Valenzuela MD;  Location: [facility] OR;  Service: Cardiothoracic;  Laterality: Right;       Outpatient Medications Marked as Taking for the 3/14/25 encounter (Office Visit) with Jesse Herbert APRN   Medication Sig Dispense Refill    ASPIRIN 81 PO Take 81 mg by mouth Daily. self      atenolol (TENORMIN) 50 MG tablet Take 1 tablet by mouth Every Morning.      Cranberry 400 MG capsule Take 1 capsule by mouth Daily.      Ergocalciferol (VITAMIN D2) 400 UNITS tablet Take 400 Units by mouth Daily.      finasteride (PROSCAR) 5 MG tablet Take 1 tablet by mouth Daily.      Garlic 10 MG capsule Take 10 mg by mouth Daily.      lisinopril (PRINIVIL,ZESTRIL) 5 MG tablet Take 1 tablet by mouth Daily.      Magnesium 250 MG tablet Take 1 tablet by mouth Daily.      metFORMIN (GLUCOPHAGE) 500 MG tablet Take 1 tablet by mouth 2 (Two) Times a Day With Meals.      Multiple Vitamins-Minerals (MULTIVITAMIN ADULT PO) Take 1 tablet by mouth Daily.      mupirocin (BACTROBAN) 2 % ointment Apply  topically to the appropriate area as directed 3 (Three) Times a Day. 22 g 6    Omega-3 Fatty Acids (FISH OIL) 1000 MG capsule capsule Take 1 capsule by mouth Daily With Breakfast.      simvastatin (ZOCOR) 40 MG tablet Take 1 tablet by mouth Every Night.      triamterene-hydrochlorothiazide (MAXZIDE-25) 37.5-25 MG per tablet Take 1 tablet by mouth Daily.          Other    Family History   Problem Relation Age of Onset    Breast cancer Mother     Cancer Mother         breast cancer    Heart attack Mother     Heart failure Father     Diabetes Father     Hypertension Father     Cancer Maternal Uncle         pancreatic cancer       Social History     Socioeconomic History    Marital status:    Tobacco Use    Smoking status: Former     Current packs/day: 0.00     Average packs/day: 1 pack/day for 10.0 years (10.0 ttl pk-yrs)     Types: Cigarettes     Start date: 1970     Quit date: 1977     Years since quittin.2    Smokeless tobacco: Current     Types: Chew    Tobacco comments:     dip when fishing   Vaping Use    Vaping status: Never Used   Substance and Sexual Activity    Alcohol use: No    Drug use: No    Sexual activity: Defer       Review of Systems   Constitutional: Negative.    HENT:  Positive for hearing loss. Negative for ear discharge and ear pain.        Vitals:    25 0922   Temp: 97.9 °F (36.6 °C)       Body mass index is 32.58 kg/m².    Objective     Physical Exam  Ears were examined.    Physical Exam  Vitals reviewed.   Constitutional:       Appearance: He is obese.   HENT:      Head: Normocephalic.      Comments: Left blepharospasm          Right Ear: Tympanic membrane, ear canal and external ear normal. Decreased hearing noted.      Left Ear: Tympanic membrane, ear canal and external ear normal. Decreased hearing noted.      Nose: Nose normal.      Mouth/Throat:      Lips: Pink.   Musculoskeletal:      Cervical back: Full passive range of motion without pain.   Lymphadenopathy:      Cervical: No cervical adenopathy.   Neurological:      Mental Status: He is alert.   Psychiatric:         Behavior: Behavior is cooperative.           Assessment & Plan   Diagnoses and all orders for this visit:    1. 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 Mixed conductive and sensorineural  hearing loss of left ear with restricted hearing of right ear    3. Tinnitus of both ears      * Surgery not found *  No orders of the defined types were placed in this encounter.    Assessment & Plan  1. Hearing impairment.  The patient's hearing loss has remained relatively stable compared to the previous year, with no significant changes observed. He reports no pain or drainage from the ears. He has not experienced any recent dizziness. A comprehensive hearing test will be conducted during his upcoming appointment with Dr. Krueger in 10/2025.    Return for Next scheduled follow up.       Patient Instructions   HEARING LOSS       Hearing loss is the third most common health condition in the [place] affecting more than 1 of every 10 people.  This means that over 28 million Americans suffer from hearing loss.  The condition varies with age: 1 out of every 25 children, 1 out of every 14 aged 14-44 years old, 1 out of every 7 adults aged 45-65 year old, and 1 out of every 3 adults over the age of 65 years old.    The ear works by receiving sound vibrations, amplifying the sound, and then converting the mechanical vibration into an electrical signal that is sent to the hearing center of the brain.  The ear has three basic parts, each with very specific functions:  The External Ear   This is made up of the parts of the ear you can see (the auricle), and the ear canal.  These structures function to funnel the sound vibrations down into the ear so that they can be processed.  The Middle Ear  The external ear and the middle ear are  by the eardrum (tympanic membrane).  The middle ear is an air-filled space that houses the middle ear bones (ossicles).  Sound waves hit the eardrum and cause it to vibrate.  The vibrations are then transferred to the ear bones that amplify the sound and transfer it to the inner ear (cochlea).  The Inner Ear  The inner ear is a snail-shaped bone that contains a fluid-filled  membrane inside another fluid-filled membrane.  It acts like a battery to transform the mechanical vibrations into and electrical signal.  It does this with very delicate hair cells that rest on top of the inner ear membranes.  The electrical signal is then shipped out of the inner ear to the brain where it is processed and understood as sound.    Disease in any one of these parts of the ear can cause hearing loss, but can do so in two different ways.  When there is a problem with the process of bringing the sound to the inner ear (i.e. problems in the external or middle ear) it is called a conductive hearing loss.  Problems in the inner ear or nerves of hearing care called sensorineural hearing losses.  Often times, however, there is a combination of the types of hearing losses or a mixed hearing loss.  Conductive Hearing Loss  Impairment of the transfer of sound into the inner ear because of problems with the external ear, the eardrum or the middle ear can cause conductive hearing loss.  Conductive losses can often be lessened or cured with medication or surgery, depending on the etiology.  Causes of Conductive Hearing Losses:  Wax occlusion of the ear canal  Eardrum perforations  Stiffened or scarred eardrums  Fluid in the middle earspace (otitis media with effusion)  Middle ear infections (acute otitis media)  Scarring or fixation of the ear bones (tympanosclerosis, otosclerosis)  Dislocation of the ear bones  Cholesteatoma: this is a “skin cyst” that develops due to severely retracted eardrums or from skin growth into the middle ear from a chronic eardrum perforation.  Over time the cyst can grow and erode the bones of hearing.  Middle ear tumors or masses  Sensorineural Hearing Loss  Deficits in hearing due to problems in the inner ear or nerves of hearing are termed sensorineural losses.  These are usually due to damage to the microscopic hair cells in the cochlea, or due to loss of the nerve cells in the  hearing nerve.  For the most part, this type of hearing loss cannot be repaired with medication or surgery, except in rare instances.  Often times, however, it can be helped with hearing aids and rarely with cochlear implantation.  This type of loss can also be associated with tinnitus (ringing of the ears) and vertigo (dizziness).  Causes of Sensorineural Hearing Losses:  Damage from noise exposure  Aging:  this is often a slow, progressive loss of the high frequencies  Infection (labrynthitis)  Secondary loss from the effects of meningitis  Genetic/familial related hearing loss  Autoimmune hearing loss (a rheumatoid-like process)  Temporal bond fracture (severe fracture of the bone around or through the inner ear)  Medication induced damage (chemotherapy, high dose IV antibiotics, diuretics)  Inner ear and skull base tumors (acoustic neuroma, memingioma)        Specific Causes    Ear Infections and Effusion in Children  Any time the middle ear is filled with fluid, as in an active infection or in middle ear effusion, there can be a conductive hearing.  Ear infections are the most common cause of infant hearing loss and are a very treatable type of hearing loss.  Usually the middle ear is filled with air, which allows the bones of hearing to freely move.  When there is fluid in this space it slows the vibration and is literally like trying to hear underwater.  Most of the time this infection or fluid can be treated with medication.  If this does not happen, often myringotomy tube placement can allow for drainage of fluid and allow the middle ear space to remain ventilated.  Congenital Hearing Loss  Hearing loss is the most common birth defect, affecting 3 in every 1000 children born in the [place].  If untreated, this can lead to significant problems in speech, language, and learning.  Recently, most hospitals have early infant screening that can identify newborns with hearing problems.  With accurate  diagnosis, these children can often be treated with hearing aids and other forms of treatment that can allow them to develop normal speech and learning.  Noise Induced Hearing Loss  According to the National Institutes of Health, approximately 10 million Americans have hearing losses that are a least partially attributable to loud noise exposure.  Exposures that can cause permanent damage vary, but short exposures of very loud quality (gunfire) can be as bad as longer exposures at lower levels.  Most conversations are at about 65-70 decibels.  Levels over 85dB, with exposures of up to 8 hours a day, will produce permanent hearing loss after many years of exposure.  A stereo headset turned up full blast (about 110 dB) can cause a significant hearing loss in less than a half an hour.  These losses are due to damage of the hair cells of the inner ear from the excessive vibrational acoustic trauma of the loud noise.  Since this loss is usually nonreversible, hearing protection is essential.  Foam earplugs can provide 15-30dB of noise protection.  Age Related Hearing Loss (Presbyacusis)  Age related hearing loss is another very common form of hearing loss in the elderly.  It is usually a slowly progressive, high frequency sensorineural loss that in nonreversible.  Not all elderly people will have hearing loss as this is very much related to the genetic makeup of the individual.  This can cause the patient to withdraw from social situations, or cause them to seem to have a mood change because of the frustration of not being able to hear a conversation or having to ask people to repeat things.  Most of the time, this can be well treated by amplifying sounds with a hearing aid.    Hearing loss can have a large impact in the way we function on a day to day basis with our environment.  We are fortunate that most hearing loss can be treated by hearing aids or medical and surgical treatments.  If you think you may be suffering  from hearing loss, an evaluation by our doctor can help identify the cause and the specific treatment that can help improve your hearing.     TINNITUS  Tinnitus (latin for ringing) is the sensation of noise in the ears. This symptom can be quite variable and disconcerting. Most people have had ringing in the ears but most do not require treatment. Only 6% of people have ringing troubling enough to seek treatment.    Symptoms can range from ringing to “noise” of any sort in the ear or ears. Timing can vary as well. There are no specific symptom requirements to have tinnitus. It is speculated that the inner ear hair cells (responsible for hearing) may be dying and causing the brain to seek additional input for sound that is missing. There are many theories for the etiology of tinnitus or ringing.    You may be referred for hearing testing or imaging of the ears/brain to try to determine what is causing ringing and how to best treat the condition.    Tinnitus Recommendations-  >Avoid loud or sudden noise  >Wear hearing protection in the presence of loud noise  >Avoid irritants like caffeine, nicotine, tonic water, malaria medications, alcohol, aspirin- please tell MD if you are taking any of these medications  >In a quiet environment or while sleeping, use a white noise generator or radio station to provide background noise  >Relaxation and stress management techniques are useful  >Biofeedback  >Complementary medicine may be of benefit  >Wear a hearing aid or tinnitus masker/retrainer  >Psychological counseling may be beneficial in some situations  >Exercise!!  >Restorative Sleep!!    Medical therapy for ringing (may include)-  Do nothing  Medications for ringing  IV medication  Ear perfusion- inner ear surgery to reduce ringing  Hearing Aids, Tinnitus maskers, Tinnitus retrainers  Biofeedback  Psychological counseling    All options will be reviewed at your visit. Treating tinnitus can be difficult and frustrating.  There really is no cure but rather control. This can be time consuming to treat. The patient determines how much treatment is warranted if there are no associated medical conditions requiring therapy. Please be patient.       Patient or patient representative verbalized consent for the use of Ambient Listening during the visit with  EDWIN Salgado for chart documentation. 3/14/2025  09:58 CDT

## 2025-04-02 ENCOUNTER — TELEPHONE (OUTPATIENT)
Dept: VASCULAR SURGERY | Facility: CLINIC | Age: 80
End: 2025-04-02
Payer: MEDICARE

## 2025-04-02 ENCOUNTER — PREP FOR SURGERY (OUTPATIENT)
Dept: OTHER | Facility: HOSPITAL | Age: 80
End: 2025-04-02
Payer: MEDICARE

## 2025-04-02 DIAGNOSIS — Z51.81 ENCOUNTER FOR MONITORING ANTIPLATELET THERAPY: ICD-10-CM

## 2025-04-02 DIAGNOSIS — Z79.02 ENCOUNTER FOR MONITORING ANTIPLATELET THERAPY: ICD-10-CM

## 2025-04-02 DIAGNOSIS — Z01.818 PREOP TESTING: ICD-10-CM

## 2025-04-02 DIAGNOSIS — I73.9 PAD (PERIPHERAL ARTERY DISEASE): Primary | ICD-10-CM

## 2025-04-02 NOTE — TELEPHONE ENCOUNTER
Spoke with patient Your surgery is scheduled for 04/14/2025, you need to be at the hospital at 6:00 am.  Nothing to eat or drink after midnight the night before your procedure. Patient  will continue to take Zocor,Aspirin uninterrupted prior to surgery.  Your pre work is scheduled for 04/11/2025, you need to be at the hospital at 9:00 am.  Patient verbalized understanding.

## 2025-04-11 ENCOUNTER — TELEPHONE (OUTPATIENT)
Dept: VASCULAR SURGERY | Facility: CLINIC | Age: 80
End: 2025-04-11
Payer: MEDICARE

## 2025-04-11 ENCOUNTER — PRE-ADMISSION TESTING (OUTPATIENT)
Dept: PREADMISSION TESTING | Facility: HOSPITAL | Age: 80
End: 2025-04-11
Payer: MEDICARE

## 2025-04-11 VITALS
DIASTOLIC BLOOD PRESSURE: 52 MMHG | HEIGHT: 67 IN | OXYGEN SATURATION: 93 % | SYSTOLIC BLOOD PRESSURE: 120 MMHG | HEART RATE: 71 BPM | BODY MASS INDEX: 32.15 KG/M2 | RESPIRATION RATE: 20 BRPM | WEIGHT: 204.81 LBS

## 2025-04-11 DIAGNOSIS — Z79.02 ENCOUNTER FOR MONITORING ANTIPLATELET THERAPY: ICD-10-CM

## 2025-04-11 DIAGNOSIS — I73.9 PAD (PERIPHERAL ARTERY DISEASE): ICD-10-CM

## 2025-04-11 DIAGNOSIS — Z51.81 ENCOUNTER FOR MONITORING ANTIPLATELET THERAPY: ICD-10-CM

## 2025-04-11 DIAGNOSIS — Z01.818 PREOP TESTING: ICD-10-CM

## 2025-04-11 LAB
ABO GROUP BLD: NORMAL
ANION GAP SERPL CALCULATED.3IONS-SCNC: 10 MMOL/L (ref 5–15)
APTT PPP: 26.2 SECONDS (ref 24.5–36)
BASOPHILS # BLD AUTO: 0.05 10*3/MM3 (ref 0–0.2)
BASOPHILS NFR BLD AUTO: 0.8 % (ref 0–1.5)
BLD GP AB SCN SERPL QL: NEGATIVE
BUN SERPL-MCNC: 14 MG/DL (ref 8–23)
BUN/CREAT SERPL: 17.9 (ref 7–25)
CALCIUM SPEC-SCNC: 9.1 MG/DL (ref 8.6–10.5)
CHLORIDE SERPL-SCNC: 100 MMOL/L (ref 98–107)
CO2 SERPL-SCNC: 27 MMOL/L (ref 22–29)
CREAT SERPL-MCNC: 0.78 MG/DL (ref 0.76–1.27)
DEPRECATED RDW RBC AUTO: 47.9 FL (ref 37–54)
EGFRCR SERPLBLD CKD-EPI 2021: 90.2 ML/MIN/1.73
EOSINOPHIL # BLD AUTO: 0.23 10*3/MM3 (ref 0–0.4)
EOSINOPHIL NFR BLD AUTO: 3.6 % (ref 0.3–6.2)
ERYTHROCYTE [DISTWIDTH] IN BLOOD BY AUTOMATED COUNT: 14.2 % (ref 12.3–15.4)
GLUCOSE SERPL-MCNC: 101 MG/DL (ref 65–99)
HCT VFR BLD AUTO: 38 % (ref 37.5–51)
HGB BLD-MCNC: 12.1 G/DL (ref 13–17.7)
IMM GRANULOCYTES # BLD AUTO: 0.02 10*3/MM3 (ref 0–0.05)
IMM GRANULOCYTES NFR BLD AUTO: 0.3 % (ref 0–0.5)
INR PPP: 1.03 (ref 0.91–1.09)
LYMPHOCYTES # BLD AUTO: 1.71 10*3/MM3 (ref 0.7–3.1)
LYMPHOCYTES NFR BLD AUTO: 26.8 % (ref 19.6–45.3)
MCH RBC QN AUTO: 29.8 PG (ref 26.6–33)
MCHC RBC AUTO-ENTMCNC: 31.8 G/DL (ref 31.5–35.7)
MCV RBC AUTO: 93.6 FL (ref 79–97)
MONOCYTES # BLD AUTO: 0.4 10*3/MM3 (ref 0.1–0.9)
MONOCYTES NFR BLD AUTO: 6.3 % (ref 5–12)
NEUTROPHILS NFR BLD AUTO: 3.98 10*3/MM3 (ref 1.7–7)
NEUTROPHILS NFR BLD AUTO: 62.2 % (ref 42.7–76)
NRBC BLD AUTO-RTO: 0 /100 WBC (ref 0–0.2)
PLATELET # BLD AUTO: 188 10*3/MM3 (ref 140–450)
PMV BLD AUTO: 11.1 FL (ref 6–12)
POTASSIUM SERPL-SCNC: 4.6 MMOL/L (ref 3.5–5.2)
PROTHROMBIN TIME: 14 SECONDS (ref 11.8–14.8)
QT INTERVAL: 444 MS
QTC INTERVAL: 432 MS
RBC # BLD AUTO: 4.06 10*6/MM3 (ref 4.14–5.8)
RH BLD: NEGATIVE
SODIUM SERPL-SCNC: 137 MMOL/L (ref 136–145)
T&S EXPIRATION DATE: NORMAL
WBC NRBC COR # BLD AUTO: 6.39 10*3/MM3 (ref 3.4–10.8)

## 2025-04-11 PROCEDURE — 80048 BASIC METABOLIC PNL TOTAL CA: CPT

## 2025-04-11 PROCEDURE — 36415 COLL VENOUS BLD VENIPUNCTURE: CPT

## 2025-04-11 PROCEDURE — 86901 BLOOD TYPING SEROLOGIC RH(D): CPT | Performed by: NURSE PRACTITIONER

## 2025-04-11 PROCEDURE — 85610 PROTHROMBIN TIME: CPT

## 2025-04-11 PROCEDURE — 86850 RBC ANTIBODY SCREEN: CPT | Performed by: NURSE PRACTITIONER

## 2025-04-11 PROCEDURE — 85025 COMPLETE CBC W/AUTO DIFF WBC: CPT

## 2025-04-11 PROCEDURE — 93005 ELECTROCARDIOGRAM TRACING: CPT

## 2025-04-11 PROCEDURE — 86900 BLOOD TYPING SEROLOGIC ABO: CPT | Performed by: NURSE PRACTITIONER

## 2025-04-11 PROCEDURE — 85730 THROMBOPLASTIN TIME PARTIAL: CPT

## 2025-04-11 NOTE — DISCHARGE INSTRUCTIONS

## 2025-04-11 NOTE — H&P
4/11/2025          Preethi Mccullough APRN  [address] [place] [address] [place] [address]        Manjit Seth  1945          Chief Complaint   Patient presents with    Follow-up       2 week follow up w/ testing. Last seen 9/3/24. Patient denies any changes in legs since last visit.          Dear Preethi Mccullough APRN      HPI  I had the pleasure of seeing your patient Manjit Seth in the office today.   As you recall, Manjit Seth is a 78 y.o.  male who you are currently following for routine health maintenance.  He has complaints of heaviness and aching to the left leg.  He does feel like his leg wants to give out on him.  He is maintained on aspirin and Zocor.  He did have noninvasive testing performed today, which I did review in office.       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    Hemifacial spasm     Left side    History of radiation therapy 03/28/2016    4000 cGy to nose completed on 3/28/16    History of tobacco abuse     Hyperlipidemia     Hypertension     Lung cancer 12/27/2024    upper lobe right lung    Nosebleed occasional post radiation    PAC (premature atrial contraction)     PAD (peripheral artery disease)       Past Surgical History:   Procedure Laterality Date    ANTERIOR CERVICAL DISCECTOMY W/ FUSION N/A 01/16/2019    Procedure: CERVICAL DISCECTOMY ANTERIOR WITH FUSION C3-4,4-5, 5-6 ACDF with neuromonitoring and 1 C-arm;  Surgeon: Pablito Islas MD;  Location:  [facility] OR;  Service: Neurosurgery    BRONCHOSCOPY N/A 11/29/2024    Procedure: BRONCHOSCOPY, THORACOSCOPY, RIGHT UPPER LOBE WEDGE RESECTION, RIGHT MIDDLE LOBE WEDGE RESECTION, PLEURAL BIOPSY;  Surgeon: Mat Valenzuela MD;  Location:  [facility] OR;  Service:  Cardiothoracic;  Laterality: N/A;    BRONCHOSCOPY N/A 2025    Procedure: BRONCHOSCOPY WITH ENDOBRONCHIAL ULTRASOUND, BIOPSY, TRANSBRONCHIAL NEEDLE ASPIRATE;  Surgeon: Peter Griffith MD;  Location: [facility] OR;  Service: Pulmonary;  Laterality: N/A;  pre: malignant neoplasm of RUL   post: same    BRONCHOSCOPY      CATARACT EXTRACTION Bilateral     COLONOSCOPY      NASAL ENDOSCOPY      bilateral with excisional biopsy of left intranasal mass 16    NOSE SURGERY      removal of plasmacytoma    SPINAL FUSION  3 level acdf        THORACOSCOPY Right 2024    Procedure: THORACOSCOPY;  Surgeon: Mat Valenzuela MD;  Location:  [facility] OR;  Service: Cardiothoracic;  Laterality: Right;     Social History     Socioeconomic History    Marital status:    Tobacco Use    Smoking status: Former     Current packs/day: 0.00     Average packs/day: 1 pack/day for 10.0 years (10.0 ttl pk-yrs)     Types: Cigarettes     Start date: 1970     Quit date: 1977     Years since quittin.3    Smokeless tobacco: Current     Types: Chew    Tobacco comments:     dip when fishing   Vaping Use    Vaping status: Never Used   Substance and Sexual Activity    Alcohol use: No    Drug use: No    Sexual activity: Defer     Family History   Problem Relation Age of Onset    Breast cancer Mother     Cancer Mother         breast cancer    Heart attack Mother     Heart failure Father     Diabetes Father     Hypertension Father     Cancer Maternal Uncle         pancreatic cancer     Allergies   Allergen Reactions    Other Dermatitis and Rash      - WHITE SURGICAL TAPE -      Current Outpatient Medications   Medication Instructions    ASPIRIN 81 PO Take 81 mg by mouth Daily. self    atenolol (TENORMIN) 50 mg, Every Morning    Cranberry 400 MG capsule 1 capsule, Daily    finasteride (PROSCAR) 5 mg, Daily    fish oil 1,000 mg, Daily With Breakfast    Garlic 10 mg, Daily    lisinopril (PRINIVIL,ZESTRIL) 5 mg, Daily     "Magnesium 250 MG tablet 1 tablet, Daily    metFORMIN (GLUCOPHAGE) 500 mg, 2 Times Daily With Meals    Multiple Vitamins-Minerals (MULTIVITAMIN ADULT PO) 1 tablet, Daily    mupirocin (BACTROBAN) 2 % ointment Topical, 3 Times Daily    simvastatin (ZOCOR) 40 mg, Nightly    triamterene-hydrochlorothiazide (MAXZIDE-25) 37.5-25 MG per tablet 1 tablet, Daily    Vitamin D2 400 Units, Daily        Review of Systems   Constitutional: Negative.    HENT: Negative.     Eyes: Negative.    Respiratory: Negative.     Cardiovascular: Negative.         Claudication   Gastrointestinal: Negative.    Endocrine: Negative.    Genitourinary: Negative.    Musculoskeletal:  Positive for arthralgias and back pain.   Skin: Negative.    Allergic/Immunologic: Negative.    Neurological: Negative.    Hematological: Negative.    Psychiatric/Behavioral: Negative.     All other systems reviewed and are negative.     /58   Pulse 65   Ht 175.3 cm (69\")   Wt 95.3 kg (210 lb)   SpO2 100%   BMI 31.01 kg/m²      Physical Exam  Vitals and nursing note reviewed.   Constitutional:       Appearance: Normal appearance. He is well-developed. He is obese.   HENT:      Head: Normocephalic and atraumatic.   Eyes:      General: No scleral icterus.     Pupils: Pupils are equal, round, and reactive to light.   Neck:      Thyroid: No thyromegaly.      Vascular: No carotid bruit or JVD.   Cardiovascular:      Rate and Rhythm: Normal rate and regular rhythm.      Pulses:           Carotid pulses are 2+ on the right side and 2+ on the left side.       Femoral pulses are 2+ on the right side and 2+ on the left side.       Dorsalis pedis pulses are 1+ on the right side and detected w/ Doppler on the left side.        Posterior tibial pulses are 2+ on the right side and detected w/ Doppler on the left side.      Heart sounds: Normal heart sounds.   Pulmonary:      Effort: Pulmonary effort is normal.      Breath sounds: Normal breath sounds.   Abdominal:      " General: Bowel sounds are normal. There is no distension or abdominal bruit.      Palpations: Abdomen is soft. There is no mass.      Tenderness: There is no abdominal tenderness.   Musculoskeletal:         General: Normal range of motion.      Cervical back: Neck supple.   Lymphadenopathy:      Cervical: No cervical adenopathy.   Skin:     General: Skin is warm and dry.   Neurological:      Mental Status: He is alert and oriented to person, place, and time.      Cranial Nerves: No cranial nerve deficit.      Sensory: No sensory deficit.   Psychiatric:         Mood and Affect: Mood normal.         Behavior: Behavior normal.         Thought Content: Thought content normal.         Judgment: Judgment normal.          Diagnostic Data:  CT Angio Abdominal Aorta Bilateral Iliofem Runoff     Result Date: 9/17/2024  Narrative: EXAM: CT ANGIO ABDOMINAL AORTA BILAT ILIOFEM RUNOFF- - 9/17/2024 9:09 AM  HISTORY: aortoiliac disease/femoral disease   COMPARISON: None.  DOSE LENGTH PRODUCT: 710.48 mGy.cm mGy cm. Automatic exposure control was utilized to make radiation dose as low as reasonably achievable.  TECHNIQUE: Enhanced  axial images of the abdomen, pelvis and bilateral lower extremities obtained with multiplanar reformats. 3D postprocessing, including MIPs, performed and images saved to PACS.  FINDINGS: VISUALIZED CHEST: Motion limited evaluation of the lung bases. No pleural or pericardial effusion. Prominent inferior heart size. Scattered coronary artery calcifications.  Arterial phase of imaging limits solid organ evaluation.  LIVER: No focally suspicious finding.  BILIARY: No calcified gallstone. No intrahepatic or extrahepatic bile duct dilation.  PANCREAS: 7 mm circumscribed hypodensity in the proximal pancreas on axial series 5, image 75, statistically favored to be a intraductal papillary mucinous neoplasm (IPMN) or pseudocyst.  SPLEEN: Normal size and contour.  ADRENAL: Normal appearance of the bilateral adrenal  glands.  GENITOURINARY: No hydronephrosis, urolithiasis or solid renal lesion. Bilateral renal cortical cysts without complicating features. Diffuse thickening of the urinary bladder, which could be seen with cystitis or chronic bladder outlet obstruction. Markedly enlarged prostate measures 7 cm in transverse dimension.  PERITONEUM: No free air or ascites.  GI TRACT: Small hiatal hernia. Otherwise normal appearance of the stomach and duodenum. No abnormally dilated loops of bowel or bowel wall thickening. A few colonic diverticula. Normal appendix on axial series 5, images 197-176.  VESSELS: Aorta. Normal in course and caliber with calcified and noncalcified atherosclerosis. Celiac, conventional hepatic, splenic, superior mesenteric, bilateral single renal and inferior mesenteric arteries are patent and normal caliber. No critical stenosis, dissection or aneurysm.  RIGHT. Calcified atherosclerosis. RIGHT Common iliac, internal iliac, external iliac, common femoral, deep and superficial femoral arteries are patent with multifocal mild stenosis. RIGHT popliteal artery with short segment 50 percent stenosis on axial series 7, image 273. Short segment severe appearing stenosis of the tibial peroneal trunk on axial series 7, images 326-333. RIGHT anterior tibial artery with patchy opacity to the mid calf. RIGHT peroneal artery patent to the ankle. RIGHT posterior tibial artery patent into the foot.  LEFT. Common iliac, internal iliac, external iliac, arteries patent with calcified atherosclerosis. Severe short segment stenosis of the LEFT common femoral and superficial femoral arteries on axial series 7, image . Deep femoral artery patent. Multifocal mild and moderate stenosis of the distal superficial femoral artery. Short segment severe stenosis of the LEFT popliteal artery on axial series 7, image 296-3 11. LEFT anterior tibial artery patent to the mid calf. LEFT peroneal artery patent to the ankle. LEFT  "posterior tibial artery patent into the foot.  RETROPERITONEUM: No mass, lymphadenopathy or hemorrhage.  SOFT TISSUES: Intramuscular lipoma at the LEFT upper medial thigh. Otherwise normal appearance of the overlying abdominal soft tissues.  BONES: No acute or aggressive bony lesion.        Impression: 1. Bilateral lower extremity multifocal stenoses as detailed above.  2. Single vessel runoff into the feet bilaterally (bilateral posterior tibial arteries).  3. Diffuse thickening of the urinary bladder, which can be seen with cystitis or chronic bladder outlet obstruction. Correlate with patient symptoms. Markedly enlarged prostate measuring 7 cm in transverse dimension.  4. 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 Recommend follow-up MRI abdomen without and with contrast in 1 year per ACR incidental findings recommendations.  Exam was tagged in PACS with \"incidental findings\" to assist in appropriate follow up (impression point 4 pancreatic lesion).  This report was signed and finalized on 9/17/2024 2:38 PM by Dr Aleena Constantino MD.       US outside films     Result Date: 8/29/2024  Narrative: This procedure was auto-finalized with no dictation required.           Problem List       Patient Active Problem List   Diagnosis    Extramedullary plasmacytoma- left nose    History of radiation therapy- Left Nose    Encounter for aftercare    Hypertension    Neoplasm of uncertain behavior    Spinal stenosis in cervical region    Intervertebral cervical disc disorder with myelopathy, cervical region    Cervical radiculopathy    BMI 26.0-26.9,adult    Snuff user    Cervical spondylosis with myelopathy    Degeneration of cervical intervertebral disc    Abnormal ECG    PAC (premature atrial contraction)    BMI 29.0-29.9,adult    Benign prostatic hyperplasia with urinary obstruction    Raised prostate specific antigen    History of tobacco abuse "    Anterior epistaxis    Tinnitus aurium, left    Neoplasm of uncertain behavior of skin of nose    Actinic keratosis              Diagnosis Plan   1. PAD (peripheral artery disease)  CBC and Differential     Basic metabolic panel     APTT     Protime-INR     XR chest 2 vw     ECG 12 Lead     Case Request     ceFAZolin (ANCEF) 2,000 mg in sodium chloride 0.9 % 100 mL IVPB     Case Request       2. Preop testing  CBC and Differential     Basic metabolic panel     APTT     Protime-INR     XR chest 2 vw     ECG 12 Lead     Case Request     ceFAZolin (ANCEF) 2,000 mg in sodium chloride 0.9 % 100 mL IVPB     Case Request       3. Encounter for monitoring antiplatelet therapy  APTT                Plan: After thoroughly evaluating Manjit Seth, I believe the best course of action is to proceed with a left lower extremity angiogram.  I did review her testing as well as review with Dr. Bal and I believe we will be able to get catheters through femoral stenosis and will not require femoral endarterectomy.  We did again discuss that some of his symptoms may be neurogenic in nature and will not fix all of his discomfort but should improve from a vascular standpoint.  Risks of angiogram were discussed.  These include, but are not limited to, bleeding, infection, vessel damage, nerve damage, embolus, and loss of limb.  The patient understands these risks and wishes to proceed with procedure.  He did have some incidental findings regarding enlarged prostate as well as proximal pancreas hypodensity and radiology recommending MRI of the abdomen with and without contrast for follow-up.  We will contact primary care provider to have them follow with these findings. I did discuss vascular risk factors as they pertain to the progression of vascular disease including controlling his hypertension which is stable on his current medications.  He will continue aspirin 81 mg daily and Zocor 40 mg daily in addition to his other  medications.   This was all discussed in full with complete understanding.  Thank you for allowing me to participate in the care of your patient.  Please do not hesitate to call with any questions or concerns.  We will keep you aware of any further encounters with Manjit Seth.         Sincerely yours,         Richard Bal, Preethi Clark APRN

## 2025-04-11 NOTE — TELEPHONE ENCOUNTER
Spoke with patient Your surgery is scheduled for 04/14/2025, you need to be at the hospital at 6:00 am.  Nothing to eat or drink after midnight the night before your procedure. Patient  will continue to take Zocor,Aspirin uninterrupted prior to surgery.  Your pre work is scheduled for 04/11/2025, you need to be at the hospital at 9:00 am.  You do not need to fast before your pre work. Patient verbalized understanding.

## 2025-04-14 ENCOUNTER — HOSPITAL ENCOUNTER (OUTPATIENT)
Facility: HOSPITAL | Age: 80
Setting detail: HOSPITAL OUTPATIENT SURGERY
Discharge: HOME | End: 2025-04-14
Attending: SURGERY | Admitting: SURGERY
Payer: MEDICARE

## 2025-04-14 ENCOUNTER — ANESTHESIA EVENT (OUTPATIENT)
Dept: PERIOP | Facility: HOSPITAL | Age: 80
End: 2025-04-14
Payer: MEDICARE

## 2025-04-14 ENCOUNTER — ANESTHESIA (OUTPATIENT)
Dept: PERIOP | Facility: HOSPITAL | Age: 80
End: 2025-04-14
Payer: MEDICARE

## 2025-04-14 ENCOUNTER — APPOINTMENT (OUTPATIENT)
Dept: INTERVENTIONAL RADIOLOGY/VASCULAR | Facility: HOSPITAL | Age: 80
End: 2025-04-14
Payer: MEDICARE

## 2025-04-14 VITALS
SYSTOLIC BLOOD PRESSURE: 112 MMHG | OXYGEN SATURATION: 96 % | TEMPERATURE: 98.6 F | RESPIRATION RATE: 18 BRPM | HEART RATE: 55 BPM | DIASTOLIC BLOOD PRESSURE: 57 MMHG

## 2025-04-14 DIAGNOSIS — Z01.818 PREOP TESTING: ICD-10-CM

## 2025-04-14 DIAGNOSIS — I73.9 PAD (PERIPHERAL ARTERY DISEASE): ICD-10-CM

## 2025-04-14 LAB
GLUCOSE BLDC GLUCOMTR-MCNC: 119 MG/DL (ref 70–130)
GLUCOSE BLDC GLUCOMTR-MCNC: 123 MG/DL (ref 70–130)

## 2025-04-14 PROCEDURE — 75625 CONTRAST EXAM ABDOMINL AORTA: CPT

## 2025-04-14 PROCEDURE — C1894 INTRO/SHEATH, NON-LASER: HCPCS | Performed by: SURGERY

## 2025-04-14 PROCEDURE — 25510000001 IOPAMIDOL 61 % SOLUTION: Performed by: SURGERY

## 2025-04-14 PROCEDURE — C1887 CATHETER, GUIDING: HCPCS | Performed by: SURGERY

## 2025-04-14 PROCEDURE — 75710 ARTERY X-RAYS ARM/LEG: CPT | Performed by: SURGERY

## 2025-04-14 PROCEDURE — 25010000002 HEPARIN (PORCINE) PER 1000 UNITS

## 2025-04-14 PROCEDURE — 75625 CONTRAST EXAM ABDOMINL AORTA: CPT | Performed by: SURGERY

## 2025-04-14 PROCEDURE — 25010000002 BUPIVACAINE (PF) 0.5 % SOLUTION: Performed by: SURGERY

## 2025-04-14 PROCEDURE — C1769 GUIDE WIRE: HCPCS | Performed by: SURGERY

## 2025-04-14 PROCEDURE — 25010000002 FENTANYL CITRATE (PF) 50 MCG/ML SOLUTION: Performed by: ANESTHESIOLOGY

## 2025-04-14 PROCEDURE — 75710 ARTERY X-RAYS ARM/LEG: CPT

## 2025-04-14 PROCEDURE — 82948 REAGENT STRIP/BLOOD GLUCOSE: CPT

## 2025-04-14 PROCEDURE — 25010000002 LIDOCAINE PF 2% 2 % SOLUTION

## 2025-04-14 PROCEDURE — C1760 CLOSURE DEV, VASC: HCPCS | Performed by: SURGERY

## 2025-04-14 PROCEDURE — 76000 FLUOROSCOPY <1 HR PHYS/QHP: CPT

## 2025-04-14 PROCEDURE — 25810000003 LACTATED RINGERS PER 1000 ML: Performed by: SURGERY

## 2025-04-14 PROCEDURE — 25010000002 HEPARIN (PORCINE) PER 1000 UNITS: Performed by: SURGERY

## 2025-04-14 PROCEDURE — 36245 INS CATH ABD/L-EXT ART 1ST: CPT | Performed by: SURGERY

## 2025-04-14 PROCEDURE — 25010000002 PROPOFOL 1000 MG/100ML EMULSION

## 2025-04-14 PROCEDURE — 25010000002 CEFAZOLIN PER 500 MG: Performed by: NURSE PRACTITIONER

## 2025-04-14 PROCEDURE — 25010000002 ONDANSETRON PER 1 MG

## 2025-04-14 RX ORDER — ONDANSETRON 2 MG/ML
4 INJECTION INTRAMUSCULAR; INTRAVENOUS ONCE AS NEEDED
Status: DISCONTINUED | OUTPATIENT
Start: 2025-04-14 | End: 2025-04-14 | Stop reason: HOSPADM

## 2025-04-14 RX ORDER — BUPIVACAINE HYDROCHLORIDE 5 MG/ML
INJECTION, SOLUTION EPIDURAL; INTRACAUDAL; PERINEURAL AS NEEDED
Status: DISCONTINUED | OUTPATIENT
Start: 2025-04-14 | End: 2025-04-14 | Stop reason: HOSPADM

## 2025-04-14 RX ORDER — ONDANSETRON 2 MG/ML
INJECTION INTRAMUSCULAR; INTRAVENOUS AS NEEDED
Status: DISCONTINUED | OUTPATIENT
Start: 2025-04-14 | End: 2025-04-14 | Stop reason: SURG

## 2025-04-14 RX ORDER — IBUPROFEN 600 MG/1
600 TABLET, FILM COATED ORAL EVERY 6 HOURS PRN
Status: DISCONTINUED | OUTPATIENT
Start: 2025-04-14 | End: 2025-04-14 | Stop reason: HOSPADM

## 2025-04-14 RX ORDER — SODIUM CHLORIDE, SODIUM LACTATE, POTASSIUM CHLORIDE, CALCIUM CHLORIDE 600; 310; 30; 20 MG/100ML; MG/100ML; MG/100ML; MG/100ML
1000 INJECTION, SOLUTION INTRAVENOUS CONTINUOUS
Status: DISCONTINUED | OUTPATIENT
Start: 2025-04-14 | End: 2025-04-14 | Stop reason: HOSPADM

## 2025-04-14 RX ORDER — HYDROCODONE BITARTRATE AND ACETAMINOPHEN 10; 325 MG/1; MG/1
1 TABLET ORAL EVERY 4 HOURS PRN
Status: DISCONTINUED | OUTPATIENT
Start: 2025-04-14 | End: 2025-04-14 | Stop reason: HOSPADM

## 2025-04-14 RX ORDER — NALOXONE HCL 0.4 MG/ML
0.4 VIAL (ML) INJECTION AS NEEDED
Status: DISCONTINUED | OUTPATIENT
Start: 2025-04-14 | End: 2025-04-14 | Stop reason: HOSPADM

## 2025-04-14 RX ORDER — IOPAMIDOL 612 MG/ML
INJECTION, SOLUTION INTRAVASCULAR AS NEEDED
Status: DISCONTINUED | OUTPATIENT
Start: 2025-04-14 | End: 2025-04-14 | Stop reason: HOSPADM

## 2025-04-14 RX ORDER — DEXTROSE MONOHYDRATE 25 G/50ML
12.5 INJECTION, SOLUTION INTRAVENOUS AS NEEDED
Status: DISCONTINUED | OUTPATIENT
Start: 2025-04-14 | End: 2025-04-14 | Stop reason: HOSPADM

## 2025-04-14 RX ORDER — SODIUM CHLORIDE 0.9 % (FLUSH) 0.9 %
3 SYRINGE (ML) INJECTION AS NEEDED
Status: DISCONTINUED | OUTPATIENT
Start: 2025-04-14 | End: 2025-04-14 | Stop reason: HOSPADM

## 2025-04-14 RX ORDER — LABETALOL HYDROCHLORIDE 5 MG/ML
5 INJECTION, SOLUTION INTRAVENOUS
Status: DISCONTINUED | OUTPATIENT
Start: 2025-04-14 | End: 2025-04-14 | Stop reason: HOSPADM

## 2025-04-14 RX ORDER — FENTANYL CITRATE 50 UG/ML
25 INJECTION, SOLUTION INTRAMUSCULAR; INTRAVENOUS
Status: DISCONTINUED | OUTPATIENT
Start: 2025-04-14 | End: 2025-04-14 | Stop reason: HOSPADM

## 2025-04-14 RX ORDER — LIDOCAINE HYDROCHLORIDE 10 MG/ML
0.5 INJECTION, SOLUTION EPIDURAL; INFILTRATION; INTRACAUDAL; PERINEURAL ONCE AS NEEDED
Status: DISCONTINUED | OUTPATIENT
Start: 2025-04-14 | End: 2025-04-14 | Stop reason: HOSPADM

## 2025-04-14 RX ORDER — SODIUM CHLORIDE 0.9 % (FLUSH) 0.9 %
10 SYRINGE (ML) INJECTION EVERY 12 HOURS SCHEDULED
Status: DISCONTINUED | OUTPATIENT
Start: 2025-04-14 | End: 2025-04-14 | Stop reason: HOSPADM

## 2025-04-14 RX ORDER — PROPOFOL 10 MG/ML
INJECTION, EMULSION INTRAVENOUS AS NEEDED
Status: DISCONTINUED | OUTPATIENT
Start: 2025-04-14 | End: 2025-04-14 | Stop reason: SURG

## 2025-04-14 RX ORDER — HYDROCODONE BITARTRATE AND ACETAMINOPHEN 5; 325 MG/1; MG/1
1 TABLET ORAL ONCE AS NEEDED
Refills: 0 | Status: DISCONTINUED | OUTPATIENT
Start: 2025-04-14 | End: 2025-04-14 | Stop reason: HOSPADM

## 2025-04-14 RX ORDER — LIDOCAINE HYDROCHLORIDE 20 MG/ML
INJECTION, SOLUTION EPIDURAL; INFILTRATION; INTRACAUDAL; PERINEURAL AS NEEDED
Status: DISCONTINUED | OUTPATIENT
Start: 2025-04-14 | End: 2025-04-14 | Stop reason: SURG

## 2025-04-14 RX ORDER — HEPARIN SODIUM 1000 [USP'U]/ML
INJECTION, SOLUTION INTRAVENOUS; SUBCUTANEOUS AS NEEDED
Status: DISCONTINUED | OUTPATIENT
Start: 2025-04-14 | End: 2025-04-14 | Stop reason: SURG

## 2025-04-14 RX ORDER — FENTANYL CITRATE 50 UG/ML
50 INJECTION, SOLUTION INTRAMUSCULAR; INTRAVENOUS
Status: COMPLETED | OUTPATIENT
Start: 2025-04-14 | End: 2025-04-14

## 2025-04-14 RX ORDER — FLUMAZENIL 0.1 MG/ML
0.2 INJECTION INTRAVENOUS AS NEEDED
Status: DISCONTINUED | OUTPATIENT
Start: 2025-04-14 | End: 2025-04-14 | Stop reason: HOSPADM

## 2025-04-14 RX ORDER — SODIUM CHLORIDE 0.9 % (FLUSH) 0.9 %
10 SYRINGE (ML) INJECTION AS NEEDED
Status: DISCONTINUED | OUTPATIENT
Start: 2025-04-14 | End: 2025-04-14 | Stop reason: HOSPADM

## 2025-04-14 RX ORDER — HYDROCODONE BITARTRATE AND ACETAMINOPHEN 5; 325 MG/1; MG/1
1 TABLET ORAL EVERY 4 HOURS PRN
Status: DISCONTINUED | OUTPATIENT
Start: 2025-04-14 | End: 2025-04-14 | Stop reason: HOSPADM

## 2025-04-14 RX ADMIN — LIDOCAINE HYDROCHLORIDE 100 MG: 20 INJECTION, SOLUTION EPIDURAL; INFILTRATION; INTRACAUDAL; PERINEURAL at 10:02

## 2025-04-14 RX ADMIN — SODIUM CHLORIDE, POTASSIUM CHLORIDE, SODIUM LACTATE AND CALCIUM CHLORIDE 1000 ML: 600; 310; 30; 20 INJECTION, SOLUTION INTRAVENOUS at 07:17

## 2025-04-14 RX ADMIN — PROPOFOL 30 MG: 10 INJECTION, EMULSION INTRAVENOUS at 10:03

## 2025-04-14 RX ADMIN — PROPOFOL 20 MG: 10 INJECTION, EMULSION INTRAVENOUS at 10:24

## 2025-04-14 RX ADMIN — ONDANSETRON 4 MG: 2 INJECTION INTRAMUSCULAR; INTRAVENOUS at 10:43

## 2025-04-14 RX ADMIN — PROPOFOL 20 MG: 10 INJECTION, EMULSION INTRAVENOUS at 10:27

## 2025-04-14 RX ADMIN — CEFAZOLIN 2000 MG: 2 INJECTION, POWDER, FOR SOLUTION INTRAMUSCULAR; INTRAVENOUS at 10:04

## 2025-04-14 RX ADMIN — FENTANYL CITRATE 50 MCG: 50 INJECTION, SOLUTION INTRAMUSCULAR; INTRAVENOUS at 11:10

## 2025-04-14 RX ADMIN — FENTANYL CITRATE 50 MCG: 50 INJECTION, SOLUTION INTRAMUSCULAR; INTRAVENOUS at 11:20

## 2025-04-14 RX ADMIN — HYDROCODONE BITARTRATE AND ACETAMINOPHEN 1 TABLET: 10; 325 TABLET ORAL at 11:10

## 2025-04-14 RX ADMIN — HEPARIN SODIUM 1000 UNITS: 1000 INJECTION, SOLUTION INTRAVENOUS; SUBCUTANEOUS at 10:29

## 2025-04-14 RX ADMIN — PROPOFOL 150 MCG/KG/MIN: 10 INJECTION, EMULSION INTRAVENOUS at 10:02

## 2025-04-14 NOTE — OP NOTE
Manjit Seth  4/14/2025     PREOPERATIVE DIAGNOSIS: PAD (peripheral artery disease) [I73.9]  Preop testing [Z01.818]     POSTOPERATIVE DIAGNOSIS: Post-Op Diagnosis Codes:     * PAD (peripheral artery disease) [I73.9]     * Preop testing [Z01.818]     PROCEDURE PERFORMED:   1.  Introduction of catheter/sheath into the aorta  2.  Aortoiliac angiogram   3.  Contralateral cannulation of left common iliac artery  4.  Left lower extremity angiogram with radiographic supervision and interpretation  5.  Mynx closure of the right common femoral artery       SURGEON: Richard Bal DO      ANESTHESIA: MAC    PREPARATION: Routine.    STAFF: Circulator: Enedelia Davis RN  Scrub Person: Mago Jauregui; Champ Salmeron  Assistant: Shelby Negro  Orientee: Bridget Bruno RNA  Vascular Radiology Technician: Veronica Krueger    Estimated Blood Loss: minimal    SPECIMENS: None    COMPLICATIONS: None    INDICATIONS: Manjit Seth is a 79 y.o. male who you are currently following for routine health maintenance. He has complaints of heaviness and aching to the left leg. He does feel like his leg wants to give out on him. He is maintained on aspirin and Zocor. He did have noninvasive testing performed today, which I did review in office. The indications, risks, and possible complications of the procedure were explained to the patient, who voiced understanding and wished to proceed with surgery.     PROCEDURE IN DETAIL: The patient was taken to the operating room and placed on the operating table in a supine position. After MAC anesthesia was obtained, the bilateral groins was prepped and draped in a sterile manner.  5 cc 0.5% Marcaine plain was used infiltrate the right groin for local anesthesia.  Using a micropuncture technique the right common femoral artery was cannulated and a micro sheath was placed.   the advantage Glidewire was advanced to the aorta and a short 6 Swiss sheath was placed.  The patient was  given 1000 units of intravenous heparin.  The Omni Flush catheter was advanced into the aorta and an aortoiliac angiogram was performed.  Findings are as follows:   Patent renal arteries bilaterally without stenosis   Patent aorta without stenosis   Patent iliac systems bilaterally without stenosis     Contralateral cannulation was established of the left common iliac artery.  The catheter was then brought down to the level of the femoral head.  An angiogram with runoff was performed.  Findings are as follows:   Patent common femoral artery with significant intraluminal plaque burden present   Patent profunda femoris artery approximately 2 to 3 cm past the takeoff and then complete occlusion appears apparent   Patent SFA and popliteal arteries stenosis   Patent 1.5 vessel runoff to the foot via the posterior tibial and peroneal arteries.  The anterior tibial artery is occluded from just past the takeoff.      At this point, decision was made to terminate the procedure.  The patient will need a left common femoral endarterectomy with bovine patch angioplasty and eversion profundoplasty for revascularization at a later date.  The sheath and wire were removed and a minx device was used to seal off the right common femoral artery.  Direct pressure was held for an additional 10 to 15 minutes to help ensure hemostasis.  Sterile dressings were applied. The patient tolerated the procedure well. Sponge and needle counts were correct. The patient was then awakened in the operating room and taken to the recovery room in good condition.    Richard Bal,   Date: 4/14/2025 Time: 10:45 CDT    CC:Preethi Mccullough APRN

## 2025-04-14 NOTE — ANESTHESIA POSTPROCEDURE EVALUATION
Patient: Manjit Seth    Procedure Summary       Date: 04/14/25 Room / Location:  [facility]  /  [facility]    Anesthesia Start: 0956 Anesthesia Stop: 1101    Procedure: LEFT LOWER EXTREMITY ANGIOGRAM (Left: Groin) Diagnosis:       PAD (peripheral artery disease)      Preop testing      (PAD (peripheral artery disease) [I73.9])      (Preop testing [Z01.818])    Surgeons: Richard Bal DO Provider: Renee Salazar CRNA    Anesthesia Type: MAC ASA Status: 3            Anesthesia Type: MAC    Vitals  Vitals Value Taken Time   /57 04/14/25 11:50   Temp 98.6 °F (37 °C) 04/14/25 11:50   Pulse 55 04/14/25 11:55   Resp 14 04/14/25 11:50   SpO2 91 % 04/14/25 11:55   Vitals shown include unfiled device data.        Post Anesthesia Care and Evaluation    Patient location during evaluation: PHASE II  Patient participation: complete - patient participated  Level of consciousness: awake and awake and alert  Pain score: 0  Pain management: adequate    Airway patency: patent  Anesthetic complications: No anesthetic complications  PONV Status: none  Cardiovascular status: acceptable  Respiratory status: acceptable  Hydration status: acceptable    Comments: Patient discharged according to acceptable Popeye score per RN assessment. See nursing records for further information.     Blood pressure 115/56, pulse 56, temperature 98.6 °F (37 °C), temperature source Temporal, resp. rate 16, SpO2 91%.

## 2025-04-14 NOTE — ANESTHESIA PREPROCEDURE EVALUATION
Anesthesia Evaluation     Patient summary reviewed   no history of anesthetic complications:   NPO Solid Status: > 8 hours             Airway   Mallampati: II  Dental      Pulmonary    (+) lung cancer,  (-) COPD, asthma, sleep apnea, not a smoker  Cardiovascular   Exercise tolerance: good (4-7 METS)    (+) hypertension, hyperlipidemia  (-) pacemaker, past MI, angina, cardiac stents      Neuro/Psych  (-) seizures, TIA, CVA  GI/Hepatic/Renal/Endo    (+) obesity, diabetes mellitus  (-) GERD, liver disease, no renal disease    Musculoskeletal     Abdominal    Substance History      OB/GYN          Other                    Anesthesia Plan    ASA 3     MAC     intravenous induction     Anesthetic plan, risks, benefits, and alternatives have been provided, discussed and informed consent has been obtained with: patient.    CODE STATUS:

## 2025-04-15 LAB
QT INTERVAL: 444 MS
QTC INTERVAL: 432 MS

## 2025-04-28 ENCOUNTER — OFFICE VISIT (OUTPATIENT)
Dept: VASCULAR SURGERY | Facility: CLINIC | Age: 80
End: 2025-04-28
Payer: MEDICARE

## 2025-04-28 VITALS
SYSTOLIC BLOOD PRESSURE: 122 MMHG | DIASTOLIC BLOOD PRESSURE: 60 MMHG | BODY MASS INDEX: 31.71 KG/M2 | HEIGHT: 67 IN | HEART RATE: 70 BPM | WEIGHT: 202 LBS | OXYGEN SATURATION: 93 %

## 2025-04-28 DIAGNOSIS — I10 PRIMARY HYPERTENSION: ICD-10-CM

## 2025-04-28 DIAGNOSIS — Z79.02 ENCOUNTER FOR MONITORING ANTIPLATELET THERAPY: ICD-10-CM

## 2025-04-28 DIAGNOSIS — Z51.81 ENCOUNTER FOR MONITORING ANTIPLATELET THERAPY: ICD-10-CM

## 2025-04-28 DIAGNOSIS — I73.9 PAD (PERIPHERAL ARTERY DISEASE): Primary | ICD-10-CM

## 2025-04-28 DIAGNOSIS — E11.51 TYPE 2 DIABETES MELLITUS WITH DIABETIC PERIPHERAL ANGIOPATHY WITHOUT GANGRENE, WITHOUT LONG-TERM CURRENT USE OF INSULIN: ICD-10-CM

## 2025-04-28 DIAGNOSIS — Z01.818 PREOP TESTING: ICD-10-CM

## 2025-04-28 PROCEDURE — 1160F RVW MEDS BY RX/DR IN RCRD: CPT | Performed by: NURSE PRACTITIONER

## 2025-04-28 PROCEDURE — 3078F DIAST BP <80 MM HG: CPT | Performed by: NURSE PRACTITIONER

## 2025-04-28 PROCEDURE — 99214 OFFICE O/P EST MOD 30 MIN: CPT | Performed by: NURSE PRACTITIONER

## 2025-04-28 PROCEDURE — 3074F SYST BP LT 130 MM HG: CPT | Performed by: NURSE PRACTITIONER

## 2025-04-28 PROCEDURE — 1159F MED LIST DOCD IN RCRD: CPT | Performed by: NURSE PRACTITIONER

## 2025-04-28 NOTE — PROGRESS NOTES
"4/28/2025       Preethi Mccullough APRN  [address] [place] [address] [place] [address]      Manjit Seth  1945    Chief Complaint   Patient presents with    SET PAD     No issues since surgery, said he had no pain, soreness, or bruising.       Dear Preethi Mccullough APRN       I had the pleasure of seeing your patient Manjit Seth in the office today.   As you recall, Manjit Seth is a 79 y.o.  male who you are currently following for routine health maintenance.  He has complaints of heaviness and aching to the left leg.  He does feel like his leg wants to give out on him.  He did undergo a left lower extremity angiogram which noted significant plaque burden into the common femoral artery and complete occlusion of the profunda 2 to 3 cm past the takeoff.  He is going to need a left common femoral endarterectomy with bovine patch angioplasty and eversion profundoplasty for revascularization.  His groin has healed.  He is maintained on aspirin and Zocor.      Review of Systems   Constitutional: Negative.    HENT: Negative.     Eyes: Negative.    Respiratory: Negative.     Cardiovascular: Negative.         Claudication   Gastrointestinal: Negative.    Endocrine: Negative.    Genitourinary: Negative.    Musculoskeletal:  Positive for arthralgias and back pain.   Skin: Negative.    Allergic/Immunologic: Negative.    Neurological: Negative.    Hematological: Negative.    Psychiatric/Behavioral: Negative.     All other systems reviewed and are negative.    /60   Pulse 70   Ht 171 cm (67.32\")   Wt 91.6 kg (202 lb)   SpO2 93%   BMI 31.34 kg/m²     Physical Exam  Vitals and nursing note reviewed.   Constitutional:       Appearance: Normal appearance. He is well-developed. He is obese.   HENT:      Head: Normocephalic and atraumatic.   Eyes:      General: No scleral icterus.     Pupils: Pupils are equal, round, and reactive to light.   Neck:      Thyroid: No thyromegaly.      Vascular: No carotid " bruit or JVD.   Cardiovascular:      Rate and Rhythm: Normal rate and regular rhythm.      Pulses:           Carotid pulses are 2+ on the right side and 2+ on the left side.       Femoral pulses are 2+ on the right side and 2+ on the left side.       Dorsalis pedis pulses are 1+ on the right side and detected w/ Doppler on the left side.        Posterior tibial pulses are 2+ on the right side and detected w/ Doppler on the left side.      Heart sounds: Normal heart sounds.      Comments: Groin healed  Pulmonary:      Effort: Pulmonary effort is normal.      Breath sounds: Normal breath sounds.   Abdominal:      General: Bowel sounds are normal. There is no distension or abdominal bruit.      Palpations: Abdomen is soft. There is no mass.      Tenderness: There is no abdominal tenderness.   Musculoskeletal:         General: Normal range of motion.      Cervical back: Neck supple.   Lymphadenopathy:      Cervical: No cervical adenopathy.   Skin:     General: Skin is warm and dry.   Neurological:      Mental Status: He is alert and oriented to person, place, and time.      Cranial Nerves: No cranial nerve deficit.      Sensory: No sensory deficit.   Psychiatric:         Mood and Affect: Mood normal.         Behavior: Behavior normal.         Thought Content: Thought content normal.         Judgment: Judgment normal.        Diagnostic Data:  IR Angiogram Extremity  Result Date: 4/14/2025  Narrative: Performed by Dr. Bal in the OR. Please see operative dictation.  This report was signed and finalized on 4/14/2025 2:40 PM by Dr. Richard Bal MD.      FL C Arm During Surgery  Result Date: 4/14/2025  Narrative: This procedure was auto-finalized with no dictation required.           Patient Active Problem List   Diagnosis    Extramedullary plasmacytoma- left nose    History of radiation therapy- Left Nose    Encounter for aftercare    Hypertension    Neoplasm of uncertain behavior    Spinal stenosis in cervical  region    Intervertebral cervical disc disorder with myelopathy, cervical region    Cervical radiculopathy    Snuff user    Cervical spondylosis with myelopathy    Degeneration of cervical intervertebral disc    Abnormal ECG    PAC (premature atrial contraction)    Benign prostatic hyperplasia with urinary obstruction    Raised prostate specific antigen    History of tobacco abuse    Anterior epistaxis    Tinnitus aurium, left    Neoplasm of uncertain behavior of skin of nose    Actinic keratosis    PAD (peripheral artery disease)    Preop testing    Lung nodule    Type 2 diabetes mellitus, without long-term current use of insulin    Malignant neoplasm of upper lobe of right lung        Diagnosis Plan   1. PAD (peripheral artery disease)  CBC and Differential    Basic metabolic panel    APTT    Protime-INR    Case Request    ceFAZolin (ANCEF) 2,000 mg in sodium chloride 0.9 % 100 mL IVPB    Case Request      2. Preop testing  Follow Anesthesia Guidelines / Protocol    Follow Anesthesia Guidelines / Protocol    Clip groin    Provide NPO Instructions to Patient    Chlorhexidine Skin Prep    Instructions on coughing, deep breathing, and incentive spirometry.    CBC and Differential    Basic metabolic panel    APTT    Protime-INR    Case Request    Type & Screen    ceFAZolin (ANCEF) 2,000 mg in sodium chloride 0.9 % 100 mL IVPB    Case Request      3. Encounter for monitoring antiplatelet therapy  APTT      4. Primary hypertension        5. Type 2 diabetes mellitus with diabetic peripheral angiopathy without gangrene, without long-term current use of insulin                Plan: After thoroughly evaluating Manjit Seth, I believe the best course of action is to proceed with a left common femoral endarterectomy with bovine patch angioplasty and eversion profundoplasty for revascularization.  Risks/benefits were expanded great length to the patient which include but are not limited to bleeding, infection, vessel  damage, nerve damage, loss of limb, MI, stroke, and death.  I did discuss vascular risk factors as they pertain to the progression of vascular disease including controlling his hypertension and diabetes.  His blood pressure stable on his current medications.  He should continue his aspirin 81 mg daily and Zocor 40 mg nightly in addition to his other medications. Body mass index is 31.34 kg/m².   This was all discussed in full with complete understanding.  Thank you for allowing me to participate in the care of your patient.  Please do not hesitate to call with any questions or concerns.  We will keep you aware of any further encounters with Manjit Seth.        Sincerely yours,         Cherie Yung, EDWIN Mccullough, EDWIN Blank

## 2025-04-28 NOTE — LETTER
April 28, 2025     EDWIN Patiño  [address] [facility] [place] [address]    Patient: Manjit Seth   YOB: 1945   Date of Visit: 4/28/2025     Dear EDWIN Patiño:       Thank you for referring Manjit Seth to me for evaluation. Below are the relevant portions of my assessment and plan of care.    If you have questions, please do not hesitate to call me. I look forward to following Manjit along with you.         Sincerely,        EDWIN Bolivar        CC: No Recipients    Cherie Yung APRN  04/28/25 1625  Sign when Signing Visit  4/28/2025       Preethi Mccullough APRN  [address] [facility] [address]      Manjit Seth  1945    Chief Complaint   Patient presents with   • SET PAD     No issues since surgery, said he had no pain, soreness, or bruising.       Dear Preethi Mccullough APRN       I had the pleasure of seeing your patient Manjit Seth in the office today.   As you recall, Manjit Seth is a 79 y.o.  male who you are currently following for routine health maintenance.  He has complaints of heaviness and aching to the left leg.  He does feel like his leg wants to give out on him.  He did undergo a left lower extremity angiogram which noted significant plaque burden into the common femoral artery and complete occlusion of the profunda 2 to 3 cm past the takeoff.  He is going to need a left common femoral endarterectomy with bovine patch angioplasty and eversion profundoplasty for revascularization.  His groin has healed.  He is maintained on aspirin and Zocor.      Review of Systems   Constitutional: Negative.    HENT: Negative.     Eyes: Negative.    Respiratory: Negative.     Cardiovascular: Negative.         Claudication   Gastrointestinal: Negative.    Endocrine: Negative.    Genitourinary: Negative.    Musculoskeletal:  Positive for arthralgias and back pain.   Skin: Negative.    Allergic/Immunologic: Negative.   "  Neurological: Negative.    Hematological: Negative.    Psychiatric/Behavioral: Negative.     All other systems reviewed and are negative.    /60   Pulse 70   Ht 171 cm (67.32\")   Wt 91.6 kg (202 lb)   SpO2 93%   BMI 31.34 kg/m²     Physical Exam  Vitals and nursing note reviewed.   Constitutional:       Appearance: Normal appearance. He is well-developed. He is obese.   HENT:      Head: Normocephalic and atraumatic.   Eyes:      General: No scleral icterus.     Pupils: Pupils are equal, round, and reactive to light.   Neck:      Thyroid: No thyromegaly.      Vascular: No carotid bruit or JVD.   Cardiovascular:      Rate and Rhythm: Normal rate and regular rhythm.      Pulses:           Carotid pulses are 2+ on the right side and 2+ on the left side.       Femoral pulses are 2+ on the right side and 2+ on the left side.       Dorsalis pedis pulses are 1+ on the right side and detected w/ Doppler on the left side.        Posterior tibial pulses are 2+ on the right side and detected w/ Doppler on the left side.      Heart sounds: Normal heart sounds.      Comments: Groin healed  Pulmonary:      Effort: Pulmonary effort is normal.      Breath sounds: Normal breath sounds.   Abdominal:      General: Bowel sounds are normal. There is no distension or abdominal bruit.      Palpations: Abdomen is soft. There is no mass.      Tenderness: There is no abdominal tenderness.   Musculoskeletal:         General: Normal range of motion.      Cervical back: Neck supple.   Lymphadenopathy:      Cervical: No cervical adenopathy.   Skin:     General: Skin is warm and dry.   Neurological:      Mental Status: He is alert and oriented to person, place, and time.      Cranial Nerves: No cranial nerve deficit.      Sensory: No sensory deficit.   Psychiatric:         Mood and Affect: Mood normal.         Behavior: Behavior normal.         Thought Content: Thought content normal.         Judgment: Judgment normal.        " Diagnostic Data:  IR Angiogram Extremity  Result Date: 4/14/2025  Narrative: Performed by Dr. Bal in the OR. Please see operative dictation.  This report was signed and finalized on 4/14/2025 2:40 PM by Dr. Richard Bal MD.      FL C Arm During Surgery  Result Date: 4/14/2025  Narrative: This procedure was auto-finalized with no dictation required.           Patient Active Problem List   Diagnosis   • Extramedullary plasmacytoma- left nose   • History of radiation therapy- Left Nose   • Encounter for aftercare   • Hypertension   • Neoplasm of uncertain behavior   • Spinal stenosis in cervical region   • Intervertebral cervical disc disorder with myelopathy, cervical region   • Cervical radiculopathy   • Snuff user   • Cervical spondylosis with myelopathy   • Degeneration of cervical intervertebral disc   • Abnormal ECG   • PAC (premature atrial contraction)   • Benign prostatic hyperplasia with urinary obstruction   • Raised prostate specific antigen   • History of tobacco abuse   • Anterior epistaxis   • Tinnitus aurium, left   • Neoplasm of uncertain behavior of skin of nose   • Actinic keratosis   • PAD (peripheral artery disease)   • Preop testing   • Lung nodule   • Type 2 diabetes mellitus, without long-term current use of insulin   • Malignant neoplasm of upper lobe of right lung        Diagnosis Plan   1. PAD (peripheral artery disease)  CBC and Differential    Basic metabolic panel    APTT    Protime-INR    Case Request    ceFAZolin (ANCEF) 2,000 mg in sodium chloride 0.9 % 100 mL IVPB    Case Request      2. Preop testing  Follow Anesthesia Guidelines / Protocol    Follow Anesthesia Guidelines / Protocol    Clip groin    Provide NPO Instructions to Patient    Chlorhexidine Skin Prep    Instructions on coughing, deep breathing, and incentive spirometry.    CBC and Differential    Basic metabolic panel    APTT    Protime-INR    Case Request    Type & Screen    ceFAZolin (ANCEF) 2,000 mg in sodium  chloride 0.9 % 100 mL IVPB    Case Request      3. Encounter for monitoring antiplatelet therapy  APTT      4. Primary hypertension        5. Type 2 diabetes mellitus with diabetic peripheral angiopathy without gangrene, without long-term current use of insulin                Plan: After thoroughly evaluating Manjit Seth, I believe the best course of action is to proceed with a left common femoral endarterectomy with bovine patch angioplasty and eversion profundoplasty for revascularization.  Risks/benefits were expanded great length to the patient which include but are not limited to bleeding, infection, vessel damage, nerve damage, loss of limb, MI, stroke, and death.  I did discuss vascular risk factors as they pertain to the progression of vascular disease including controlling his hypertension and diabetes.  His blood pressure stable on his current medications.  He should continue his aspirin 81 mg daily and Zocor 40 mg nightly in addition to his other medications. Body mass index is 31.34 kg/m².   This was all discussed in full with complete understanding.  Thank you for allowing me to participate in the care of your patient.  Please do not hesitate to call with any questions or concerns.  We will keep you aware of any further encounters with Manjit Seth.        Sincerely yours,         EDWIN Bolivar, EDWIN Blank

## 2025-04-30 ENCOUNTER — TELEPHONE (OUTPATIENT)
Dept: VASCULAR SURGERY | Facility: CLINIC | Age: 80
End: 2025-04-30
Payer: MEDICARE

## 2025-04-30 NOTE — TELEPHONE ENCOUNTER
Spoke with patient Your surgery is scheduled for 05/07/2025, you need to be at the hospital at 8:00 am.  Nothing to eat or drink after midnight the night before your procedure. Patient  will continue to take Aspirin,Zocor uninterrupted prior to surgery.  If you have any questions or concerns, please contact our office at [phone]. Patient verbalized understanding.

## 2025-05-03 NOTE — H&P
5/3/2025          Preethi Mccullough APRN  [address] [place] [address] [place] [address]        Manjit Seth  1945          Chief Complaint   Patient presents with    SET PAD       No issues since surgery, said he had no pain, soreness, or bruising.         Dear Preethi Mccullough APRN         I had the pleasure of seeing your patient Manjit Seth in the office today.   As you recall, Manjit Seth is a 79 y.o.  male who you are currently following for routine health maintenance.  He has complaints of heaviness and aching to the left leg.  He does feel like his leg wants to give out on him.  He did undergo a left lower extremity angiogram which noted significant plaque burden into the common femoral artery and complete occlusion of the profunda 2 to 3 cm past the takeoff.  He is going to need a left common femoral endarterectomy with bovine patch angioplasty and eversion profundoplasty for revascularization.  His groin has healed.  He is maintained on aspirin and Zocor.        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    Hemifacial spasm     Left side    History of radiation therapy 03/28/2016    4000 cGy to nose completed on 3/28/16    History of tobacco abuse     Hyperlipidemia     Hypertension     Lung cancer 12/27/2024    upper lobe right lung    Nosebleed occasional post radiation    PAC (premature atrial contraction)     PAD (peripheral artery disease)       Past Surgical History:   Procedure Laterality Date    ANTERIOR CERVICAL DISCECTOMY W/ FUSION N/A 01/16/2019    Procedure: CERVICAL DISCECTOMY ANTERIOR WITH FUSION C3-4,4-5, 5-6 ACDF with neuromonitoring and 1 C-arm;  Surgeon: Pablito Islas MD;  Location: [facility];  Service: Neurosurgery     AORTAGRAM Left 2025    Procedure: LEFT LOWER EXTREMITY ANGIOGRAM;  Surgeon: Richard Bal DO;  Location:  [facility];  Service: Vascular;  Laterality: Left;    BRONCHOSCOPY N/A 2024    Procedure: BRONCHOSCOPY, THORACOSCOPY, RIGHT UPPER LOBE WEDGE RESECTION, RIGHT MIDDLE LOBE WEDGE RESECTION, PLEURAL BIOPSY;  Surgeon: Mat Valenzuela MD;  Location:  [facility] OR;  Service: Cardiothoracic;  Laterality: N/A;    BRONCHOSCOPY N/A 2025    Procedure: BRONCHOSCOPY WITH ENDOBRONCHIAL ULTRASOUND, BIOPSY, TRANSBRONCHIAL NEEDLE ASPIRATE;  Surgeon: Peter Griffith MD;  Location:  [facility] OR;  Service: Pulmonary;  Laterality: N/A;  pre: malignant neoplasm of RUL   post: same    BRONCHOSCOPY      CATARACT EXTRACTION Bilateral     COLONOSCOPY      NASAL ENDOSCOPY      bilateral with excisional biopsy of left intranasal mass 16    NOSE SURGERY      removal of plasmacytoma    SPINAL FUSION  3 level acdf        THORACOSCOPY Right 2024    Procedure: THORACOSCOPY;  Surgeon: Mat Valenzuela MD;  Location:  [facility] OR;  Service: Cardiothoracic;  Laterality: Right;     Social History     Socioeconomic History    Marital status:    Tobacco Use    Smoking status: Former     Current packs/day: 0.00     Average packs/day: 1 pack/day for 10.0 years (10.0 ttl pk-yrs)     Types: Cigarettes     Start date: 1970     Quit date: 1977     Years since quittin.3    Smokeless tobacco: Current     Types: Chew    Tobacco comments:     dip when fishing   Vaping Use    Vaping status: Never Used   Substance and Sexual Activity    Alcohol use: No    Drug use: No    Sexual activity: Defer     Family History   Problem Relation Age of Onset    Breast cancer Mother     Cancer Mother         breast cancer    Heart attack Mother     Heart failure Father     Diabetes Father     Hypertension Father     Cancer Maternal Uncle         pancreatic cancer     Allergies   Allergen Reactions    Other  "Dermatitis and Rash      - WHITE SURGICAL TAPE -      Current Outpatient Medications   Medication Instructions    ASPIRIN 81 PO Take 81 mg by mouth Daily. self    atenolol (TENORMIN) 50 mg, Every Morning    Cranberry 400 MG capsule 1 capsule, Daily    finasteride (PROSCAR) 5 mg, Daily    fish oil 1,000 mg, Daily With Breakfast    Garlic 10 mg, Daily    lisinopril (PRINIVIL,ZESTRIL) 5 mg, Daily    Magnesium 250 MG tablet 1 tablet, Daily    metFORMIN (GLUCOPHAGE) 500 mg, 2 Times Daily With Meals    Multiple Vitamins-Minerals (MULTIVITAMIN ADULT PO) 1 tablet, Daily    mupirocin (BACTROBAN) 2 % ointment Topical, 3 Times Daily    simvastatin (ZOCOR) 40 mg, Nightly    triamterene-hydrochlorothiazide (MAXZIDE-25) 37.5-25 MG per tablet 1 tablet, Daily    Vitamin D2 400 Units, Daily       Review of Systems   Constitutional: Negative.    HENT: Negative.     Eyes: Negative.    Respiratory: Negative.     Cardiovascular: Negative.         Claudication   Gastrointestinal: Negative.    Endocrine: Negative.    Genitourinary: Negative.    Musculoskeletal:  Positive for arthralgias and back pain.   Skin: Negative.    Allergic/Immunologic: Negative.    Neurological: Negative.    Hematological: Negative.    Psychiatric/Behavioral: Negative.     All other systems reviewed and are negative.     /60   Pulse 70   Ht 171 cm (67.32\")   Wt 91.6 kg (202 lb)   SpO2 93%   BMI 31.34 kg/m²      Physical Exam  Vitals and nursing note reviewed.   Constitutional:       Appearance: Normal appearance. He is well-developed. He is obese.   HENT:      Head: Normocephalic and atraumatic.   Eyes:      General: No scleral icterus.     Pupils: Pupils are equal, round, and reactive to light.   Neck:      Thyroid: No thyromegaly.      Vascular: No carotid bruit or JVD.   Cardiovascular:      Rate and Rhythm: Normal rate and regular rhythm.      Pulses:           Carotid pulses are 2+ on the right side and 2+ on the left side.       Femoral pulses are " 2+ on the right side and 2+ on the left side.       Dorsalis pedis pulses are 1+ on the right side and detected w/ Doppler on the left side.        Posterior tibial pulses are 2+ on the right side and detected w/ Doppler on the left side.      Heart sounds: Normal heart sounds.      Comments: Groin healed  Pulmonary:      Effort: Pulmonary effort is normal.      Breath sounds: Normal breath sounds.   Abdominal:      General: Bowel sounds are normal. There is no distension or abdominal bruit.      Palpations: Abdomen is soft. There is no mass.      Tenderness: There is no abdominal tenderness.   Musculoskeletal:         General: Normal range of motion.      Cervical back: Neck supple.   Lymphadenopathy:      Cervical: No cervical adenopathy.   Skin:     General: Skin is warm and dry.   Neurological:      Mental Status: He is alert and oriented to person, place, and time.      Cranial Nerves: No cranial nerve deficit.      Sensory: No sensory deficit.   Psychiatric:         Mood and Affect: Mood normal.         Behavior: Behavior normal.         Thought Content: Thought content normal.         Judgment: Judgment normal.          Diagnostic Data:  IR Angiogram Extremity  Result Date: 4/14/2025  Narrative: Performed by Dr. Bal in the OR. Please see operative dictation.  This report was signed and finalized on 4/14/2025 2:40 PM by Dr. Richard Bal MD.       FL C Arm During Surgery  Result Date: 4/14/2025  Narrative: This procedure was auto-finalized with no dictation required.              Problem List       Patient Active Problem List   Diagnosis    Extramedullary plasmacytoma- left nose    History of radiation therapy- Left Nose    Encounter for aftercare    Hypertension    Neoplasm of uncertain behavior    Spinal stenosis in cervical region    Intervertebral cervical disc disorder with myelopathy, cervical region    Cervical radiculopathy    Snuff user    Cervical spondylosis with myelopathy    Degeneration of  cervical intervertebral disc    Abnormal ECG    PAC (premature atrial contraction)    Benign prostatic hyperplasia with urinary obstruction    Raised prostate specific antigen    History of tobacco abuse    Anterior epistaxis    Tinnitus aurium, left    Neoplasm of uncertain behavior of skin of nose    Actinic keratosis    PAD (peripheral artery disease)    Preop testing    Lung nodule    Type 2 diabetes mellitus, without long-term current use of insulin    Malignant neoplasm of upper lobe of right lung              Diagnosis Plan   1. PAD (peripheral artery disease)  CBC and Differential     Basic metabolic panel     APTT     Protime-INR     Case Request     ceFAZolin (ANCEF) 2,000 mg in sodium chloride 0.9 % 100 mL IVPB     Case Request       2. Preop testing  Follow Anesthesia Guidelines / Protocol     Follow Anesthesia Guidelines / Protocol     Clip groin     Provide NPO Instructions to Patient     Chlorhexidine Skin Prep     Instructions on coughing, deep breathing, and incentive spirometry.     CBC and Differential     Basic metabolic panel     APTT     Protime-INR     Case Request     Type & Screen     ceFAZolin (ANCEF) 2,000 mg in sodium chloride 0.9 % 100 mL IVPB     Case Request       3. Encounter for monitoring antiplatelet therapy  APTT       4. Primary hypertension          5. Type 2 diabetes mellitus with diabetic peripheral angiopathy without gangrene, without long-term current use of insulin                      Plan: After thoroughly evaluating Manjit Seth, I believe the best course of action is to proceed with a left common femoral endarterectomy with bovine patch angioplasty and eversion profundoplasty for revascularization.  Risks/benefits were expanded great length to the patient which include but are not limited to bleeding, infection, vessel damage, nerve damage, loss of limb, MI, stroke, and death.  I did discuss vascular risk factors as they pertain to the progression of vascular  disease including controlling his hypertension and diabetes.  His blood pressure stable on his current medications.  He should continue his aspirin 81 mg daily and Zocor 40 mg nightly in addition to his other medications. Body mass index is 31.34 kg/m².   This was all discussed in full with complete understanding.  Thank you for allowing me to participate in the care of your patient.  Please do not hesitate to call with any questions or concerns.  We will keep you aware of any further encounters with Manjit Seth.         Sincerely yours,           Richard Bal, Preethi Clark APRN

## 2025-05-06 ENCOUNTER — TELEPHONE (OUTPATIENT)
Dept: VASCULAR SURGERY | Facility: CLINIC | Age: 80
End: 2025-05-06
Payer: MEDICARE

## 2025-05-07 ENCOUNTER — ANESTHESIA EVENT (OUTPATIENT)
Dept: PERIOP | Facility: HOSPITAL | Age: 80
End: 2025-05-07
Payer: MEDICARE

## 2025-05-07 ENCOUNTER — ANESTHESIA (OUTPATIENT)
Dept: PERIOP | Facility: HOSPITAL | Age: 80
End: 2025-05-07
Payer: MEDICARE

## 2025-05-07 ENCOUNTER — HOSPITAL ENCOUNTER (INPATIENT)
Facility: HOSPITAL | Age: 80
LOS: 1 days | Discharge: HOME | DRG: 253 | End: 2025-05-08
Attending: SURGERY | Admitting: SURGERY
Payer: MEDICARE

## 2025-05-07 ENCOUNTER — APPOINTMENT (OUTPATIENT)
Dept: INTERVENTIONAL RADIOLOGY/VASCULAR | Facility: HOSPITAL | Age: 80
DRG: 253 | End: 2025-05-07
Payer: MEDICARE

## 2025-05-07 DIAGNOSIS — Z01.818 PREOP TESTING: ICD-10-CM

## 2025-05-07 DIAGNOSIS — I73.9 PAD (PERIPHERAL ARTERY DISEASE): ICD-10-CM

## 2025-05-07 LAB
ABO GROUP BLD: NORMAL
BLD GP AB SCN SERPL QL: NEGATIVE
GLUCOSE BLDC GLUCOMTR-MCNC: 105 MG/DL (ref 70–130)
GLUCOSE BLDC GLUCOMTR-MCNC: 147 MG/DL (ref 70–130)
RH BLD: NEGATIVE
T&S EXPIRATION DATE: NORMAL

## 2025-05-07 PROCEDURE — C1894 INTRO/SHEATH, NON-LASER: HCPCS | Performed by: SURGERY

## 2025-05-07 PROCEDURE — 88304 TISSUE EXAM BY PATHOLOGIST: CPT | Performed by: SURGERY

## 2025-05-07 PROCEDURE — 86850 RBC ANTIBODY SCREEN: CPT | Performed by: NURSE PRACTITIONER

## 2025-05-07 PROCEDURE — 25510000001 IOPAMIDOL 61 % SOLUTION: Performed by: SURGERY

## 2025-05-07 PROCEDURE — 04UL0KZ SUPPLEMENT LEFT FEMORAL ARTERY WITH NONAUTOLOGOUS TISSUE SUBSTITUTE, OPEN APPROACH: ICD-10-PCS | Performed by: SURGERY

## 2025-05-07 PROCEDURE — 25010000002 CEFAZOLIN PER 500 MG: Performed by: NURSE PRACTITIONER

## 2025-05-07 PROCEDURE — 25010000002 HYDROMORPHONE PER 4 MG: Performed by: ANESTHESIOLOGY

## 2025-05-07 PROCEDURE — 88311 DECALCIFY TISSUE: CPT | Performed by: SURGERY

## 2025-05-07 PROCEDURE — 37226 PR REVSC OPN/PRQ FEM/POP W/STNT/ANGIOP SM VSL: CPT | Performed by: SURGERY

## 2025-05-07 PROCEDURE — 25010000002 CEFAZOLIN PER 500 MG: Performed by: SURGERY

## 2025-05-07 PROCEDURE — 25010000002 DROPERIDOL PER 5 MG: Performed by: NURSE ANESTHETIST, CERTIFIED REGISTERED

## 2025-05-07 PROCEDURE — 75710 ARTERY X-RAYS ARM/LEG: CPT

## 2025-05-07 PROCEDURE — 25010000002 FENTANYL CITRATE (PF) 50 MCG/ML SOLUTION: Performed by: ANESTHESIOLOGY

## 2025-05-07 PROCEDURE — 86901 BLOOD TYPING SEROLOGIC RH(D): CPT | Performed by: NURSE PRACTITIONER

## 2025-05-07 PROCEDURE — C1768 GRAFT, VASCULAR: HCPCS | Performed by: SURGERY

## 2025-05-07 PROCEDURE — C1725 CATH, TRANSLUMIN NON-LASER: HCPCS | Performed by: SURGERY

## 2025-05-07 PROCEDURE — B41G1ZZ FLUOROSCOPY OF LEFT LOWER EXTREMITY ARTERIES USING LOW OSMOLAR CONTRAST: ICD-10-PCS | Performed by: SURGERY

## 2025-05-07 PROCEDURE — 76000 FLUOROSCOPY <1 HR PHYS/QHP: CPT

## 2025-05-07 PROCEDURE — 86900 BLOOD TYPING SEROLOGIC ABO: CPT | Performed by: NURSE PRACTITIONER

## 2025-05-07 PROCEDURE — C1760 CLOSURE DEV, VASC: HCPCS | Performed by: SURGERY

## 2025-05-07 PROCEDURE — 25810000003 LACTATED RINGERS PER 1000 ML: Performed by: SURGERY

## 2025-05-07 PROCEDURE — 25010000002 ONDANSETRON PER 1 MG: Performed by: NURSE ANESTHETIST, CERTIFIED REGISTERED

## 2025-05-07 PROCEDURE — 25010000002 LIDOCAINE PF 2% 2 % SOLUTION: Performed by: NURSE ANESTHETIST, CERTIFIED REGISTERED

## 2025-05-07 PROCEDURE — 25010000002 HEPARIN (PORCINE) PER 1000 UNITS: Performed by: SURGERY

## 2025-05-07 PROCEDURE — 25010000002 HEPARIN (PORCINE) PER 1000 UNITS: Performed by: NURSE ANESTHETIST, CERTIFIED REGISTERED

## 2025-05-07 PROCEDURE — 82948 REAGENT STRIP/BLOOD GLUCOSE: CPT

## 2025-05-07 PROCEDURE — 04CL0ZZ EXTIRPATION OF MATTER FROM LEFT FEMORAL ARTERY, OPEN APPROACH: ICD-10-PCS | Performed by: SURGERY

## 2025-05-07 PROCEDURE — 35371 RECHANNELING OF ARTERY: CPT | Performed by: SURGERY

## 2025-05-07 PROCEDURE — 047K3DZ DILATION OF RIGHT FEMORAL ARTERY WITH INTRALUMINAL DEVICE, PERCUTANEOUS APPROACH: ICD-10-PCS | Performed by: SURGERY

## 2025-05-07 PROCEDURE — C1876 STENT, NON-COA/NON-COV W/DEL: HCPCS | Performed by: SURGERY

## 2025-05-07 PROCEDURE — 25010000002 FENTANYL CITRATE (PF) 50 MCG/ML SOLUTION: Performed by: NURSE ANESTHETIST, CERTIFIED REGISTERED

## 2025-05-07 DEVICE — STNT PERIPH EVERFLEX ENTRUST 5F 6X40MM 120CM: Type: IMPLANTABLE DEVICE | Site: ARTERY FEMORAL | Status: FUNCTIONAL

## 2025-05-07 DEVICE — SEALANT HEMO SURG PREVELEAK 4ML: Type: IMPLANTABLE DEVICE | Site: ARTERY FEMORAL | Status: FUNCTIONAL

## 2025-05-07 DEVICE — PTCH VASC XENOSURE BIO 0.8X8CM: Type: IMPLANTABLE DEVICE | Site: ARTERY FEMORAL | Status: FUNCTIONAL

## 2025-05-07 RX ORDER — IOPAMIDOL 612 MG/ML
INJECTION, SOLUTION INTRAVASCULAR AS NEEDED
Status: DISCONTINUED | OUTPATIENT
Start: 2025-05-07 | End: 2025-05-07 | Stop reason: HOSPADM

## 2025-05-07 RX ORDER — LISINOPRIL 5 MG/1
5 TABLET ORAL DAILY
Status: DISCONTINUED | OUTPATIENT
Start: 2025-05-07 | End: 2025-05-08 | Stop reason: HOSPADM

## 2025-05-07 RX ORDER — ONDANSETRON 4 MG/1
4 TABLET, ORALLY DISINTEGRATING ORAL EVERY 6 HOURS PRN
Status: DISCONTINUED | OUTPATIENT
Start: 2025-05-07 | End: 2025-05-08 | Stop reason: HOSPADM

## 2025-05-07 RX ORDER — LABETALOL HYDROCHLORIDE 5 MG/ML
5 INJECTION, SOLUTION INTRAVENOUS
Status: DISCONTINUED | OUTPATIENT
Start: 2025-05-07 | End: 2025-05-07 | Stop reason: HOSPADM

## 2025-05-07 RX ORDER — HYDROMORPHONE HYDROCHLORIDE 1 MG/ML
0.5 INJECTION, SOLUTION INTRAMUSCULAR; INTRAVENOUS; SUBCUTANEOUS
Status: DISCONTINUED | OUTPATIENT
Start: 2025-05-07 | End: 2025-05-07 | Stop reason: HOSPADM

## 2025-05-07 RX ORDER — OXYCODONE AND ACETAMINOPHEN 10; 325 MG/1; MG/1
1 TABLET ORAL EVERY 4 HOURS PRN
Status: DISCONTINUED | OUTPATIENT
Start: 2025-05-07 | End: 2025-05-07 | Stop reason: HOSPADM

## 2025-05-07 RX ORDER — ATENOLOL 50 MG/1
50 TABLET ORAL DAILY
Status: DISCONTINUED | OUTPATIENT
Start: 2025-05-08 | End: 2025-05-08 | Stop reason: HOSPADM

## 2025-05-07 RX ORDER — SODIUM CHLORIDE, SODIUM LACTATE, POTASSIUM CHLORIDE, CALCIUM CHLORIDE 600; 310; 30; 20 MG/100ML; MG/100ML; MG/100ML; MG/100ML
1000 INJECTION, SOLUTION INTRAVENOUS CONTINUOUS
Status: DISCONTINUED | OUTPATIENT
Start: 2025-05-07 | End: 2025-05-07

## 2025-05-07 RX ORDER — SODIUM CHLORIDE 0.9 % (FLUSH) 0.9 %
10 SYRINGE (ML) INJECTION AS NEEDED
Status: DISCONTINUED | OUTPATIENT
Start: 2025-05-07 | End: 2025-05-07 | Stop reason: HOSPADM

## 2025-05-07 RX ORDER — ACETAMINOPHEN 325 MG/1
650 TABLET ORAL EVERY 8 HOURS
Status: DISCONTINUED | OUTPATIENT
Start: 2025-05-07 | End: 2025-05-08 | Stop reason: HOSPADM

## 2025-05-07 RX ORDER — SODIUM CHLORIDE 0.9 % (FLUSH) 0.9 %
3 SYRINGE (ML) INJECTION AS NEEDED
Status: DISCONTINUED | OUTPATIENT
Start: 2025-05-07 | End: 2025-05-07 | Stop reason: HOSPADM

## 2025-05-07 RX ORDER — LIDOCAINE HYDROCHLORIDE 20 MG/ML
INJECTION, SOLUTION EPIDURAL; INFILTRATION; INTRACAUDAL; PERINEURAL AS NEEDED
Status: DISCONTINUED | OUTPATIENT
Start: 2025-05-07 | End: 2025-05-07 | Stop reason: SURG

## 2025-05-07 RX ORDER — HYDROMORPHONE HYDROCHLORIDE 1 MG/ML
0.5 INJECTION, SOLUTION INTRAMUSCULAR; INTRAVENOUS; SUBCUTANEOUS
Status: DISCONTINUED | OUTPATIENT
Start: 2025-05-07 | End: 2025-05-08 | Stop reason: HOSPADM

## 2025-05-07 RX ORDER — SODIUM CHLORIDE 0.9 % (FLUSH) 0.9 %
10 SYRINGE (ML) INJECTION EVERY 12 HOURS SCHEDULED
Status: DISCONTINUED | OUTPATIENT
Start: 2025-05-07 | End: 2025-05-08 | Stop reason: HOSPADM

## 2025-05-07 RX ORDER — ONDANSETRON 2 MG/ML
4 INJECTION INTRAMUSCULAR; INTRAVENOUS EVERY 6 HOURS PRN
Status: DISCONTINUED | OUTPATIENT
Start: 2025-05-07 | End: 2025-05-08 | Stop reason: HOSPADM

## 2025-05-07 RX ORDER — ACETAMINOPHEN 500 MG
1000 TABLET ORAL ONCE
Status: COMPLETED | OUTPATIENT
Start: 2025-05-07 | End: 2025-05-07

## 2025-05-07 RX ORDER — FENTANYL CITRATE 50 UG/ML
INJECTION, SOLUTION INTRAMUSCULAR; INTRAVENOUS AS NEEDED
Status: DISCONTINUED | OUTPATIENT
Start: 2025-05-07 | End: 2025-05-07 | Stop reason: SURG

## 2025-05-07 RX ORDER — ATORVASTATIN CALCIUM 10 MG/1
20 TABLET, FILM COATED ORAL NIGHTLY
Status: DISCONTINUED | OUTPATIENT
Start: 2025-05-07 | End: 2025-05-08 | Stop reason: HOSPADM

## 2025-05-07 RX ORDER — FLUMAZENIL 0.1 MG/ML
0.2 INJECTION INTRAVENOUS AS NEEDED
Status: DISCONTINUED | OUTPATIENT
Start: 2025-05-07 | End: 2025-05-07 | Stop reason: HOSPADM

## 2025-05-07 RX ORDER — ONDANSETRON 2 MG/ML
4 INJECTION INTRAMUSCULAR; INTRAVENOUS
Status: DISCONTINUED | OUTPATIENT
Start: 2025-05-07 | End: 2025-05-07 | Stop reason: HOSPADM

## 2025-05-07 RX ORDER — SODIUM CHLORIDE, SODIUM LACTATE, POTASSIUM CHLORIDE, CALCIUM CHLORIDE 600; 310; 30; 20 MG/100ML; MG/100ML; MG/100ML; MG/100ML
100 INJECTION, SOLUTION INTRAVENOUS CONTINUOUS
Status: DISCONTINUED | OUTPATIENT
Start: 2025-05-07 | End: 2025-05-07

## 2025-05-07 RX ORDER — FINASTERIDE 5 MG/1
5 TABLET, FILM COATED ORAL DAILY
Status: DISCONTINUED | OUTPATIENT
Start: 2025-05-07 | End: 2025-05-08 | Stop reason: HOSPADM

## 2025-05-07 RX ORDER — DROPERIDOL 2.5 MG/ML
INJECTION, SOLUTION INTRAMUSCULAR; INTRAVENOUS AS NEEDED
Status: DISCONTINUED | OUTPATIENT
Start: 2025-05-07 | End: 2025-05-07 | Stop reason: SURG

## 2025-05-07 RX ORDER — NALOXONE HCL 0.4 MG/ML
0.04 VIAL (ML) INJECTION AS NEEDED
Status: DISCONTINUED | OUTPATIENT
Start: 2025-05-07 | End: 2025-05-07 | Stop reason: HOSPADM

## 2025-05-07 RX ORDER — ACETAMINOPHEN 650 MG/1
650 SUPPOSITORY RECTAL EVERY 8 HOURS
Status: DISCONTINUED | OUTPATIENT
Start: 2025-05-07 | End: 2025-05-08 | Stop reason: HOSPADM

## 2025-05-07 RX ORDER — MULTIPLE VITAMINS W/ MINERALS TAB 9MG-400MCG
1 TAB ORAL DAILY
Status: DISCONTINUED | OUTPATIENT
Start: 2025-05-07 | End: 2025-05-08 | Stop reason: HOSPADM

## 2025-05-07 RX ORDER — MUPIROCIN 20 MG/G
OINTMENT TOPICAL 3 TIMES DAILY
Status: DISCONTINUED | OUTPATIENT
Start: 2025-05-07 | End: 2025-05-07

## 2025-05-07 RX ORDER — NALOXONE HCL 0.4 MG/ML
0.4 VIAL (ML) INJECTION
Status: DISCONTINUED | OUTPATIENT
Start: 2025-05-07 | End: 2025-05-08 | Stop reason: HOSPADM

## 2025-05-07 RX ORDER — MIDAZOLAM HYDROCHLORIDE 2 MG/2ML
0.5 INJECTION, SOLUTION INTRAMUSCULAR; INTRAVENOUS
Status: DISCONTINUED | OUTPATIENT
Start: 2025-05-07 | End: 2025-05-07 | Stop reason: HOSPADM

## 2025-05-07 RX ORDER — SODIUM CHLORIDE 0.9 % (FLUSH) 0.9 %
3-10 SYRINGE (ML) INJECTION AS NEEDED
Status: DISCONTINUED | OUTPATIENT
Start: 2025-05-07 | End: 2025-05-07 | Stop reason: HOSPADM

## 2025-05-07 RX ORDER — LIDOCAINE HYDROCHLORIDE 10 MG/ML
0.5 INJECTION, SOLUTION EPIDURAL; INFILTRATION; INTRACAUDAL; PERINEURAL ONCE AS NEEDED
Status: DISCONTINUED | OUTPATIENT
Start: 2025-05-07 | End: 2025-05-07 | Stop reason: HOSPADM

## 2025-05-07 RX ORDER — ACETAMINOPHEN 160 MG/5ML
650 SOLUTION ORAL EVERY 8 HOURS
Status: DISCONTINUED | OUTPATIENT
Start: 2025-05-07 | End: 2025-05-08 | Stop reason: HOSPADM

## 2025-05-07 RX ORDER — ONDANSETRON 2 MG/ML
INJECTION INTRAMUSCULAR; INTRAVENOUS AS NEEDED
Status: DISCONTINUED | OUTPATIENT
Start: 2025-05-07 | End: 2025-05-07 | Stop reason: SURG

## 2025-05-07 RX ORDER — SODIUM CHLORIDE 0.9 % (FLUSH) 0.9 %
3 SYRINGE (ML) INJECTION EVERY 12 HOURS SCHEDULED
Status: DISCONTINUED | OUTPATIENT
Start: 2025-05-07 | End: 2025-05-07 | Stop reason: HOSPADM

## 2025-05-07 RX ORDER — ASPIRIN 81 MG/1
81 TABLET ORAL DAILY
Status: DISCONTINUED | OUTPATIENT
Start: 2025-05-07 | End: 2025-05-08 | Stop reason: HOSPADM

## 2025-05-07 RX ORDER — HYDROCODONE BITARTRATE AND ACETAMINOPHEN 5; 325 MG/1; MG/1
1 TABLET ORAL EVERY 6 HOURS PRN
Refills: 0 | Status: DISCONTINUED | OUTPATIENT
Start: 2025-05-07 | End: 2025-05-08 | Stop reason: HOSPADM

## 2025-05-07 RX ORDER — HEPARIN SODIUM 1000 [USP'U]/ML
INJECTION, SOLUTION INTRAVENOUS; SUBCUTANEOUS AS NEEDED
Status: DISCONTINUED | OUTPATIENT
Start: 2025-05-07 | End: 2025-05-07 | Stop reason: SURG

## 2025-05-07 RX ORDER — VECURONIUM BROMIDE 1 MG/ML
INJECTION, POWDER, LYOPHILIZED, FOR SOLUTION INTRAVENOUS AS NEEDED
Status: DISCONTINUED | OUTPATIENT
Start: 2025-05-07 | End: 2025-05-07 | Stop reason: SURG

## 2025-05-07 RX ORDER — ETOMIDATE 2 MG/ML
INJECTION INTRAVENOUS AS NEEDED
Status: DISCONTINUED | OUTPATIENT
Start: 2025-05-07 | End: 2025-05-07 | Stop reason: SURG

## 2025-05-07 RX ORDER — TRIAMTERENE AND HYDROCHLOROTHIAZIDE 37.5; 25 MG/1; MG/1
1 TABLET ORAL DAILY
Status: DISCONTINUED | OUTPATIENT
Start: 2025-05-07 | End: 2025-05-08 | Stop reason: HOSPADM

## 2025-05-07 RX ORDER — SODIUM CHLORIDE 9 MG/ML
40 INJECTION, SOLUTION INTRAVENOUS AS NEEDED
Status: DISCONTINUED | OUTPATIENT
Start: 2025-05-07 | End: 2025-05-07 | Stop reason: HOSPADM

## 2025-05-07 RX ORDER — SODIUM CHLORIDE 0.9 % (FLUSH) 0.9 %
10 SYRINGE (ML) INJECTION AS NEEDED
Status: DISCONTINUED | OUTPATIENT
Start: 2025-05-07 | End: 2025-05-08 | Stop reason: HOSPADM

## 2025-05-07 RX ORDER — IBUPROFEN 600 MG/1
600 TABLET, FILM COATED ORAL EVERY 6 HOURS PRN
Status: DISCONTINUED | OUTPATIENT
Start: 2025-05-07 | End: 2025-05-07 | Stop reason: HOSPADM

## 2025-05-07 RX ORDER — FENTANYL CITRATE 50 UG/ML
50 INJECTION, SOLUTION INTRAMUSCULAR; INTRAVENOUS
Status: DISCONTINUED | OUTPATIENT
Start: 2025-05-07 | End: 2025-05-07 | Stop reason: HOSPADM

## 2025-05-07 RX ORDER — CLOPIDOGREL BISULFATE 75 MG/1
75 TABLET ORAL DAILY
Status: DISCONTINUED | OUTPATIENT
Start: 2025-05-07 | End: 2025-05-08 | Stop reason: HOSPADM

## 2025-05-07 RX ORDER — SODIUM CHLORIDE 9 MG/ML
40 INJECTION, SOLUTION INTRAVENOUS AS NEEDED
Status: DISCONTINUED | OUTPATIENT
Start: 2025-05-07 | End: 2025-05-08 | Stop reason: HOSPADM

## 2025-05-07 RX ADMIN — Medication 10 ML: at 18:03

## 2025-05-07 RX ADMIN — FENTANYL CITRATE 50 MCG: 50 INJECTION, SOLUTION INTRAMUSCULAR; INTRAVENOUS at 13:18

## 2025-05-07 RX ADMIN — Medication 1 TABLET: at 18:03

## 2025-05-07 RX ADMIN — HYDROMORPHONE HYDROCHLORIDE 0.5 MG: 1 INJECTION, SOLUTION INTRAMUSCULAR; INTRAVENOUS; SUBCUTANEOUS at 13:05

## 2025-05-07 RX ADMIN — CLOPIDOGREL BISULFATE 75 MG: 75 TABLET ORAL at 18:03

## 2025-05-07 RX ADMIN — FINASTERIDE 5 MG: 5 TABLET, FILM COATED ORAL at 18:03

## 2025-05-07 RX ADMIN — ACETAMINOPHEN 650 MG: 325 TABLET, FILM COATED ORAL at 20:59

## 2025-05-07 RX ADMIN — CEFAZOLIN 2 G: 2 INJECTION, POWDER, FOR SOLUTION INTRAMUSCULAR; INTRAVENOUS at 09:45

## 2025-05-07 RX ADMIN — SODIUM CHLORIDE, POTASSIUM CHLORIDE, SODIUM LACTATE AND CALCIUM CHLORIDE 1000 ML: 600; 310; 30; 20 INJECTION, SOLUTION INTRAVENOUS at 07:50

## 2025-05-07 RX ADMIN — CEFAZOLIN 2000 MG: 2 INJECTION, POWDER, FOR SOLUTION INTRAMUSCULAR; INTRAVENOUS at 18:02

## 2025-05-07 RX ADMIN — HEPARIN SODIUM 8000 UNITS: 1000 INJECTION, SOLUTION INTRAVENOUS; SUBCUTANEOUS at 10:27

## 2025-05-07 RX ADMIN — FENTANYL CITRATE 100 MCG: 50 INJECTION, SOLUTION INTRAMUSCULAR; INTRAVENOUS at 12:12

## 2025-05-07 RX ADMIN — ATORVASTATIN CALCIUM 20 MG: 10 TABLET, FILM COATED ORAL at 20:59

## 2025-05-07 RX ADMIN — SODIUM CHLORIDE, POTASSIUM CHLORIDE, SODIUM LACTATE AND CALCIUM CHLORIDE: 600; 310; 30; 20 INJECTION, SOLUTION INTRAVENOUS at 09:45

## 2025-05-07 RX ADMIN — HYDROMORPHONE HYDROCHLORIDE 0.5 MG: 1 INJECTION, SOLUTION INTRAMUSCULAR; INTRAVENOUS; SUBCUTANEOUS at 12:55

## 2025-05-07 RX ADMIN — METFORMIN HYDROCHLORIDE 500 MG: 500 TABLET ORAL at 18:03

## 2025-05-07 RX ADMIN — ONDANSETRON 4 MG: 2 INJECTION INTRAMUSCULAR; INTRAVENOUS at 09:45

## 2025-05-07 RX ADMIN — DROPERIDOL 1.25 MG: 2.5 INJECTION, SOLUTION INTRAMUSCULAR; INTRAVENOUS at 09:45

## 2025-05-07 RX ADMIN — ACETAMINOPHEN 1000 MG: 500 TABLET, FILM COATED ORAL at 08:42

## 2025-05-07 RX ADMIN — FENTANYL CITRATE 100 MCG: 50 INJECTION, SOLUTION INTRAMUSCULAR; INTRAVENOUS at 09:59

## 2025-05-07 RX ADMIN — SODIUM CHLORIDE, POTASSIUM CHLORIDE, SODIUM LACTATE AND CALCIUM CHLORIDE 1000 ML: 600; 310; 30; 20 INJECTION, SOLUTION INTRAVENOUS at 07:40

## 2025-05-07 RX ADMIN — ETOMIDATE 20 MG: 20 INJECTION, SOLUTION INTRAVENOUS at 09:47

## 2025-05-07 RX ADMIN — FENTANYL CITRATE 100 MCG: 50 INJECTION, SOLUTION INTRAMUSCULAR; INTRAVENOUS at 09:47

## 2025-05-07 RX ADMIN — LIDOCAINE HYDROCHLORIDE 200 MG: 20 INJECTION, SOLUTION EPIDURAL; INFILTRATION; INTRACAUDAL; PERINEURAL at 09:47

## 2025-05-07 RX ADMIN — VECURONIUM BROMIDE 3 MG: 1 INJECTION, POWDER, LYOPHILIZED, FOR SOLUTION INTRAVENOUS at 09:58

## 2025-05-07 RX ADMIN — VECURONIUM BROMIDE 7 MG: 1 INJECTION, POWDER, LYOPHILIZED, FOR SOLUTION INTRAVENOUS at 09:47

## 2025-05-07 RX ADMIN — FENTANYL CITRATE 100 MCG: 50 INJECTION, SOLUTION INTRAMUSCULAR; INTRAVENOUS at 10:34

## 2025-05-07 RX ADMIN — VECURONIUM BROMIDE 3 MG: 1 INJECTION, POWDER, LYOPHILIZED, FOR SOLUTION INTRAVENOUS at 11:17

## 2025-05-07 RX ADMIN — Medication 10 ML: at 21:00

## 2025-05-07 RX ADMIN — HEPARIN SODIUM 1000 UNITS: 1000 INJECTION, SOLUTION INTRAVENOUS; SUBCUTANEOUS at 11:47

## 2025-05-07 RX ADMIN — ASPIRIN 81 MG: 81 TABLET, COATED ORAL at 18:03

## 2025-05-07 RX ADMIN — HYDROMORPHONE HYDROCHLORIDE 0.5 MG: 1 INJECTION, SOLUTION INTRAMUSCULAR; INTRAVENOUS; SUBCUTANEOUS at 13:20

## 2025-05-07 NOTE — ANESTHESIA PREPROCEDURE EVALUATION
Anesthesia Evaluation     Patient summary reviewed   no history of anesthetic complications:   NPO Solid Status: > 8 hours  NPO Liquid Status: > 8 hours           Airway   Mallampati: II  TM distance: >3 FB  No difficulty expected  Dental      Pulmonary    (+) lung cancer (s/p wedge resection),  (-) COPD, asthma, sleep apnea, not a smoker    ROS comment: Lung nodule  Cardiovascular   Exercise tolerance: good (4-7 METS)    (+) hypertension, PVD, hyperlipidemia  (-) pacemaker, past MI, CAD, angina, cardiac stents      Neuro/Psych  (-) seizures, TIA, CVA    ROS Comment: Hemifacial spasm left side  GI/Hepatic/Renal/Endo    (+) obesity, diabetes mellitus type 2  (-) GERD, liver disease, no renal disease    Musculoskeletal     Abdominal    Substance History      OB/GYN          Other   arthritis,   history of cancer active                  Anesthesia Plan    ASA 3     general     intravenous induction     Anesthetic plan, risks, benefits, and alternatives have been provided, discussed and informed consent has been obtained with: patient.    CODE STATUS:

## 2025-05-07 NOTE — OP NOTE
Manjit Seth  5/7/2025     PREOPERATIVE DIAGNOSIS: PAD (peripheral artery disease) [I73.9]  Preop testing [Z01.818]     POSTOPERATIVE DIAGNOSIS: Post-Op Diagnosis Codes:     * PAD (peripheral artery disease) [I73.9]     * Preop testing [Z01.818]     PROCEDURE PERFORMED:   1.  Left common femoral artery cutdown/exposure  2.  Left common femoral endarterectomy with bovine patch angioplasty and eversion profundoplasty  3.  Introduction of catheter/sheath into the aorta  4.  Contralateral cannulation of the left common iliac artery  5.  Left lower extremity angiogram with radiographic supervision and interpretation  6.  Balloon angioplasty and stenting of the proximal SFA using a 5 x 20 mm EverCross balloon and a 6 x 40 mm Everflex stent  7 Mynx closure of the right common femoral artery and primary closure of the left groin.     SURGEON: Richard Bal DO      ANESTHESIA: General    PREPARATION: Routine.    STAFF: Circulator: Enedelia Davis RN  Scrub Person: Bc Hannah; Mago Jauregui  Assistant: Shelby Negro    Estimated Blood Loss: 100ml    SPECIMENS: Femoral plaque    COMPLICATIONS: None    INDICATIONS: Manjit Seth is a 79 y.o. male who you are currently following for routine health maintenance. He has complaints of heaviness and aching to the left leg. He does feel like his leg wants to give out on him. He did undergo a left lower extremity angiogram which noted significant plaque burden into the common femoral artery and complete occlusion of the profunda 2 to 3 cm past the takeoff. He is going to need a left common femoral endarterectomy with bovine patch angioplasty and eversion profundoplasty for revascularization. His groin has healed. He is maintained on aspirin and Zocor. The indications, risks, and possible complications of the procedure were explained to the patient, who voiced understanding and wished to proceed with surgery.     PROCEDURE IN DETAIL: The patient was taken to  the operating room and placed on the operating table in a supine position. After general anesthesia was obtained, the bilateral groins was prepped and draped in a sterile manner.  Using a 15 blade, a longitudinal incision was made in the left groin overlying the femoral artery.  Careful dissection was made down through the subcutaneous tissues using Bovie cautery to ensure hemostasis.  Any crossing veins were ligated with hemoclips.  The inguinal ligament was identified and just inferior to that the femoral sheath was entered with the Metzenbaum scissors.  The femoral artery was identified and freed from its local attachments proximally and distally.  Proximal and distal control was established with Vesseloops.  The patient was given 9000 units of intravenous heparin.  After 3 minutes, the artery was clamped proximally and distally.  An 11 blade was used to make an arteriotomy in the common femoral artery and extended with the Blackwood scissors.  There was a significant amount of heavy plaque burden blocking the artery.  Using a freer elevator the standard endarterectomy was performed as well as performing an eversion profundoplasty.  The arterial bed was then irrigated with heparinized saline and all loose debris was picked clean.  The bovine pericardial patch was then brought to the field and the patch anastomosis was performed with a 5-0 Prolene in a running fashion.  Prior to completion of the patch anastomosis the appropriate flushing maneuvers were performed and the anastomosis was completed.  Flow was reestablished.  A 20-gauge Angiocath needle was then used to stick the patch and an angiogram was performed.  There was a small dissection flap noted in the proximal SFA.  Therefore the decision was made to cannulate the right groin.  Using a micropuncture technique the right common femoral artery was cannulated and a microsheath was placed.  Advantage Glidewire was advanced into the aorta and a short 6 Albanian  sheath was placed.  The Omni Flush catheter was advanced into the aorta and contralateral cannulation was established of the left common iliac artery.  The wire was placed down into the profunda femoris artery.  A 6 Barbadian by 45 cm destination sheath was placed down into the proximal common femoral artery.  The proximal SFA lesion was then balloon angioplastied with a 5 x 20 mm EverCross balloon.  Lastly, a 6 x 40 mm Everflex stent was placed.  Completion angiogram was performed which showed rapid flow through the common femoral profunda femoris and SFA without any evidence of stenosis, dissection, or occlusion.  There was still 1.5 vessel runoff to the foot via the posterior tibial and peroneal arteries.  The anterior tibial artery was occluded just past the takeoff.  At this point, I felt no further intervention was warranted.  The sheath and wire were removed and a minx device was used to seal off the right common femoral artery.  Direct pressure was held for an additional 10 to 15 minutes to help ensure hemostasis.  In the left groin, the wound bed was irrigated with antibiotic saline and Preven leak was used to help ensure hemostasis.  The deep layers were closed in 2 separate layers of 2-0 Vicryl in a running fashion.  The subcutaneous layers were closed with a 3-0 Vicryl in a running fashion.  The skin was then reapproximated using a 4-0 Monocryl in a subcuticular fashion.  The wounds were then cleaned.  Sterile dressings were applied. The patient tolerated the procedure well. Sponge and needle counts were correct. The patient was then awakened and extubated in the operating room and taken to the recovery room in good condition.    Richard Bal,   Date: 5/7/2025 Time: 12:48 CDT    CC:Preethi Mccullough, EDWIN

## 2025-05-07 NOTE — ANESTHESIA POSTPROCEDURE EVALUATION
"Patient: Manjit Seth    Procedure Summary       Date: 05/07/25 Room / Location:  [facility]  /  [facility]    Anesthesia Start: 0945 Anesthesia Stop: 1244    Procedure: LEFT FEMORAL ENDARTERECTOMY (Left: Groin) Diagnosis:       PAD (peripheral artery disease)      Preop testing      (PAD (peripheral artery disease) [I73.9])      (Preop testing [Z01.818])    Surgeons: Richard Bal DO Provider: Venu Gaytan CRNA    Anesthesia Type: general ASA Status: 3            Anesthesia Type: general    Vitals  Vitals Value Taken Time   /60 05/07/25 13:02   Temp 98.2 °F (36.8 °C) 05/07/25 12:46   Pulse 61 05/07/25 13:04   Resp 16 05/07/25 12:55   SpO2 95 % 05/07/25 13:04   Vitals shown include unfiled device data.        Post Anesthesia Care and Evaluation    Patient location during evaluation: PACU  Patient participation: complete - patient participated  Level of consciousness: awake and alert  Pain management: adequate    Airway patency: patent  Anesthetic complications: No anesthetic complications    Cardiovascular status: acceptable  Respiratory status: acceptable  Hydration status: acceptable    Comments: Blood pressure 137/65, pulse 62, temperature 98.2 °F (36.8 °C), temperature source Temporal, resp. rate 16, height 169 cm (66.54\"), weight 88.9 kg (195 lb 15.8 oz), SpO2 100%.    Pt discharged from PACU based on lonnie score >8    "

## 2025-05-07 NOTE — ANESTHESIA PROCEDURE NOTES
Airway  Date/Time: 5/7/2025 10:02 AM    Final Airway Details         Cormack-Lehane Classification: grade I - full view of glottis  Number of attempts at approach: 2  Assessment: lips, teeth, and gum same as pre-op and atraumatic intubation    Additional Comments  Pt with large tongue unable control with costello 2 blade...switch to glide scope with 3blade.  DCV =ETCO2, +BBS,+

## 2025-05-07 NOTE — ANESTHESIA PROCEDURE NOTES
Arterial Line    Pre-sedation assessment completed: 5/7/2025 8:50 AM    Patient reassessed immediately prior to procedure    Patient location during procedure: pre-op  Start time: 5/7/2025 8:50 AM  Stop Time:5/7/2025 8:50 AM       Line placed for hemodynamic monitoring.  Performed By   Anesthesiologist: Scarlet Blair MD   Preanesthetic Checklist  Completed: patient identified, IV checked, site marked, risks and benefits discussed, surgical consent, monitors and equipment checked, pre-op evaluation and timeout performed  Arterial Line Prep    Sterile Tech: cap and gloves  Prep: ChloraPrep  Patient monitoring: continuous pulse oximetry  Arterial Line Procedure   Laterality:right  Location:  radial artery  Catheter size: 20 G   Guidance: ultrasound guided  PROCEDURE NOTE/ULTRASOUND INTERPRETATION.  Using ultrasound guidance the potential vascular sites for insertion of the catheter were visualized to determine the patency of the vessel to be used for vascular access.  After selecting the appropriate site for insertion, the needle was visualized under ultrasound being inserted into the radial artery, followed by ultrasound confirmation of wire and catheter placement. There were no abnormalities seen on ultrasound; an image was taken; and the patient tolerated the procedure with no complications.   Number of attempts: 1  Successful placement: yes Images: still images not obtained  Post Assessment   Dressing Type: secured with tape and wrist guard applied.   Complications no  Circ/Move/Sens Assessment: normal and unchanged.   Patient Tolerance: patient tolerated the procedure well with no apparent complications

## 2025-05-08 ENCOUNTER — READMISSION MANAGEMENT (OUTPATIENT)
Dept: CALL CENTER | Facility: HOSPITAL | Age: 80
End: 2025-05-08
Payer: MEDICARE

## 2025-05-08 VITALS
WEIGHT: 194.9 LBS | HEIGHT: 67 IN | TEMPERATURE: 97.6 F | RESPIRATION RATE: 16 BRPM | BODY MASS INDEX: 30.59 KG/M2 | OXYGEN SATURATION: 95 % | HEART RATE: 64 BPM | DIASTOLIC BLOOD PRESSURE: 57 MMHG | SYSTOLIC BLOOD PRESSURE: 121 MMHG

## 2025-05-08 PROCEDURE — 25010000002 CEFAZOLIN PER 500 MG: Performed by: SURGERY

## 2025-05-08 PROCEDURE — 99024 POSTOP FOLLOW-UP VISIT: CPT | Performed by: SURGERY

## 2025-05-08 RX ORDER — CLOPIDOGREL BISULFATE 75 MG/1
75 TABLET ORAL DAILY
Qty: 30 TABLET | Refills: 3 | Status: SHIPPED | OUTPATIENT
Start: 2025-05-08

## 2025-05-08 RX ADMIN — Medication 10 ML: at 09:01

## 2025-05-08 RX ADMIN — CEFAZOLIN 2000 MG: 2 INJECTION, POWDER, FOR SOLUTION INTRAMUSCULAR; INTRAVENOUS at 02:50

## 2025-05-08 RX ADMIN — TRIAMTERENE AND HYDROCHLOROTHIAZIDE 1 TABLET: 37.5; 25 TABLET ORAL at 09:00

## 2025-05-08 RX ADMIN — FINASTERIDE 5 MG: 5 TABLET, FILM COATED ORAL at 09:00

## 2025-05-08 RX ADMIN — ASPIRIN 81 MG: 81 TABLET, COATED ORAL at 09:00

## 2025-05-08 RX ADMIN — ATENOLOL 50 MG: 50 TABLET ORAL at 09:00

## 2025-05-08 RX ADMIN — METFORMIN HYDROCHLORIDE 500 MG: 500 TABLET ORAL at 09:00

## 2025-05-08 RX ADMIN — LISINOPRIL 5 MG: 5 TABLET ORAL at 09:00

## 2025-05-08 RX ADMIN — CLOPIDOGREL BISULFATE 75 MG: 75 TABLET ORAL at 09:00

## 2025-05-08 NOTE — DISCHARGE SUMMARY
Date of Discharge:  5/8/2025    Discharge Diagnosis: LEFT FEMORAL ENDARTERECTOMY, LEFT LOWER EXTREMITY ANGIOGRAM, BALLOON ANGIOPLASTY, STENT PLACEMENT, RIGHT FEMORAL MYNX CLOSURE    Presenting Problem/History of Present Illness  PAD (peripheral artery disease) [I73.9]  Preop testing [Z01.818]  Peripheral artery disease [I73.9]       Hospital Course  Patient is a 79 y.o. male presented with significant plaque burden into the common femoral artery and complete occlusion of the profunda 2 to 3 cm past the takeoff.  He did undergo elective left common femoral endarterectomy with bovine patch angioplasty and eversion profundoplasty for revascularization without incident.  He was transferred to PACU for continued monitoring.  His groins remained soft, vitals signs stable, and pulses intact.  He was transferred to 3C for continued monitoring.  He has done well through the night.  Vitals are stable.  His groins are soft without hematoma and pulses intact, significant bruising is present to left groin.  He is stable and ready for discharge.  We will see him back in 2 weeks for post operative follow up.  Written and verbal instructions were given.  This was all discussed in full with complete understanding.               Procedures Performed  Procedure(s):  LEFT FEMORAL ENDARTERECTOMY, LEFT LOWER EXTREMITY ANGIOGRAM, BALLOON ANGIOPLASTY, STENT PLACEMENT, RIGHT FEMORAL MYNX CLOSURE       Consults:   Consults       No orders found for last 30 day(s).              Condition on Discharge: Stable    Discharge Medications     Discharge Medications        New Medications        Instructions Start Date   clopidogrel 75 MG tablet  Commonly known as: PLAVIX   75 mg, Oral, Daily             Continue These Medications        Instructions Start Date   ASPIRIN 81 PO   Take 81 mg by mouth Daily. self      atenolol 50 MG tablet  Commonly known as: TENORMIN   50 mg, Every Morning      Cranberry 400 MG capsule   1 capsule, Daily       finasteride 5 MG tablet  Commonly known as: PROSCAR   5 mg, Daily      fish oil 1000 MG capsule capsule   1,000 mg, Daily With Breakfast      Garlic 10 MG capsule   10 mg, Daily      lisinopril 5 MG tablet  Commonly known as: PRINIVIL,ZESTRIL   5 mg, Daily      Magnesium 250 MG tablet   1 tablet, Daily      metFORMIN 1000 MG tablet  Commonly known as: GLUCOPHAGE   1,000 mg, Oral, 2 Times Daily With Meals      multivitamin with minerals tablet tablet   1 tablet, Daily      mupirocin 2 % ointment  Commonly known as: BACTROBAN   Topical, 3 Times Daily      simvastatin 40 MG tablet  Commonly known as: ZOCOR   40 mg, Nightly      triamterene-hydrochlorothiazide 37.5-25 MG per tablet  Commonly known as: MAXZIDE-25   1 tablet, Daily      Vitamin D2 10 MCG (400 UNIT) tablet   400 Units, Daily               Discharge Diet:   Diet Instructions       Diet: Regular/House Diet; Regular (IDDSI 7); Thin (IDDSI 0)      Discharge Diet: Regular/House Diet    Texture: Regular (IDDSI 7)    Fluid Consistency: Thin (IDDSI 0)            Activity at Discharge:   Activity Instructions       Bathing Restrictions      Type of Restriction: Bathing    Bathing Restrictions: Other    Explain Bathing Restrictions: May shower in 48 hours after dressing removal    Driving Restrictions      Type of Restriction: Driving    Driving Restrictions: No Driving (Time Limited)    Length: 1 Week    Lifting Restrictions      Type of Restriction: Lifting    Lifting Restrictions: Lifting Restriction (Indicate Limit)    Weight Limit (Pounds): 10    Length of Lifting Restriction: 1 week    Other Activity Restrictions      Type of Restriction: Other    Explain Other Restrictions: No bending, stooping, or straining            Follow-up Appointments  Future Appointments   Date Time Provider Department Center   5/12/2025  8:30 AM PAD [facility] CT 1 BH PAD CT BI PAD   6/25/2025 11:30 AM Peter Griffith MD [facility] PAD PAD   10/30/2025  8:30 AM Renetta Ravi AUD  [facility] PAD PAD   10/30/2025  9:00 AM Jose Krueger MD [facility] PAD PAD     Additional Instructions for the Follow-ups that You Need to Schedule       Discharge Follow-up with Specialty: [facility]; 2 Weeks   As directed      Specialty: [facility]   Follow Up: 2 Weeks   Follow Up Details: Postop femoral endarterectomy with cutdown                 I did spend more than 30 minutes reviewing the chart, face to face encounter, and organizing discharge.    Alea Bravo, APRN  05/08/25  07:58 CDT

## 2025-05-09 NOTE — OUTREACH NOTE
Prep Survey      Flowsheet Row Responses   Religion facility patient discharged from? [facility]   Is LACE score < 7 ? No   Eligibility Readm Mgmt   Discharge diagnosis PAD (peripheral artery disease), LEFT FEMORAL ENDARTERECTOMY, LEFT LOWER EXTREMITY ANGIOGRAM, BALLOON ANGIOPLASTY, STENT PLACEMENT, RIGHT FEMORAL MYNX CLOSURE   Does the patient have one of the following disease processes/diagnoses(primary or secondary)? General Surgery   Does the patient have Home health ordered? No   Is there a DME ordered? No   Prep survey completed? Yes            Venessa ACOSTA - Registered Nurse

## 2025-05-12 ENCOUNTER — READMISSION MANAGEMENT (OUTPATIENT)
Dept: CALL CENTER | Facility: HOSPITAL | Age: 80
End: 2025-05-12
Payer: MEDICARE

## 2025-05-12 NOTE — OUTREACH NOTE
General Surgery Week 1 Survey      Flowsheet Row Responses   [facility] patient discharged from? [facility]   Does the patient have one of the following disease processes/diagnoses(primary or secondary)? General Surgery   Week 1 attempt successful? Yes   Call start time 1657   Call end time 1701   Discharge diagnosis PAD (peripheral artery disease), LEFT FEMORAL ENDARTERECTOMY, LEFT LOWER EXTREMITY ANGIOGRAM, BALLOON ANGIOPLASTY, STENT PLACEMENT, RIGHT FEMORAL MYNX CLOSURE   Person spoke with today (if not patient) and relationship pt   Meds reviewed with patient/caregiver? Yes   Is the patient having any side effects they believe may be caused by any medication additions or changes? No   Does the patient have all medications related to this admission filled (includes all antibiotics, pain medications, etc.) Yes   Is the patient taking all medications as directed (includes completed medication regime)? Yes   Does the patient have a follow up appointment scheduled with their surgeon? Yes   Has the patient kept scheduled appointments due by today? N/A   Psychosocial issues? No   Did the patient receive a copy of their discharge instructions? Yes   Nursing interventions Reviewed instructions with patient   What is the patient's perception of their health status since discharge? Improving   Nursing interventions Nurse provided patient education   Is the patient /caregiver able to teach back basic post-op care? Take showers only when approved by MD-sponge bathe until then, No tub bath, swimming, or hot tub until instructed by MD, Keep incision areas clean,dry and protected, Lifting as instructed by MD in discharge instructions   Is the patient/caregiver able to teach back signs and symptoms of incisional infection? Increased redness, swelling or pain at the incisonal site, Increased drainage or bleeding, Incisional warmth, Pus or odor from incision, Fever   Is the patient/caregiver able to teach back steps to recovery  at home? Rest and rebuild strength, gradually increase activity, Eat a well-balance diet   Is the patient/caregiver able to teach back the hierarchy of who to call/visit for symptoms/problems? PCP, Specialist, Home health nurse, Urgent Care, ED, 911 Yes   Week 1 call completed? Yes   Is the patient interested in additional calls from an ambulatory ? No   Would this patient benefit from a Referral to Amb Social Work? No   Wrap up additional comments Pt denies any fever, or increased redness, swelling, drainage at incisional site. Reviewed AVS/meds/instructions with pt. Pt verified post-op appt   Call end time 1701            Genny ACOSTA - Registered Nurse

## 2025-05-13 ENCOUNTER — CLINICAL DOCUMENTATION (OUTPATIENT)
Dept: HEMATOLOGY | Age: 80
End: 2025-05-13

## 2025-05-13 ENCOUNTER — TRANSCRIBE ORDERS (OUTPATIENT)
Dept: ADMINISTRATIVE | Facility: HOSPITAL | Age: 80
End: 2025-05-13
Payer: MEDICARE

## 2025-05-13 DIAGNOSIS — C34.11 MALIGNANT NEOPLASM OF UPPER LOBE OF RIGHT LUNG (HCC): Primary | ICD-10-CM

## 2025-05-13 DIAGNOSIS — R97.8 OTHER ABNORMAL TUMOR MARKERS: ICD-10-CM

## 2025-05-13 DIAGNOSIS — D47.2 IGA MONOCLONAL GAMMOPATHY OF UNCERTAIN SIGNIFICANCE: ICD-10-CM

## 2025-05-13 DIAGNOSIS — C34.11 MALIGNANT NEOPLASM OF UPPER LOBE OF RIGHT LUNG: Primary | ICD-10-CM

## 2025-05-13 NOTE — PROGRESS NOTES
Blake presented to clinic on 5/12/25 stating that  did not have a current ordred for CT scan as previously arranged by Dr Childers.  Blake asks that this get rescheduled and if possible, to be done first part of June prior to scheduled f/u with Dr Jeffers at [facility] on 6/25/25 .    CT CHEST W contrast scheduled at [facility] on 6/10/25 arrive at 8AM for 830 scan at [facility].  Order faxed to [facility] scheduling per protocol.   As ordered by Dr Childers to evaluate lymphadenopathy with hx of resected lung adenocarcinoma.  This will be compared to previous imaging performed 2/18/2025.      Lab appt made in clinic on 6/10/25  to be available for review at scheduled f/u with Dr Childers on 6/17/2025    Mr Storey verbalized  understanding of appt details.

## 2025-05-15 LAB
CYTO UR: NORMAL
LAB AP CASE REPORT: NORMAL
Lab: NORMAL
PATH REPORT.FINAL DX SPEC: NORMAL
PATH REPORT.GROSS SPEC: NORMAL

## 2025-05-21 ENCOUNTER — READMISSION MANAGEMENT (OUTPATIENT)
Dept: CALL CENTER | Facility: HOSPITAL | Age: 80
End: 2025-05-21
Payer: MEDICARE

## 2025-05-21 NOTE — OUTREACH NOTE
General Surgery Week 2 Survey      Flowsheet Row Responses   [facility] patient discharged from? [facility]   Does the patient have one of the following disease processes/diagnoses(primary or secondary)? General Surgery   Week 2 attempt successful? Yes   Call start time 1000   Call end time 1003   Discharge diagnosis PAD (peripheral artery disease), LEFT FEMORAL ENDARTERECTOMY, LEFT LOWER EXTREMITY ANGIOGRAM, BALLOON ANGIOPLASTY, STENT PLACEMENT, RIGHT FEMORAL MYNX CLOSURE   Person spoke with today (if not patient) and relationship pt   Meds reviewed with patient/caregiver? Yes   Is the patient having any side effects they believe may be caused by any medication additions or changes? No   Does the patient have all medications related to this admission filled (includes all antibiotics, pain medications, etc.) Yes   Is the patient taking all medications as directed (includes completed medication regime)? Yes   Does the patient have a follow up appointment scheduled with their surgeon? Yes   Has the patient kept scheduled appointments due by today? Yes   Psychosocial issues? No   Did the patient receive a copy of their discharge instructions? Yes   Nursing interventions Reviewed instructions with patient   What is the patient's perception of their health status since discharge? Improving   Nursing interventions Nurse provided patient education   Is the patient /caregiver able to teach back basic post-op care? Continue use of incentive spirometry at least 1 week post discharge   Is the patient/caregiver able to teach back signs and symptoms of incisional infection? Increased redness, swelling or pain at the incisonal site, Increased drainage or bleeding, Incisional warmth, Pus or odor from incision, Fever   Is the patient/caregiver able to teach back steps to recovery at home? Set small, achievable goals for return to baseline health, Rest and rebuild strength, gradually increase activity, Make a list of questions  for surgeon's appointment   If the patient is a current smoker, are they able to teach back resources for cessation? Not a smoker   Is the patient/caregiver able to teach back the hierarchy of who to call/visit for symptoms/problems? PCP, Specialist, Home health nurse, Urgent Care, ED, 911 Yes   Week 2 call completed? Yes   Graduated Yes   Is the patient interested in additional calls from an ambulatory ? No   Would this patient benefit from a Referral to Amb Social Work? No   Wrap up additional comments pt stated he is doing great no concerns, leg fels much better   Call end time 1003            VIVIEN ECHEVARRIA - Registered Nurse

## 2025-05-22 ENCOUNTER — OFFICE VISIT (OUTPATIENT)
Dept: VASCULAR SURGERY | Facility: CLINIC | Age: 80
End: 2025-05-22
Payer: MEDICARE

## 2025-05-22 VITALS
HEART RATE: 75 BPM | WEIGHT: 195 LBS | SYSTOLIC BLOOD PRESSURE: 126 MMHG | BODY MASS INDEX: 30.61 KG/M2 | OXYGEN SATURATION: 100 % | HEIGHT: 67 IN | DIASTOLIC BLOOD PRESSURE: 68 MMHG

## 2025-05-22 DIAGNOSIS — I70.202 FEMORAL ARTERY STENOSIS, LEFT: ICD-10-CM

## 2025-05-22 DIAGNOSIS — E11.51 TYPE 2 DIABETES MELLITUS WITH DIABETIC PERIPHERAL ANGIOPATHY WITHOUT GANGRENE, WITHOUT LONG-TERM CURRENT USE OF INSULIN: ICD-10-CM

## 2025-05-22 DIAGNOSIS — I65.23 BILATERAL CAROTID ARTERY STENOSIS: ICD-10-CM

## 2025-05-22 DIAGNOSIS — I73.9 PAD (PERIPHERAL ARTERY DISEASE): Primary | ICD-10-CM

## 2025-05-22 DIAGNOSIS — I10 PRIMARY HYPERTENSION: ICD-10-CM

## 2025-05-22 NOTE — PROGRESS NOTES
"5/22/2025       Preethi Mccullough APRN  [address] [place] [address] [place] [address]      Manjit Seth  1945    Chief Complaint   Patient presents with    left femoral endarterectony post op follow up     States he has been fantastic since surgery, no complaints       Dear Preethi Mccullough APRN       I had the pleasure of seeing your patient Manjit Seth in the office today.   As you recall, Manjit Seth is a 79 y.o.  male who you are currently following for routine health maintenance.  He has complaints of heaviness and aching to the left leg.  He does feel like his leg wants to give out on him.  He did undergo an angiogram of the left lower extremity and ultimately needed further intervention.  On 5/7/2025 he did undergo a left common femoral endarterectomy with bovine patch angioplasty and eversion profundoplasty with balloon angioplasty and stenting of the proximal SFA.  He states he is feeling fantastic since surgery.  His claudication has completely resolved.  His groins have healed.  He is maintained on aspirin, Plavix, and Zocor.    Review of Systems   Constitutional: Negative.    HENT: Negative.     Eyes: Negative.    Respiratory: Negative.     Cardiovascular: Negative.    Gastrointestinal: Negative.    Endocrine: Negative.    Genitourinary: Negative.    Musculoskeletal: Negative.    Skin: Negative.    Allergic/Immunologic: Negative.    Neurological: Negative.    Hematological: Negative.    Psychiatric/Behavioral: Negative.     All other systems reviewed and are negative.    /68   Pulse 75   Ht 170.2 cm (67\")   Wt 88.5 kg (195 lb)   SpO2 100%   BMI 30.54 kg/m²     Physical Exam  Vitals and nursing note reviewed.   Constitutional:       General: He is not in acute distress.     Appearance: Normal appearance. He is well-developed. He is obese. He is not diaphoretic.   HENT:      Head: Normocephalic and atraumatic.   Neck:      Vascular: No carotid bruit or JVD. "   Cardiovascular:      Rate and Rhythm: Normal rate and regular rhythm.      Pulses: Normal pulses.           Femoral pulses are 2+ on the right side and 2+ on the left side.       Popliteal pulses are 2+ on the right side and 2+ on the left side.        Dorsalis pedis pulses are 2+ on the right side and 2+ on the left side.        Posterior tibial pulses are 2+ on the right side and 2+ on the left side.      Heart sounds: Normal heart sounds, S1 normal and S2 normal. No murmur heard.     No friction rub. No gallop.      Comments: Palpable pulses, bilateral groins healed  Pulmonary:      Effort: Pulmonary effort is normal.      Breath sounds: Normal breath sounds.   Abdominal:      General: Bowel sounds are normal. There is no abdominal bruit.      Palpations: Abdomen is soft.      Tenderness: There is no abdominal tenderness.   Musculoskeletal:         General: Normal range of motion.   Skin:     General: Skin is warm and dry.   Neurological:      General: No focal deficit present.      Mental Status: He is alert and oriented to person, place, and time.      Cranial Nerves: No cranial nerve deficit.   Psychiatric:         Mood and Affect: Mood normal.         Behavior: Behavior normal.         Thought Content: Thought content normal.         Judgment: Judgment normal.        Diagnostic Data:  IR Angiogram Extremity  Result Date: 5/7/2025  Narrative: Performed by Dr. Bal in OR. Please see operative dictation.  This report was signed and finalized on 5/7/2025 3:07 PM by Dr. Richard Bal MD.      FL C Arm During Surgery  Result Date: 5/7/2025  Narrative: This procedure was auto-finalized with no dictation required.    Arterial Line  Result Date: 5/7/2025  Narrative: Scarlet Blair MD     5/7/2025  8:51 AM Arterial Line Pre-sedation assessment completed: 5/7/2025 8:50 AM Patient reassessed immediately prior to procedure Patient location during procedure: pre-op Start time: 5/7/2025 8:50 AM Stop  Time:5/7/2025 8:50 AM  Line placed for hemodynamic monitoring. Performed By Anesthesiologist: Scarlet Blair MD Preanesthetic Checklist Completed: patient identified, IV checked, site marked, risks and benefits discussed, surgical consent, monitors and equipment checked, pre-op evaluation and timeout performed Arterial Line Prep  Sterile Tech: cap and gloves Prep: ChloraPrep Patient monitoring: continuous pulse oximetry Arterial Line Procedure Laterality:right Location:  radial artery Catheter size: 20 G Guidance: ultrasound guided PROCEDURE NOTE/ULTRASOUND INTERPRETATION.  Using ultrasound guidance the potential vascular sites for insertion of the catheter were visualized to determine the patency of the vessel to be used for vascular access.  After selecting the appropriate site for insertion, the needle was visualized under ultrasound being inserted into the radial artery, followed by ultrasound confirmation of wire and catheter placement. There were no abnormalities seen on ultrasound; an image was taken; and the patient tolerated the procedure with no complications. Number of attempts: 1 Successful placement: yes Images: still images not obtained Post Assessment Dressing Type: secured with tape and wrist guard applied. Complications no Circ/Move/Sens Assessment: normal and unchanged. Patient Tolerance: patient tolerated the procedure well with no apparent complications            Patient Active Problem List   Diagnosis    Extramedullary plasmacytoma- left nose    History of radiation therapy- Left Nose    Encounter for aftercare    Hypertension    Neoplasm of uncertain behavior    Spinal stenosis in cervical region    Intervertebral cervical disc disorder with myelopathy, cervical region    Cervical radiculopathy    Snuff user    Cervical spondylosis with myelopathy    Degeneration of cervical intervertebral disc    Abnormal ECG    PAC (premature atrial contraction)    Benign prostatic hyperplasia with  urinary obstruction    Raised prostate specific antigen    History of tobacco abuse    Anterior epistaxis    Tinnitus aurium, left    Neoplasm of uncertain behavior of skin of nose    Actinic keratosis    PAD (peripheral artery disease)    Preop testing    Lung nodule    Type 2 diabetes mellitus, without long-term current use of insulin    Malignant neoplasm of upper lobe of right lung    Peripheral artery disease        Diagnosis Plan   1. PAD (peripheral artery disease)  US Ankle / Brachial Indices Extremity Complete      2. Primary hypertension        3. Type 2 diabetes mellitus with diabetic peripheral angiopathy without gangrene, without long-term current use of insulin        4. Femoral artery stenosis, left        5. Bilateral carotid artery stenosis  US Carotid Bilateral                Plan: After thoroughly evaluating Manjit Seth, I am pleased to report he is doing great after his left common femoral endarterectomy.  His claudication has completely resolved.  His left groin has healed as well as his right.  He has palpable pulses upon exam.  He is free to resume normal activity without restriction.  Will see him back in 6 months with repeat noninvasive testing including ABIs and a carotid duplex.  I did discuss vascular risk factors as they pertain to the progression of vascular disease including controlling his hypertension and diabetes.  His blood pressure is stable on his current medications.  He should continue his aspirin 81 mg daily, Plavix 75 mg daily, Zocor 40 mg daily in addition to his other medications.  Body mass index is 30.54 kg/m².   This was all discussed in full with complete understanding.  Thank you for allowing me to participate in the care of your patient.  Please do not hesitate to call with any questions or concerns.  We will keep you aware of any further encounters with Manjit Seth.        Sincerely yours,         Cherie Yung, Preethi Slaughter APRN

## 2025-05-22 NOTE — LETTER
May 22, 2025     EDWIN Patiño  [address] [facility] [place] [address]    Patient: Manjit Seth   YOB: 1945   Date of Visit: 5/22/2025     Dear EDWIN Patiño:       Thank you for referring Manjit Seth to me for evaluation. Below are the relevant portions of my assessment and plan of care.    If you have questions, please do not hesitate to call me. I look forward to following Manjit along with you.         Sincerely,        EDWIN Bolivar        CC: No Recipients    Cherie Yung APRN  05/22/25 1127  Sign when Signing Visit  5/22/2025       Preethi Mccullough APRN  [address] [facility] [address]      Manjit Seth  1945    Chief Complaint   Patient presents with   • left femoral endarterectony post op follow up     States he has been fantastic since surgery, no complaints       Dear Preethi Mccullough APRN       I had the pleasure of seeing your patient Manjit Seth in the office today.   As you recall, Manjit Seth is a 79 y.o.  male who you are currently following for routine health maintenance.  He has complaints of heaviness and aching to the left leg.  He does feel like his leg wants to give out on him.  He did undergo an angiogram of the left lower extremity and ultimately needed further intervention.  On 5/7/2025 he did undergo a left common femoral endarterectomy with bovine patch angioplasty and eversion profundoplasty with balloon angioplasty and stenting of the proximal SFA.  He states he is feeling fantastic since surgery.  His claudication has completely resolved.  His groins have healed.  He is maintained on aspirin, Plavix, and Zocor.    Review of Systems   Constitutional: Negative.    HENT: Negative.     Eyes: Negative.    Respiratory: Negative.     Cardiovascular: Negative.    Gastrointestinal: Negative.    Endocrine: Negative.    Genitourinary: Negative.    Musculoskeletal: Negative.    Skin: Negative.   "  Allergic/Immunologic: Negative.    Neurological: Negative.    Hematological: Negative.    Psychiatric/Behavioral: Negative.     All other systems reviewed and are negative.    /68   Pulse 75   Ht 170.2 cm (67\")   Wt 88.5 kg (195 lb)   SpO2 100%   BMI 30.54 kg/m²     Physical Exam  Vitals and nursing note reviewed.   Constitutional:       General: He is not in acute distress.     Appearance: Normal appearance. He is well-developed. He is obese. He is not diaphoretic.   HENT:      Head: Normocephalic and atraumatic.   Neck:      Vascular: No carotid bruit or JVD.   Cardiovascular:      Rate and Rhythm: Normal rate and regular rhythm.      Pulses: Normal pulses.           Femoral pulses are 2+ on the right side and 2+ on the left side.       Popliteal pulses are 2+ on the right side and 2+ on the left side.        Dorsalis pedis pulses are 2+ on the right side and 2+ on the left side.        Posterior tibial pulses are 2+ on the right side and 2+ on the left side.      Heart sounds: Normal heart sounds, S1 normal and S2 normal. No murmur heard.     No friction rub. No gallop.      Comments: Palpable pulses, bilateral groins healed  Pulmonary:      Effort: Pulmonary effort is normal.      Breath sounds: Normal breath sounds.   Abdominal:      General: Bowel sounds are normal. There is no abdominal bruit.      Palpations: Abdomen is soft.      Tenderness: There is no abdominal tenderness.   Musculoskeletal:         General: Normal range of motion.   Skin:     General: Skin is warm and dry.   Neurological:      General: No focal deficit present.      Mental Status: He is alert and oriented to person, place, and time.      Cranial Nerves: No cranial nerve deficit.   Psychiatric:         Mood and Affect: Mood normal.         Behavior: Behavior normal.         Thought Content: Thought content normal.         Judgment: Judgment normal.        Diagnostic Data:  IR Angiogram Extremity  Result Date: " 5/7/2025  Narrative: Performed by Dr. Bal in OR. Please see operative dictation.  This report was signed and finalized on 5/7/2025 3:07 PM by Dr. Richard Bal MD.      FL C Arm During Surgery  Result Date: 5/7/2025  Narrative: This procedure was auto-finalized with no dictation required.    Arterial Line  Result Date: 5/7/2025  Narrative: Scarlet Blair MD     5/7/2025  8:51 AM Arterial Line Pre-sedation assessment completed: 5/7/2025 8:50 AM Patient reassessed immediately prior to procedure Patient location during procedure: pre-op Start time: 5/7/2025 8:50 AM Stop Time:5/7/2025 8:50 AM  Line placed for hemodynamic monitoring. Performed By Anesthesiologist: Scarlet Blair MD Preanesthetic Checklist Completed: patient identified, IV checked, site marked, risks and benefits discussed, surgical consent, monitors and equipment checked, pre-op evaluation and timeout performed Arterial Line Prep  Sterile Tech: cap and gloves Prep: ChloraPrep Patient monitoring: continuous pulse oximetry Arterial Line Procedure Laterality:right Location:  radial artery Catheter size: 20 G Guidance: ultrasound guided PROCEDURE NOTE/ULTRASOUND INTERPRETATION.  Using ultrasound guidance the potential vascular sites for insertion of the catheter were visualized to determine the patency of the vessel to be used for vascular access.  After selecting the appropriate site for insertion, the needle was visualized under ultrasound being inserted into the radial artery, followed by ultrasound confirmation of wire and catheter placement. There were no abnormalities seen on ultrasound; an image was taken; and the patient tolerated the procedure with no complications. Number of attempts: 1 Successful placement: yes Images: still images not obtained Post Assessment Dressing Type: secured with tape and wrist guard applied. Complications no Circ/Move/Sens Assessment: normal and unchanged. Patient Tolerance: patient tolerated the  procedure well with no apparent complications            Patient Active Problem List   Diagnosis   • Extramedullary plasmacytoma- left nose   • History of radiation therapy- Left Nose   • Encounter for aftercare   • Hypertension   • Neoplasm of uncertain behavior   • Spinal stenosis in cervical region   • Intervertebral cervical disc disorder with myelopathy, cervical region   • Cervical radiculopathy   • Snuff user   • Cervical spondylosis with myelopathy   • Degeneration of cervical intervertebral disc   • Abnormal ECG   • PAC (premature atrial contraction)   • Benign prostatic hyperplasia with urinary obstruction   • Raised prostate specific antigen   • History of tobacco abuse   • Anterior epistaxis   • Tinnitus aurium, left   • Neoplasm of uncertain behavior of skin of nose   • Actinic keratosis   • PAD (peripheral artery disease)   • Preop testing   • Lung nodule   • Type 2 diabetes mellitus, without long-term current use of insulin   • Malignant neoplasm of upper lobe of right lung   • Peripheral artery disease        Diagnosis Plan   1. PAD (peripheral artery disease)  US Ankle / Brachial Indices Extremity Complete      2. Primary hypertension        3. Type 2 diabetes mellitus with diabetic peripheral angiopathy without gangrene, without long-term current use of insulin        4. Femoral artery stenosis, left        5. Bilateral carotid artery stenosis  US Carotid Bilateral                Plan: After thoroughly evaluating Manjit Seth, I am pleased to report he is doing great after his left common femoral endarterectomy.  His claudication has completely resolved.  His left groin has healed as well as his right.  He has palpable pulses upon exam.  He is free to resume normal activity without restriction.  Will see him back in 6 months with repeat noninvasive testing including ABIs and a carotid duplex.  I did discuss vascular risk factors as they pertain to the progression of vascular disease including  controlling his hypertension and diabetes.  His blood pressure is stable on his current medications.  He should continue his aspirin 81 mg daily, Plavix 75 mg daily, Zocor 40 mg daily in addition to his other medications.  Body mass index is 30.54 kg/m².   This was all discussed in full with complete understanding.  Thank you for allowing me to participate in the care of your patient.  Please do not hesitate to call with any questions or concerns.  We will keep you aware of any further encounters with Manjit Seth.        Sincerely yours,         EDWIN Bolivar, EDWIN Blank

## 2025-06-05 ENCOUNTER — APPOINTMENT (OUTPATIENT)
Dept: INFUSION THERAPY | Age: 80
End: 2025-06-05
Payer: MEDICARE

## 2025-06-10 ENCOUNTER — HOSPITAL ENCOUNTER (OUTPATIENT)
Dept: CT IMAGING | Facility: HOSPITAL | Age: 80
Discharge: HOME | End: 2025-06-10
Admitting: INTERNAL MEDICINE
Payer: MEDICARE

## 2025-06-10 ENCOUNTER — HOSPITAL ENCOUNTER (OUTPATIENT)
Dept: INFUSION THERAPY | Age: 80
Discharge: HOME | End: 2025-06-10
Payer: MEDICARE

## 2025-06-10 DIAGNOSIS — R97.8 OTHER ABNORMAL TUMOR MARKERS: ICD-10-CM

## 2025-06-10 DIAGNOSIS — D47.2 IGA MONOCLONAL GAMMOPATHY OF UNCERTAIN SIGNIFICANCE: ICD-10-CM

## 2025-06-10 DIAGNOSIS — C34.11 MALIGNANT NEOPLASM OF UPPER LOBE OF RIGHT LUNG (HCC): ICD-10-CM

## 2025-06-10 DIAGNOSIS — C34.11 MALIGNANT NEOPLASM OF UPPER LOBE OF RIGHT LUNG: ICD-10-CM

## 2025-06-10 LAB
ALBUMIN SERPL-MCNC: 4.3 G/DL (ref 3.5–5.2)
ALP SERPL-CCNC: 99 U/L (ref 40–129)
ALT SERPL-CCNC: 171 U/L (ref 5–41)
ANION GAP SERPL CALCULATED.3IONS-SCNC: 15 MMOL/L (ref 7–19)
AST SERPL-CCNC: 108 U/L (ref 5–40)
BASOPHILS # BLD: 0.06 K/UL (ref 0–0.2)
BASOPHILS NFR BLD: 0.9 % (ref 0–1)
BILIRUB SERPL-MCNC: 0.4 MG/DL (ref 0–1.2)
BUN SERPL-MCNC: 17 MG/DL (ref 8–23)
CALCIUM SERPL-MCNC: 9 MG/DL (ref 8.8–10.2)
CANCER AG19-9 SERPL-ACNC: 29 U/ML (ref 0–35)
CEA SERPL-MCNC: 1.5 NG/ML (ref 0–4.7)
CHLORIDE SERPL-SCNC: 98 MMOL/L (ref 98–107)
CO2 SERPL-SCNC: 24 MMOL/L (ref 22–29)
CREAT BLDA-MCNC: 0.9 MG/DL (ref 0.6–1.3)
CREAT SERPL-MCNC: 0.8 MG/DL (ref 0.7–1.2)
EOSINOPHIL # BLD: 0.23 K/UL (ref 0–0.6)
EOSINOPHIL NFR BLD: 3.5 % (ref 0–5)
ERYTHROCYTE [DISTWIDTH] IN BLOOD BY AUTOMATED COUNT: 14.6 % (ref 11.5–14.5)
GLUCOSE SERPL-MCNC: 111 MG/DL (ref 70–99)
HCT VFR BLD AUTO: 35.8 % (ref 42–52)
HGB BLD-MCNC: 11.7 G/DL (ref 14–18)
LYMPHOCYTES # BLD: 1.44 K/UL (ref 1.1–4.5)
LYMPHOCYTES NFR BLD: 22.1 % (ref 20–40)
MCH RBC QN AUTO: 30.9 PG (ref 27–31)
MCHC RBC AUTO-ENTMCNC: 32.7 G/DL (ref 33–37)
MCV RBC AUTO: 94.5 FL (ref 80–94)
MONOCYTES # BLD: 0.48 K/UL (ref 0–0.9)
MONOCYTES NFR BLD: 7.4 % (ref 1–10)
NEUTROPHILS # BLD: 4.29 K/UL (ref 1.5–7.5)
NEUTS SEG NFR BLD: 65.9 % (ref 50–65)
PLATELET # BLD AUTO: 172 K/UL (ref 130–400)
PMV BLD AUTO: 11.2 FL (ref 9.4–12.4)
POTASSIUM SERPL-SCNC: 4.2 MMOL/L (ref 3.5–5.1)
PROT SERPL-MCNC: 7.4 G/DL (ref 6.4–8.3)
PSA SERPL-MCNC: 1.77 NG/ML (ref 0–4)
RBC # BLD AUTO: 3.79 M/UL (ref 4.7–6.1)
SODIUM SERPL-SCNC: 137 MMOL/L (ref 136–145)
WBC # BLD AUTO: 6.51 K/UL (ref 4.8–10.8)

## 2025-06-10 PROCEDURE — 85025 COMPLETE CBC W/AUTO DIFF WBC: CPT

## 2025-06-10 PROCEDURE — 84155 ASSAY OF PROTEIN SERUM: CPT

## 2025-06-10 PROCEDURE — 86334 IMMUNOFIX E-PHORESIS SERUM: CPT

## 2025-06-10 PROCEDURE — 84165 PROTEIN E-PHORESIS SERUM: CPT

## 2025-06-10 PROCEDURE — 83521 IG LIGHT CHAINS FREE EACH: CPT

## 2025-06-10 PROCEDURE — 82784 ASSAY IGA/IGD/IGG/IGM EACH: CPT

## 2025-06-10 PROCEDURE — 25510000001 IOPAMIDOL 61 % SOLUTION: Performed by: INTERNAL MEDICINE

## 2025-06-10 PROCEDURE — 86301 IMMUNOASSAY TUMOR CA 19-9: CPT

## 2025-06-10 PROCEDURE — 80053 COMPREHEN METABOLIC PANEL: CPT

## 2025-06-10 PROCEDURE — 36415 COLL VENOUS BLD VENIPUNCTURE: CPT

## 2025-06-10 PROCEDURE — 82378 CARCINOEMBRYONIC ANTIGEN: CPT

## 2025-06-10 PROCEDURE — 84153 ASSAY OF PSA TOTAL: CPT

## 2025-06-10 PROCEDURE — 71260 CT THORAX DX C+: CPT

## 2025-06-10 PROCEDURE — 83883 ASSAY NEPHELOMETRY NOT SPEC: CPT

## 2025-06-10 PROCEDURE — 82565 ASSAY OF CREATININE: CPT

## 2025-06-10 PROCEDURE — 80061 LIPID PANEL: CPT

## 2025-06-10 PROCEDURE — 82232 ASSAY OF BETA-2 PROTEIN: CPT

## 2025-06-10 RX ORDER — IOPAMIDOL 612 MG/ML
100 INJECTION, SOLUTION INTRAVASCULAR
Status: COMPLETED | OUTPATIENT
Start: 2025-06-10 | End: 2025-06-10

## 2025-06-10 RX ADMIN — IOPAMIDOL 100 ML: 612 INJECTION, SOLUTION INTRAVENOUS at 09:15

## 2025-06-11 ENCOUNTER — TELEPHONE (OUTPATIENT)
Dept: HEMATOLOGY | Age: 80
End: 2025-06-11

## 2025-06-11 NOTE — PROGRESS NOTES
loss on the right as before, with postop changes of previous partial right lung resection,   mild bowing of the trachea towards the right is stable.  There is blunting of the right costophrenic angle suggestive of mild pleural thickening.   The left lung remains clear.        CXR at [facility] on 01/24/2025:  Stable chest probable postoperative changes on the right and no active disease identified.       EBUS per Dr. Ryan Jeffers at [facility] on 01/27/2025  Endobronchial findings:  Trachea: Normal mucosa  Dunia: Normal mucosa  Right main bronchus: Normal mucosa  Right upper lobe bronchus: Normal mucosa  Right middle lobe bronchus: Normal mucosa  Right lower lobe bronchus: Normal mucosa  Left main bronchus: Normal mucosa  Left upper lobe bronchus: Normal mucosa  Left lower lobe bronchus: Normal mucosa    The conventional bronchoscope was removed and the EBUS scope was inserted and the mediastinum was examined via linear ultrasound.   Findings:   stations 4r, 10 r, 110 r, 4L, 10L, 11 L negative.   Station 7 1.9 cm nod3. TBNA under ultrasound guidance was collected from station 7 x 3 passes, Rapid onsite evaluation: lymphocytes. Iced saline was instilled and wash aspirated in small aliquots between needle passes for hemostasis.   After satisfactory confirmation of no persistent abnormal bleeding, the bronchoscope was removed.     Final Diagnosis  1. Station 7 lymph node, EBUS (smears (, ThinPrep, and cellblock section):  No malignancy identified. Mixed lymphocytes, histiocytes, anthracotic pigment, and rare benign bronchial epithelial cells.   2. Bronchial washing (ThinPrep and cellblock section):  No malignant cells identified. Bronchial epithelial cells, alveolar macrophages, and neutrophils present      Station 7 (subcarinal) lymph node biopsy and washings on EBUS 1/27/2025 was NEGATIVE for malignancy.  At the initial oncology visit on 1/29/2025 regarding the newly resected RUL lung cancer and again today, 2/6/2025,

## 2025-06-11 NOTE — TELEPHONE ENCOUNTER

## 2025-06-13 LAB — B2 MICROGLOB SERPL-MCNC: 3.1 MG/L

## 2025-06-14 LAB
ALBUMIN SERPL-MCNC: 4.1 G/DL (ref 3.75–5.01)
ALPHA1 GLOB SERPL ELPH-MCNC: 0.3 G/DL (ref 0.19–0.46)
ALPHA2 GLOB SERPL ELPH-MCNC: 1.04 G/DL (ref 0.48–1.05)
B-GLOBULIN SERPL ELPH-MCNC: 0.71 G/DL (ref 0.48–1.1)
DEPRECATED KAPPA LC FREE/LAMBDA SER: 3.78 {RATIO} (ref 0.26–1.65)
EER MONOCLONAL PROTEIN AND FLC, SERUM: ABNORMAL
GAMMA GLOB SERPL ELPH-MCNC: 1.25 G/DL (ref 0.62–1.51)
IGA SERPL-MCNC: 165 MG/DL (ref 68–408)
IGG SERPL-MCNC: 1391 MG/DL (ref 768–1632)
IGM SERPL-MCNC: 26 MG/DL (ref 35–263)
INTERPRETATION SERPL IFE-IMP: ABNORMAL
INTERPRETATION SERPL IFE-IMP: ABNORMAL
KAPPA LC FREE SER-MCNC: 56.51 MG/L (ref 3.3–19.4)
LAMBDA LC FREE SERPL-MCNC: 14.95 MG/L (ref 5.71–26.3)
MONOCLONAL PROTEIN, SERUM: 0.96 G/DL
PROT SERPL-MCNC: 7.4 G/DL (ref 6.3–8.2)

## 2025-06-17 ENCOUNTER — OFFICE VISIT (OUTPATIENT)
Dept: HEMATOLOGY | Age: 80
End: 2025-06-17
Payer: MEDICARE

## 2025-06-17 ENCOUNTER — TELEPHONE (OUTPATIENT)
Dept: HEMATOLOGY | Age: 80
End: 2025-06-17

## 2025-06-17 ENCOUNTER — TRANSCRIBE ORDERS (OUTPATIENT)
Dept: ADMINISTRATIVE | Facility: HOSPITAL | Age: 80
End: 2025-06-17
Payer: MEDICARE

## 2025-06-17 ENCOUNTER — HOSPITAL ENCOUNTER (OUTPATIENT)
Dept: INFUSION THERAPY | Age: 80
Discharge: HOME | End: 2025-06-17
Payer: MEDICARE

## 2025-06-17 VITALS
DIASTOLIC BLOOD PRESSURE: 64 MMHG | TEMPERATURE: 97.2 F | SYSTOLIC BLOOD PRESSURE: 138 MMHG | BODY MASS INDEX: 29.8 KG/M2 | OXYGEN SATURATION: 95 % | WEIGHT: 196.6 LBS | HEART RATE: 84 BPM | HEIGHT: 68 IN

## 2025-06-17 DIAGNOSIS — C80.1 ADENOCARCINOMA (HCC): ICD-10-CM

## 2025-06-17 DIAGNOSIS — C34.11 MALIGNANT NEOPLASM OF UPPER LOBE OF RIGHT LUNG: Primary | ICD-10-CM

## 2025-06-17 DIAGNOSIS — C34.11 MALIGNANT NEOPLASM OF UPPER LOBE OF RIGHT LUNG (HCC): Primary | ICD-10-CM

## 2025-06-17 DIAGNOSIS — Z71.2 ENCOUNTER TO DISCUSS TEST RESULTS: ICD-10-CM

## 2025-06-17 DIAGNOSIS — Z00.00 HEALTH CARE MAINTENANCE: ICD-10-CM

## 2025-06-17 DIAGNOSIS — C90.20 EXTRAMEDULLARY PLASMACYTOMA, UNSPECIFIED WHETHER REMISSION ACHIEVED (HCC): ICD-10-CM

## 2025-06-17 DIAGNOSIS — K86.9 PANCREATIC LESION: ICD-10-CM

## 2025-06-17 DIAGNOSIS — D47.2 IGA MONOCLONAL GAMMOPATHY OF UNCERTAIN SIGNIFICANCE: ICD-10-CM

## 2025-06-17 DIAGNOSIS — R97.8 OTHER ABNORMAL TUMOR MARKERS: ICD-10-CM

## 2025-06-17 PROCEDURE — 1123F ACP DISCUSS/DSCN MKR DOCD: CPT | Performed by: INTERNAL MEDICINE

## 2025-06-17 PROCEDURE — 1126F AMNT PAIN NOTED NONE PRSNT: CPT | Performed by: INTERNAL MEDICINE

## 2025-06-17 PROCEDURE — 99214 OFFICE O/P EST MOD 30 MIN: CPT | Performed by: INTERNAL MEDICINE

## 2025-06-17 PROCEDURE — G8417 CALC BMI ABV UP PARAM F/U: HCPCS | Performed by: INTERNAL MEDICINE

## 2025-06-17 PROCEDURE — 1159F MED LIST DOCD IN RCRD: CPT | Performed by: INTERNAL MEDICINE

## 2025-06-17 PROCEDURE — 99213 OFFICE O/P EST LOW 20 MIN: CPT

## 2025-06-17 PROCEDURE — G8427 DOCREV CUR MEDS BY ELIG CLIN: HCPCS | Performed by: INTERNAL MEDICINE

## 2025-06-17 PROCEDURE — 4004F PT TOBACCO SCREEN RCVD TLK: CPT | Performed by: INTERNAL MEDICINE

## 2025-06-17 RX ORDER — CLOPIDOGREL BISULFATE 75 MG/1
75 TABLET ORAL DAILY
COMMUNITY
Start: 2025-05-08

## 2025-06-17 NOTE — TELEPHONE ENCOUNTER
CT chest and MRI abdomen scheduled at the [facility] for September 22nd arrive 8:15 for 8:45 am   MRI first and CT chest to follow     NPO 4 hours prior    Order  faxed to [phone]    Blake is aware of the appointment     Lab 10/14/2025 at 8:00 am    F/U with Dr. Childers 10/21/2025

## 2025-06-18 ENCOUNTER — TRANSCRIBE ORDERS (OUTPATIENT)
Dept: ADMINISTRATIVE | Facility: HOSPITAL | Age: 80
End: 2025-06-18
Payer: MEDICARE

## 2025-06-18 DIAGNOSIS — R74.8 ABNORMAL LEVELS OF OTHER SERUM ENZYMES: Primary | ICD-10-CM

## 2025-06-25 ENCOUNTER — OFFICE VISIT (OUTPATIENT)
Dept: PULMONOLOGY | Facility: CLINIC | Age: 80
End: 2025-06-25
Payer: MEDICARE

## 2025-06-25 VITALS
WEIGHT: 195 LBS | OXYGEN SATURATION: 96 % | BODY MASS INDEX: 30.61 KG/M2 | SYSTOLIC BLOOD PRESSURE: 126 MMHG | HEART RATE: 68 BPM | DIASTOLIC BLOOD PRESSURE: 60 MMHG | HEIGHT: 67 IN

## 2025-06-25 DIAGNOSIS — C34.11 MALIGNANT NEOPLASM OF UPPER LOBE OF RIGHT LUNG: Primary | ICD-10-CM

## 2025-06-25 NOTE — PROGRESS NOTES
"Background:  No My Sticky Note on file.   Chief Complaint  Lung Nodule    Subjective    History of Present Illness     Manjit Seth is here for follow up with [facility].  History of Present Illness  He follows up with history of wedge resection for lung cancer, negative mediastinal ebus evaluation earlier this year.   He is undergoing close surveillance without additional therapy so far.  He has no pulmonary complaints.       Tobacco Use: High Risk (6/25/2025)    Patient History     Smoking Tobacco Use: Former     Smokeless Tobacco Use: Current     Passive Exposure: Not on file      Current Outpatient Medications   Medication Instructions    ASPIRIN 81 PO Take 81 mg by mouth Daily. self    atenolol (TENORMIN) 50 mg, Every Morning    clopidogrel (PLAVIX) 75 mg, Oral, Daily    finasteride (PROSCAR) 5 mg, Daily    fish oil 1,000 mg, Daily With Breakfast    lisinopril (PRINIVIL,ZESTRIL) 5 mg, Daily    Magnesium 250 MG tablet 1 tablet, Daily    metFORMIN (GLUCOPHAGE) 1,000 mg, 2 Times Daily With Meals    Multiple Vitamins-Minerals (MULTIVITAMIN ADULT PO) 1 tablet, Daily    mupirocin (BACTROBAN) 2 % ointment Topical, 3 Times Daily    simvastatin (ZOCOR) 40 mg, Nightly    triamterene-hydrochlorothiazide (MAXZIDE-25) 37.5-25 MG per tablet 1 tablet, Daily    Vitamin D2 400 Units, Daily      Objective     Vital Signs:   /60   Pulse 68   Ht 170.2 cm (67\")   Wt 88.5 kg (195 lb)   SpO2 96% Comment: RA  BMI 30.54 kg/m²   Physical Exam  Constitutional:       Appearance: Normal appearance. He is not ill-appearing or diaphoretic.   Eyes:      Extraocular Movements: Extraocular movements intact.   Pulmonary:      Effort: Pulmonary effort is normal. No respiratory distress.      Breath sounds: No wheezing, rhonchi or rales.   Neurological:      Mental Status: He is alert.        Result Review  Data Reviewed:    CT Chest With Contrast Diagnostic  Result Date: " 6/10/2025  Impression: 1. No new suspicious pulmonary finding. Similar pulmonary findings compared to 2/18/2025 as detailed above.  2. Proximal pancreas with 5 mm circumscribed hypodensity, similar compared to 9/17/2024, favor small incidental pancreatic cyst. Pancreatic cyst is less than or equal to 2.5 cm, in a patient 80 years or older, recommend reimaging every 2 years for 4 years, then stop if stable.  Reference: SIVAN James, et al. Management of Incidental Pancreatic Cysts: A White Paper of the ACR Incidental Findings Committee. Journal of the American College of Radiology, vol. 14, no. 7, 2017, pp. 911-923.  This report was signed and finalized on 6/10/2025 2:28 PM by Dr Aleena Constantino MD.                     Assessment and Plan    Diagnoses and all orders for this visit:    1. Malignant neoplasm of upper lobe of right lung (Primary)    He appears stable with favorable follow-up imaging.  We will follow him up in 6 months with ongoing monitoring.  Dr. Medel has been ordering his CT scans.    Follow Up   Return in about 6 months (around 1/6/2026).  Patient was given instructions and counseling regarding his condition or for health maintenance advice. Please see specific information pulled into the AVS if appropriate.    Electronically signed by Peter Griffith MD, 6/25/2025, 15:34 CDT

## 2025-07-02 ENCOUNTER — TRANSCRIBE ORDERS (OUTPATIENT)
Dept: ADMINISTRATIVE | Facility: HOSPITAL | Age: 80
End: 2025-07-02
Payer: MEDICARE

## 2025-07-02 DIAGNOSIS — R74.8 ABNORMAL LEVELS OF OTHER SERUM ENZYMES: Primary | ICD-10-CM

## 2025-07-02 DIAGNOSIS — Z00.00 ROUTINE GENERAL MEDICAL EXAMINATION AT A HEALTH CARE FACILITY: ICD-10-CM

## (undated) DEVICE — GAUZE,SPONGE,4"X4",12PLY,STERILE,LF,2'S: Brand: MEDLINE

## (undated) DEVICE — MODEL BT2000 P/N 700287-012KIT CONTENTS: MANIFOLD WITH SALINE AND CONTRAST PORTS, SALINE TUBING WITH SPIKE AND HAND SYRINGE, TRANSDUCER: Brand: BT2000 AUTOMATED MANIFOLD KIT

## (undated) DEVICE — KT NDL GUIDE STRL 18GA

## (undated) DEVICE — PAD MAJOR VASCULAR: Brand: MEDLINE INDUSTRIES, INC.

## (undated) DEVICE — ANTIBACTERIAL UNDYED BRAIDED (POLYGLACTIN 910), SYNTHETIC ABSORBABLE SUTURE: Brand: COATED VICRYL

## (undated) DEVICE — PAD ENDOVASCULAR: Brand: MEDLINE INDUSTRIES, INC.

## (undated) DEVICE — SUT PROLN 4/0 RB1 D/A 36IN 8557H

## (undated) DEVICE — TRAP,MUCUS SPECIMEN, 80CC: Brand: MEDLINE

## (undated) DEVICE — INTENDED FOR TISSUE SEPARATION, AND OTHER PROCEDURES THAT REQUIRE A SHARP SURGICAL BLADE TO PUNCTURE OR CUT.: Brand: BARD-PARKER ®  SAFETY SCALPED

## (undated) DEVICE — 4.0MM PRECISION ROUND

## (undated) DEVICE — TRAP FLD MINIVAC MEGADYNE 100ML

## (undated) DEVICE — CATH FLSH OMNI SFT 5F 90CM

## (undated) DEVICE — BANDAGE,GAUZE,BULKEE II,4.5"X4.1YD,STRL: Brand: MEDLINE

## (undated) DEVICE — ST MIC/INTRO ACC SHRP/NDL TUNG/TP NITNL 5F 45CM 7CM

## (undated) DEVICE — 3M™ IOBAN™ 2 ANTIMICROBIAL INCISE DRAPE 6651EZ: Brand: IOBAN™ 2

## (undated) DEVICE — SYR LL TP 10ML STRL

## (undated) DEVICE — TRY PREP SCRB VAG PVP

## (undated) DEVICE — ADAPT SWVL FIBROPTIC BRONCH

## (undated) DEVICE — GLV SURG BIOGEL LTX PF 7 1/2

## (undated) DEVICE — NDL SPINE 18G 31/2IN PNK

## (undated) DEVICE — BALN EVERCROSS OTW .035 5F 5X20135

## (undated) DEVICE — GLV SURG TRIUMPH GREEN W/ALOE PF LTX 8 STRL

## (undated) DEVICE — CVR HNDL LIGHT RIGID

## (undated) DEVICE — KIT,ANTI FOG,W/SPONGE & FLUID,SOFT PACK: Brand: MEDLINE

## (undated) DEVICE — PK TURNOVER RM ADV

## (undated) DEVICE — SYR LUERLOK 20CC BX/50

## (undated) DEVICE — UTILITY MARKER W/MED LABELS: Brand: MEDLINE

## (undated) DEVICE — SINGLE USE SUCTION VALVE MAJ-209: Brand: SINGLE USE SUCTION VALVE (STERILE)

## (undated) DEVICE — DEV CLS VASC MYNXCONTROL 6FTO7F

## (undated) DEVICE — OASIS DRAIN, SINGLE, INLINE & ATS COMPATIBLE: Brand: OASIS

## (undated) DEVICE — ELECTRD BLD EDGE/INSUL1P 2.4X5.1MM STRL

## (undated) DEVICE — PINNACLE R/O II INTRODUCER SHEATH WITH RADIOPAQUE MARKER: Brand: PINNACLE

## (undated) DEVICE — GLV SURG BIOGEL LTX PF 8

## (undated) DEVICE — SPONGE,LAP,12"X12",XR,ST,5/PK,40PK/CS: Brand: MEDLINE

## (undated) DEVICE — SPNG DISSCT CHRRY 3/8IN STRL PK/5

## (undated) DEVICE — SNAP KOVER: Brand: UNBRANDED

## (undated) DEVICE — SUT SILK 2/0 SUTUPAK TIES 24IN SA75H

## (undated) DEVICE — TOTAL TRAY, 16FR 10ML SIL FOLEY, URN: Brand: MEDLINE

## (undated) DEVICE — DISPOSABLE IRRIGATION CASSETTE: Brand: CORE

## (undated) DEVICE — CONN FLX BREATHE CIRCT

## (undated) DEVICE — GLV SURG SENSICARE W/ALOE PF LF 7.5 STRL

## (undated) DEVICE — ENDOPOUCH RETRIEVER SPECIMEN RETRIEVAL BAGS: Brand: ENDOPOUCH RETRIEVER

## (undated) DEVICE — ELECTRD BLD EZ CLN MOD 6.5IN

## (undated) DEVICE — 28 FR STRAIGHT – SOFT PVC CATHETER: Brand: PVC THORACIC CATHETERS

## (undated) DEVICE — APPL CHLORAPREP HI/LITE 26ML ORNG

## (undated) DEVICE — SUT MNCRYL 4/0 PS2 27IN UD MCP426H

## (undated) DEVICE — TBG PRESS EXT

## (undated) DEVICE — 1LYRTR 16FR10ML100%SIL UMS SNP: Brand: MEDLINE INDUSTRIES, INC.

## (undated) DEVICE — GAUZE,SPONGE,4"X4",16PLY,XRAY,STRL,LF: Brand: MEDLINE

## (undated) DEVICE — A2000 MULTI-USE SYRINGE KIT, P/N 701277-003KIT CONTENTS: 100ML CONTRAST RESERVOIR AND TUBING WITH CONTRAST SPIKE AND CLAMP: Brand: A2000 MULTI-USE SYRINGE KIT

## (undated) DEVICE — CATH IV ANGIO FEP 12G 3IN LTBLU 10PK

## (undated) DEVICE — DRP C/ARMOR

## (undated) DEVICE — GLV SURG BIOGEL LTX PF 6 1/2

## (undated) DEVICE — SYR LUERLOK 30CC

## (undated) DEVICE — SUT SILK 3/0 SUTUPAK TIES 24IN SA74H

## (undated) DEVICE — PK SPINE CERV ANT 30

## (undated) DEVICE — SUT NONABS PROLENE TPR/HEMOSEAL DBL/ARM HS/7 5/0 60CM BLU

## (undated) DEVICE — DRP SPECIAL PROC 4PC W/FC POUCH

## (undated) DEVICE — BG BANDED WRUBBER BAND AND TP 36X54IN

## (undated) DEVICE — CLTH CLENS READYCLEANSE PERI CARE PK/5

## (undated) DEVICE — DECANTER: Brand: UNBRANDED

## (undated) DEVICE — DRSNG SURESITE WNDW 4X4.5

## (undated) DEVICE — SUT VIC 0 UR6 27IN VCP603H

## (undated) DEVICE — EMG TUBE 8229708 NIM TRIVANTAGE 8.0MM ID: Brand: NIM TRIVANTAGE™

## (undated) DEVICE — PIN DISTRACT TI 14MM STRL

## (undated) DEVICE — HALTR TRACT HD CERV STD UNIV

## (undated) DEVICE — RADIFOCUS GLIDEWIRE ADVANTAGE GUIDEWIRE: Brand: GLIDEWIRE ADVANTAGE

## (undated) DEVICE — TBG SMPL FLTR LINE NASL 02/C02 A/ BX/100

## (undated) DEVICE — KT ASP VIZISHOT 5SYRG 5BIOPSY/VLV 22G

## (undated) DEVICE — PROXIMATE RH ROTATING HEAD SKIN STAPLERS (35 WIDE) CONTAINS 35 STAINLESS STEEL STAPLES: Brand: PROXIMATE

## (undated) DEVICE — ADHS LIQ MASTISOL 2/3ML

## (undated) DEVICE — PAD MINOR UNIVERSAL: Brand: MEDLINE INDUSTRIES, INC.

## (undated) DEVICE — SINGLE USE BIOPSY VALVE MAJ-210: Brand: SINGLE USE BIOPSY VALVE (STERILE)

## (undated) DEVICE — CONTAINER,SPECIMEN,OR STERILE,4OZ: Brand: MEDLINE

## (undated) DEVICE — SENSR O2 OXIMAX FNGR A/ 18IN NONSTR

## (undated) DEVICE — MODEL AT P65, P/N 701554-001KIT CONTENTS: HAND CONTROLLER, 3-WAY HIGH-PRESSURE STOPCOCK WITH ROTATING END AND PREMIUM HIGH-PRESSURE TUBING: Brand: ANGIOTOUCH® KIT

## (undated) DEVICE — SUT SILK 2/0 SH 30IN K833H

## (undated) DEVICE — STRIP,CLOSURE,WOUND,MEDI-STRIP,1/2X4: Brand: MEDLINE

## (undated) DEVICE — INFLATION DEVICE: Brand: ENCORE™ 26

## (undated) DEVICE — SUT SILK 4/0 SUTUPAK TIES 24IN SA73H

## (undated) DEVICE — 5.0MM BARREL STRAIGHT FLUTE

## (undated) DEVICE — ECHELON 3000 45 STANDARD: Brand: ECHELON

## (undated) DEVICE — Device: Brand: BALLOON

## (undated) DEVICE — THE CHANNEL CLEANING BRUSH IS A NYLON FLEXI BRUSH ATTACHED TO A FLEXIBLE PLASTIC SHEATH DESIGNED TO SAFELY REMOVE DEBRIS FROM FLEXIBLE ENDOSCOPES.

## (undated) DEVICE — PAD,NON-ADHERENT,3X8,STERILE,LF,1/PK: Brand: MEDLINE

## (undated) DEVICE — COVER,MAYO STAND,STERILE: Brand: MEDLINE

## (undated) DEVICE — SUT SILK 2/0 FS BLK 18IN 685G

## (undated) DEVICE — DRAPE,UTILITY,TAPE,15X26,STERILE: Brand: MEDLINE

## (undated) DEVICE — DISSCT SECTO 1PC 1P/U 5MMX35CM STRL

## (undated) DEVICE — SUT SILK 0 SUTUPAK TIES 24IN SA76G